# Patient Record
Sex: FEMALE | Race: WHITE | Employment: OTHER | URBAN - METROPOLITAN AREA
[De-identification: names, ages, dates, MRNs, and addresses within clinical notes are randomized per-mention and may not be internally consistent; named-entity substitution may affect disease eponyms.]

---

## 2017-03-09 ENCOUNTER — HOSPITAL ENCOUNTER (OUTPATIENT)
Dept: LAB | Age: 67
Discharge: HOME | End: 2017-03-09
Payer: MEDICARE

## 2017-03-09 DIAGNOSIS — I25.10 ATHEROSCLEROTIC HEART DISEASE OF NATIVE CORONARY ARTERY WITHOUT ANGINA PECTORIS: ICD-10-CM

## 2017-03-09 DIAGNOSIS — E78.5 HYPERLIPEMIA: ICD-10-CM

## 2017-03-09 LAB
ALBUMIN SERPL BCP-MCNC: 3.7 G/DL (ref 3.4–5)
ALBUMIN/GLOB SERPL: 1.1 {RATIO} (ref 0.8–1.7)
ALP SERPL-CCNC: 170 U/L (ref 45–117)
ALT SERPL-CCNC: 20 U/L (ref 13–56)
AST SERPL W P-5'-P-CCNC: 21 U/L (ref 15–37)
BILIRUB DIRECT SERPL-MCNC: 0.2 MG/DL (ref 0–0.2)
BILIRUB SERPL-MCNC: 0.5 MG/DL (ref 0.2–1)
CHOLEST SERPL-MCNC: 158 MG/DL
CK SERPL-CCNC: 117 U/L (ref 26–192)
GLOBULIN SER CALC-MCNC: 3.4 G/DL (ref 2–4)
HDLC SERPL-MCNC: 58 MG/DL (ref 40–60)
HDLC SERPL: 2.7 {RATIO} (ref 0–5)
LDLC SERPL CALC-MCNC: 84.4 MG/DL (ref 0–100)
LIPID PROFILE,FLP: NORMAL
PROT SERPL-MCNC: 7.1 G/DL (ref 6.4–8.2)
TRIGL SERPL-MCNC: 78 MG/DL (ref ?–150)
VLDLC SERPL CALC-MCNC: 15.6 MG/DL

## 2017-03-09 PROCEDURE — 83516 IMMUNOASSAY NONANTIBODY: CPT | Performed by: INTERNAL MEDICINE

## 2017-03-09 PROCEDURE — 80076 HEPATIC FUNCTION PANEL: CPT | Performed by: INTERNAL MEDICINE

## 2017-03-09 PROCEDURE — 36415 COLL VENOUS BLD VENIPUNCTURE: CPT | Performed by: INTERNAL MEDICINE

## 2017-03-09 PROCEDURE — 82103 ALPHA-1-ANTITRYPSIN TOTAL: CPT | Performed by: INTERNAL MEDICINE

## 2017-03-09 PROCEDURE — 84080 ASSAY ALKALINE PHOSPHATASES: CPT | Performed by: INTERNAL MEDICINE

## 2017-03-09 PROCEDURE — 82550 ASSAY OF CK (CPK): CPT | Performed by: INTERNAL MEDICINE

## 2017-03-09 PROCEDURE — 80061 LIPID PANEL: CPT | Performed by: INTERNAL MEDICINE

## 2017-03-09 PROCEDURE — 86038 ANTINUCLEAR ANTIBODIES: CPT | Performed by: INTERNAL MEDICINE

## 2017-03-10 LAB
A1AT SERPL-MCNC: 173 MG/DL (ref 90–200)
ACTIN IGG SERPL-ACNC: 8 UNITS (ref 0–19)
ALP BONE CFR SERPL: 29 % (ref 14–68)
ALP INTEST CFR SERPL: 0 % (ref 0–18)
ALP LIVER CFR SERPL: 71 % (ref 18–85)
ALP SERPL-CCNC: 156 IU/L (ref 39–117)
ANA SER QL: POSITIVE
MITOCHONDRIA M2 IGG SER-ACNC: 6.3 UNITS (ref 0–20)

## 2017-05-22 ENCOUNTER — OFFICE VISIT (OUTPATIENT)
Dept: CARDIOLOGY CLINIC | Age: 67
End: 2017-05-22

## 2017-05-22 VITALS
OXYGEN SATURATION: 97 % | WEIGHT: 199 LBS | HEART RATE: 77 BPM | DIASTOLIC BLOOD PRESSURE: 76 MMHG | SYSTOLIC BLOOD PRESSURE: 112 MMHG | HEIGHT: 62 IN | BODY MASS INDEX: 36.62 KG/M2

## 2017-05-22 DIAGNOSIS — I34.0 NON-RHEUMATIC MITRAL REGURGITATION: ICD-10-CM

## 2017-05-22 DIAGNOSIS — E78.5 DYSLIPIDEMIA: ICD-10-CM

## 2017-05-22 DIAGNOSIS — I25.10 CORONARY ARTERY DISEASE INVOLVING NATIVE CORONARY ARTERY OF NATIVE HEART WITHOUT ANGINA PECTORIS: Primary | ICD-10-CM

## 2017-05-22 DIAGNOSIS — Z98.890 S/P CARDIAC CATHETERIZATION: ICD-10-CM

## 2017-05-22 RX ORDER — DICYCLOMINE HYDROCHLORIDE 10 MG/1
10 CAPSULE ORAL
COMMUNITY
End: 2018-11-01

## 2017-05-22 NOTE — PROGRESS NOTES
1. Have you been to the ER, urgent care clinic since your last visit? Hospitalized since your last visit? No     2. Have you seen or consulted any other health care providers outside of the [address] [place] [address] since your last visit? Include any pap smears or colon screening.   No

## 2017-05-22 NOTE — PROGRESS NOTES
HISTORY OF PRESENT ILLNESS  Yanci Castillo is a 74 y.o. female. Shortness of Breath   Pertinent negatives include no fever, no headaches, no cough, no wheezing, no PND, no orthopnea, no chest pain, no vomiting, no abdominal pain, no rash, no leg swelling and no claudication. Patient presents for a follow-up office visit. She has a history of dyslipidemia and a family history for CAD. The patient had an episode of fairly significant back pain in January 2014 that awoke her from sleep, it radiated around to the front of her chest up into her neck and down both arms associated with tingling and numbness. he patient subsequently underwent a cardiac catheterization in February 2014, which showed a high-grade stenosis of her mid left circumflex extending into obtuse marginal branch 1. She was continued to complain of exertional dyspnea and some chest pain between her shoulder blades, which is felt to be her anginal equivalent. She was also afraid to do any exertional activity because of her high-grade left circumflex stenosis, so she underwent scheduled percutaneous coronary intervention over her left circumflex lesion with a drug-eluting stent In July 2014. She underwent a followup echocardiogram in April 2016 which showed overall improvement of her well ejection fraction of 55-60% without mention of any regional wall motion abnormalities. Her mitral regurgitation was in the moderate range, which may have been minimally increased compared to the prior study. The patient was last seen in the office approximately 6 months ago. Since last visit, she continues have exertional dyspnea, but states her activity tolerance is unchanged. Overall she feels her dyspnea has not significantly changed over the past 6-12 months. No new chest pain or pressure. No leg swelling, orthopnea, or PND.     Past Medical History:   Diagnosis Date    Arthritis     CAD (coronary artery disease), native coronary artery 7/2014    ULISES to LCx    Cardiac cath 07/07/2014    RCA 15%. LM patent. mLAD 30-50%. CX/OM1 85% (2.75 x 26-mm Resolute ULISES, resid 0%).  Cardiac echocardiogram 04/05/2016    EF 55-60%. No WMA. Gr 1 DDfx. Mod MR.  Cardiac nuclear imaging test, abnormal 02/05/2014    Mod-sized, mild-mod mid lateral infarction w/mild-mod rosa isela-infarct ischemia. Mod lateral hypk. EF 56%. Normal EKG on pharm stress test.  Intermediate risk.  Dyslipidemia     Gallstone     Hernia     Obesity     Sinus infection       Current Outpatient Prescriptions   Medication Sig Dispense Refill    dicyclomine (BENTYL) 10 mg capsule Take 10 mg by mouth daily as needed.  rosuvastatin (CRESTOR) 20 mg tablet Take 20 mg by mouth nightly.  metoprolol succinate (TOPROL-XL) 50 mg XL tablet TAKE 1 TABLET BY MOUTH DAILY 90 Tab 3    mometasone 100 mcg/actuation HFAA Take 100 mcg by inhalation daily.  nitroglycerin (NITROSTAT) 0.4 mg SL tablet 1 Tab by SubLINGual route every five (5) minutes as needed for Chest Pain. 1 Bottle 3    aspirin delayed-release 81 mg tablet Take 162 mg by mouth daily.  desloratadine-pseudoephedrine (CLARINEX-D 12 HOUR) 2.5-120 mg per tablet Take 1 Tab by mouth two (2) times a day.  montelukast (SINGULAIR) 10 mg tablet Take 10 mg by mouth daily.  pyridoxine (VITAMIN B-6) 100 mg tablet Take 100 mg by mouth daily.  calcium 500 mg tab Take  by mouth.  DICLOFENAC SODIUM/MISOPROSTOL (ARTHROTEC 50 PO) Take  by mouth. Allergies   Allergen Reactions    Celebrex [Celecoxib] Swelling      Social History   Substance Use Topics    Smoking status: Never Smoker    Smokeless tobacco: Never Used    Alcohol use No            Review of Systems   Constitutional: Negative for chills, fever and weight loss. HENT: Negative for nosebleeds. Eyes: Negative for blurred vision and double vision. Respiratory: Positive for shortness of breath.  Negative for cough and wheezing. Cardiovascular: Negative for chest pain, palpitations, orthopnea, claudication, leg swelling and PND. Gastrointestinal: Negative for abdominal pain, heartburn, nausea and vomiting. Genitourinary: Negative for dysuria and hematuria. Musculoskeletal: Negative for falls and myalgias. Skin: Negative for rash. Neurological: Negative for dizziness, focal weakness and headaches. Endo/Heme/Allergies: Does not bruise/bleed easily. Psychiatric/Behavioral: Negative for substance abuse. Visit Vitals    /76    Pulse 77    Ht 5' 2\" (1.575 m)    Wt 90.3 kg (199 lb)    SpO2 97%    BMI 36.4 kg/m2      Physical Exam   Constitutional: She is oriented to person, place, and time. She appears well-developed and well-nourished. HENT:   Head: Normocephalic and atraumatic. Eyes: Conjunctivae are normal.   Neck: Neck supple. No JVD present. Carotid bruit is not present. Cardiovascular: Normal rate, regular rhythm, S1 normal, S2 normal and normal pulses. Exam reveals no gallop. Murmur heard. High-pitched blowing holosystolic murmur is present with a grade of 2/6  at the apex  Pulmonary/Chest: Effort normal and breath sounds normal. She has no wheezes. She has no rales. Abdominal: Soft. Bowel sounds are normal. There is no tenderness. Musculoskeletal: She exhibits no edema. Neurological: She is alert and oriented to person, place, and time. Skin: Skin is warm and dry. EKG: Normal sinus rhythm, Normal axis, Poor R wave progression, Normal QTc interval, No ST or T wave abnormalities concerning for ischemia. Compared to previous EKG, No significant interval change. ASSESSMENT and PLAN    Coronary artery disease. The patient has a 85-90% stenosis of her mid left circumflex extending into OM1 Which was diagnosed by cardiac catheterization in February 2014. She underwent scheduled PCI In July 2014, with a Resolute drug-eluting stent.  Her symptoms have improved since the intervention. I suspect she did have a small infarction of the inferolateral region in January 2014. Ejection fraction is now back to normal by echocardiogram in 2016, EF 55-60%. She is now taking an aspirin, beta blocker and high-dose potent statin. All of which I would continue. Mitral regurgitation. Non-rheumatic, more likely ischemic in nature. This was moderate on her recent echocardiogram in April 2016. I would like to reevaluate this later this summer with a follow-up echocardiogram.    Dyslipidemia. Patient was recently switched over to Crestor 20 mg daily by her PCP. Her lipids have been reasonably well-controlled on this regimen. I prefer her LDL to be less than 70 if possible. Followup in 6 months, sooner if needed.

## 2017-05-22 NOTE — MR AVS SNAPSHOT
Visit Information Date & Time Provider Department Dept. Phone Encounter #  
 5/22/2017  1:40 PM Jr Cartwright MD [facility] [address] [id number] Your Appointments 11/20/2017  1:20 PM  
Follow Up with Jr Cartwright MD  
[facility] ([facility]) Appt Note: 6 month f/u  
 [address] [place] [address] [address] [address] [address] [phone] [address] Upcoming Health Maintenance Date Due Hepatitis C Screening 1950 DTaP/Tdap/Td series (1 - Tdap) 6/25/1969 BREAST CANCER SCRN MAMMOGRAM 6/25/1998 FOBT Q 1 YEAR AGE 50-75 6/25/1998 ZOSTER VACCINE AGE 60> 6/25/2008 GLAUCOMA SCREENING Q2Y 6/25/2013 OSTEOPOROSIS SCREENING (DEXA) 6/25/2013 Pneumococcal 65+ Low/Medium Risk (1 of 2 - PCV13) 6/25/2013 MEDICARE YEARLY EXAM 6/25/2013 INFLUENZA AGE 9 TO ADULT 8/1/2017 Allergies as of 5/22/2017  Review Complete On: 12/22/2016 By: Demi Dietrich RN Severity Noted Reaction Type Reactions Celebrex [Celecoxib]  10/17/2012    Swelling Current Immunizations  Never Reviewed No immunizations on file. Not reviewed this visit You Were Diagnosed With   
  
 Codes Comments Coronary artery disease involving native coronary artery of native heart without angina pectoris    -  Primary ICD-10-CM: I25.10 ICD-9-CM: 414.01 Non-rheumatic mitral regurgitation     ICD-10-CM: I34.0 ICD-9-CM: 424.0 Dyslipidemia     ICD-10-CM: E78.5 ICD-9-CM: 272.4 S/P cardiac catheterization     ICD-10-CM: Z98.890 ICD-9-CM: V45.89 Vitals BP Pulse Height(growth percentile) Weight(growth percentile) SpO2 BMI  
 112/76 77 5' 2\" (1.575 m) 199 lb (90.3 kg) 97% 36.4 kg/m2 OB Status Smoking Status Postmenopausal Never Smoker Vitals History BMI and BSA Data Body Mass Index Body Surface Area  
 36.4 kg/m 2 1.99 m 2 Preferred Pharmacy Pharmacy Name Phone [facility] [place] [address] [place] [address] [phone] Your Updated Medication List  
  
   
This list is accurate as of: 5/22/17  2:54 PM.  Always use your most recent med list.  
  
  
  
  
 ARTHROTEC 50 PO Take  by mouth. aspirin delayed-release 81 mg tablet Take 162 mg by mouth daily. BENTYL 10 mg capsule Generic drug:  dicyclomine Take 10 mg by mouth daily as needed. calcium 500 mg Tab Take  by mouth. CLARINEX-D 12 HOUR 2.5-120 mg per tablet Generic drug:  desloratadine-pseudoephedrine Take 1 Tab by mouth two (2) times a day. CRESTOR 20 mg tablet Generic drug:  rosuvastatin Take 20 mg by mouth nightly. metoprolol succinate 50 mg XL tablet Commonly known as:  TOPROL-XL  
TAKE 1 TABLET BY MOUTH DAILY  
  
 mometasone 100 mcg/actuation Hfaa Take 100 mcg by inhalation daily. nitroglycerin 0.4 mg SL tablet Commonly known as:  NITROSTAT  
1 Tab by SubLINGual route every five (5) minutes as needed for Chest Pain. SINGULAIR 10 mg tablet Generic drug:  montelukast  
Take 10 mg by mouth daily. VITAMIN B-6 100 mg tablet Generic drug:  pyridoxine (vitamin B6) Take 100 mg by mouth daily. We Performed the Following AMB POC EKG ROUTINE W/ 12 LEADS, INTER & REP [72560 CPT(R)] To-Do List   
 05/22/2017 ECHO:  2D ECHO COMPLETE ADULT (TTE) W OR WO CONTR   
  
 06/12/2017 12:30 PM  
  Appointment with [id number] MACHINE (WT ) at [facility] NON-INVASIVE CARD ([phone]) Age Limit for ALL Heart procedures @ all [facility] facilities: 18 yrs and older only. Under the age of 25, refer to [facility] ([phone]). Wt Limit: 350lbs.   This study requires patient to bring a written physician's order or MD office may fax the order to Central Scheduling at [phone]. Patient needs to bring a current list of all medications. No preparation is required for this study. Patients should report 15 minutes prior to their appointment time to the [facility], [address]. Introducing [facility] SERVICES! Dear Carmen Stephens: Thank you for requesting a [employer] account. Our records indicate that you already have an active [employer] account. You can access your account anytime at [email or web address] Did you know that you can access your hospital and ER discharge instructions at any time in [employer] You can also review all of your test results from your hospital stay or ER visit. Additional Information If you have questions, please visit the Frequently Asked Questions section of the [employer] website at [email or web address] Remember, [employer] is NOT to be used for urgent needs. For medical emergencies, dial 911. Now available from your iPhone and Android! Please provide this summary of care documentation to your next provider. Your primary care clinician is listed as Sada Lew. If you have any questions after today's visit, please call [phone].

## 2017-06-12 ENCOUNTER — HOSPITAL ENCOUNTER (OUTPATIENT)
Dept: NON INVASIVE DIAGNOSTICS | Age: 67
Discharge: HOME | End: 2017-06-12
Attending: INTERNAL MEDICINE
Payer: MEDICARE

## 2017-06-12 DIAGNOSIS — I34.0 NON-RHEUMATIC MITRAL REGURGITATION: ICD-10-CM

## 2017-06-12 PROCEDURE — 93306 TTE W/DOPPLER COMPLETE: CPT

## 2017-06-23 ENCOUNTER — TELEPHONE (OUTPATIENT)
Dept: CARDIOLOGY CLINIC | Age: 67
End: 2017-06-23

## 2017-06-23 NOTE — TELEPHONE ENCOUNTER
Patient was informed that her mitral regurgitation remains in the moderate range and was unchanged from last year.    Patient verbalized understanding to results

## 2017-06-23 NOTE — TELEPHONE ENCOUNTER
----- Message from Jairon Alston MD sent at 6/15/2017  1:41 PM EDT -----  Please let the patient know that her mitral regurgitation remains in the moderate range and was unchanged from last year.  ----- Message -----     From: Christie Romo LPN     Sent: 8/66/2782   4:09 PM       To: Jairon Alston MD    Per last note: Mitral regurgitation. Non-rheumatic, more likely ischemic in nature. This was moderate on her recent echocardiogram in April 2016.   I would like to reevaluate this later this summer with a follow-up echocardiogram.

## 2017-10-11 ENCOUNTER — HOSPITAL ENCOUNTER (OUTPATIENT)
Dept: BONE DENSITY | Age: 67
Discharge: HOME | End: 2017-10-11
Attending: ADVANCED PRACTICE MIDWIFE
Payer: MEDICARE

## 2017-10-11 DIAGNOSIS — M81.0 OSTEOPOROSIS: ICD-10-CM

## 2017-10-11 PROCEDURE — 77080 DXA BONE DENSITY AXIAL: CPT

## 2017-11-20 ENCOUNTER — OFFICE VISIT (OUTPATIENT)
Dept: CARDIOLOGY CLINIC | Age: 67
End: 2017-11-20

## 2017-11-20 VITALS
HEART RATE: 68 BPM | DIASTOLIC BLOOD PRESSURE: 70 MMHG | BODY MASS INDEX: 35.88 KG/M2 | OXYGEN SATURATION: 98 % | HEIGHT: 62 IN | WEIGHT: 195 LBS | SYSTOLIC BLOOD PRESSURE: 110 MMHG

## 2017-11-20 DIAGNOSIS — E78.5 DYSLIPIDEMIA: ICD-10-CM

## 2017-11-20 DIAGNOSIS — I34.0 NON-RHEUMATIC MITRAL REGURGITATION: ICD-10-CM

## 2017-11-20 DIAGNOSIS — I25.10 CORONARY ARTERY DISEASE INVOLVING NATIVE CORONARY ARTERY OF NATIVE HEART WITHOUT ANGINA PECTORIS: Primary | ICD-10-CM

## 2017-11-20 NOTE — MR AVS SNAPSHOT
Visit Information Date & Time Provider Department Dept. Phone Encounter #  
 11/20/2017  1:20 PM Amadou Nassar MD [facility] [address] [id number] Your Appointments 11/19/2018  1:00 PM  
Follow Up with Amadou Nassar MD  
[facility] ([address]) Appt Note: 1 year follow up with an EKG  
 [facility] [address] [address] [address] [phone] [address] [place] [address] [address] Upcoming Health Maintenance Date Due Hepatitis C Screening 1950 DTaP/Tdap/Td series (1 - Tdap) 6/25/1969 BREAST CANCER SCRN MAMMOGRAM 6/25/1998 FOBT Q 1 YEAR AGE 50-75 6/25/1998 ZOSTER VACCINE AGE 60> 4/25/2008 GLAUCOMA SCREENING Q2Y 6/25/2013 Pneumococcal 65+ Low/Medium Risk (1 of 2 - PCV13) 6/25/2013 MEDICARE YEARLY EXAM 6/25/2013 Influenza Age 5 to Adult 8/1/2017 Allergies as of 11/20/2017  Review Complete On: 5/22/2017 By: Amadou Nassar MD  
  
 Severity Noted Reaction Type Reactions Celebrex [Celecoxib]  10/17/2012    Swelling Current Immunizations  Never Reviewed No immunizations on file. Not reviewed this visit You Were Diagnosed With   
  
 Codes Comments Coronary artery disease involving native coronary artery of native heart without angina pectoris    -  Primary ICD-10-CM: I25.10 ICD-9-CM: 414.01 Dyslipidemia     ICD-10-CM: E78.5 ICD-9-CM: 272.4 Non-rheumatic mitral regurgitation     ICD-10-CM: I34.0 ICD-9-CM: 424.0 Vitals BP Pulse Height(growth percentile) Weight(growth percentile) SpO2 BMI  
 110/70 68 5' 2\" (1.575 m) 195 lb (88.5 kg) 98% 35.67 kg/m2 OB Status Smoking Status Postmenopausal Never Smoker Vitals History BMI and BSA Data Body Mass Index Body Surface Area  
 35.67 kg/m 2 1.97 m 2 Preferred Pharmacy Pharmacy Name Phone [facility] DR[facility]ORE [facility] [place] [address] [facility] [phone] Your Updated Medication List  
  
   
This list is accurate as of: 11/20/17  2:13 PM.  Always use your most recent med list.  
  
  
  
  
 ARTHROTEC 50 PO Take  by mouth. aspirin delayed-release 81 mg tablet Take 162 mg by mouth daily. BENTYL 10 mg capsule Generic drug:  dicyclomine Take 10 mg by mouth daily as needed. calcium 500 mg Tab Take  by mouth. CLARINEX-D 12 HOUR 2.5-120 mg per tablet Generic drug:  desloratadine-pseudoephedrine Take 1 Tab by mouth two (2) times a day. CRESTOR 20 mg tablet Generic drug:  rosuvastatin Take 20 mg by mouth nightly. metoprolol succinate 50 mg XL tablet Commonly known as:  TOPROL-XL  
TAKE 1 TABLET BY MOUTH DAILY  
  
 mometasone 100 mcg/actuation Hfaa Take 100 mcg by inhalation daily. nitroglycerin 0.4 mg SL tablet Commonly known as:  NITROSTAT  
1 Tab by SubLINGual route every five (5) minutes as needed for Chest Pain. SINGULAIR 10 mg tablet Generic drug:  montelukast  
Take 10 mg by mouth daily. VITAMIN B-6 100 mg tablet Generic drug:  pyridoxine (vitamin B6) Take 100 mg by mouth daily. We Performed the Following AMB POC EKG ROUTINE W/ 12 LEADS, INTER & REP [72298 CPT(R)] To-Do List   
 10/01/2018 ECHO:  2D ECHO COMPLETE ADULT (TTE) W OR WO CONTR Introducing [facility]! Dear Petra Melchor: Thank you for requesting a [employer] account. Our records indicate that you already have an active [employer] account. You can access your account anytime at [email or web address] Did you know that you can access your hospital and ER discharge instructions at any time in [employer]? You can also review all of your test results from your hospital stay or ER visit. Additional Information If you have questions, please visit the Frequently Asked Questions section of the [employer] website at [email or web address] Remember, [employer] is NOT to be used for urgent needs. For medical emergencies, dial 911. Now available from your iPhone and Android! Please provide this summary of care documentation to your next provider. Your primary care clinician is listed as Glynn Hall. If you have any questions after today's visit, please call [phone].

## 2017-11-20 NOTE — PROGRESS NOTES
1. Have you been to the ER, urgent care clinic since your last visit? Hospitalized since your last visit? No     2. Have you seen or consulted any other health care providers outside of the [address] [place] [address] since your last visit? Include any pap smears or colon screening.  No

## 2017-11-20 NOTE — PROGRESS NOTES
HISTORY OF PRESENT ILLNESS  Timothy Najera is a 69 y.o. female. Shortness of Breath   Pertinent negatives include no fever, no headaches, no cough, no wheezing, no PND, no orthopnea, no chest pain, no vomiting, no abdominal pain, no rash, no leg swelling and no claudication. Patient presents for a follow-up office visit. The patient had an episode of fairly significant back pain in January 2014 that awoke her from sleep, it radiated around to the front of her chest up into her neck and down both arms associated with tingling and numbness. She subsequently underwent a cardiac catheterization in February 2014, which showed a high-grade stenosis of her mid left circumflex extending into obtuse marginal branch 1. She was continued to complain of exertional dyspnea and some chest pain between her shoulder blades, which is felt to be her anginal equivalent. She was also afraid to do any exertional activity because of her high-grade left circumflex stenosis, so she underwent scheduled percutaneous coronary intervention over her left circumflex lesion with a drug-eluting stent In July 2014. She underwent a followup echocardiogram in April 2016 which showed overall improvement of her well ejection fraction of 55-60% without mention of any regional wall motion abnormalities. Her mitral regurgitation was in the moderate range, which may have been minimally increased compared to the prior study. The patient then underwent a follow-up echocardiogram in June 2017 which showed preserved LV systolic function, EF 43%, evidence of diastolic dysfunction, mitral valve prolapse with moderate mitral regurgitation, essentially unchanged compared to the study from 2016. She was last seen in the office 6 months ago. Since that time, she has been feeling well. She continues to have exertional dyspnea which has been present for several years and has not significantly changed.   She denies any new chest pain or pressure. No leg swelling, orthopnea, or PND. No palpitations, dizziness, nor syncope. Past Medical History:   Diagnosis Date    Arthritis     CAD (coronary artery disease), native coronary artery 7/2014    ULISES to LCx    Cardiac cath 07/07/2014    RCA 15%. LM patent. mLAD 30-50%. CX/OM1 85% (2.75 x 26-mm Resolute ULISES, resid 0%).  Cardiac echocardiogram 04/05/2016    EF 55-60%. No WMA. Gr 1 DDfx. Mod MR.  Cardiac nuclear imaging test, abnormal 02/05/2014    Mod-sized, mild-mod mid lateral infarction w/mild-mod rosa isela-infarct ischemia. Mod lateral hypk. EF 56%. Normal EKG on pharm stress test.  Intermediate risk.  Dyslipidemia     Gallstone     Hernia     Obesity     Sinus infection       Current Outpatient Prescriptions   Medication Sig Dispense Refill    dicyclomine (BENTYL) 10 mg capsule Take 10 mg by mouth daily as needed.  rosuvastatin (CRESTOR) 20 mg tablet Take 20 mg by mouth nightly.  metoprolol succinate (TOPROL-XL) 50 mg XL tablet TAKE 1 TABLET BY MOUTH DAILY 90 Tab 3    mometasone 100 mcg/actuation HFAA Take 100 mcg by inhalation daily.  nitroglycerin (NITROSTAT) 0.4 mg SL tablet 1 Tab by SubLINGual route every five (5) minutes as needed for Chest Pain. 1 Bottle 3    aspirin delayed-release 81 mg tablet Take 162 mg by mouth daily.  desloratadine-pseudoephedrine (CLARINEX-D 12 HOUR) 2.5-120 mg per tablet Take 1 Tab by mouth two (2) times a day.  montelukast (SINGULAIR) 10 mg tablet Take 10 mg by mouth daily.  pyridoxine (VITAMIN B-6) 100 mg tablet Take 100 mg by mouth daily.  calcium 500 mg tab Take  by mouth.  DICLOFENAC SODIUM/MISOPROSTOL (ARTHROTEC 50 PO) Take  by mouth.          Allergies   Allergen Reactions    Celebrex [Celecoxib] Swelling      Social History   Substance Use Topics    Smoking status: Never Smoker    Smokeless tobacco: Never Used    Alcohol use No            Review of Systems   Constitutional: Negative for chills, fever and weight loss. HENT: Negative for nosebleeds. Eyes: Negative for blurred vision and double vision. Respiratory: Positive for shortness of breath. Negative for cough and wheezing. Cardiovascular: Negative for chest pain, palpitations, orthopnea, claudication, leg swelling and PND. Gastrointestinal: Negative for abdominal pain, heartburn, nausea and vomiting. Genitourinary: Negative for dysuria and hematuria. Musculoskeletal: Negative for falls and myalgias. Skin: Negative for rash. Neurological: Negative for dizziness, focal weakness and headaches. Endo/Heme/Allergies: Does not bruise/bleed easily. Psychiatric/Behavioral: Negative for substance abuse. Visit Vitals    /70    Pulse 68    Ht 5' 2\" (1.575 m)    Wt 88.5 kg (195 lb)    SpO2 98%    BMI 35.67 kg/m2      Physical Exam   Constitutional: She is oriented to person, place, and time. She appears well-developed and well-nourished. HENT:   Head: Normocephalic and atraumatic. Eyes: Conjunctivae are normal.   Neck: Neck supple. No JVD present. Carotid bruit is not present. Cardiovascular: Normal rate, regular rhythm, S1 normal, S2 normal and normal pulses. Exam reveals no gallop. Murmur heard. High-pitched blowing holosystolic murmur is present with a grade of 2/6  at the apex  Pulmonary/Chest: Effort normal and breath sounds normal. She has no wheezes. She has no rales. Abdominal: Soft. Bowel sounds are normal. There is no tenderness. Musculoskeletal: She exhibits no edema. Neurological: She is alert and oriented to person, place, and time. Skin: Skin is warm and dry. EKG: Normal sinus rhythm, Normal axis, Poor R wave progression, Normal QTc interval, No ST or T wave abnormalities concerning for ischemia. Compared to previous EKG, No significant interval change. ASSESSMENT and PLAN    Coronary artery disease.   The patient had a 85-90% stenosis of her mid left circumflex extending into OM1 diagnosed by cardiac catheterization in February 2014. She underwent scheduled PCI In July 2014, with a Resolute drug-eluting stent. Her symptoms have improved since the intervention. I suspect she did have a small infarction of the inferolateral region in January 2014. Ejection fraction is now back to normal by echocardiogram in 2017, EF 60%. She is now taking an aspirin, beta blocker and high-dose potent statin. All of which I would continue. Mitral regurgitation. Non-rheumatic, more likely ischemic versus myxomatous in nature. This was moderate on her recent echocardiogram in June 2017. I would like to reevaluate this annually with follow-up echocardiograms. Dyslipidemia. Patient was recently switched over to Crestor 20 mg daily by her PCP. Her lipids have been reasonably well-controlled on this regimen. I prefer her LDL to be less than 70 if possible. Followup in 12 months, sooner if needed.

## 2017-12-11 RX ORDER — METOPROLOL SUCCINATE 50 MG/1
TABLET, EXTENDED RELEASE ORAL
Qty: 90 TAB | Refills: 3 | Status: SHIPPED | OUTPATIENT
Start: 2017-12-11 | End: 2021-04-05 | Stop reason: SDUPTHER

## 2018-07-26 ENCOUNTER — HOSPITAL ENCOUNTER (OUTPATIENT)
Dept: LAB | Age: 68
Discharge: HOME | End: 2018-07-26
Payer: MEDICARE

## 2018-07-26 DIAGNOSIS — Z72.89 OTHER PROBLEMS RELATED TO LIFESTYLE: ICD-10-CM

## 2018-07-26 DIAGNOSIS — I25.119 ATHEROSCLEROTIC HEART DISEASE OF NATIVE CORONARY ARTERY WITH ANGINA PECTORIS (HCC): ICD-10-CM

## 2018-07-26 DIAGNOSIS — E55.9 VITAMIN D DEFICIENCY: ICD-10-CM

## 2018-07-26 DIAGNOSIS — E78.5 HYPERLIPEMIA: ICD-10-CM

## 2018-07-26 DIAGNOSIS — R73.01 IMPAIRED FASTING GLUCOSE: ICD-10-CM

## 2018-07-26 LAB
25(OH)D3 SERPL-MCNC: 23.9 NG/ML (ref 30–100)
ALBUMIN SERPL-MCNC: 3.5 G/DL (ref 3.4–5)
ALBUMIN/GLOB SERPL: 0.9 {RATIO} (ref 0.8–1.7)
ALP SERPL-CCNC: 124 U/L (ref 45–117)
ALT SERPL-CCNC: 20 U/L (ref 13–56)
ANION GAP SERPL CALC-SCNC: 6 MMOL/L (ref 3–18)
AST SERPL-CCNC: 16 U/L (ref 15–37)
BASOPHILS # BLD: 0.1 K/UL (ref 0–0.1)
BASOPHILS NFR BLD: 1 % (ref 0–2)
BILIRUB SERPL-MCNC: 0.5 MG/DL (ref 0.2–1)
BUN SERPL-MCNC: 15 MG/DL (ref 7–18)
BUN/CREAT SERPL: 18 (ref 12–20)
CALCIUM SERPL-MCNC: 8.5 MG/DL (ref 8.5–10.1)
CHLORIDE SERPL-SCNC: 109 MMOL/L (ref 100–108)
CHOLEST SERPL-MCNC: 151 MG/DL
CO2 SERPL-SCNC: 27 MMOL/L (ref 21–32)
CREAT SERPL-MCNC: 0.82 MG/DL (ref 0.6–1.3)
DIFFERENTIAL METHOD BLD: NORMAL
EOSINOPHIL # BLD: 0.4 K/UL (ref 0–0.4)
EOSINOPHIL NFR BLD: 5 % (ref 0–5)
ERYTHROCYTE [DISTWIDTH] IN BLOOD BY AUTOMATED COUNT: 13.7 % (ref 11.6–14.5)
EST. AVERAGE GLUCOSE BLD GHB EST-MCNC: 134 MG/DL
GLOBULIN SER CALC-MCNC: 3.7 G/DL (ref 2–4)
GLUCOSE SERPL-MCNC: 106 MG/DL (ref 74–99)
HBA1C MFR BLD: 6.3 % (ref 4.2–5.6)
HCT VFR BLD AUTO: 41.8 % (ref 35–45)
HCV AB SER IA-ACNC: 0.12 INDEX
HCV AB SERPL QL IA: NEGATIVE
HCV COMMENT,HCGAC: NORMAL
HDLC SERPL-MCNC: 57 MG/DL (ref 40–60)
HDLC SERPL: 2.6 {RATIO} (ref 0–5)
HGB BLD-MCNC: 13.8 G/DL (ref 12–16)
LDLC SERPL CALC-MCNC: 71.2 MG/DL (ref 0–100)
LIPID PROFILE,FLP: NORMAL
LYMPHOCYTES # BLD: 3.2 K/UL (ref 0.9–3.6)
LYMPHOCYTES NFR BLD: 38 % (ref 21–52)
MCH RBC QN AUTO: 28.6 PG (ref 24–34)
MCHC RBC AUTO-ENTMCNC: 33 G/DL (ref 31–37)
MCV RBC AUTO: 86.5 FL (ref 74–97)
MONOCYTES # BLD: 0.4 K/UL (ref 0.05–1.2)
MONOCYTES NFR BLD: 5 % (ref 3–10)
NEUTS SEG # BLD: 4.2 K/UL (ref 1.8–8)
NEUTS SEG NFR BLD: 51 % (ref 40–73)
PLATELET # BLD AUTO: 267 K/UL (ref 135–420)
PMV BLD AUTO: 11.2 FL (ref 9.2–11.8)
POTASSIUM SERPL-SCNC: 4.6 MMOL/L (ref 3.5–5.5)
PROT SERPL-MCNC: 7.2 G/DL (ref 6.4–8.2)
RBC # BLD AUTO: 4.83 M/UL (ref 4.2–5.3)
SODIUM SERPL-SCNC: 142 MMOL/L (ref 136–145)
TRIGL SERPL-MCNC: 114 MG/DL (ref ?–150)
VLDLC SERPL CALC-MCNC: 22.8 MG/DL
WBC # BLD AUTO: 8.3 K/UL (ref 4.6–13.2)

## 2018-07-26 PROCEDURE — 83036 HEMOGLOBIN GLYCOSYLATED A1C: CPT | Performed by: INTERNAL MEDICINE

## 2018-07-26 PROCEDURE — 36415 COLL VENOUS BLD VENIPUNCTURE: CPT | Performed by: INTERNAL MEDICINE

## 2018-07-26 PROCEDURE — 82306 VITAMIN D 25 HYDROXY: CPT | Performed by: INTERNAL MEDICINE

## 2018-07-26 PROCEDURE — 86803 HEPATITIS C AB TEST: CPT | Performed by: INTERNAL MEDICINE

## 2018-07-26 PROCEDURE — 85025 COMPLETE CBC W/AUTO DIFF WBC: CPT | Performed by: INTERNAL MEDICINE

## 2018-07-26 PROCEDURE — 80053 COMPREHEN METABOLIC PANEL: CPT | Performed by: INTERNAL MEDICINE

## 2018-07-26 PROCEDURE — 80061 LIPID PANEL: CPT | Performed by: INTERNAL MEDICINE

## 2018-08-09 ENCOUNTER — HOSPITAL ENCOUNTER (OUTPATIENT)
Dept: CT IMAGING | Age: 68
Discharge: HOME | End: 2018-08-09
Attending: INTERNAL MEDICINE
Payer: MEDICARE

## 2018-08-09 DIAGNOSIS — R31.29 MICROSCOPIC HEMATURIA: ICD-10-CM

## 2018-08-09 DIAGNOSIS — R35.0 URINARY FREQUENCY: ICD-10-CM

## 2018-08-09 PROCEDURE — 74176 CT ABD & PELVIS W/O CONTRAST: CPT

## 2018-08-21 ENCOUNTER — OFFICE VISIT (OUTPATIENT)
Dept: UROLOGY | Age: 68
End: 2018-08-21

## 2018-08-21 ENCOUNTER — HOSPITAL ENCOUNTER (OUTPATIENT)
Dept: LAB | Age: 68
Discharge: HOME | End: 2018-08-21
Payer: MEDICARE

## 2018-08-21 VITALS
BODY MASS INDEX: 37.17 KG/M2 | HEIGHT: 62 IN | HEART RATE: 76 BPM | WEIGHT: 202 LBS | TEMPERATURE: 97.3 F | OXYGEN SATURATION: 94 % | SYSTOLIC BLOOD PRESSURE: 121 MMHG | DIASTOLIC BLOOD PRESSURE: 68 MMHG

## 2018-08-21 DIAGNOSIS — R31.29 MICROSCOPIC HEMATURIA: Primary | ICD-10-CM

## 2018-08-21 DIAGNOSIS — Z87.442 HISTORY OF KIDNEY STONES: ICD-10-CM

## 2018-08-21 PROBLEM — E66.01 SEVERE OBESITY (BMI 35.0-39.9): Status: ACTIVE | Noted: 2018-08-21

## 2018-08-21 LAB
BILIRUB UR QL STRIP: NEGATIVE
GLUCOSE UR-MCNC: NEGATIVE MG/DL
KETONES P FAST UR STRIP-MCNC: NEGATIVE MG/DL
PH UR STRIP: 5.5 [PH] (ref 4.6–8)
PROT UR QL STRIP: NORMAL
SP GR UR STRIP: 1.03 (ref 1–1.03)
UA UROBILINOGEN AMB POC: NORMAL (ref 0.2–1)
URINALYSIS CLARITY POC: CLEAR
URINALYSIS COLOR POC: YELLOW
URINE BLOOD POC: NORMAL
URINE LEUKOCYTES POC: NEGATIVE
URINE NITRITES POC: NEGATIVE

## 2018-08-21 PROCEDURE — 88112 CYTOPATH CELL ENHANCE TECH: CPT | Performed by: UROLOGY

## 2018-08-21 RX ORDER — DEXTROMETHORPHAN HYDROBROMIDE, GUAIFENESIN 5; 100 MG/5ML; MG/5ML
650 LIQUID ORAL EVERY 8 HOURS
COMMUNITY
End: 2020-11-13

## 2018-08-21 NOTE — PROGRESS NOTES
Ms. Stacia Nichols has a reminder for a \"due or due soon\" health maintenance. I have asked that she contact her primary care provider for follow-up on this health maintenance.

## 2018-08-21 NOTE — PATIENT INSTRUCTIONS

## 2018-08-21 NOTE — PROGRESS NOTES
Chief Complaint   Patient presents with    New Patient    Microscopic Hematuria       HISTORY OF PRESENT ILLNESS:  Freedom Teran is a 77 y.o. female who comes in today with a history of microhematuria. She has never noticed any bleeding herself either on the tissue or on her underclothing. She does have a remote history of gross hematuria about 2 years ago when she passed a ureteral stone. The CT scan brought with her today and which I have reviewed with her shows no evidence of any upper tract stones that I can see. She is currently completely asymptomatic. Her urinalysis though in our office does show some mild proteinuria although her metabolic blood work all looks fine and her renal function studies are completely normal.  She has never been a smoker. She does take aspirin for mild anticoagulants resulting from a cardiac stent being placed about 3 years ago. Past Medical History:   Diagnosis Date    Arthritis     CAD (coronary artery disease), native coronary artery 7/2014    ULISES to LCx    Cardiac cath 07/07/2014    RCA 15%. LM patent. mLAD 30-50%. CX/OM1 85% (2.75 x 26-mm Resolute ULISES, resid 0%).  Cardiac echocardiogram 04/05/2016    EF 55-60%. No WMA. Gr 1 DDfx. Mod MR.  Cardiac nuclear imaging test, abnormal 02/05/2014    Mod-sized, mild-mod mid lateral infarction w/mild-mod rosa isela-infarct ischemia. Mod lateral hypk. EF 56%. Normal EKG on pharm stress test.  Intermediate risk.     Dyslipidemia     Gallstone     Hernia     Obesity     Sinus infection        Past Surgical History:   Procedure Laterality Date    HX CHOLECYSTECTOMY      HX HEART CATHETERIZATION      HX HERNIA REPAIR      HX LYSIS OF ADHESIONS      HX OOPHORECTOMY         Social History   Substance Use Topics    Smoking status: Never Smoker    Smokeless tobacco: Never Used    Alcohol use No       Allergies   Allergen Reactions    Celebrex [Celecoxib] Swelling       Family History   Problem Relation Age of Onset    Hypertension Mother     Cancer Father     Heart Disease Maternal Grandmother        Current Outpatient Prescriptions   Medication Sig Dispense Refill    folic acid/multivit-min/lutein (CENTRUM SILVER PO) Take  by mouth.  multivit-mins 25-folic acid-D3 9-3,602 mg-unit tab Take  by mouth.  acetaminophen (TYLENOL ARTHRITIS PAIN) 650 mg TbER Take 650 mg by mouth every eight (8) hours.  metoprolol succinate (TOPROL-XL) 50 mg XL tablet TAKE 1 TABLET BY MOUTH DAILY 90 Tab 3    dicyclomine (BENTYL) 10 mg capsule Take 10 mg by mouth daily as needed.  rosuvastatin (CRESTOR) 20 mg tablet Take 20 mg by mouth nightly.  mometasone 100 mcg/actuation HFAA Take 100 mcg by inhalation daily.  aspirin delayed-release 81 mg tablet Take 162 mg by mouth daily.  desloratadine-pseudoephedrine (CLARINEX-D 12 HOUR) 2.5-120 mg per tablet Take 1 Tab by mouth two (2) times a day.  DICLOFENAC SODIUM/MISOPROSTOL (ARTHROTEC 50 PO) Take  by mouth.  nitroglycerin (NITROSTAT) 0.4 mg SL tablet 1 Tab by SubLINGual route every five (5) minutes as needed for Chest Pain. 1 Bottle 3    montelukast (SINGULAIR) 10 mg tablet Take 10 mg by mouth daily.  pyridoxine (VITAMIN B-6) 100 mg tablet Take 100 mg by mouth daily.  calcium 500 mg tab Take  by mouth. REVIEW OF SYSTEMS:   Documented on the chart      PHYSICAL EXAMINATION:     Visit Vitals    /68 (BP 1 Location: Left arm, BP Patient Position: Sitting)    Pulse 76    Temp 97.3 °F (36.3 °C) (Oral)    Ht 5' 2\" (1.575 m)    Wt 202 lb (91.6 kg)    SpO2 94%    BMI 36.95 kg/m2     Constitutional: Well developed, well-nourished female in no acute distress. CV:  No peripheral swelling noted  Respiratory: No respiratory distress or difficulties  Abdomen:  Soft and nontender. No masses. No hepatosplenomegaly.  Female: A pelvic exam is not performed today. Skin:  Normal color. No evidence of jaundice. Neuro/Psych:  Patient with appropriate affect. Alert and oriented. Lymphatic:   No enlargement of supraclavicular lymph nodes. Results for orders placed or performed in visit on 08/21/18   AMB POC URINALYSIS DIP STICK AUTO W/O MICRO   Result Value Ref Range    Color (UA POC) Yellow     Clarity (UA POC) Clear     Glucose (UA POC) Negative Negative    Bilirubin (UA POC) Negative Negative    Ketones (UA POC) Negative Negative    Specific gravity (UA POC) 1.030 1.001 - 1.035    Blood (UA POC) Trace Negative    pH (UA POC) 5.5 4.6 - 8.0    Protein (UA POC) 2+ Negative    Urobilinogen (UA POC) 1 mg/dL 0.2 - 1    Nitrites (UA POC) Negative Negative    Leukocyte esterase (UA POC) Negative Negative         REVIEW OF LABS AND IMAGING:      Imaging Report Reviewed? YES      Images Reviewed? YES           Other Lab Data Reviewed? YES    ASSESSMENT:     ICD-10-CM ICD-9-CM    1. Microscopic hematuria R31.29 599.72 AMB POC URINALYSIS DIP STICK AUTO W/O MICRO      CYTOLOGY NON-GYN   2. History of kidney stones Z87.442 V13.01                 PLAN/DISCUSSION: I have reviewed the CT scan with her and gone over those results. I am going to send urine for cytology despite the fact that she has never been a smoker nevertheless on the basis of all AUA guidelines for microhematuria, I think she is going to need a flexible cystoscopy here in the office. 1 of the things that is of note of her urine is that she does have some proteinuria. This may indicate some glomerular dysfunction despite normal renal function. Patient voices understanding and agreement to the plan. Pietro Barton MD on 8/21/2018           Please note: This document has been produced using voice recognition software. Unrecognized errors in transcription may be present.

## 2018-09-06 ENCOUNTER — OFFICE VISIT (OUTPATIENT)
Dept: UROLOGY | Age: 68
End: 2018-09-06

## 2018-09-06 VITALS
SYSTOLIC BLOOD PRESSURE: 125 MMHG | OXYGEN SATURATION: 93 % | BODY MASS INDEX: 37.17 KG/M2 | HEART RATE: 85 BPM | DIASTOLIC BLOOD PRESSURE: 74 MMHG | WEIGHT: 202 LBS | HEIGHT: 62 IN

## 2018-09-06 DIAGNOSIS — R31.29 MICROSCOPIC HEMATURIA: Primary | ICD-10-CM

## 2018-09-06 LAB
BILIRUB UR QL STRIP: NORMAL
GLUCOSE UR-MCNC: NEGATIVE MG/DL
KETONES P FAST UR STRIP-MCNC: NEGATIVE MG/DL
PH UR STRIP: 5.5 [PH] (ref 4.6–8)
PROT UR QL STRIP: NORMAL
SP GR UR STRIP: 1.03 (ref 1–1.03)
UA UROBILINOGEN AMB POC: NORMAL (ref 0.2–1)
URINALYSIS CLARITY POC: CLEAR
URINALYSIS COLOR POC: YELLOW
URINE BLOOD POC: NORMAL
URINE LEUKOCYTES POC: NEGATIVE
URINE NITRITES POC: NEGATIVE

## 2018-09-06 RX ORDER — CHOLECALCIFEROL (VITAMIN D3) 125 MCG
CAPSULE ORAL DAILY
COMMUNITY

## 2018-09-06 RX ORDER — RABEPRAZOLE SODIUM 20 MG/1
TABLET, DELAYED RELEASE ORAL
COMMUNITY
Start: 2018-06-07 | End: 2018-11-01

## 2018-09-06 RX ORDER — CONJUGATED ESTROGENS 0.62 MG/G
CREAM VAGINAL
COMMUNITY
Start: 2018-06-04 | End: 2020-11-13

## 2018-09-06 NOTE — MR AVS SNAPSHOT
[facility] [address] [address] 
[phone] Patient: Manjinder Gonzales MRN: [id number] IEE:[id number] Visit Information Date & Time Provider Department Dept. Phone Encounter #  
 9/6/2018  2:00 PM Ezio Marie Spangler [facility] [phone] Your Appointments 11/19/2018  1:00 PM  
Follow Up with Miladys Bolivar MD  
[facility] [place] [address] ([address] [place] [address]) Appt Note: 1 year follow up with an EKG  
 [place] [address] [address] [address] [phone] [address] [place] [address] [address] Upcoming Health Maintenance Date Due DTaP/Tdap/Td series (1 - Tdap) 6/25/1969 BREAST CANCER SCRN MAMMOGRAM 6/25/1998 FOBT Q 1 YEAR AGE 50-75 6/25/1998 ZOSTER VACCINE AGE 60> 4/25/2008 GLAUCOMA SCREENING Q2Y 6/25/2013 Pneumococcal 65+ Low/Medium Risk (1 of 2 - PCV13) 6/25/2013 MEDICARE YEARLY EXAM 3/14/2018 Influenza Age 5 to Adult 8/1/2018 Allergies as of 9/6/2018  Review Complete On: 9/6/2018 By: Nathaly Perry LPN Severity Noted Reaction Type Reactions Celebrex [Celecoxib]  10/17/2012    Swelling Current Immunizations  Never Reviewed No immunizations on file. Not reviewed this visit You Were Diagnosed With   
  
 Codes Comments Microscopic hematuria    -  Primary ICD-10-CM: R31.29 ICD-9-CM: 599.72 Vitals BP Pulse Height(growth percentile) Weight(growth percentile) SpO2 BMI  
 125/74 (BP 1 Location: Left arm, BP Patient Position: Sitting) 85 5' 2\" (1.575 m) 202 lb (91.6 kg) 93% 36.95 kg/m2 OB Status Smoking Status Postmenopausal Never Smoker Vitals History BMI and BSA Data Body Mass Index Body Surface Area  
 36.95 kg/m 2 2 m 2 Preferred Pharmacy Pharmacy Name Phone [facility] [place] [address] [place] [address] [phone] Your Updated Medication List  
  
   
This list is accurate as of 9/6/18  2:44 PM.  Always use your most recent med list.  
  
  
  
  
 ARTHROTEC 50 PO Take  by mouth. aspirin delayed-release 81 mg tablet Take 162 mg by mouth daily. BENTYL 10 mg capsule Generic drug:  dicyclomine Take 10 mg by mouth daily as needed. calcium 500 mg Tab Take  by mouth. CENTRUM SILVER PO Take  by mouth. CLARINEX-D 12 HOUR 2.5-120 mg per tablet Generic drug:  desloratadine-pseudoephedrine Take 1 Tab by mouth two (2) times a day. CRESTOR 20 mg tablet Generic drug:  rosuvastatin Take 20 mg by mouth nightly. metoprolol succinate 50 mg XL tablet Commonly known as:  TOPROL-XL  
TAKE 1 TABLET BY MOUTH DAILY  
  
 mometasone 100 mcg/actuation Hfaa Take 100 mcg by inhalation daily. multivit-mins 25-folic acid-D3 6-4,218 mg-unit Tab Take  by mouth. nitroglycerin 0.4 mg SL tablet Commonly known as:  NITROSTAT  
1 Tab by SubLINGual route every five (5) minutes as needed for Chest Pain. PREMARIN 0.625 mg/gram vaginal cream  
Generic drug:  conjugated estrogens RABEprazole 20 mg tablet Commonly known as:  ACIPHEX  
  
 SINGULAIR 10 mg tablet Generic drug:  montelukast  
Take 10 mg by mouth daily. TYLENOL ARTHRITIS PAIN 650 mg [place] Generic drug:  acetaminophen Take 650 mg by mouth every eight (8) hours. VITAMIN B-6 100 mg tablet Generic drug:  pyridoxine (vitamin B6) Take 100 mg by mouth daily. VITAMIN D3 2,000 unit Tab Generic drug:  cholecalciferol (vitamin D3) Take  by mouth. We Performed the Following AMB POC URINALYSIS DIP STICK AUTO W/O MICRO [73200 CPT(R)] CYSTOSCOPY [06478 CPT(R)] To-Do List   
 11/07/2018 12:30 PM  
 Appointment with [facility] S[facility] at [facility]-INVAS[facility] ([phone]) Age Limit for ALL Heart procedures @ all [employer] facilities: 18 yrs and older only. Under the age of 25, refer to [facility] ([phone]). Wt Limit: 350lbs. This study requires patient to bring a written physician's order or MD office may fax the order to Central Scheduling at [phone]. Patient needs to bring a current list of all medications. No preparation is required for this study. Patients should report 15 minutes prior to their appointment time to the [facility], [address] [facility]/[address]. Patient Instructions Blood in the Urine: Care Instructions Your Care Instructions Blood in the urine, or hematuria, may make the urine look red, brown, or pink. There may be blood every time you urinate or just from time to time. You cannot always see blood in the urine, but it will show up in a urine test. 
Blood in the urine may be serious. It should always be checked by a doctor. Your doctor may recommend more tests, including an X-ray, a CT scan, or a cystoscopy (which lets a doctor look inside the urethra and bladder). Blood in the urine can be a sign of another problem. Common causes are bladder infections and kidney stones. An injury to your groin or your genital area can also cause bleeding in the urinary tract. Very hard exercise-such as running a marathon-can cause blood in the urine. Blood in the urine can also be a sign of kidney disease or cancer in the bladder or kidney. Many cases of blood in the urine are caused by a harmless condition that runs in families. This is called benign familial hematuria. It does not need any treatment. Sometimes your urine may look red or brown even though it does not contain blood.  For example, not getting enough fluids (dehydration), taking certain medicines, or having a liver problem can change the color of your urine. Eating foods such as beets, rhubarb, or blackberries or foods with red food coloring can make your urine look red or pink. Follow-up care is a key part of your treatment and safety. Be sure to make and go to all appointments, and call your doctor if you are having problems. It's also a good idea to know your test results and keep a list of the medicines you take. When should you call for help? Call your doctor now or seek immediate medical care if: 
  · You have symptoms of a urinary infection. For example: ¨ You have pus in your urine. ¨ You have pain in your back just below your rib cage. This is called flank pain. ¨ You have a fever, chills, or body aches. ¨ It hurts to urinate. ¨ You have groin or belly pain.  
  · You have more blood in your urine.  
 Watch closely for changes in your health, and be sure to contact your doctor if: 
  · You have new urination problems.  
  · You do not get better as expected. Where can you learn more? Go to http://ofelia-yamilex.info/. Enter J391 in the search box to learn more about \"Blood in the Urine: Care Instructions. \" Current as of: May 12, 2017 Content Version: 11.7 © 8085-8733 [employer]. Care instructions adapted under license by AngelPrime (which disclaims liability or warranty for this information). If you have questions about a medical condition or this instruction, always ask your healthcare professional. [place] [address] any warranty or liability for your use of this information. Cystoscopy: Care Instructions Your Care Instructions Cystoscopy is a test. It uses a thin, lighted tube called a cystoscope to see the inside of the bladder and the urethra. The urethra is the tube that carries urine out of the body. This test is helpful because it lets your doctor see areas of your bladder and urethra that are hard to see on X-rays.  It can help your doctor find bladder stones, tumors, bleeding, and infection. During this test, your doctor also can take tissue and urine samples. And if your doctor finds small stones or growths, he or she can remove them. In most cases the scope is in the bladder for less than 10 minutes. But the entire test may take 45 minutes or longer. You will probably get local anesthesia. This numbs a small part of your body. Or you may get spinal anesthesia, which numbs more of your body. Once in a while, doctors use general anesthesia. It makes you sleep during surgery. If you get a local anesthetic, you may be able to get up right after the test. But if you had spinal or general anesthesia, you will stay in the recovery room until you are able to walk or you have feeling again in your lower body. This usually takes about an hour. Your doctor may be able to tell you some of the results right after the test. But the complete results may take several days. Follow-up care is a key part of your treatment and safety. Be sure to make and go to all appointments, and call your doctor if you are having problems. It's also a good idea to know your test results and keep a list of the medicines you take. How can you care for yourself at home? Before the test 
· If you are having a local anesthetic, you can eat and drink before the test. 
· If you are having a spinal or general anesthetic, do not eat or drink anything for at least 8 hours before the test. Tell your doctor what medicines you take. · If you are not staying overnight in the hospital, make sure you have someone who can drive you home after the test. 
After the test 
· If your doctor prescribed antibiotics, take them as directed. Do not stop taking them just because you feel better. You need to take the full course of antibiotics. · You may have some burning when you urinate for a day or two after the test. You may feel better if you drink more fluids. This may also help prevent an infection. · Your urine may have a pinkish color for a few days after the test. 
When should you call for help? Call your doctor now or seek immediate medical care if: 
  · Your urine is still red or you see blood clots after you have urinated several times.  
  · You cannot pass urine 8 hours after the test.  
  · You get a fever or chills.  
  · You have pain in your belly or your back just below your rib cage. This is also called flank pain.  
 Watch closely for changes in your health, and be sure to contact your doctor if: 
  · You have pain or burning when you urinate. A burning sensation is normal for a day or two after the test. But call if it does not get better.  
  · You have a frequent urge to urinate but can pass only small amounts of urine.  
  · Your urine is pink, red, or cloudy or smells bad. It is normal for the urine to have a pinkish color for a few days after the test. But call if it does not get better.  
  · You do not get better as expected. Where can you learn more? Go to http://ofelia-yamilex.info/. Enter B063 in the search box to learn more about \"Cystoscopy: Care Instructions. \" Current as of: May 12, 2017 Content Version: 11.7 © 8749-8613 Solo[employer]. Care instructions adapted under license by bitmovin (which disclaims liability or warranty for this information). If you have questions about a medical condition or this instruction, always ask your healthcare professional. [place] [address] any warranty or liability for your use of this information. Introducing [facility]! Dear Kwaku Dacosta: Thank you for requesting a MyLikes account. Our records indicate that you already have an active MyLikes account. You can access your account anytime at [email or web address] Did you know that you can access your hospital and ER discharge instructions at any time in MyLikes?   You can also review all of your test results from your hospital stay or ER visit. Additional Information If you have questions, please visit the Frequently Asked Questions section of the [email or web address] website at [email or web address] Remember, [email or web address] is NOT to be used for urgent needs. For medical emergencies, dial 911. Now available from your iPhone and Android! Please provide this summary of care documentation to your next provider. Your primary care clinician is listed as Marivel Aragon. If you have any questions after today's visit, please call [phone].

## 2018-09-06 NOTE — PROGRESS NOTES
Chief Complaint Patient presents with  Microscopic Hematuria  Cystoscopy HISTORY OF PRESENT ILLNESS:  Yarelis Lambert is a 77 y.o. female comes into the office today for a cystoscopy for microscopic hematuria this is carried out with the findings of no significant pathology. Refer to the procedure note for those details. Past Medical History:  
Diagnosis Date  Arthritis  CAD (coronary artery disease), native coronary artery 7/2014 ULISES to LCx  Cardiac cath 07/07/2014 RCA 15%. LM patent. mLAD 30-50%. CX/OM1 85% (2.75 x 26-mm Resolute ULISES, resid 0%).  Cardiac echocardiogram 04/05/2016 EF 55-60%. No WMA. Gr 1 DDfx. Mod MR.  Cardiac nuclear imaging test, abnormal 02/05/2014 Mod-sized, mild-mod mid lateral infarction w/mild-mod rosa isela-infarct ischemia. Mod lateral hypk. EF 56%. Normal EKG on pharm stress test.  Intermediate risk.  Dyslipidemia  Gallstone  Hernia  Obesity  Sinus infection Past Surgical History:  
Procedure Laterality Date  HX CHOLECYSTECTOMY  HX HEART CATHETERIZATION    
 HX HERNIA REPAIR    
 HX LYSIS OF ADHESIONS    
 HX OOPHORECTOMY Social History Substance Use Topics  Smoking status: Never Smoker  Smokeless tobacco: Never Used  Alcohol use No  
 
 
Allergies Allergen Reactions  Celebrex [Celecoxib] Swelling Family History Problem Relation Age of Onset  Hypertension Mother  Cancer Father  Heart Disease Maternal Grandmother Current Outpatient Prescriptions Medication Sig Dispense Refill  RABEprazole (ACIPHEX) 20 mg tablet  PREMARIN 0.625 mg/gram vaginal cream     
 cholecalciferol, vitamin D3, (VITAMIN D3) 2,000 unit tab Take  by mouth.  folic acid/multivit-min/lutein (CENTRUM SILVER PO) Take  by mouth.  acetaminophen (TYLENOL ARTHRITIS PAIN) 650 mg TbER Take 650 mg by mouth every eight (8) hours.  metoprolol succinate (TOPROL-XL) 50 mg XL tablet TAKE 1 TABLET BY MOUTH DAILY 90 Tab 3  
 dicyclomine (BENTYL) 10 mg capsule Take 10 mg by mouth daily as needed.  rosuvastatin (CRESTOR) 20 mg tablet Take 20 mg by mouth nightly.  aspirin delayed-release 81 mg tablet Take 162 mg by mouth daily.  desloratadine-pseudoephedrine (CLARINEX-D 12 HOUR) 2.5-120 mg per tablet Take 1 Tab by mouth two (2) times a day.  montelukast (SINGULAIR) 10 mg tablet Take 10 mg by mouth daily.  calcium 500 mg tab Take  by mouth.  multivit-mins 25-folic acid-D3 1-1,232 mg-unit tab Take  by mouth.  mometasone 100 mcg/actuation HFAA Take 100 mcg by inhalation daily.  nitroglycerin (NITROSTAT) 0.4 mg SL tablet 1 Tab by SubLINGual route every five (5) minutes as needed for Chest Pain. 1 Bottle 3  pyridoxine (VITAMIN B-6) 100 mg tablet Take 100 mg by mouth daily.  DICLOFENAC SODIUM/MISOPROSTOL (ARTHROTEC 50 PO) Take  by mouth. REVIEW OF SYSTEMS:  
Documented on the chart PHYSICAL EXAMINATION:  
 
Visit Vitals  /74 (BP 1 Location: Left arm, BP Patient Position: Sitting)  Pulse 85  
 Ht 5' 2\" (1.575 m)  Wt 202 lb (91.6 kg)  SpO2 93%  BMI 36.95 kg/m2 Constitutional: Well developed, well-nourished female in no acute distress. CV:  No peripheral swelling noted Respiratory: No respiratory distress or difficulties Abdomen:  Soft and nontender. No masses. No hepatosplenomegaly.  Female: Atrophic urethritis and vaginitis. Skin:  Normal color. No evidence of jaundice. Neuro/Psych:  Patient with appropriate affect. Alert and oriented. Lymphatic:   No enlargement of supraclavicular lymph nodes. Results for orders placed or performed in visit on 09/06/18 AMB POC URINALYSIS DIP STICK AUTO W/O MICRO Result Value Ref Range Color (UA POC) Yellow Clarity (UA POC) Clear Glucose (UA POC) Negative Negative Bilirubin (UA POC) 1+ Negative Ketones (UA POC) Negative Negative Specific gravity (UA POC) 1.030 1.001 - 1.035 Blood (UA POC) 1+ Negative pH (UA POC) 5.5 4.6 - 8.0 Protein (UA POC) 2+ Negative Urobilinogen (UA POC) 1 mg/dL 0.2 - 1 Nitrites (UA POC) Negative Negative Leukocyte esterase (UA POC) Negative Negative REVIEW OF LABS AND IMAGING:   
 
Imaging Report Reviewed? NO Images Reviewed? NO Other Lab Data Reviewed? YES 
 
ASSESSMENT:  
  ICD-10-CM ICD-9-CM 1. Microscopic hematuria R31.29 599.72 AMB POC URINALYSIS DIP STICK AUTO W/O MICRO  
   CYSTOSCOPY PLAN/DISCUSSION: Cystoscopy revealed atrophic urethritis and vaginitis but no pathology in the bladder. Return as necessary. 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 She does have some moderate proteinuria which may need to be evaluated by a nephrologist. 
 
Patient voices understanding and agreement to the plan. Tammy Maurice MD on 9/6/2018 Please note: This document has been produced using voice recognition software. Unrecognized errors in transcription may be present.

## 2018-09-06 NOTE — PROCEDURES
Under satisfactory skin prep the flexible cystoscope was inserted there and there is moderate atrophic vaginitis as well as urethritis. A careful survey of the bladder however reveals no intravesical pathology that I can detect. The bladder distends easily empties well. She does have a moderate cystocele but no other significant findings. I think her microhematuria is probably on the basis of atrophic urethritis.

## 2018-09-06 NOTE — PROGRESS NOTES
Ms. Princess Bower has a reminder for a \"due or due soon\" health maintenance. I have asked that she contact her primary care provider for follow-up on this health maintenance. [facility] 
OFFICE PROCEDURE PROGRESS NOTE Chart reviewed for the following: 
 Amber Soriano LPN, have reviewed the History, Physical and updated the Allergic reactions for Lajean Downer TIME OUT performed immediately prior to start of procedure: 
 I, Cyndy Mesa LPN, have performed the following reviews on Lajean Downer prior to the start of the procedure: 
         
* Patient was identified by name and date of birth * Agreement on procedure being performed was verified * Risks and Benefits explained to the patient * Procedure site verified and marked as necessary * Patient was positioned for comfort * Consent was signed and verified Time: 14:17 Date of procedure: 9/6/2018 Procedure performed by:  Sena Kirkpatrick MD 
 
Provider assisted by: Moisés Edge LPN Patient assisted by: self How tolerated by patient: tolerated the procedure well with no complications Post Procedural Pain Scale: 0 - No Hurt Comments: Patient verbalized understanding of procedure and post procedure instructions.

## 2018-09-06 NOTE — PATIENT INSTRUCTIONS
Blood in the Urine: Care Instructions Your Care Instructions Blood in the urine, or hematuria, may make the urine look red, brown, or pink. There may be blood every time you urinate or just from time to time. You cannot always see blood in the urine, but it will show up in a urine test. 
Blood in the urine may be serious. It should always be checked by a doctor. Your doctor may recommend more tests, including an X-ray, a CT scan, or a cystoscopy (which lets a doctor look inside the urethra and bladder). Blood in the urine can be a sign of another problem. Common causes are bladder infections and kidney stones. An injury to your groin or your genital area can also cause bleeding in the urinary tract. Very hard exercise-such as running a marathon-can cause blood in the urine. Blood in the urine can also be a sign of kidney disease or cancer in the bladder or kidney. Many cases of blood in the urine are caused by a harmless condition that runs in families. This is called benign familial hematuria. It does not need any treatment. Sometimes your urine may look red or brown even though it does not contain blood. For example, not getting enough fluids (dehydration), taking certain medicines, or having a liver problem can change the color of your urine. Eating foods such as beets, rhubarb, or blackberries or foods with red food coloring can make your urine look red or pink. Follow-up care is a key part of your treatment and safety. Be sure to make and go to all appointments, and call your doctor if you are having problems. It's also a good idea to know your test results and keep a list of the medicines you take. When should you call for help? Call your doctor now or seek immediate medical care if: 
  · You have symptoms of a urinary infection. For example: ¨ You have pus in your urine. ¨ You have pain in your back just below your rib cage. This is called flank pain. ¨ You have a fever, chills, or body aches. ¨ It hurts to urinate. ¨ You have groin or belly pain.  
  · You have more blood in your urine.  
 Watch closely for changes in your health, and be sure to contact your doctor if: 
  · You have new urination problems.  
  · You do not get better as expected. Where can you learn more? Go to http://ofelia-yamilex.info/. Enter M098 in the search box to learn more about \"Blood in the Urine: Care Instructions. \" Current as of: May 12, 2017 Content Version: 11.7 © 1562-6663 [employer]. Care instructions adapted under license by [employer] (which disclaims liability or warranty for this information). If you have questions about a medical condition or this instruction, always ask your healthcare professional. [address] any warranty or liability for your use of this information. Cystoscopy: Care Instructions Your Care Instructions Cystoscopy is a test. It uses a thin, lighted tube called a cystoscope to see the inside of the bladder and the urethra. The urethra is the tube that carries urine out of the body. This test is helpful because it lets your doctor see areas of your bladder and urethra that are hard to see on X-rays. It can help your doctor find bladder stones, tumors, bleeding, and infection. During this test, your doctor also can take tissue and urine samples. And if your doctor finds small stones or growths, he or she can remove them. In most cases the scope is in the bladder for less than 10 minutes. But the entire test may take 45 minutes or longer. You will probably get local anesthesia. This numbs a small part of your body. Or you may get spinal anesthesia, which numbs more of your body. Once in a while, doctors use general anesthesia. It makes you sleep during surgery.  
If you get a local anesthetic, you may be able to get up right after the test. But if you had spinal or general anesthesia, you will stay in the recovery room until you are able to walk or you have feeling again in your lower body. This usually takes about an hour. Your doctor may be able to tell you some of the results right after the test. But the complete results may take several days. Follow-up care is a key part of your treatment and safety. Be sure to make and go to all appointments, and call your doctor if you are having problems. It's also a good idea to know your test results and keep a list of the medicines you take. How can you care for yourself at home? Before the test 
· If you are having a local anesthetic, you can eat and drink before the test. 
· If you are having a spinal or general anesthetic, do not eat or drink anything for at least 8 hours before the test. Tell your doctor what medicines you take. · If you are not staying overnight in the hospital, make sure you have someone who can drive you home after the test. 
After the test 
· If your doctor prescribed antibiotics, take them as directed. Do not stop taking them just because you feel better. You need to take the full course of antibiotics. · You may have some burning when you urinate for a day or two after the test. You may feel better if you drink more fluids. This may also help prevent an infection. · Your urine may have a pinkish color for a few days after the test. 
When should you call for help? Call your doctor now or seek immediate medical care if: 
  · Your urine is still red or you see blood clots after you have urinated several times.  
  · You cannot pass urine 8 hours after the test.  
  · You get a fever or chills.  
  · You have pain in your belly or your back just below your rib cage. This is also called flank pain.  
 Watch closely for changes in your health, and be sure to contact your doctor if: 
  · You have pain or burning when you urinate.  A burning sensation is normal for a day or two after the test. But call if it does not get better.  
  · You have a frequent urge to urinate but can pass only small amounts of urine.  
  · Your urine is pink, red, or cloudy or smells bad. It is normal for the urine to have a pinkish color for a few days after the test. But call if it does not get better.  
  · You do not get better as expected. Where can you learn more? Go to http://ofelia-yamilex.info/. Enter A883 in the search box to learn more about \"Cystoscopy: Care Instructions. \" Current as of: May 12, 2017 Content Version: 11.7 © 9979-0047 [employer]. Care instructions adapted under license by [employer] (which disclaims liability or warranty for this information). If you have questions about a medical condition or this instruction, always ask your healthcare professional. [address] any warranty or liability for your use of this information.

## 2018-10-29 ENCOUNTER — TELEPHONE (OUTPATIENT)
Dept: CARDIOLOGY CLINIC | Age: 68
End: 2018-10-29

## 2018-11-07 ENCOUNTER — HOSPITAL ENCOUNTER (OUTPATIENT)
Dept: NON INVASIVE DIAGNOSTICS | Age: 68
Discharge: HOME | End: 2018-11-07
Attending: INTERNAL MEDICINE
Payer: MEDICARE

## 2018-11-07 VITALS
SYSTOLIC BLOOD PRESSURE: 124 MMHG | HEIGHT: 62 IN | DIASTOLIC BLOOD PRESSURE: 74 MMHG | WEIGHT: 202 LBS | BODY MASS INDEX: 37.17 KG/M2

## 2018-11-07 DIAGNOSIS — I34.0 NON-RHEUMATIC MITRAL REGURGITATION: ICD-10-CM

## 2018-11-07 PROCEDURE — 93306 TTE W/DOPPLER COMPLETE: CPT

## 2018-11-08 LAB
ECHO AO ROOT DIAM: 3.12 CM
ECHO LA AREA 4C: 20.7 CM2
ECHO LA VOL 2C: 86.8 ML (ref 22–52)
ECHO LA VOL 4C: 58.97 ML (ref 22–52)
ECHO LA VOL BP: 79.46 ML (ref 22–52)
ECHO LA VOL/BSA BIPLANE: 41.39 ML/M2 (ref 16–28)
ECHO LA VOLUME INDEX A2C: 45.22 ML/M2 (ref 16–28)
ECHO LA VOLUME INDEX A4C: 30.72 ML/M2 (ref 16–28)
ECHO LV E' LATERAL VELOCITY: 8.75 CM/S
ECHO LV E' SEPTAL VELOCITY: 5.75 CM/S
ECHO LV INTERNAL DIMENSION DIASTOLIC: 4.78 CM (ref 3.9–5.3)
ECHO LV INTERNAL DIMENSION SYSTOLIC: 3.46 CM
ECHO LV IVSD: 1.19 CM (ref 0.6–0.9)
ECHO LV MASS 2D: 273.9 G (ref 67–162)
ECHO LV MASS INDEX 2D: 142.7 G/M2 (ref 43–95)
ECHO LV POSTERIOR WALL DIASTOLIC: 1.3 CM (ref 0.6–0.9)
ECHO LVOT DIAM: 1.91 CM
ECHO LVOT PEAK GRADIENT: 3.6 MMHG
ECHO LVOT PEAK VELOCITY: 95.47 CM/S
ECHO LVOT VTI: 21.72 CM
ECHO MV A VELOCITY: 87.79 CM/S
ECHO MV AREA PISA: 0.1 CM2
ECHO MV E DECELERATION TIME (DT): 248.6 MS
ECHO MV E VELOCITY: 0.87 CM/S
ECHO MV E/A RATIO: 0.01
ECHO MV E/E' LATERAL: 0.1
ECHO MV E/E' RATIO (AVERAGED): 0.13
ECHO MV E/E' SEPTAL: 0.15
ECHO MV EROA PISA: 0.1 CM2
ECHO MV REGURGITANT PEAK GRADIENT: 133 MMHG
ECHO MV REGURGITANT PEAK VELOCITY: 576.55 CM/S
ECHO MV REGURGITANT RADIUS PISA: 0.58 CM
ECHO MV REGURGITANT VOLUME: 15.81 CC
ECHO MV REGURGITANT VTIA: 175.05 CM
ECHO PVEIN A DURATION: 116 MS
ECHO PVEIN A VELOCITY: 30.11 CM/S

## 2018-11-09 NOTE — PROGRESS NOTES
Per your last note\" Coronary artery disease. The patient had a 85-90% stenosis of her mid left circumflex extending into OM1 diagnosed by cardiac catheterization in February 2014. She underwent scheduled PCI In July 2014, with a Resolute drug-eluting stent. Her symptoms have improved since the intervention. I suspect she did have a small infarction of the inferolateral region in January 2014. Ejection fraction is now back to normal by echocardiogram in 2017, EF 60%. She is now taking an aspirin, beta blocker and high-dose potent statin. All of which I would continue. 
  
Mitral regurgitation. Non-rheumatic, more likely ischemic versus myxomatous in nature. This was moderate on her recent echocardiogram in June 2017. I would like to reevaluate this annually with follow-up echocardiograms.

## 2018-11-14 ENCOUNTER — HOSPITAL ENCOUNTER (OUTPATIENT)
Dept: ULTRASOUND IMAGING | Age: 68
Discharge: HOME | End: 2018-11-14
Attending: FAMILY MEDICINE
Payer: MEDICARE

## 2018-11-14 ENCOUNTER — TELEPHONE (OUTPATIENT)
Dept: CARDIOLOGY CLINIC | Age: 68
End: 2018-11-14

## 2018-11-14 DIAGNOSIS — R10.2 PELVIC PAIN IN FEMALE: ICD-10-CM

## 2018-11-14 DIAGNOSIS — R10.9 ACUTE ABDOMINAL PAIN: ICD-10-CM

## 2018-11-14 PROCEDURE — 76830 TRANSVAGINAL US NON-OB: CPT

## 2018-11-14 PROCEDURE — 76700 US EXAM ABDOM COMPLETE: CPT

## 2018-11-14 NOTE — TELEPHONE ENCOUNTER
----- Message from Frieda Mac MD sent at 11/9/2018  9:41 AM EST ----- Please let the patient know that her mitral regurgitation has not significantly changed on her repeat echocardiogram.  He remains in the moderate range. Her heart function remains normal as well. 
----- Message ----- From: Deepika Brooks LPN Sent: 11/9/2018   8:26 AM 
To: Frieda Mac MD 
 
Per your last note\" Coronary artery disease. The patient had a 85-90% stenosis of her mid left circumflex extending into OM1 diagnosed by cardiac catheterization in February 2014. She underwent scheduled PCI In July 2014, with a Resolute drug-eluting stent. Her symptoms have improved since the intervention. I suspect she did have a small infarction of the inferolateral region in January 2014. Ejection fraction is now back to normal by echocardiogram in 2017, EF 60%. She is now taking an aspirin, beta blocker and high-dose potent statin. All of which I would continue. 
  
Mitral regurgitation. Non-rheumatic, more likely ischemic versus myxomatous in nature. This was moderate on her recent echocardiogram in June 2017. I would like to reevaluate this annually with follow-up echocardiograms.

## 2018-11-14 NOTE — LETTER
11/14/2018 10:01 AM 
 
Ms. Niall Palm [address] [id number] Dear Ms. Ayonfabjaleesa, We have been unable to reach you by phone to notify you of your test results. Please call our office at [phone] and ask to speak with my nurse in order to explain these results to you and advise you of any recommendations. Sincerely, Delia Lane MD

## 2018-11-14 NOTE — TELEPHONE ENCOUNTER
Patient called office and may aware of results. Verified name and , all questions answered. This has been fully explained to the patient, who indicates understanding.

## 2018-11-20 ENCOUNTER — HOSPITAL ENCOUNTER (EMERGENCY)
Age: 68
Discharge: HOME | End: 2018-11-21
Attending: EMERGENCY MEDICINE
Payer: MEDICARE

## 2018-11-20 ENCOUNTER — APPOINTMENT (OUTPATIENT)
Dept: CT IMAGING | Age: 68
End: 2018-11-20
Attending: PHYSICIAN ASSISTANT
Payer: MEDICARE

## 2018-11-20 DIAGNOSIS — N20.0 RENAL CALCULI: Primary | ICD-10-CM

## 2018-11-20 DIAGNOSIS — K83.8 DILATION OF COMMON BILE DUCT: ICD-10-CM

## 2018-11-20 LAB
ALBUMIN SERPL-MCNC: 3.5 G/DL (ref 3.4–5)
ALBUMIN/GLOB SERPL: 0.8 {RATIO} (ref 0.8–1.7)
ALP SERPL-CCNC: 142 U/L (ref 45–117)
ALT SERPL-CCNC: 25 U/L (ref 13–56)
ANION GAP SERPL CALC-SCNC: 6 MMOL/L (ref 3–18)
AST SERPL-CCNC: 42 U/L (ref 15–37)
BASOPHILS # BLD: 0.1 K/UL (ref 0–0.1)
BASOPHILS NFR BLD: 0 % (ref 0–2)
BILIRUB SERPL-MCNC: 0.8 MG/DL (ref 0.2–1)
BUN SERPL-MCNC: 18 MG/DL (ref 7–18)
BUN/CREAT SERPL: 15 (ref 12–20)
CALCIUM SERPL-MCNC: 9.3 MG/DL (ref 8.5–10.1)
CHLORIDE SERPL-SCNC: 108 MMOL/L (ref 100–108)
CO2 SERPL-SCNC: 24 MMOL/L (ref 21–32)
CREAT SERPL-MCNC: 1.18 MG/DL (ref 0.6–1.3)
DIFFERENTIAL METHOD BLD: ABNORMAL
EOSINOPHIL # BLD: 0.3 K/UL (ref 0–0.4)
EOSINOPHIL NFR BLD: 2 % (ref 0–5)
ERYTHROCYTE [DISTWIDTH] IN BLOOD BY AUTOMATED COUNT: 13.2 % (ref 11.6–14.5)
GLOBULIN SER CALC-MCNC: 4.4 G/DL (ref 2–4)
GLUCOSE SERPL-MCNC: 123 MG/DL (ref 74–99)
HCT VFR BLD AUTO: 42.5 % (ref 35–45)
HGB BLD-MCNC: 14.2 G/DL (ref 12–16)
LYMPHOCYTES # BLD: 2.4 K/UL (ref 0.9–3.6)
LYMPHOCYTES NFR BLD: 18 % (ref 21–52)
MCH RBC QN AUTO: 28.6 PG (ref 24–34)
MCHC RBC AUTO-ENTMCNC: 33.4 G/DL (ref 31–37)
MCV RBC AUTO: 85.5 FL (ref 74–97)
MONOCYTES # BLD: 1 K/UL (ref 0.05–1.2)
MONOCYTES NFR BLD: 7 % (ref 3–10)
NEUTS SEG # BLD: 9.5 K/UL (ref 1.8–8)
NEUTS SEG NFR BLD: 73 % (ref 40–73)
PLATELET # BLD AUTO: 266 K/UL (ref 135–420)
PMV BLD AUTO: 10.6 FL (ref 9.2–11.8)
POTASSIUM SERPL-SCNC: 4.8 MMOL/L (ref 3.5–5.5)
PROT SERPL-MCNC: 7.9 G/DL (ref 6.4–8.2)
RBC # BLD AUTO: 4.97 M/UL (ref 4.2–5.3)
SODIUM SERPL-SCNC: 138 MMOL/L (ref 136–145)
WBC # BLD AUTO: 13.1 K/UL (ref 4.6–13.2)

## 2018-11-20 PROCEDURE — 96374 THER/PROPH/DIAG INJ IV PUSH: CPT

## 2018-11-20 PROCEDURE — 85025 COMPLETE CBC W/AUTO DIFF WBC: CPT

## 2018-11-20 PROCEDURE — 80053 COMPREHEN METABOLIC PANEL: CPT

## 2018-11-20 PROCEDURE — 87086 URINE CULTURE/COLONY COUNT: CPT

## 2018-11-20 PROCEDURE — 99284 EMERGENCY DEPT VISIT MOD MDM: CPT

## 2018-11-20 PROCEDURE — 74176 CT ABD & PELVIS W/O CONTRAST: CPT

## 2018-11-20 PROCEDURE — 81001 URINALYSIS AUTO W/SCOPE: CPT

## 2018-11-20 PROCEDURE — 81003 URINALYSIS AUTO W/O SCOPE: CPT

## 2018-11-20 PROCEDURE — 99285 EMERGENCY DEPT VISIT HI MDM: CPT

## 2018-11-20 PROCEDURE — 74011250636 HC RX REV CODE- 250/636: Performed by: PHYSICIAN ASSISTANT

## 2018-11-20 PROCEDURE — 96361 HYDRATE IV INFUSION ADD-ON: CPT

## 2018-11-20 PROCEDURE — 96376 TX/PRO/DX INJ SAME DRUG ADON: CPT

## 2018-11-20 PROCEDURE — 96375 TX/PRO/DX INJ NEW DRUG ADDON: CPT

## 2018-11-20 RX ORDER — MORPHINE SULFATE 2 MG/ML
2 INJECTION, SOLUTION INTRAMUSCULAR; INTRAVENOUS
Status: COMPLETED | OUTPATIENT
Start: 2018-11-20 | End: 2018-11-20

## 2018-11-20 RX ORDER — MORPHINE SULFATE 2 MG/ML
2 INJECTION, SOLUTION INTRAMUSCULAR; INTRAVENOUS
Status: DISCONTINUED | OUTPATIENT
Start: 2018-11-20 | End: 2018-11-20

## 2018-11-20 RX ORDER — ONDANSETRON 2 MG/ML
4 INJECTION INTRAMUSCULAR; INTRAVENOUS
Status: DISCONTINUED | OUTPATIENT
Start: 2018-11-20 | End: 2018-11-20

## 2018-11-20 RX ORDER — ONDANSETRON 2 MG/ML
4 INJECTION INTRAMUSCULAR; INTRAVENOUS
Status: COMPLETED | OUTPATIENT
Start: 2018-11-20 | End: 2018-11-20

## 2018-11-20 RX ADMIN — MORPHINE SULFATE 2 MG: 2 INJECTION, SOLUTION INTRAMUSCULAR; INTRAVENOUS at 22:57

## 2018-11-20 RX ADMIN — ONDANSETRON HYDROCHLORIDE 4 MG: 2 SOLUTION INTRAMUSCULAR; INTRAVENOUS at 22:57

## 2018-11-20 RX ADMIN — SODIUM CHLORIDE 1000 ML: 900 INJECTION, SOLUTION INTRAVENOUS at 22:57

## 2018-11-20 RX ADMIN — ONDANSETRON 4 MG: 2 INJECTION INTRAMUSCULAR; INTRAVENOUS at 23:47

## 2018-11-21 ENCOUNTER — APPOINTMENT (OUTPATIENT)
Dept: ULTRASOUND IMAGING | Age: 68
End: 2018-11-21
Attending: PHYSICIAN ASSISTANT
Payer: MEDICARE

## 2018-11-21 VITALS
DIASTOLIC BLOOD PRESSURE: 103 MMHG | RESPIRATION RATE: 15 BRPM | TEMPERATURE: 97.3 F | OXYGEN SATURATION: 95 % | HEART RATE: 91 BPM | WEIGHT: 200 LBS | HEIGHT: 62 IN | SYSTOLIC BLOOD PRESSURE: 123 MMHG | BODY MASS INDEX: 36.8 KG/M2

## 2018-11-21 LAB
APPEARANCE UR: ABNORMAL
BACTERIA URNS QL MICRO: ABNORMAL /HPF
BILIRUB UR QL: NEGATIVE
CAOX CRY URNS QL MICRO: ABNORMAL
COLOR UR: ABNORMAL
EPITH CASTS URNS QL MICRO: ABNORMAL /LPF (ref 0–5)
GLUCOSE UR STRIP.AUTO-MCNC: NEGATIVE MG/DL
HGB UR QL STRIP: ABNORMAL
KETONES UR QL STRIP.AUTO: ABNORMAL MG/DL
LEUKOCYTE ESTERASE UR QL STRIP.AUTO: ABNORMAL
NITRITE UR QL STRIP.AUTO: NEGATIVE
PH UR STRIP: 5.5 [PH] (ref 5–8)
PROT UR STRIP-MCNC: 30 MG/DL
RBC #/AREA URNS HPF: ABNORMAL /HPF (ref 0–5)
SP GR UR REFRACTOMETRY: 1.02 (ref 1–1.03)
UROBILINOGEN UR QL STRIP.AUTO: 1 EU/DL (ref 0.2–1)
WBC URNS QL MICRO: POSITIVE /HPF (ref 0–5)

## 2018-11-21 PROCEDURE — 74011250636 HC RX REV CODE- 250/636: Performed by: PHYSICIAN ASSISTANT

## 2018-11-21 PROCEDURE — 96376 TX/PRO/DX INJ SAME DRUG ADON: CPT

## 2018-11-21 PROCEDURE — 76705 ECHO EXAM OF ABDOMEN: CPT

## 2018-11-21 RX ORDER — ONDANSETRON HYDROCHLORIDE 4 MG/2ML
4 INJECTION, SOLUTION INTRAMUSCULAR; INTRAVENOUS
Status: COMPLETED | OUTPATIENT
Start: 2018-11-21 | End: 2018-11-21

## 2018-11-21 RX ORDER — TAMSULOSIN HYDROCHLORIDE 0.4 MG/1
CAPSULE ORAL
Qty: 10 CAP | Refills: 0 | Status: SHIPPED | OUTPATIENT
Start: 2018-11-21 | End: 2018-11-29

## 2018-11-21 RX ORDER — MORPHINE SULFATE 2 MG/ML
2 INJECTION, SOLUTION INTRAMUSCULAR; INTRAVENOUS
Status: COMPLETED | OUTPATIENT
Start: 2018-11-21 | End: 2018-11-21

## 2018-11-21 RX ORDER — OXYCODONE AND ACETAMINOPHEN 5; 325 MG/1; MG/1
TABLET ORAL
Qty: 12 TAB | Refills: 0 | Status: SHIPPED | OUTPATIENT
Start: 2018-11-21 | End: 2018-12-26

## 2018-11-21 RX ORDER — CEPHALEXIN 500 MG/1
500 CAPSULE ORAL 2 TIMES DAILY
Qty: 14 CAP | Refills: 0 | Status: SHIPPED | OUTPATIENT
Start: 2018-11-21 | End: 2018-11-29

## 2018-11-21 RX ADMIN — ONDANSETRON 4 MG: 2 INJECTION INTRAMUSCULAR; INTRAVENOUS at 01:12

## 2018-11-21 RX ADMIN — MORPHINE SULFATE 2 MG: 2 INJECTION, SOLUTION INTRAMUSCULAR; INTRAVENOUS at 01:12

## 2018-11-21 NOTE — ED NOTES
Pt ambulated with RN standby to restroom to attempt to produce a urine sample. Pt reported feeling slightly nauseous and a little off from the pain med but stated she was fine to ambulate to bathroom. Pt had steady but slow gait to restroom.

## 2018-11-21 NOTE — ED NOTES
I have reviewed discharge instructions with patient. The patient verbalized understanding. Patient armband removed and shredded. Pt is ambulatory with no acute distress noted at this time, pt alert and oriented. Stable at time of discharge. Vital signs stable. Pt is being discharged with 2 prescription at this time.

## 2018-11-21 NOTE — ED PROVIDER NOTES
EMERGENCY DEPARTMENT HISTORY AND PHYSICAL EXAM 
 
Date: 11/20/2018 Patient Name: Claudia Stokes History of Presenting Illness Chief Complaint Patient presents with  Abdominal Pain  Flank Pain  
  left History Provided By: Patient Chief Complaint: left flank pain Duration: hours Timing: Location: left flank Quality:  
Severity:  
Modifying Factors: n/a Associated Symptoms: nausea Additional History (Context): Claudia Stokes is a 77 y.o. female with h/o renal calculi, gall stones, obesity, athritis, hernia, CAD who presents with left flank pain for past 10 hours. States pain started around 1pm and has been constant. Feels like kidney stone. Also reports some blood in urine, left groin pain. Denies dysuria, fever, chills, abdominal pain, vomiting, diarrhea, CP, sob or any other sx. PCP: Lisa Grande MD 
 
Current Facility-Administered Medications Medication Dose Route Frequency Provider Last Rate Last Dose  morphine injection 2 mg  2 mg IntraVENous NOW Patito Merino PA-C Current Outpatient Medications Medication Sig Dispense Refill  tamsulosin (FLOMAX) 0.4 mg capsule Take 1 tab by mouth daily until kidney stone passes. 10 Cap 0  
 oxyCODONE-acetaminophen (PERCOCET) 5-325 mg per tablet Take 1 tablet every 4-6 hours as needed for pain control. If you were instructed to try over the counter ibuprofen or tylenol, only take the percocet for pain not controlled with the over the counter medication. 12 Tab 0  cephALEXin (KEFLEX) 500 mg capsule Take 1 Cap by mouth two (2) times a day for 7 days. 14 Cap 0  
 traMADol (ULTRAM) 50 mg tablet Take 1-2 Tabs by mouth every six (6) hours as needed for Pain. Max Daily Amount: 400 mg. 30 Tab 0  
 dicyclomine (BENTYL) 10 mg capsule Take 10 mg by mouth 4 times daily (before meals and nightly).     
 PREMARIN 0.625 mg/gram vaginal cream     
  cholecalciferol, vitamin D3, (VITAMIN D3) 2,000 unit tab Take  by mouth.  folic acid/multivit-min/lutein (CENTRUM SILVER PO) Take  by mouth.  acetaminophen (TYLENOL ARTHRITIS PAIN) 650 mg TbER Take 650 mg by mouth every eight (8) hours.  metoprolol succinate (TOPROL-XL) 50 mg XL tablet TAKE 1 TABLET BY MOUTH DAILY 90 Tab 3  
 rosuvastatin (CRESTOR) 20 mg tablet Take 20 mg by mouth nightly.  mometasone 100 mcg/actuation HFAA Take 100 mcg by inhalation daily.  nitroglycerin (NITROSTAT) 0.4 mg SL tablet 1 Tab by SubLINGual route every five (5) minutes as needed for Chest Pain. 1 Bottle 3  
 aspirin delayed-release 81 mg tablet Take 162 mg by mouth daily.  desloratadine-pseudoephedrine (CLARINEX-D 12 HOUR) 2.5-120 mg per tablet Take 1 Tab by mouth two (2) times a day.  montelukast (SINGULAIR) 10 mg tablet Take 10 mg by mouth daily. Past History Past Medical History: 
Past Medical History:  
Diagnosis Date  Arthritis  CAD (coronary artery disease), native coronary artery 7/2014 ULISES to LCx  Cardiac cath 07/07/2014 RCA 15%. LM patent. mLAD 30-50%. CX/OM1 85% (2.75 x 26-mm Resolute ULISES, resid 0%).  Cardiac echocardiogram 04/05/2016 EF 55-60%. No WMA. Gr 1 DDfx. Mod MR.  Cardiac nuclear imaging test, abnormal 02/05/2014 Mod-sized, mild-mod mid lateral infarction w/mild-mod [place]-infarct ischemia. Mod lateral hypk. EF 56%. Normal EKG on pharm stress test.  Intermediate risk.  Dyslipidemia  Gallstone  Hernia  Obesity  Sinus infection Past Surgical History: 
Past Surgical History:  
Procedure Laterality Date  HX CHOLECYSTECTOMY  HX HEART CATHETERIZATION    
 HX HERNIA REPAIR    
 HX LYSIS OF ADHESIONS    
 HX OOPHORECTOMY Family History: 
Family History Problem Relation Age of Onset  Hypertension Mother  Cancer Father  Heart Disease Maternal Grandmother Social History: Social History Tobacco Use  Smoking status: Never Smoker  Smokeless tobacco: Never Used Substance Use Topics  Alcohol use: No  
 Drug use: No  
 
 
Allergies: Allergies Allergen Reactions  Celebrex [Celecoxib] Swelling Review of Systems Review of Systems Constitutional: Negative for chills and fever. HENT: Negative. Eyes: Negative. Respiratory: Negative for cough and shortness of breath. Cardiovascular: Negative for chest pain and palpitations. Gastrointestinal: Negative for abdominal pain, constipation, diarrhea, nausea and vomiting. Genitourinary: Positive for flank pain and hematuria. Negative for decreased urine volume, dysuria, vaginal bleeding, vaginal discharge and vaginal pain. Musculoskeletal: Negative for back pain and neck pain. Skin: Negative. Allergic/Immunologic: Negative. Neurological: Negative for dizziness, weakness, numbness and headaches. Psychiatric/Behavioral: Negative. All other systems reviewed and are negative. All Other Systems Negative Physical Exam  
 
Vitals:  
 11/21/18 2990 11/21/18 0053 11/21/18 0054 11/21/18 3450 BP:      
Pulse: 92 91 90 91 Resp: 19 18 16 15 Temp:      
SpO2: 95% 94% 95% 95% Weight:      
Height:      
 
Physical Exam  
Constitutional: She is oriented to person, place, and time. She appears well-developed and well-nourished. No distress. NAD, well hydrated, non toxic HENT:  
Head: Normocephalic and atraumatic. Nose: Nose normal.  
Mouth/Throat: Oropharynx is clear and moist. No oropharyngeal exudate. Eyes: Conjunctivae and EOM are normal. Pupils are equal, round, and reactive to light. Neck: Normal range of motion. Neck supple. Cardiovascular: Normal rate, regular rhythm and normal heart sounds. No murmur heard. Pulmonary/Chest: Effort normal and breath sounds normal. No respiratory distress. She has no wheezes. She has no rales. Abdominal: Soft. She exhibits no distension. There is no tenderness. There is CVA tenderness. There is no guarding. Left CVAT tenderness, mild left lower abdominal pain. No RUQ or epigastric pain. Musculoskeletal: Normal range of motion. Lymphadenopathy:  
  She has no cervical adenopathy. Neurological: She is alert and oriented to person, place, and time. No cranial nerve deficit. Coordination normal.  
Skin: Skin is warm. No rash noted. She is not diaphoretic. Psychiatric: She has a normal mood and affect. Her behavior is normal.  
Nursing note and vitals reviewed. Diagnostic Study Results Labs - Recent Results (from the past 12 hour(s)) CBC WITH AUTOMATED DIFF Collection Time: 11/20/18 10:35 PM  
Result Value Ref Range WBC 13.1 4.6 - 13.2 K/uL  
 RBC 4.97 4.20 - 5.30 M/uL  
 HGB 14.2 12.0 - 16.0 g/dL HCT 42.5 35.0 - 45.0 % MCV 85.5 74.0 - 97.0 FL  
 MCH 28.6 24.0 - 34.0 PG  
 MCHC 33.4 31.0 - 37.0 g/dL  
 RDW 13.2 11.6 - 14.5 % PLATELET 362 624 - 550 K/uL MPV 10.6 9.2 - 11.8 FL  
 NEUTROPHILS 73 40 - 73 % LYMPHOCYTES 18 (L) 21 - 52 % MONOCYTES 7 3 - 10 % EOSINOPHILS 2 0 - 5 % BASOPHILS 0 0 - 2 %  
 ABS. NEUTROPHILS 9.5 (H) 1.8 - 8.0 K/UL  
 ABS. LYMPHOCYTES 2.4 0.9 - 3.6 K/UL  
 ABS. MONOCYTES 1.0 0.05 - 1.2 K/UL  
 ABS. EOSINOPHILS 0.3 0.0 - 0.4 K/UL  
 ABS. BASOPHILS 0.1 0.0 - 0.1 K/UL  
 DF AUTOMATED METABOLIC PANEL, COMPREHENSIVE Collection Time: 11/20/18 10:35 PM  
Result Value Ref Range Sodium 138 136 - 145 mmol/L Potassium 4.8 3.5 - 5.5 mmol/L Chloride 108 100 - 108 mmol/L  
 CO2 24 21 - 32 mmol/L Anion gap 6 3.0 - 18 mmol/L Glucose 123 (H) 74 - 99 mg/dL BUN 18 7.0 - 18 MG/DL Creatinine 1.18 0.6 - 1.3 MG/DL  
 BUN/Creatinine ratio 15 12 - 20 GFR est AA 55 (L) >60 ml/min/1.73m2 GFR est non-AA 45 (L) >60 ml/min/1.73m2 Calcium 9.3 8.5 - 10.1 MG/DL  Bilirubin, total 0.8 0.2 - 1.0 MG/DL  
 ALT (SGPT) 25 13 - 56 U/L  
 AST (SGOT) 42 (H) 15 - 37 U/L Alk. phosphatase 142 (H) 45 - 117 U/L Protein, total 7.9 6.4 - 8.2 g/dL Albumin 3.5 3.4 - 5.0 g/dL Globulin 4.4 (H) 2.0 - 4.0 g/dL A-G Ratio 0.8 0.8 - 1.7 URINALYSIS W/ RFLX MICROSCOPIC Collection Time: 11/20/18 11:57 PM  
Result Value Ref Range Color KEV Appearance CLOUDY Specific gravity 1.025 1.005 - 1.030    
 pH (UA) 5.5 5.0 - 8.0 Protein 30 (A) NEG mg/dL Glucose NEGATIVE  NEG mg/dL Ketone TRACE (A) NEG mg/dL Bilirubin NEGATIVE  NEG Blood LARGE (A) NEG Urobilinogen 1.0 0.2 - 1.0 EU/dL Nitrites NEGATIVE  NEG Leukocyte Esterase SMALL (A) NEG URINE MICROSCOPIC ONLY Collection Time: 11/20/18 11:57 PM  
Result Value Ref Range WBC POSITIVE 0 - 5 /hpf  
 RBC TOO NUMEROUS TO COUNT 0 - 5 /hpf Epithelial cells FEW 0 - 5 /lpf Bacteria (A) NEG /hpf UNABLE TO QUANTITATE MICROSCOPIC PARAMETERS DUE TO EXCESSIVE RBCS  
 CA Oxalate crystals 1+ (A) NEG Radiologic Studies -  
US ABD LTD Final Result CT ABD PELV WO CONT Final Result CT Results  (Last 48 hours)  
          
 11/20/18 2207  CT ABD PELV WO CONT Final result Impression:  IMPRESSION:  
   
1. Left mid ureteral stone, with associated postobstructive changes. 2.  No acute findings along the gastrointestinal tract. 3.  Unexplained increasing trend for the focal fluid collection or cystic  
structure located between the uterus and the rectum. Recommend further  
evaluation with ultrasound on nonemergent basis. If still equivocal with  
ultrasound results, MR may be considered for further delineation. 4.  New questionable developing hypodense structure in the pancreatic head  
adjacent to the dilated common bile duct. The common bile duct appears more  
dilated than on recent prior study. Recommend correlation with either  
contrast-enhanced CT or MR on nonemergent basis. Narrative:  EXAM:  CT Abdomen-Pelvis without Contrast.  
   
CLINICAL INDICATION:  Left flank pain with nausea and vomiting. History of  
kidney stones. COMPARISON:  8/9/18. TECHNIQUE:  Helical volumetric CT imaging of the abdomen and pelvis is performed  
without IV contrast administration. Coronal and sagittal multiplanar  
reconstruction images are generated for improved anatomic delineation.  
   
- Radiation dose:  DLP 1033 mGy-cm.  
- All CT scans at this facility are performed using dose optimization technique  
as appropriate to the performed exam, to include automated exposure control,  
adjustment of the mA and/or kV according to patient's size (Including  
appropriate matching for site-specific examinations), or use of iterative  
reconstruction technique. FINDINGS:  
   
ABDOMEN Lung Bases:  Multiple foci of scarring or subsegmental atelectatic changes in  
both lungs. No convincing evidence of dominant lung mass. No significant  
pleural effusion. Liver, Gallbladder, Bile Ducts:  
   
- Status post cholecystectomy. - Dilation of the common bile duct, up to about 0.9 cm. Increased in diameter  
compared to 8/9/18. Complex morphology of the dilated common bile duct in the  
pancreatic head region. This may be related to the presence of small adjacent  
cystic structure (axial #31, coronal #32), measuring about 0.8 cm. Pancreas:  Predominantly fatty replaced. The pancreatic tail region appears  
relatively spared from fatty infiltration. The pancreatic head region reveals a  
questionable new developing density adjacent to the distal common bile duct as  
noted above. Spleen, Adrenal Glands:  Unremarkable. Kidneys:  Unremarkable right kidney and ureter. The left kidney demonstrates  
new mild hydronephrosis with perinephric fat stranding. The left ureter is  
asymmetrically dilated, with a 0.4 x 0.3 cm stone (axial #55) in the mid left ureter at about L4 transverse processes inferior margin level. GI Tract:  Small hiatal hernia. Small adjacent lymph node measuring only about  
0.6 x 0.4 cm. Unremarkable stomach. No acute findings along the small bowel. The appendix is not confidently visualized. No acute appendicitis. No evidence  
of acute diverticulitis or colitis is detected. Mild diverticulosis coli. Nodes:  No convincing evidence for mesenteric or retroperitoneal adenopathy. Vascular: Moderate to pronounced atheromatous plaque calcifications in the  
aorta. PELVIS Urinary Bladder:  Contracted. Rectum:  Unremarkable. Uterus, Adnexa:  Grossly unremarkable uterus. Adjacent to the uterus, a bilobed  
cystic structure is identified, located between the uterus and the rectum,  
measuring about 6.0 x 3.7 x 4.6 cm. This structure was much smaller on 8/9/18  
and 12/22/16. The source for this focal fluid collection or cystic structure is  
unclear. Pelvic Sidewall:  No adenopathy. Bones:  No acute osseous findings. Spontaneous fusion of L5 and S1. CXR Results  (Last 48 hours) None Medical Decision Making I am the first provider for this patient. I reviewed the vital signs, available nursing notes, past medical history, past surgical history, family history and social history. Vital Signs-Reviewed the patient's vital signs. Pulse Oximetry Analysis - 95% on RA Records Reviewed: Nursing Notes, Old Medical Records, Previous Radiology Studies and Previous Laboratory Studies Procedures: 
Procedures Provider Notes (Medical Decision Making):  
Pt here with left flank pain c/w pain from previous kidney stones. Radiates to left groin. +N, no vomiting. No fever. VSS. Exam c/w left CVAT, mild diffuse abdominal pain. Labs, urine, CT of abdomen pelvis reviewed. US to eval new dilated CB reviewed, no dilation noted on US.  CT of abdomen/pelvis c/w kidney stone to left. All other results d/w patient thoroughly- she is aware of all findings and will f/u with pcp and urology. Pt not septic, no concern for admission or emergent consultation. Pt has urology. MED RECONCILIATION: 
Current Facility-Administered Medications Medication Dose Route Frequency  morphine injection 2 mg  2 mg IntraVENous NOW Current Outpatient Medications Medication Sig  tamsulosin (FLOMAX) 0.4 mg capsule Take 1 tab by mouth daily until kidney stone passes.  oxyCODONE-acetaminophen (PERCOCET) 5-325 mg per tablet Take 1 tablet every 4-6 hours as needed for pain control. If you were instructed to try over the counter ibuprofen or tylenol, only take the percocet for pain not controlled with the over the counter medication.  cephALEXin (KEFLEX) 500 mg capsule Take 1 Cap by mouth two (2) times a day for 7 days.  traMADol (ULTRAM) 50 mg tablet Take 1-2 Tabs by mouth every six (6) hours as needed for Pain. Max Daily Amount: 400 mg.  
 dicyclomine (BENTYL) 10 mg capsule Take 10 mg by mouth 4 times daily (before meals and nightly).  PREMARIN 0.625 mg/gram vaginal cream   
 cholecalciferol, vitamin D3, (VITAMIN D3) 2,000 unit tab Take  by mouth.  folic acid/multivit-min/lutein (CENTRUM SILVER PO) Take  by mouth.  acetaminophen (TYLENOL ARTHRITIS PAIN) 650 mg TbER Take 650 mg by mouth every eight (8) hours.  metoprolol succinate (TOPROL-XL) 50 mg XL tablet TAKE 1 TABLET BY MOUTH DAILY  rosuvastatin (CRESTOR) 20 mg tablet Take 20 mg by mouth nightly.  mometasone 100 mcg/actuation HFAA Take 100 mcg by inhalation daily.  nitroglycerin (NITROSTAT) 0.4 mg SL tablet 1 Tab by SubLINGual route every five (5) minutes as needed for Chest Pain.  aspirin delayed-release 81 mg tablet Take 162 mg by mouth daily.  desloratadine-pseudoephedrine (CLARINEX-D 12 HOUR) 2.5-120 mg per tablet Take 1 Tab by mouth two (2) times a day.  montelukast (SINGULAIR) 10 mg tablet Take 10 mg by mouth daily. Disposition: d/c home DISCHARGE NOTE:  
 
Pt has been reexamined. Patient has no new complaints, changes, or physical findings. Care plan outlined and precautions discussed. Results of work up were reviewed with the patient. All medications were reviewed with the patient; will d/c home with urology f/u. All of pt's questions and concerns were addressed. Patient was instructed and agrees to follow up with pcp, as well as to return to the ED upon further deterioration. Patient is ready to go home. Follow-up Information Follow up With Specialties Details Why Contact Info Shae Moreno MD Internal Medicine Schedule an appointment as soon as possible for a visit in 2 days  [address] [place] [address] [address] [facility] [id number] 
[phone] [facility] EMERGENCY DEPT Emergency Medicine  As needed, If symptoms worsen [address] [facility] [address] 
[phone] Urology of [facility]    [address] [facility] [address] 
[phone] Current Discharge Medication List  
  
START taking these medications Details  
tamsulosin (FLOMAX) 0.4 mg capsule Take 1 tab by mouth daily until kidney stone passes. Qty: 10 Cap, Refills: 0  
  
oxyCODONE-acetaminophen (PERCOCET) 5-325 mg per tablet Take 1 tablet every 4-6 hours as needed for pain control. If you were instructed to try over the counter ibuprofen or tylenol, only take the percocet for pain not controlled with the over the counter medication. Qty: 12 Tab, Refills: 0 Associated Diagnoses: Renal calculi  
  
cephALEXin (KEFLEX) 500 mg capsule Take 1 Cap by mouth two (2) times a day for 7 days. Qty: 14 Cap, Refills: 0 Diagnosis Clinical Impression:  
Renal calculi

## 2018-11-21 NOTE — DISCHARGE INSTRUCTIONS
Kidney Stone: Care Instructions  Your Care Instructions    Kidney stones are formed when salts, minerals, and other substances normally found in the urine clump together. They can be as small as grains of sand or, rarely, as large as golf balls. While the stone is traveling through the ureter, which is the tube that carries urine from the kidney to the bladder, you will probably feel pain. The pain may be mild or very severe. You may also have some blood in your urine. As soon as the stone reaches the bladder, any intense pain should go away. If a stone is too large to pass on its own, you may need a medical procedure to help you pass the stone. The doctor has checked you carefully, but problems can develop later. If you notice any problems or new symptoms, get medical treatment right away. Follow-up care is a key part of your treatment and safety. Be sure to make and go to all appointments, and call your doctor if you are having problems. It's also a good idea to know your test results and keep a list of the medicines you take. How can you care for yourself at home? · Drink plenty of fluids, enough so that your urine is light yellow or clear like water. If you have kidney, heart, or liver disease and have to limit fluids, talk with your doctor before you increase the amount of fluids you drink. · Take pain medicines exactly as directed. Call your doctor if you think you are having a problem with your medicine. ? If the doctor gave you a prescription medicine for pain, take it as prescribed. ? If you are not taking a prescription pain medicine, ask your doctor if you can take an over-the-counter medicine. Read and follow all instructions on the label. · Your doctor may ask you to strain your urine so that you can collect your kidney stone when it passes. You can use a kitchen strainer or a tea strainer to catch the stone. Store it in a plastic bag until you see your doctor again.   Preventing future kidney stones  Some changes in your diet may help prevent kidney stones. Depending on the cause of your stones, your doctor may recommend that you:  · Drink plenty of fluids, enough so that your urine is light yellow or clear like water. If you have kidney, heart, or liver disease and have to limit fluids, talk with your doctor before you increase the amount of fluids you drink. · Limit coffee, tea, and alcohol. Also avoid grapefruit juice. · Do not take more than the recommended daily dose of vitamins C and D.  · Avoid antacids such as Gaviscon, Maalox, Mylanta, or Tums. · Limit the amount of salt (sodium) in your diet. · Eat a balanced diet that is not too high in protein. · Limit foods that are high in a substance called oxalate, which can cause kidney stones. These foods include dark green vegetables, rhubarb, chocolate, wheat bran, nuts, cranberries, and beans. When should you call for help? Call your doctor now or seek immediate medical care if:    · You cannot keep down fluids.     · Your pain gets worse.     · You have a fever or chills.     · You have new or worse pain in your back just below your rib cage (the flank area).     · You have new or more blood in your urine.    Watch closely for changes in your health, and be sure to contact your doctor if:    · You do not get better as expected. Where can you learn more? Go to http://ofelia-yamilex.info/. Enter B233 in the search box to learn more about \"Kidney Stone: Care Instructions. \"  Current as of: March 15, 2018  Content Version: 11.8  © 3747-1861 [employer]. Care instructions adapted under license by [employer] (which disclaims liability or warranty for this information). If you have questions about a medical condition or this instruction, always ask your healthcare professional. [address] any warranty or liability for your use of this information.            Learning About Diet for Kidney Stone Prevention  What are kidney stones? Kidney stones are made of salts and minerals in the urine that form small \"shabana. \" Stones can form in the kidneys and the ureters (the tubes that lead from the kidneys to the bladder). They can also form in the bladder. Stones may not cause a problem as long as they stay in the kidneys. But they can cause sudden, severe pain. Pain is most likely when the stones travel from the kidneys to the bladder. Kidney stones can cause bloody urine. Kidney stones often run in families. You are more likely to get them if you don't drink enough fluids, mainly water. Certain foods and drinks and some dietary supplements may also increase your risk for kidney stones if you consume too much of them. What can you do to prevent kidney stones? Changing what you eat may not prevent all types of kidney stones. But for people who have a history of certain kinds of kidney stones, some changes in diet may help. A dietitian can help you set up a meal plan that includes healthy, low-oxalate choices. Here are some general guidelines to get you started. Plan your meals and snacks around foods that are low in oxalate. These foods include:  · Corn, kale, parsnips, and squash,. · Beef, chicken, pork, turkey, and fish. · Milk, butter, cheese, and yogurt. You can eat certain foods that are medium-high in oxalate, but eat them only once in a while. These foods include:  · Bread. · Brown rice. · English muffins. · Figs. · Popcorn. · String beans. · Tomatoes. Limit very high-oxalate foods, including:  · Black tea. · Coffee. · Chocolate. · Dark green vegetables. · Nuts. Here are some other things you can do to help prevent kidney stones. · Drink plenty of fluids. If you have kidney, heart, or liver disease and have to limit fluids, talk with your doctor before you increase the amount of fluids you drink.   · Do not take more than the recommended daily dose of vitamins C and D.  · Limit the salt in your diet. · Eat a balanced diet that is not too high in protein. Follow-up care is a key part of your treatment and safety. Be sure to make and go to all appointments, and call your doctor if you are having problems. It's also a good idea to know your test results and keep a list of the medicines you take. Where can you learn more? Go to http://ofelia-yamilex.info/. Enter C138 in the search box to learn more about \"Learning About Diet for Kidney Stone Prevention. \"  Current as of: March 29, 2018  Content Version: 11.8  © 7749-1889 [employer], [employer]. Care instructions adapted under license by [employer] (which disclaims liability or warranty for this information). If you have questions about a medical condition or this instruction, always ask your healthcare professional. [address] any warranty or liability for your use of this information.

## 2018-11-21 NOTE — ED TRIAGE NOTES
Pt. States \"I got a kidney stone\" reports symptoms started today around [address] [place] [address]. Pt. Also c/o N/V.

## 2018-11-21 NOTE — ED NOTES
Pt resting on stretcher in a position of comfort with  at bedside. No acute distress observed and stable vital signs on monitor. Call bell within reach. No needs expressed at this time. Will continue to monitor pt and carry out orders.

## 2018-11-21 NOTE — ED NOTES
Reports pain resolved. Continues to experience nausea. Medicated per MAR. IVF complete. INT patent and free from s/sx of inflammation or infiltration. Call bell in reach. WCTM.

## 2018-11-22 LAB
BACTERIA SPEC CULT: NORMAL
SERVICE CMNT-IMP: NORMAL

## 2018-11-26 ENCOUNTER — TELEPHONE (OUTPATIENT)
Dept: ONCOLOGY | Age: 68
End: 2018-11-26

## 2018-11-26 NOTE — H&P (VIEW-ONLY)
[facility], [address], [address] [facility] 
[address] [facility], [address] [place], [address] 
 [phone] Frandy Grady PEREZ Patient ID: 
Name:  Sarah Betts MRN:  [id number] :  1950/68 y.o. Date:  2018 HISTORY OF PRESENT ILLNESS: 
Sarah Betts is a 77 y.o.   postmenopausal female referred by Dr. Ledy Baez for thickened endometrial lining and complex bilobed cyst 
with internal septations and internal echoes. She was orginally presented to PCP 2018 with reports of RLQ pelvic pain and diagnosed with U/A with hematuria. CT from 2018 negative for mass with free fluid was visualized. Due to continued pain she underwent US on 2018 with results of 8mm endometrial lining and a 5.7 cm x 5.1 cm complex right adnexal mass with no free fluid. She then underwent CT again on 2018 revealed a bilobed 
cystic structure located between the uterus and the rectum measuring about 6.0 x 3.7 x 4.6 cm.  drawn on 2018 within normal limits. Her gynecologic history is positive for left oophorectomy. Currently, Denies Vaginal bleeding, change in vaginal discharge, new abdominopelvic pain, change in bladder/bowel habits. Patient admits to some pain with bowel movements. Labs: 
2018 : 27.8 
2018 Pap IG, rfx HPV ASCUS 
NEGATIVE FOR INTRAEPITHELIAL LESION AND MALIGNANCY. Imaging 2018 US [facility] FINDINGS: 
  
Pancreas: Unremarkable. Tail not well visualized. 
  
Abdominal aorta: No aneurysm. 
  
IVC: Unremarkable. 
  
Liver: 14.3 cm length. Slightly coarsened increased echotexture. No focal 
abnormality. Portal vein normal caliber with antegrade flow. 
  
Right kidney: 10.8 cm length. Normal echotexture.  No focal abnormality or 
hydronephrosis. 
  
Gallbladder: Absent. 
  
 Biliary tree: Common bile duct 0.7 cm caliber. No intrahepatic ductal 
dilatation. 
  
IMPRESSION: 
  
1. No clearly acute findings status post cholecystectomy. Slightly coarsened 
increased hepatic echotexture suggests nonspecific hepatocellular pathology such 
as steatosis. 11/20/2018 CT Abdomen-Pelvis without Contrast 
FINDINGS: 
ABDOMEN 
  
Lung Bases:  Multiple foci of scarring or subsegmental atelectatic changes in 
both lungs. No convincing evidence of dominant lung mass. No significant 
pleural effusion. 
  
Liver, Gallbladder, Bile Ducts: 
  
- Status post cholecystectomy. - Dilation of the common bile duct, up to about 0.9 cm. Increased in diameter 
compared to 8/9/18. Complex morphology of the dilated common bile duct in the 
pancreatic head region. This may be related to the presence of small adjacent 
cystic structure (axial #31, coronal #32), measuring about 0.8 cm. 
  
Pancreas:  Predominantly fatty replaced. The pancreatic tail region appears 
relatively spared from fatty infiltration. The pancreatic head region reveals a 
questionable new developing density adjacent to the distal common bile duct as 
noted above. 
  
Spleen, Adrenal Glands:  Unremarkable. 
  
Kidneys:  Unremarkable right kidney and ureter. The left kidney demonstrates 
new mild hydronephrosis with perinephric fat stranding. The left ureter is 
asymmetrically dilated, with a 0.4 x 0.3 cm stone (axial #55) in the mid left 
ureter at about L4 transverse processes inferior margin level. 
  
GI Tract:  Small hiatal hernia. Small adjacent lymph node measuring only about 
0.6 x 0.4 cm. Unremarkable stomach. No acute findings along the small bowel. The appendix is not confidently visualized. No acute appendicitis. No evidence 
of acute diverticulitis or colitis is detected.   Mild diverticulosis coli. 
  
Nodes:  No convincing evidence for mesenteric or retroperitoneal adenopathy. 
  
 Vascular: Moderate to pronounced atheromatous plaque calcifications in the 
aorta. 
  
PELVIS 
  
Urinary Bladder:  Contracted. 
  
Rectum:  Unremarkable. 
  
Uterus, Adnexa:  Grossly unremarkable uterus. Adjacent to the uterus, a bilobed 
cystic structure is identified, located between the uterus and the rectum, 
measuring about 6.0 x 3.7 x 4.6 cm. This structure was much smaller on 8/9/18 
and 12/22/16. The source for this focal fluid collection or cystic structure is 
unclear. 
  
Pelvic Sidewall:  No adenopathy. 
  
Bones:  No acute osseous findings. Spontaneous fusion of L5 and S1. 
  
IMPRESSION: 
  
1.  Left mid ureteral stone, with associated postobstructive changes. 
  
2. No acute findings along the gastrointestinal tract.    
  
3. Unexplained increasing trend for the focal fluid collection or cystic 
structure located between the uterus and the rectum. Recommend further 
evaluation with ultrasound on nonemergent basis. If still equivocal with 
ultrasound results, MR may be considered for further delineation. 
  
4.  New questionable developing hypodense structure in the pancreatic head 
adjacent to the dilated common bile duct. The common bile duct appears more 
dilated than on recent prior study. Recommend correlation with either 
contrast-enhanced CT or MR on nonemergent basis. 11/14/2018 US PELVIS NON OB W TV 
FINDINGS: 
  
Transabdominal: Pelvic structures not well visualized. 
  
Transvaginal: Uterus is retroverted and measures 5.5 x 3.2 x 3.4 cm with 
endometrial canal roughly 0.8 cm thick and contains heterogeneous material. 
Cervix closed in 2.1 cm length. No suspicious uterine or cervical parenchymal 
lesions. In the right adnexum there is a 5.7 x 2.7 x 5.1 cm complex bilobed cyst 
with internal septations and internal echoes, possibly ovarian. Normal ovarian 
parenchyma not visualized. Left ovary not visualized.  No free fluid. 
  
IMPRESSION: 
  
 Large bilobed right adnexal complex cyst or complex loculated fluid as above. A 
complex ovarian cystic mass not excluded. From an imaging standpoint, contrast 
enhanced MRI may provide further characterization. 
  
Thickened endometrial canal containing heterogeneous material, abnormal for 
postmenopausal. Consider tissue sampling. 
  
Left ovary not visualized. 08/09/2018 CT ABD PELV WO CONT FINDINGS:  view shows lung bases clear and normal bowel gas pattern. 
  
CT Of The Abdomen: Lung bases: Linear densities inferiorly right middle lobe and 
both lower lobes-scar versus atelectasis. No effusions. 
  
Heart and pericardium: Normal. 
  
Liver: Normal. 
  
Gall bladder: Absent. 
  
Spleen: Normal. 
  
Pancreas: Largely fatty replaced. No duct dilatation. 
  
Adrenal glands: Normal. 
  
Kidneys: No hydronephrosis or nephrolithiasis. No ureterectasis or ureteral 
stones. No focal renal masses seen on the noncontrast scan. 
  
Lymph nodes: Stable 8 mm lymph node just above the third part of the duodenum. Subcentimeter retroperitoneal lymph nodes also similar. No mesenteric 
adenopathy. 
  
Bowel: Distal esophagus, stomach, duodenum, small bowel normal. Appendix not 
identified. Cecum is in the right side of pelvis. Multiple diverticula are 
present at the left colon and throughout the sigmoid. 
  
CT Of The Pelvis: Peritoneal space: Small volume free fluid. 
  
GYN: Uterus normal. No adnexal masses. 
  
Urinary bladder: Is empty and as such is not well evaluated. No definite stones 
in the lumen. 
  
Bony skeleton: Moderate disc space narrowing L4/L5 with minimal 
anterolisthesis. . 
  
IMPRESSION: 
  
No kidney stones or secondary signs of recently passed stone. If hematuria 
persists, consider CT urogram for correlation. 
  
Small amount of free fluid in the pelvis, slightly increased since prior. 
  
Diverticulosis coli. No findings suggesting acute diverticulitis. 
  
 
 
 
  
ROS:  
As above Patient Active Problem List  
 Diagnosis Date Noted  Severe obesity (BMI 35.0-39.9) 08/21/2018  Non-rheumatic mitral regurgitation 05/02/2016  Nephrolithiasis 08/06/2015  Ureteral stone 08/06/2015  S/P cardiac catheterization 07/08/2014  CAD (coronary artery disease), native coronary artery 06/20/2014  Abnormal nuclear stress test 02/07/2014  Chest pain 02/07/2014  Dyslipidemia  Obesity Past Medical History:  
Diagnosis Date  Arthritis  CAD (coronary artery disease), native coronary artery 7/2014 ULISES to LCx  Cardiac cath 07/07/2014 RCA 15%. LM patent. mLAD 30-50%. CX/OM1 85% (2.75 x 26-mm Resolute ULISES, resid 0%).  Cardiac echocardiogram 04/05/2016 EF 55-60%. No WMA. Gr 1 DDfx. Mod MR.  Cardiac nuclear imaging test, abnormal 02/05/2014 Mod-sized, mild-mod mid lateral infarction w/mild-mod [place]-infarct ischemia. Mod lateral hypk. EF 56%. Normal EKG on pharm stress test.  Intermediate risk.  Dyslipidemia  Gallstone  Hernia  Obesity  Sinus infection Past Surgical History:  
Procedure Laterality Date  HX CHOLECYSTECTOMY  HX HEART CATHETERIZATION    
 HX HERNIA REPAIR    
 HX LYSIS OF ADHESIONS    
 HX OOPHORECTOMY  IN REMOVAL OF ANAL FISSURE  11/02/2018 OB History No data available Social History Tobacco Use  Smoking status: Never Smoker  Smokeless tobacco: Never Used Substance Use Topics  Alcohol use: No  
  
Family History Problem Relation Age of Onset  Hypertension Mother  Cancer Father  Heart Disease Maternal Grandmother  Diabetes Paternal Grandmother Current Outpatient Medications Medication Sig  
 diclofenac (VOLTAREN) 1 % gel Apply  to affected area four (4) times daily.  tamsulosin (FLOMAX) 0.4 mg capsule Take 1 tab by mouth daily until kidney stone passes.   
 traMADol (ULTRAM) 50 mg tablet Take 1-2 Tabs by mouth every six (6) hours as needed for Pain. Max Daily Amount: 400 mg.  
 dicyclomine (BENTYL) 10 mg capsule Take 10 mg by mouth 4 times daily (before meals and nightly).  PREMARIN 0.625 mg/gram vaginal cream   
 cholecalciferol, vitamin D3, (VITAMIN D3) 2,000 unit tab Take  by mouth.  folic acid/multivit-min/lutein (CENTRUM SILVER PO) Take  by mouth.  acetaminophen (TYLENOL ARTHRITIS PAIN) 650 mg TbER Take 650 mg by mouth every eight (8) hours.  metoprolol succinate (TOPROL-XL) 50 mg XL tablet TAKE 1 TABLET BY MOUTH DAILY  rosuvastatin (CRESTOR) 20 mg tablet Take 20 mg by mouth nightly.  mometasone 100 mcg/actuation HFAA Take 100 mcg by inhalation daily.  nitroglycerin (NITROSTAT) 0.4 mg SL tablet 1 Tab by SubLINGual route every five (5) minutes as needed for Chest Pain.  aspirin delayed-release 81 mg tablet Take 162 mg by mouth daily.  desloratadine-pseudoephedrine (CLARINEX-D 12 HOUR) 2.5-120 mg per tablet Take 1 Tab by mouth two (2) times a day.  montelukast (SINGULAIR) 10 mg tablet Take 10 mg by mouth daily.  oxyCODONE-acetaminophen (PERCOCET) 5-325 mg per tablet Take 1 tablet every 4-6 hours as needed for pain control. If you were instructed to try over the counter ibuprofen or tylenol, only take the percocet for pain not controlled with the over the counter medication.  cephALEXin (KEFLEX) 500 mg capsule Take 1 Cap by mouth two (2) times a day for 7 days. No current facility-administered medications for this visit. Allergies Allergen Reactions  Celebrex [Celecoxib] Swelling OBJECTIVE: 
 
Physical Exam 
VITAL SIGNS: Visit Vitals /73 (BP 1 Location: Right arm, BP Patient Position: Sitting) Pulse 79 Temp 97 °F (36.1 °C) (Oral) Resp 18 Ht 5' 3\" (1.6 m) Wt 92.3 kg (203 lb 6.4 oz) SpO2 97% BMI 36.03 kg/m² GENERAL VIDHI: in no apparent distress and well developed and well nourished MUSCULOSKEL: no joint tenderness, deformity or swelling INTEGUMENT:  warm and dry, no rashes or lesions ABDOMEN . soft, NT, ND, No masses appreciated EXTREMITIES: extremities normal, atraumatic, no cyanosis or edema PELVIC: External genitalia: normal general appearance Vaginal: atrophic mucosa Cervix: normal appearance Adnexa: enlarged adnexa, midline Uterus: normal single, nontender RECTAL: deferred FABIO SURVEY: Cervical, supraclavicular, axillary and inguinal nodes normal.  
NEURO: Grossly normal  
 
[facility] GYNECOLOGIC ONCOLOGY SPECIALISTSOFFICE PROCEDURE PROGRESS NOTE Chart reviewed for the following: 
 Ino Medina MD, have reviewed the History, Physical and updated the Allergic reactions for Magnolia [place]is TIME OUT performed immediately prior to start of procedure: 
 Ino Medina MD, have performed the following reviews on Magnolia [place]is prior to the start of the procedure: 
         
* Patient was identified by name and date of birth * Agreement on procedure being performed was verified * Risks and Benefits explained to the patient * Procedure site verified and marked as necessary * Patient was positioned for comfort * Consent was signed and verified Time: 1015am 
 
 
Date of procedure: 11/28/2018 Procedure performed by:  Jazzy Friedman MD 
 
Provider assisted by: Doreen Murrell LPN Patient assisted by: self How tolerated by patient: tolerated the procedure well with no complications Post Procedural Pain Scale: 0 - No Hurt Comments: unable to pass pipelle due to stenosis IMPRESSION/PLAN:  
1. Complex right ovarian cyst, thickened endometrium 
 -Reviewed her imaging and blood work. Explained likely benign but given her age and complexity would recommend surgical evaluation. 
 -Endometrial biopsy unable to be performed toay 
 -Discussed recommendation for laparoscopic resection of pelvic mass including right salpingo-oophorectomy with D&C .  We also discussed possibility of hysterectomy and staging of malignant.   
 -Risks, benefits and alternatives of surgery discussed in detail and written consent obtained. Risks including bleeding, infection, blood clot, injury to nearby organs including bladder, bowel, ureters and blood vessels. The total time spent was 60 minutes regarding this patients diagnosis of complex right ovarian cyst , thickened endometrium and >50% of this time was spent counseling and coordinating care Malena Watkins DO Gynecologic Oncology 11/28/201810:53 AM

## 2018-11-26 NOTE — PROGRESS NOTES
[address], [address], [address] [facility] 
[address] [facility], [address] [place], [address] 
 [phone] Audelia Gaxiola DO Patient ID: 
Name:  Bryan Montgomery MRN:  [id number] :  1950/68 y.o. Date:  2018 HISTORY OF PRESENT ILLNESS: 
Bryan Montgomery is a 77 y.o.   postmenopausal female referred by Dr. Tena Horne for thickened endometrial lining and complex bilobed cyst 
with internal septations and internal echoes. She was orginally presented to PCP 2018 with reports of RLQ pelvic pain and diagnosed with U/A with hematuria. CT from 2018 negative for mass with free fluid was visualized. Due to continued pain she underwent US on 2018 with results of 8mm endometrial lining and a 5.7 cm x 5.1 cm complex right adnexal mass with no free fluid. She then underwent CT again on 2018 revealed a bilobed 
cystic structure located between the uterus and the rectum measuring about 6.0 x 3.7 x 4.6 cm.  drawn on 2018 within normal limits. Her gynecologic history is positive for left oophorectomy. Currently, Denies Vaginal bleeding, change in vaginal discharge, new abdominopelvic pain, change in bladder/bowel habits. Patient admits to some pain with bowel movements. Labs: 
2018 : 27.8 
2018 Pap IG, rfx HPV ASCUS 
NEGATIVE FOR INTRAEPITHELIAL LESION AND MALIGNANCY. Imaging 2018 US ABD LTD FINDINGS: 
  
Pancreas: Unremarkable. Tail not well visualized. 
  
Abdominal aorta: No aneurysm. 
  
IVC: Unremarkable. 
  
Liver: 14.3 cm length. Slightly coarsened increased echotexture. No focal 
abnormality. Portal vein normal caliber with antegrade flow. 
  
Right kidney: 10.8 cm length. Normal echotexture.  No focal abnormality or 
hydronephrosis. 
  
Gallbladder: Absent. 
  
 Biliary tree: Common bile duct 0.7 cm caliber. No intrahepatic ductal 
dilatation. 
  
IMPRESSION: 
  
1. No clearly acute findings status post cholecystectomy. Slightly coarsened 
increased hepatic echotexture suggests nonspecific hepatocellular pathology such 
as steatosis. 11/20/2018 CT Abdomen-Pelvis without Contrast 
FINDINGS: 
ABDOMEN 
  
Lung Bases:  Multiple foci of scarring or subsegmental atelectatic changes in 
both lungs. No convincing evidence of dominant lung mass. No significant 
pleural effusion. 
  
Liver, Gallbladder, Bile Ducts: 
  
- Status post cholecystectomy. - Dilation of the common bile duct, up to about 0.9 cm. Increased in diameter 
compared to 8/9/18. Complex morphology of the dilated common bile duct in the 
pancreatic head region. This may be related to the presence of small adjacent 
cystic structure (axial #31, coronal #32), measuring about 0.8 cm. 
  
Pancreas:  Predominantly fatty replaced. The pancreatic tail region appears 
relatively spared from fatty infiltration. The pancreatic head region reveals a 
questionable new developing density adjacent to the distal common bile duct as 
noted above. 
  
Spleen, Adrenal Glands:  Unremarkable. 
  
Kidneys:  Unremarkable right kidney and ureter. The left kidney demonstrates 
new mild hydronephrosis with perinephric fat stranding. The left ureter is 
asymmetrically dilated, with a 0.4 x 0.3 cm stone (axial #55) in the mid left 
ureter at about L4 transverse processes inferior margin level. 
  
GI Tract:  Small hiatal hernia. Small adjacent lymph node measuring only about 
0.6 x 0.4 cm. Unremarkable stomach. No acute findings along the small bowel. The appendix is not confidently visualized. No acute appendicitis. No evidence 
of acute diverticulitis or colitis is detected.   Mild diverticulosis coli. 
  
Nodes:  No convincing evidence for mesenteric or retroperitoneal adenopathy. 
  
 Vascular: Moderate to pronounced atheromatous plaque calcifications in the 
aorta. 
  
PELVIS 
  
Urinary Bladder:  Contracted. 
  
Rectum:  Unremarkable. 
  
Uterus, Adnexa:  Grossly unremarkable uterus. Adjacent to the uterus, a bilobed 
cystic structure is identified, located between the uterus and the rectum, 
measuring about 6.0 x 3.7 x 4.6 cm. This structure was much smaller on 8/9/18 
and 12/22/16. The source for this focal fluid collection or cystic structure is 
unclear. 
  
Pelvic Sidewall:  No adenopathy. 
  
Bones:  No acute osseous findings. Spontaneous fusion of L5 and S1. 
  
IMPRESSION: 
  
1.  Left mid ureteral stone, with associated postobstructive changes. 
  
2. No acute findings along the gastrointestinal tract.    
  
3. Unexplained increasing trend for the focal fluid collection or cystic 
structure located between the uterus and the rectum. Recommend further 
evaluation with ultrasound on nonemergent basis. If still equivocal with 
ultrasound results, MR may be considered for further delineation. 
  
4.  New questionable developing hypodense structure in the pancreatic head 
adjacent to the dilated common bile duct. The common bile duct appears more 
dilated than on recent prior study. Recommend correlation with either 
contrast-enhanced CT or MR on nonemergent basis. 11/14/2018 US PELVIS NON OB W TV 
FINDINGS: 
  
Transabdominal: Pelvic structures not well visualized. 
  
Transvaginal: Uterus is retroverted and measures 5.5 x 3.2 x 3.4 cm with 
endometrial canal roughly 0.8 cm thick and contains heterogeneous material. 
Cervix closed in 2.1 cm length. No suspicious uterine or cervical parenchymal 
lesions. In the right adnexum there is a 5.7 x 2.7 x 5.1 cm complex bilobed cyst 
with internal septations and internal echoes, possibly ovarian. Normal ovarian 
parenchyma not visualized. Left ovary not visualized.  No free fluid. 
  
IMPRESSION: 
  
 Large bilobed right adnexal complex cyst or complex loculated fluid as above. A 
complex ovarian cystic mass not excluded. From an imaging standpoint, contrast 
enhanced MRI may provide further characterization. 
  
Thickened endometrial canal containing heterogeneous material, abnormal for 
postmenopausal. Consider tissue sampling. 
  
Left ovary not visualized. 08/09/2018 CT ABD PELV WO CONT FINDINGS:  view shows lung bases clear and normal bowel gas pattern. 
  
CT Of The Abdomen: Lung bases: Linear densities inferiorly right middle lobe and 
both lower lobes-scar versus atelectasis. No effusions. 
  
Heart and pericardium: Normal. 
  
Liver: Normal. 
  
Gall bladder: Absent. 
  
Spleen: Normal. 
  
Pancreas: Largely fatty replaced. No duct dilatation. 
  
Adrenal glands: Normal. 
  
Kidneys: No hydronephrosis or nephrolithiasis. No ureterectasis or ureteral 
stones. No focal renal masses seen on the noncontrast scan. 
  
Lymph nodes: Stable 8 mm lymph node just above the third part of the duodenum. Subcentimeter retroperitoneal lymph nodes also similar. No mesenteric 
adenopathy. 
  
Bowel: Distal esophagus, stomach, duodenum, small bowel normal. Appendix not 
identified. Cecum is in the right side of pelvis. Multiple diverticula are 
present at the left colon and throughout the sigmoid. 
  
CT Of The Pelvis: Peritoneal space: Small volume free fluid. 
  
GYN: Uterus normal. No adnexal masses. 
  
Urinary bladder: Is empty and as such is not well evaluated. No definite stones 
in the lumen. 
  
Bony skeleton: Moderate disc space narrowing L4/L5 with minimal 
anterolisthesis. . 
  
IMPRESSION: 
  
No kidney stones or secondary signs of recently passed stone. If hematuria 
persists, consider CT urogram for correlation. 
  
Small amount of free fluid in the pelvis, slightly increased since prior. 
  
Diverticulosis coli. No findings suggesting acute diverticulitis. 
  
 
 
 
  
ROS:  
As above Patient Active Problem List  
 Diagnosis Date Noted  Severe obesity (BMI 35.0-39.9) 08/21/2018  Non-rheumatic mitral regurgitation 05/02/2016  Nephrolithiasis 08/06/2015  Ureteral stone 08/06/2015  S/P cardiac catheterization 07/08/2014  CAD (coronary artery disease), native coronary artery 06/20/2014  Abnormal nuclear stress test 02/07/2014  Chest pain 02/07/2014  Dyslipidemia  Obesity Past Medical History:  
Diagnosis Date  Arthritis  CAD (coronary artery disease), native coronary artery 7/2014 ULISES to LCx  Cardiac cath 07/07/2014 RCA 15%. LM patent. mLAD 30-50%. CX/OM1 85% (2.75 x 26-mm Resolute ULISES, resid 0%).  Cardiac echocardiogram 04/05/2016 EF 55-60%. No WMA. Gr 1 DDfx. Mod MR.  Cardiac nuclear imaging test, abnormal 02/05/2014 Mod-sized, mild-mod mid lateral infarction w/mild-mod [place]-infarct ischemia. Mod lateral hypk. EF 56%. Normal EKG on pharm stress test.  Intermediate risk.  Dyslipidemia  Gallstone  Hernia  Obesity  Sinus infection Past Surgical History:  
Procedure Laterality Date  HX CHOLECYSTECTOMY  HX HEART CATHETERIZATION    
 HX HERNIA REPAIR    
 HX LYSIS OF ADHESIONS    
 HX OOPHORECTOMY  AL REMOVAL OF ANAL FISSURE  11/02/2018 OB History No data available Social History Tobacco Use  Smoking status: Never Smoker  Smokeless tobacco: Never Used Substance Use Topics  Alcohol use: No  
  
Family History Problem Relation Age of Onset  Hypertension Mother  Cancer Father  Heart Disease Maternal Grandmother  Diabetes Paternal Grandmother Current Outpatient Medications Medication Sig  
 diclofenac (VOLTAREN) 1 % gel Apply  to affected area four (4) times daily.  tamsulosin (FLOMAX) 0.4 mg capsule Take 1 tab by mouth daily until kidney stone passes.   
 traMADol (ULTRAM) 50 mg tablet Take 1-2 Tabs by mouth every six (6) hours as needed for Pain. Max Daily Amount: 400 mg.  
 dicyclomine (BENTYL) 10 mg capsule Take 10 mg by mouth 4 times daily (before meals and nightly).  PREMARIN 0.625 mg/gram vaginal cream   
 cholecalciferol, vitamin D3, (VITAMIN D3) 2,000 unit tab Take  by mouth.  folic acid/multivit-min/lutein (CENTRUM SILVER PO) Take  by mouth.  acetaminophen (TYLENOL ARTHRITIS PAIN) 650 mg TbER Take 650 mg by mouth every eight (8) hours.  metoprolol succinate (TOPROL-XL) 50 mg XL tablet TAKE 1 TABLET BY MOUTH DAILY  rosuvastatin (CRESTOR) 20 mg tablet Take 20 mg by mouth nightly.  mometasone 100 mcg/actuation HFAA Take 100 mcg by inhalation daily.  nitroglycerin (NITROSTAT) 0.4 mg SL tablet 1 Tab by SubLINGual route every five (5) minutes as needed for Chest Pain.  aspirin delayed-release 81 mg tablet Take 162 mg by mouth daily.  desloratadine-pseudoephedrine (CLARINEX-D 12 HOUR) 2.5-120 mg per tablet Take 1 Tab by mouth two (2) times a day.  montelukast (SINGULAIR) 10 mg tablet Take 10 mg by mouth daily.  oxyCODONE-acetaminophen (PERCOCET) 5-325 mg per tablet Take 1 tablet every 4-6 hours as needed for pain control. If you were instructed to try over the counter ibuprofen or tylenol, only take the percocet for pain not controlled with the over the counter medication.  cephALEXin (KEFLEX) 500 mg capsule Take 1 Cap by mouth two (2) times a day for 7 days. No current facility-administered medications for this visit. Allergies Allergen Reactions  Celebrex [Celecoxib] Swelling OBJECTIVE: 
 
Physical Exam 
VITAL SIGNS: Visit Vitals /73 (BP 1 Location: Right arm, BP Patient Position: Sitting) Pulse 79 Temp 97 °F (36.1 °C) (Oral) Resp 18 Ht 5' 3\" (1.6 m) Wt 92.3 kg (203 lb 6.4 oz) SpO2 97% BMI 36.03 kg/m² GENERAL VIDHI: in no apparent distress and well developed and well nourished MUSCULOSKEL: no joint tenderness, deformity or swelling INTEGUMENT:  warm and dry, no rashes or lesions ABDOMEN . soft, NT, ND, No masses appreciated EXTREMITIES: extremities normal, atraumatic, no cyanosis or edema PELVIC: External genitalia: normal general appearance Vaginal: atrophic mucosa Cervix: normal appearance Adnexa: enlarged adnexa, midline Uterus: normal single, nontender RECTAL: deferred FABIO SURVEY: Cervical, supraclavicular, axillary and inguinal nodes normal.  
NEURO: Grossly normal  
 
[facility] GYNECOLOGIC ONCOLOGY SPECIALISTSOFFICE PROCEDURE PROGRESS NOTE Chart reviewed for the following: 
 Stanford Reddy MD, have reviewed the History, Physical and updated the Allergic reactions for Orma Heckler TIME OUT performed immediately prior to start of procedure: 
 Stanford Reddy MD, have performed the following reviews on Orma Heckler prior to the start of the procedure: 
         
* Patient was identified by name and date of birth * Agreement on procedure being performed was verified * Risks and Benefits explained to the patient * Procedure site verified and marked as necessary * Patient was positioned for comfort * Consent was signed and verified Time: 1015am 
 
 
Date of procedure: 11/28/2018 Procedure performed by:  Elmo Cruz MD 
 
Provider assisted by: Ajith Franklin LPN Patient assisted by: self How tolerated by patient: tolerated the procedure well with no complications Post Procedural Pain Scale: 0 - No Hurt Comments: unable to pass pipelle due to stenosis IMPRESSION/PLAN:  
1. Complex right ovarian cyst, thickened endometrium 
 -Reviewed her imaging and blood work. Explained likely benign but given her age and complexity would recommend surgical evaluation. 
 -Endometrial biopsy unable to be performed toay 
 -Discussed recommendation for laparoscopic resection of pelvic mass including right salpingo-oophorectomy with D&C .  We also discussed possibility of hysterectomy and staging of malignant.   
 -Risks, benefits and alternatives of surgery discussed in detail and written consent obtained. Risks including bleeding, infection, blood clot, injury to nearby organs including bladder, bowel, ureters and blood vessels. The total time spent was 60 minutes regarding this patients diagnosis of complex right ovarian cyst , thickened endometrium and >50% of this time was spent counseling and coordinating care Radha Knapp DO Gynecologic Oncology 11/28/201810:53 AM

## 2018-11-26 NOTE — TELEPHONE ENCOUNTER
Contacted patient an got patients voicemail, message was left advising patient to contact the office at her earliest convienence so we can get a records release for signed so [facility] can obtain patients office notes and such. Office number was left on patients voicemail.

## 2018-11-28 ENCOUNTER — OFFICE VISIT (OUTPATIENT)
Dept: ONCOLOGY | Age: 68
End: 2018-11-28

## 2018-11-28 ENCOUNTER — TELEPHONE (OUTPATIENT)
Dept: ONCOLOGY | Age: 68
End: 2018-11-28

## 2018-11-28 ENCOUNTER — HOSPITAL ENCOUNTER (OUTPATIENT)
Dept: LAB | Age: 68
Discharge: HOME | End: 2018-11-28
Payer: MEDICARE

## 2018-11-28 VITALS
RESPIRATION RATE: 18 BRPM | HEART RATE: 79 BPM | SYSTOLIC BLOOD PRESSURE: 139 MMHG | HEIGHT: 63 IN | WEIGHT: 203.4 LBS | BODY MASS INDEX: 36.04 KG/M2 | DIASTOLIC BLOOD PRESSURE: 73 MMHG | OXYGEN SATURATION: 97 % | TEMPERATURE: 97 F

## 2018-11-28 DIAGNOSIS — Z01.818 PREOPERATIVE TESTING: Primary | ICD-10-CM

## 2018-11-28 DIAGNOSIS — N83.202 OVARIAN CYST, LEFT: ICD-10-CM

## 2018-11-28 DIAGNOSIS — Z01.818 PREOPERATIVE TESTING: ICD-10-CM

## 2018-11-28 LAB
ANION GAP SERPL CALC-SCNC: 9 MMOL/L (ref 3–18)
ATRIAL RATE: 66 BPM
BASOPHILS # BLD: 0.1 K/UL (ref 0–0.1)
BASOPHILS NFR BLD: 1 % (ref 0–2)
BUN SERPL-MCNC: 10 MG/DL (ref 7–18)
BUN/CREAT SERPL: 13 (ref 12–20)
CALCIUM SERPL-MCNC: 9.1 MG/DL (ref 8.5–10.1)
CALCULATED P AXIS, ECG09: 22 DEGREES
CALCULATED R AXIS, ECG10: 4 DEGREES
CALCULATED T AXIS, ECG11: 36 DEGREES
CHLORIDE SERPL-SCNC: 109 MMOL/L (ref 100–108)
CO2 SERPL-SCNC: 23 MMOL/L (ref 21–32)
CREAT SERPL-MCNC: 0.77 MG/DL (ref 0.6–1.3)
DIAGNOSIS, 93000: NORMAL
DIFFERENTIAL METHOD BLD: ABNORMAL
EOSINOPHIL # BLD: 0.5 K/UL (ref 0–0.4)
EOSINOPHIL NFR BLD: 6 % (ref 0–5)
ERYTHROCYTE [DISTWIDTH] IN BLOOD BY AUTOMATED COUNT: 13.6 % (ref 11.6–14.5)
GLUCOSE SERPL-MCNC: 87 MG/DL (ref 74–99)
HCT VFR BLD AUTO: 40 % (ref 35–45)
HGB BLD-MCNC: 13 G/DL (ref 12–16)
LYMPHOCYTES # BLD: 2.6 K/UL (ref 0.9–3.6)
LYMPHOCYTES NFR BLD: 31 % (ref 21–52)
MCH RBC QN AUTO: 28.1 PG (ref 24–34)
MCHC RBC AUTO-ENTMCNC: 32.5 G/DL (ref 31–37)
MCV RBC AUTO: 86.4 FL (ref 74–97)
MONOCYTES # BLD: 0.5 K/UL (ref 0.05–1.2)
MONOCYTES NFR BLD: 6 % (ref 3–10)
NEUTS SEG # BLD: 4.6 K/UL (ref 1.8–8)
NEUTS SEG NFR BLD: 56 % (ref 40–73)
P-R INTERVAL, ECG05: 182 MS
PLATELET # BLD AUTO: 296 K/UL (ref 135–420)
PMV BLD AUTO: 11.1 FL (ref 9.2–11.8)
POTASSIUM SERPL-SCNC: 4.3 MMOL/L (ref 3.5–5.5)
Q-T INTERVAL, ECG07: 416 MS
QRS DURATION, ECG06: 88 MS
QTC CALCULATION (BEZET), ECG08: 436 MS
RBC # BLD AUTO: 4.63 M/UL (ref 4.2–5.3)
SODIUM SERPL-SCNC: 141 MMOL/L (ref 136–145)
VENTRICULAR RATE, ECG03: 66 BPM
WBC # BLD AUTO: 8.2 K/UL (ref 4.6–13.2)

## 2018-11-28 PROCEDURE — 86901 BLOOD TYPING SEROLOGIC RH(D): CPT

## 2018-11-28 PROCEDURE — 80048 BASIC METABOLIC PNL TOTAL CA: CPT

## 2018-11-28 PROCEDURE — 85025 COMPLETE CBC W/AUTO DIFF WBC: CPT

## 2018-11-28 PROCEDURE — 36415 COLL VENOUS BLD VENIPUNCTURE: CPT

## 2018-11-28 PROCEDURE — 93005 ELECTROCARDIOGRAM TRACING: CPT

## 2018-11-28 RX ORDER — DICLOFENAC SODIUM 10 MG/G
GEL TOPICAL AS NEEDED
COMMUNITY
End: 2021-08-31 | Stop reason: SDUPTHER

## 2018-11-28 NOTE — PROGRESS NOTES
Louis Gibbs, a 77 y.o. female,  is here for Chief Complaint Patient presents with  New Patient Dr. Jordin Ji, 5 mm cyst, thickened endometrium Visit Vitals /73 (BP 1 Location: Right arm, BP Patient Position: Sitting) Pulse 79 Temp 97 °F (36.1 °C) (Oral) Resp 18 Ht 5' 3\" (1.6 m) Wt 92.3 kg (203 lb 6.4 oz) SpO2 97% BMI 36.03 kg/m² Patient denies any persistent or worsening abdominal or pelvic pain. Denies any unusual vaginal bleeding, discharge, irritation, or odor. Denies experiencing any constipation or diarrheaanal fissure repaired and having bowel movements has become a more delicate process, No burning, discomfort, or irritation with urination, but has had blood in her urine , because of having kidney stones. [facility]

## 2018-11-28 NOTE — TELEPHONE ENCOUNTER
Left voicemail advising patient to contact the office so patient can get scheduled for surgery counseling appointment with [facility]. Office number was left for patient to contact the office back.

## 2018-11-28 NOTE — PATIENT INSTRUCTIONS

## 2018-11-29 LAB
ABO + RH BLD: NORMAL
BLOOD GROUP ANTIBODIES SERPL: NORMAL
SPECIMEN EXP DATE BLD: NORMAL

## 2018-11-29 RX ORDER — CALCIUM POLYCARBOPHIL 625 MG
625 TABLET ORAL DAILY
COMMUNITY
End: 2020-11-13

## 2018-12-03 ENCOUNTER — OFFICE VISIT (OUTPATIENT)
Dept: ONCOLOGY | Age: 68
End: 2018-12-03

## 2018-12-03 VITALS
RESPIRATION RATE: 16 BRPM | SYSTOLIC BLOOD PRESSURE: 124 MMHG | HEART RATE: 69 BPM | OXYGEN SATURATION: 94 % | DIASTOLIC BLOOD PRESSURE: 72 MMHG | TEMPERATURE: 97.7 F | WEIGHT: 204 LBS | BODY MASS INDEX: 36.14 KG/M2 | HEIGHT: 63 IN

## 2018-12-03 DIAGNOSIS — Z71.89 ENCOUNTER FOR SURGICAL COUNSELING: Primary | ICD-10-CM

## 2018-12-03 NOTE — PROGRESS NOTES
Judy Jordan, a 77 y.o. female,  is here for Chief Complaint Patient presents with  Patient Education  
  surgical counseling Visit Vitals /72 (BP 1 Location: Left arm, BP Patient Position: Sitting) Pulse 69 Temp 97.7 °F (36.5 °C) (Oral) Resp 16 Ht 5' 2.5\" (1.588 m) Wt 92.5 kg (204 lb) SpO2 94% BMI 36.72 kg/m² Reviewed consent form for DR. [facility] and information packet for a Resection of pelvic mass, right salpingo-oophorectomy, dilation and curettage, possible hysterctomy, and staging scheduled on 12/05/2018 at 1200 with an arrival time of 1000. Patient was given information packet that included pre-op and post-op instructions as well as the scheduled date, start time, arrival time, and length of the surgery and signed the consent form. Patient expressed understanding and had no further questions. Patient was encouraged to call if they have questions later. 1. Have you been to the ER, urgent care clinic since your last visit? Hospitalized since your last visit? No 
 
2. Have you seen or consulted any other health care providers outside of the [address] [place] [address] since your last visit? Include any pap smears or colon screening.  No

## 2018-12-03 NOTE — PATIENT INSTRUCTIONS
Laparoscopically Assisted Vaginal Hysterectomy: Before Your Surgery What is a laparoscopically assisted vaginal hysterectomy? A hysterectomy is surgery to take out the uterus. In some cases, the ovaries and fallopian tubes are taken out at the same time. The doctor makes one or more small cuts in the belly. These cuts are called incisions. They let the doctor insert tools to do the surgery. One of these tools is a tube with a light on it. It's called a laparoscope, or scope. The scope and the other tools allow the doctor to free the uterus. Then the doctor makes a small cut in the vagina. The uterus is taken out through this cut. After the surgery, you will not have periods. You will not be able to get pregnant. If there is a chance that you want to have a baby, talk to your doctor about treatment options. Some women go home the day of surgery and some will stay in the hospital 1 to 2 days after surgery. You will need about 4 to 6 weeks to fully recover. The recovery time may be less for some patients. Follow-up care is a key part of your treatment and safety. Be sure to make and go to all appointments, and call your doctor if you are having problems. It's also a good idea to know your test results and keep a list of the medicines you take. What happens before surgery? 
 Surgery can be stressful. This information will help you understand what you can expect. And it will help you safely prepare for surgery. 
 Preparing for surgery 
  · Understand exactly what surgery is planned, along with the risks, benefits, and other options. · Tell your doctors ALL the medicines, vitamins, supplements, and herbal remedies you take. Some of these can increase the risk of bleeding or interact with anesthesia.  
  · If you take blood thinners, such as warfarin (Coumadin), clopidogrel (Plavix), or aspirin, be sure to talk to your doctor.  He or she will tell you if you should stop taking these medicines before your surgery. Make sure that you understand exactly what your doctor wants you to do.  
  · Your doctor will tell you which medicines to take or stop before your surgery. You may need to stop taking certain medicines a week or more before surgery. So talk to your doctor as soon as you can.  
  · If you have an advance directive, let your doctor know. It may include a living will and a durable power of  for health care. Bring a copy to the hospital. If you don't have one, you may want to prepare one. It lets your doctor and loved ones know your health care wishes. Doctors advise that everyone prepare these papers before any type of surgery or procedure. What happens on the day of surgery? · Follow the instructions exactly about when to stop eating and drinking. If you don't, your surgery may be canceled. If your doctor told you to take your medicines on the day of surgery, please take them with only a sip of water.  
  · Take a bath or shower before you come in for your surgery. Do not apply lotions, perfumes, deodorants, or nail polish.  
  · Do not shave the surgical site yourself.  
  · Take off all jewelry and piercings. And take out contact lenses, if you wear them.  
 At the hospital or surgery center · Bring a picture ID.  
  · You will be kept comfortable and safe by your anesthesia provider. You will be asleep during the surgery.  
  · The surgery will take about 2 to 4 hours. Going home · Be sure you have someone to drive you home. Anesthesia and pain medicine make it unsafe for you to drive.  
  · You will be given more specific instructions about recovering from your surgery. They will cover things like diet, wound care, follow-up care, driving, and getting back to your normal routine. When should you call your doctor? · You have questions or concerns.  
  · You do not understand how to prepare for your surgery.   · You become ill before surgery (such as fever, flu, or a cold).  
  · You need to reschedule or have changed your mind about having the surgery. Where can you learn more? Go to http://ofelia-yamilex.info/. Enter D669 in the search box to learn more about \"Laparoscopically Assisted Vaginal Hysterectomy: Before Your Surgery. \" Current as of: May 15, 2018 Content Version: 11.8 © 8187-8678 [employer]. Care instructions adapted under license by [employer] (which disclaims liability or warranty for this information). If you have questions about a medical condition or this instruction, always ask your healthcare professional. [address] any warranty or liability for your use of this information. Laparoscopically Assisted Vaginal Hysterectomy: What to Expect at [facility] Your Recovery You can expect to feel better and stronger each day, although you may need pain medicine for a week or two. You may get tired easily or have less energy than usual. This may last for several weeks after surgery. You will probably notice that your belly is swollen and puffy. This is common. The swelling will take several weeks to go down. It may take about 4 to 6 weeks to fully recover. The recovery time may be less for some patients. You may have some light vaginal bleeding. Don't have sex until the doctor says it is okay. Don't douche or put anything into your vagina, such as a tampon, until your doctor says it is okay. It is important to avoid lifting while you are recovering so that you can heal. 
This care sheet gives you a general idea about how long it will take for you to recover. But each person recovers at a different pace. Follow the steps below to get better as quickly as possible. How can you care for yourself at home? Activity 
  · Rest when you feel tired. Getting enough sleep will help you recover.   · Try to walk each day. Start by walking a little more than you did the day before. Bit by bit, increase the amount you walk. Walking boosts blood flow and helps prevent pneumonia and constipation.  
  · Avoid lifting anything that would make you strain. This may include heavy grocery bags and milk containers, a heavy briefcase or backpack, cat litter or dog food bags, a vacuum , or a child.  
  · Avoid strenuous activities, such as biking, jogging, weight lifting, or aerobic exercise, until your doctor says it is okay.  
  · You may shower. Pat the cut (incision) dry. Do not take a bath for the first 2 weeks, or until your doctor tells you it is okay.  
  · Ask your doctor when you can drive again.  
  · You will probably need to take about 2 weeks off from work. It depends on the type of work you do and how you feel.  
  · Your doctor will tell you when you can have sex again. Diet 
  · You can eat your normal diet. If your stomach is upset, try bland, low-fat foods like plain rice, broiled chicken, toast, and yogurt.  
  · Drink plenty of fluids (unless your doctor tells you not to).  
  · You may notice that your bowel movements are not regular right after your surgery. This is common. Try to avoid constipation and straining with bowel movements. You may want to take a fiber supplement every day. If you have not had a bowel movement after a couple of days, ask your doctor about taking a mild laxative. Medicines 
  · Your doctor will tell you if and when you can restart your medicines. He or she will also give you instructions about taking any new medicines.  
  · If you take blood thinners, such as warfarin (Coumadin), clopidogrel (Plavix), or aspirin, be sure to talk to your doctor. He or she will tell you if and when to start taking those medicines again. Make sure that you understand exactly what your doctor wants you to do.  
  · Be safe with medicines. Take pain medicines exactly as directed. ? If the doctor gave you a prescription medicine for pain, take it as prescribed. ? If you are not taking a prescription pain medicine, ask your doctor if you can take an over-the-counter medicine.  
  · If you think your pain medicine is making you sick to your stomach: 
? Take your medicine after meals (unless your doctor has told you not to). ? Ask your doctor for a different pain medicine.  
  · If your doctor prescribed antibiotics, take them as directed. Do not stop taking them just because you feel better. You need to take the full course of antibiotics. Incision care 
  · You may have stitches over the cuts (incisions) the doctor made in your belly. If you have strips of tape on the incisions the doctor made, leave the tape on for a week or until it falls off. Or follow your doctor's instructions for removing the tape.  
  · Wash the area daily with warm, soapy water, and pat it dry. Don't use hydrogen peroxide or alcohol, which can slow healing. You may cover the area with a gauze bandage if it weeps or rubs against clothing. Change the bandage every day.  
  · Keep the area clean and dry. Other instructions 
  · You may have some light vaginal bleeding. Wear sanitary pads if needed. Do not douche or use tampons. Follow-up care is a key part of your treatment and safety. Be sure to make and go to all appointments, and call your doctor if you are having problems. It's also a good idea to know your test results and keep a list of the medicines you take. When should you call for help? Call 911 anytime you think you may need emergency care. For example, call if: 
  · You passed out (lost consciousness).  
  · You have chest pain, are short of breath, or cough up blood.  
 Call your doctor now or seek immediate medical care if: 
  · You have pain that does not get better after you take pain medicine.  
  · You cannot pass stools or gas.   · You have vaginal discharge that has increased in amount or smells bad.  
  · You are sick to your stomach or cannot drink fluids.  
  · You have loose stitches, or your incision comes open.  
  · Bright red blood has soaked through the bandage over your incision.  
  · You have signs of infection, such as: 
? Increased pain, swelling, warmth, or redness. ? Red streaks leading from the incision. ? Pus draining from the incision. ? A fever.  
  · You have bright red vaginal bleeding that soaks one or more pads in an hour, or you have large clots.  
  · You have signs of a blood clot in your leg (called a deep vein thrombosis), such as: 
? Pain in your calf, back of the knee, thigh, or groin. ? Redness and swelling in your leg.  
 Watch closely for changes in your health, and be sure to contact your doctor if you have any problems. Where can you learn more? Go to http://ofelia-yamilex.info/. Enter O100 in the search box to learn more about \"Laparoscopically Assisted Vaginal Hysterectomy: What to Expect at Home. \" Current as of: May 15, 2018 Content Version: 11.8 © 0220-8203 [employer]. Care instructions adapted under license by [employer] (which disclaims liability or warranty for this information). If you have questions about a medical condition or this instruction, always ask your healthcare professional. [address] any warranty or liability for your use of this information. Hysteroscopy With Dilation and Curettage: What to Expect at [facility] Your Recovery For a hysteroscopy, your doctor guides a lighted tube through your cervix and into your uterus. This helps the doctor see inside your uterus. For a dilation and curettage (D&C), your doctor uses a curved tool, called a curette, to gently scrape tissue from your uterus.  
After these procedures, you are likely to have a backache or cramps similar to menstrual cramps. Expect to pass small clots of blood from your vagina for the first few days. You may have light vaginal bleeding for several weeks after the D&C. If the doctor filled your uterus with air, you may have gas pains or your belly may feel full. You may also have shoulder pain. These symptoms should go away in 1 to 2 days. You will probably be able to go back to most of your normal activities in 1 or 2 days. This care sheet gives you a general idea about how long it will take for you to recover. But each person recovers at a different pace. Follow the steps below to get better as quickly as possible. How can you care for yourself at home? Activity 
  · Rest when you feel tired.  
  · Most women are able to return to work the day after the procedure.  
  · Wear sanitary pads if needed. Do not douche or use tampons for 2 weeks or until your doctor says it is okay.  
  · Ask your doctor when it is okay for you to have sex.  
  · If you could become pregnant, talk about birth control with your doctor. Do not try to become pregnant until your doctor says it is okay. Diet 
  · You can eat your normal diet. If your stomach is upset, try bland, low-fat foods like plain rice, broiled chicken, toast, and yogurt.  
  · Drink plenty of fluids (unless your doctor tells you not to). Medicines 
  · Your doctor will tell you if and when you can restart your medicines. He or she will also give you instructions about taking any new medicines.  
  · If you take blood thinners, such as warfarin (Coumadin), clopidogrel (Plavix), or aspirin, be sure to talk to your doctor. He or she will tell you if and when to start taking those medicines again. Make sure that you understand exactly what your doctor wants you to do.  
  · Be safe with medicines. Read and follow all instructions on the label. ? If the doctor gave you a prescription medicine for pain, take it as prescribed. ? If you are not taking a prescription pain medicine, ask your doctor if you can take an over-the-counter medicine.  
  · If your doctor prescribed antibiotics, take them as directed. Do not stop taking them just because you feel better. You need to take the full course of antibiotics. Follow-up care is a key part of your treatment and safety. Be sure to make and go to all appointments, and call your doctor if you are having problems. It's also a good idea to know your test results and keep a list of the medicines you take. When should you call for help? Call 911 anytime you think you may need emergency care. For example, call if: 
  · You passed out (lost consciousness).  
  · You have chest pain, are short of breath, or cough up blood.  
 Call your doctor now or seek immediate medical care if: 
  · You have pain that does not get better after you take pain medicine.  
  · You cannot pass stools or gas.  
  · You have bright red vaginal bleeding that soaks one or more pads in an hour, or you have large clots.  
  · You have a vaginal discharge that has increased or that smells bad.  
  · You are sick to your stomach or cannot drink fluids.  
  · You have symptoms of a blood clot in your leg (called a deep vein thrombosis), such as: 
? Pain in your calf, back of the knee, thigh, or groin. ? Redness and swelling in your leg.  
  · You have signs of infection, such as: 
? Increased pain, swelling, warmth, or redness. ? Red streaks leading from the incision. ? Pus draining from the incision. ? A fever.  
 Watch closely for changes in your health, and be sure to contact your doctor if you have any problems. Where can you learn more? Go to http://ofelia-yamilex.info/. Enter T188 in the search box to learn more about \"Hysteroscopy With Dilation and Curettage: What to Expect at Home. \" Current as of: November 21, 2017 Content Version: 11.8 © 7109-8992 [employer]. Care instructions adapted under license by [employer] (which disclaims liability or warranty for this information). If you have questions about a medical condition or this instruction, always ask your healthcare professional. [address] any warranty or liability for your use of this information.

## 2018-12-04 ENCOUNTER — ANESTHESIA EVENT (OUTPATIENT)
Dept: SURGERY | Age: 68
End: 2018-12-04
Payer: MEDICARE

## 2018-12-05 ENCOUNTER — HOSPITAL ENCOUNTER (OUTPATIENT)
Age: 68
Setting detail: OUTPATIENT SURGERY
Discharge: HOME | End: 2018-12-05
Attending: OBSTETRICS & GYNECOLOGY | Admitting: OBSTETRICS & GYNECOLOGY
Payer: MEDICARE

## 2018-12-05 ENCOUNTER — ANESTHESIA (OUTPATIENT)
Dept: SURGERY | Age: 68
End: 2018-12-05
Payer: MEDICARE

## 2018-12-05 VITALS
HEIGHT: 63 IN | DIASTOLIC BLOOD PRESSURE: 72 MMHG | SYSTOLIC BLOOD PRESSURE: 147 MMHG | WEIGHT: 203 LBS | TEMPERATURE: 97 F | RESPIRATION RATE: 14 BRPM | OXYGEN SATURATION: 94 % | BODY MASS INDEX: 35.97 KG/M2 | HEART RATE: 73 BPM

## 2018-12-05 DIAGNOSIS — G89.18 POST-OPERATIVE PAIN: Primary | ICD-10-CM

## 2018-12-05 PROCEDURE — 74011250636 HC RX REV CODE- 250/636: Performed by: OBSTETRICS & GYNECOLOGY

## 2018-12-05 PROCEDURE — 77030034154 HC SHR COAG HARM ACE J&J -F: Performed by: OBSTETRICS & GYNECOLOGY

## 2018-12-05 PROCEDURE — 77030035220 HC TRCR ENDOSC BLNTPRT ANCHR COVD -B: Performed by: OBSTETRICS & GYNECOLOGY

## 2018-12-05 PROCEDURE — 74011250636 HC RX REV CODE- 250/636: Performed by: NURSE ANESTHETIST, CERTIFIED REGISTERED

## 2018-12-05 PROCEDURE — 77030019605: Performed by: OBSTETRICS & GYNECOLOGY

## 2018-12-05 PROCEDURE — 76210000021 HC REC RM PH II 0.5 TO 1 HR: Performed by: OBSTETRICS & GYNECOLOGY

## 2018-12-05 PROCEDURE — 88305 TISSUE EXAM BY PATHOLOGIST: CPT

## 2018-12-05 PROCEDURE — 74011250637 HC RX REV CODE- 250/637: Performed by: NURSE ANESTHETIST, CERTIFIED REGISTERED

## 2018-12-05 PROCEDURE — 76060000033 HC ANESTHESIA 1 TO 1.5 HR: Performed by: OBSTETRICS & GYNECOLOGY

## 2018-12-05 PROCEDURE — 74011250637 HC RX REV CODE- 250/637

## 2018-12-05 PROCEDURE — 77030016151 HC PROTCTR LNS DFOG COVD -B: Performed by: OBSTETRICS & GYNECOLOGY

## 2018-12-05 PROCEDURE — 77030035048 HC TRCR ENDOSC OPTCL COVD -B: Performed by: OBSTETRICS & GYNECOLOGY

## 2018-12-05 PROCEDURE — 77030020268 HC MISC GENERAL SUPPLY: Performed by: OBSTETRICS & GYNECOLOGY

## 2018-12-05 PROCEDURE — 76210000000 HC OR PH I REC 2 TO 2.5 HR: Performed by: OBSTETRICS & GYNECOLOGY

## 2018-12-05 PROCEDURE — 77030039266 HC ADH SKN EXOFIN S2SG -A: Performed by: OBSTETRICS & GYNECOLOGY

## 2018-12-05 PROCEDURE — 77030005521 HC CATH URETH FOL38 BARD -B: Performed by: OBSTETRICS & GYNECOLOGY

## 2018-12-05 PROCEDURE — 88304 TISSUE EXAM BY PATHOLOGIST: CPT

## 2018-12-05 PROCEDURE — 77030032490 HC SLV COMPR SCD KNE COVD -B: Performed by: OBSTETRICS & GYNECOLOGY

## 2018-12-05 PROCEDURE — 88112 CYTOPATH CELL ENHANCE TECH: CPT

## 2018-12-05 PROCEDURE — 74011000250 HC RX REV CODE- 250: Performed by: OBSTETRICS & GYNECOLOGY

## 2018-12-05 PROCEDURE — 74011250636 HC RX REV CODE- 250/636

## 2018-12-05 PROCEDURE — 76010000149 HC OR TIME 1 TO 1.5 HR: Performed by: OBSTETRICS & GYNECOLOGY

## 2018-12-05 PROCEDURE — 77030014063 HC TIP MANIP UTER COOP -B: Performed by: OBSTETRICS & GYNECOLOGY

## 2018-12-05 PROCEDURE — 74011000250 HC RX REV CODE- 250

## 2018-12-05 PROCEDURE — 77030018836 HC SOL IRR NACL ICUM -A: Performed by: OBSTETRICS & GYNECOLOGY

## 2018-12-05 PROCEDURE — 77030020782 HC GWN BAIR PAWS FLX 3M -B: Performed by: OBSTETRICS & GYNECOLOGY

## 2018-12-05 RX ORDER — GLYCOPYRROLATE 0.2 MG/ML
INJECTION INTRAMUSCULAR; INTRAVENOUS AS NEEDED
Status: DISCONTINUED | OUTPATIENT
Start: 2018-12-05 | End: 2018-12-05 | Stop reason: HOSPADM

## 2018-12-05 RX ORDER — OXYCODONE AND ACETAMINOPHEN 5; 325 MG/1; MG/1
1-2 TABLET ORAL
Status: DISCONTINUED | OUTPATIENT
Start: 2018-12-05 | End: 2018-12-05 | Stop reason: HOSPADM

## 2018-12-05 RX ORDER — ONDANSETRON 2 MG/ML
INJECTION INTRAMUSCULAR; INTRAVENOUS AS NEEDED
Status: DISCONTINUED | OUTPATIENT
Start: 2018-12-05 | End: 2018-12-05 | Stop reason: HOSPADM

## 2018-12-05 RX ORDER — PROPOFOL 10 MG/ML
INJECTION, EMULSION INTRAVENOUS AS NEEDED
Status: DISCONTINUED | OUTPATIENT
Start: 2018-12-05 | End: 2018-12-05 | Stop reason: HOSPADM

## 2018-12-05 RX ORDER — ROCURONIUM BROMIDE 10 MG/ML
INJECTION, SOLUTION INTRAVENOUS AS NEEDED
Status: DISCONTINUED | OUTPATIENT
Start: 2018-12-05 | End: 2018-12-05 | Stop reason: HOSPADM

## 2018-12-05 RX ORDER — SODIUM CHLORIDE 0.9 % (FLUSH) 0.9 %
5-10 SYRINGE (ML) INJECTION EVERY 8 HOURS
Status: DISCONTINUED | OUTPATIENT
Start: 2018-12-05 | End: 2018-12-05 | Stop reason: HOSPADM

## 2018-12-05 RX ORDER — HEPARIN SODIUM 5000 [USP'U]/ML
5000 INJECTION, SOLUTION INTRAVENOUS; SUBCUTANEOUS ONCE
Status: COMPLETED | OUTPATIENT
Start: 2018-12-05 | End: 2018-12-05

## 2018-12-05 RX ORDER — MIDAZOLAM HYDROCHLORIDE 1 MG/ML
INJECTION, SOLUTION INTRAMUSCULAR; INTRAVENOUS AS NEEDED
Status: DISCONTINUED | OUTPATIENT
Start: 2018-12-05 | End: 2018-12-05 | Stop reason: HOSPADM

## 2018-12-05 RX ORDER — SODIUM CHLORIDE 0.9 % (FLUSH) 0.9 %
5-10 SYRINGE (ML) INJECTION AS NEEDED
Status: DISCONTINUED | OUTPATIENT
Start: 2018-12-05 | End: 2018-12-05 | Stop reason: HOSPADM

## 2018-12-05 RX ORDER — BUPIVACAINE HYDROCHLORIDE 5 MG/ML
INJECTION, SOLUTION EPIDURAL; INTRACAUDAL AS NEEDED
Status: DISCONTINUED | OUTPATIENT
Start: 2018-12-05 | End: 2018-12-05 | Stop reason: HOSPADM

## 2018-12-05 RX ORDER — SODIUM CHLORIDE, SODIUM LACTATE, POTASSIUM CHLORIDE, CALCIUM CHLORIDE 600; 310; 30; 20 MG/100ML; MG/100ML; MG/100ML; MG/100ML
75 INJECTION, SOLUTION INTRAVENOUS CONTINUOUS
Status: DISCONTINUED | OUTPATIENT
Start: 2018-12-05 | End: 2018-12-05 | Stop reason: HOSPADM

## 2018-12-05 RX ORDER — FAMOTIDINE 20 MG/1
20 TABLET, FILM COATED ORAL ONCE
Status: COMPLETED | OUTPATIENT
Start: 2018-12-05 | End: 2018-12-05

## 2018-12-05 RX ORDER — OXYCODONE AND ACETAMINOPHEN 5; 325 MG/1; MG/1
1-2 TABLET ORAL
Qty: 60 TAB | Refills: 0 | Status: SHIPPED | OUTPATIENT
Start: 2018-12-05 | End: 2018-12-26

## 2018-12-05 RX ORDER — FENTANYL CITRATE 50 UG/ML
INJECTION, SOLUTION INTRAMUSCULAR; INTRAVENOUS AS NEEDED
Status: DISCONTINUED | OUTPATIENT
Start: 2018-12-05 | End: 2018-12-05 | Stop reason: HOSPADM

## 2018-12-05 RX ORDER — ONDANSETRON 2 MG/ML
4 INJECTION INTRAMUSCULAR; INTRAVENOUS ONCE
Status: COMPLETED | OUTPATIENT
Start: 2018-12-05 | End: 2018-12-05

## 2018-12-05 RX ORDER — SUCCINYLCHOLINE CHLORIDE 20 MG/ML
INJECTION INTRAMUSCULAR; INTRAVENOUS AS NEEDED
Status: DISCONTINUED | OUTPATIENT
Start: 2018-12-05 | End: 2018-12-05 | Stop reason: HOSPADM

## 2018-12-05 RX ORDER — SCOLOPAMINE TRANSDERMAL SYSTEM 1 MG/1
PATCH, EXTENDED RELEASE TRANSDERMAL AS NEEDED
Status: DISCONTINUED | OUTPATIENT
Start: 2018-12-05 | End: 2018-12-05 | Stop reason: HOSPADM

## 2018-12-05 RX ORDER — SCOLOPAMINE TRANSDERMAL SYSTEM 1 MG/1
1 PATCH, EXTENDED RELEASE TRANSDERMAL ONCE
Status: DISCONTINUED | OUTPATIENT
Start: 2018-12-05 | End: 2018-12-05 | Stop reason: HOSPADM

## 2018-12-05 RX ORDER — DEXAMETHASONE SODIUM PHOSPHATE 4 MG/ML
INJECTION, SOLUTION INTRA-ARTICULAR; INTRALESIONAL; INTRAMUSCULAR; INTRAVENOUS; SOFT TISSUE AS NEEDED
Status: DISCONTINUED | OUTPATIENT
Start: 2018-12-05 | End: 2018-12-05 | Stop reason: HOSPADM

## 2018-12-05 RX ORDER — PROMETHAZINE HYDROCHLORIDE 25 MG/ML
12.5 INJECTION, SOLUTION INTRAMUSCULAR; INTRAVENOUS
Status: COMPLETED | OUTPATIENT
Start: 2018-12-05 | End: 2018-12-05

## 2018-12-05 RX ORDER — CEFAZOLIN SODIUM 2 G/50ML
2 SOLUTION INTRAVENOUS EVERY 8 HOURS
Status: DISCONTINUED | OUTPATIENT
Start: 2018-12-05 | End: 2018-12-05 | Stop reason: HOSPADM

## 2018-12-05 RX ORDER — NEOSTIGMINE METHYLSULFATE 5 MG/5 ML
SYRINGE (ML) INTRAVENOUS AS NEEDED
Status: DISCONTINUED | OUTPATIENT
Start: 2018-12-05 | End: 2018-12-05 | Stop reason: HOSPADM

## 2018-12-05 RX ORDER — HYDROMORPHONE HYDROCHLORIDE 2 MG/ML
0.5 INJECTION, SOLUTION INTRAMUSCULAR; INTRAVENOUS; SUBCUTANEOUS
Status: DISCONTINUED | OUTPATIENT
Start: 2018-12-05 | End: 2018-12-05 | Stop reason: HOSPADM

## 2018-12-05 RX ORDER — LIDOCAINE HYDROCHLORIDE 20 MG/ML
INJECTION, SOLUTION EPIDURAL; INFILTRATION; INTRACAUDAL; PERINEURAL AS NEEDED
Status: DISCONTINUED | OUTPATIENT
Start: 2018-12-05 | End: 2018-12-05 | Stop reason: HOSPADM

## 2018-12-05 RX ADMIN — SUCCINYLCHOLINE CHLORIDE 100 MG: 20 INJECTION INTRAMUSCULAR; INTRAVENOUS at 11:49

## 2018-12-05 RX ADMIN — ONDANSETRON 4 MG: 2 INJECTION INTRAMUSCULAR; INTRAVENOUS at 13:28

## 2018-12-05 RX ADMIN — FAMOTIDINE 20 MG: 20 TABLET, FILM COATED ORAL at 10:52

## 2018-12-05 RX ADMIN — Medication 4 MG: at 12:36

## 2018-12-05 RX ADMIN — HYDROMORPHONE HYDROCHLORIDE 0.5 MG: 2 INJECTION INTRAMUSCULAR; INTRAVENOUS; SUBCUTANEOUS at 13:26

## 2018-12-05 RX ADMIN — LIDOCAINE HYDROCHLORIDE 100 MG: 20 INJECTION, SOLUTION EPIDURAL; INFILTRATION; INTRACAUDAL; PERINEURAL at 11:48

## 2018-12-05 RX ADMIN — ONDANSETRON 4 MG: 2 INJECTION INTRAMUSCULAR; INTRAVENOUS at 11:54

## 2018-12-05 RX ADMIN — SCOLOPAMINE TRANSDERMAL SYSTEM 1 PATCH: 1 PATCH, EXTENDED RELEASE TRANSDERMAL at 11:41

## 2018-12-05 RX ADMIN — MIDAZOLAM HYDROCHLORIDE 2 MG: 1 INJECTION, SOLUTION INTRAMUSCULAR; INTRAVENOUS at 11:42

## 2018-12-05 RX ADMIN — PROMETHAZINE HYDROCHLORIDE 12.5 MG: 25 INJECTION, SOLUTION INTRAMUSCULAR; INTRAVENOUS at 13:49

## 2018-12-05 RX ADMIN — HEPARIN SODIUM 5000 UNITS: 5000 INJECTION INTRAVENOUS; SUBCUTANEOUS at 10:52

## 2018-12-05 RX ADMIN — FENTANYL CITRATE 100 MCG: 50 INJECTION, SOLUTION INTRAMUSCULAR; INTRAVENOUS at 12:10

## 2018-12-05 RX ADMIN — CEFAZOLIN SODIUM 2 G: 2 SOLUTION INTRAVENOUS at 11:45

## 2018-12-05 RX ADMIN — FENTANYL CITRATE 100 MCG: 50 INJECTION, SOLUTION INTRAMUSCULAR; INTRAVENOUS at 11:48

## 2018-12-05 RX ADMIN — ROCURONIUM BROMIDE 20 MG: 10 INJECTION, SOLUTION INTRAVENOUS at 12:00

## 2018-12-05 RX ADMIN — SODIUM CHLORIDE, SODIUM LACTATE, POTASSIUM CHLORIDE, AND CALCIUM CHLORIDE 75 ML/HR: 600; 310; 30; 20 INJECTION, SOLUTION INTRAVENOUS at 10:52

## 2018-12-05 RX ADMIN — ROCURONIUM BROMIDE 10 MG: 10 INJECTION, SOLUTION INTRAVENOUS at 11:48

## 2018-12-05 RX ADMIN — DEXAMETHASONE SODIUM PHOSPHATE 4 MG: 4 INJECTION, SOLUTION INTRA-ARTICULAR; INTRALESIONAL; INTRAMUSCULAR; INTRAVENOUS; SOFT TISSUE at 11:54

## 2018-12-05 RX ADMIN — GLYCOPYRROLATE 0.8 MG: 0.2 INJECTION INTRAMUSCULAR; INTRAVENOUS at 12:36

## 2018-12-05 RX ADMIN — PROPOFOL 150 MG: 10 INJECTION, EMULSION INTRAVENOUS at 11:48

## 2018-12-05 NOTE — ANESTHESIA POSTPROCEDURE EVALUATION
Procedure(s): LAPAROSCOPY RESECTION OF PELVIC MASS/RIGHT SALPINGO OOPHERECTOMY 
DILATATION AND CURETTAGE. Anesthesia Post Evaluation Multimodal analgesia: multimodal analgesia used between 6 hours prior to anesthesia start to PACU discharge Patient location during evaluation: bedside Patient participation: complete - patient participated Level of consciousness: awake Pain management: adequate Airway patency: patent Anesthetic complications: no 
Cardiovascular status: stable Respiratory status: acceptable Hydration status: acceptable Post anesthesia nausea and vomiting:  controlled Visit Vitals /73 (BP 1 Location: Right arm, BP Patient Position: At rest) Pulse 93 Temp 36.7 °C (98.1 °F) Resp 20 Ht 5' 2.5\" (1.588 m) Wt 92.1 kg (203 lb) SpO2 94% BMI 36.54 kg/m²

## 2018-12-05 NOTE — BRIEF OP NOTE
BRIEF OPERATIVE NOTE Date of Procedure: 12/5/2018 Preoperative Diagnosis: Z01.818, N83.262 COMPLEX RIGHT OVARIAN CYST Postoperative Diagnosis: Z01.818, N83.262 COMPLEX RIGHT OVARIAN CYST Procedure(s): LAPAROSCOPY RESECTION OF PELVIC MASS/RIGHT SALPINGO OOPHERECTOMY 
DILATATION AND CURETTAGE Surgeon(s) and Role: Rosalinda Lambert MD - Primary Surgical Assistant: Todd Wang Surgical Staff: 
Circ-1: Nohemy Bolivar RN; Ethel Umana RN 
Circ-Relief: Jad Byrdial Scrub Tech-1: Lynsey Marroquin Scrub Tech-Relief: Miranda Left Surg Asst-1: Toniann Olszewski No case tracking events are documented in the log. Anesthesia: General  
Estimated Blood Loss: 10 Specimens:  
ID Type Source Tests Collected by Time Destination 1 : Cyst Biopsy Cul Located within [facility] Fresh Ovary  Rosalinda Lambert MD 12/5/2018 1226 Pathology 2 : Endometrial Currettings Fresh Endocervical Curetting  Rosalinda Lambert MD 12/5/2018 1215 Pathology 3 : Right Tube and Right Ovary Fresh Ovary  Rosalinda Lambert MD 12/5/2018 1233 Pathology 1 : Pelvic Washings Fresh Pelvis  Rosalinda Lambert MD 12/5/2018 1212 Cytology Findings: normal right tube/ovary. Bilobed cystic lesion in posterior cul de sac Complications: none Implants: * No implants in log *

## 2018-12-05 NOTE — INTERVAL H&P NOTE
H&P Update: 
Familia Ware was seen and examined. History and physical has been reviewed. The patient has been examined.  There have been no significant clinical changes since the completion of the originally dated History and Physical. 
 
Signed By: Scar Padilla MD   
 December 5, 2018 11:07 AM

## 2018-12-05 NOTE — PERIOP NOTES
Patient armband removed and shredded Patient confirmed by two identifiers with discharge instructions prior too being provided to patient and daughter. E-signature not obtained due to technical error. Ani Vargas notified to initiate investigation of on-going occurrence.

## 2018-12-05 NOTE — DISCHARGE INSTRUCTIONS
Laparoscopic Oophorectomy: What to Expect at [address]    After surgery to remove one or both ovaries, you may feel some pain in your belly for a few days. Your belly may also be swollen. You may have a change in your bowel movements for a few days. It's normal to also have some shoulder or back pain. This is caused by the gas your doctor put in your belly to help see your organs better. To help with pain, your doctor will prescribe medicines. You may need about 1 week to fully recover. It's important not to lift anything heavy for about 1 week. You can ask your doctor when it's okay to have sex. This care sheet gives you a general idea about how long it will take for you to recover. But each person recovers at a different pace. Follow the steps below to get better as quickly as possible. How can you care for yourself at home? Activity    · Rest when you feel tired.     · Be active. Walking is a good choice.     · Allow your body to heal. Don't move quickly or lift anything heavy until you are feeling better.     · Hold a pillow over your incisions when you cough or take deep breaths. This will support your belly and may help to decrease your pain.     · Do breathing exercises at home as instructed by your doctor. This will help prevent pneumonia. Diet    · You can eat your normal diet. If your stomach is upset, try bland, low-fat foods like plain rice, broiled chicken, toast, and yogurt.     · Drink plenty of fluids (unless your doctor tells you not to).   · If your bowel movements are not regular right after surgery, try to avoid constipation and straining. Drink plenty of water. Your doctor may suggest fiber, a stool softener, or a mild laxative. Medicines    · Your doctor will tell you if and when you can restart your medicines.  He or she will also give you instructions about taking any new medicines.     · If you take blood thinners, such as warfarin (Coumadin), clopidogrel (Plavix), or aspirin, be sure to talk to your doctor. He or she will tell you if and when to start taking those medicines again. Make sure that you understand exactly what your doctor wants you to do.     · Be safe with medicines. Read and follow all instructions on the label. ? If the doctor gave you a prescription medicine for pain, take it as prescribed. ? If you are not taking a prescription pain medicine, ask your doctor if you can take an over-the-counter medicine. Incision care    · If you have strips of tape on the cut (incision) the doctor made, leave the tape on for a week or until it falls off.     · Wash the area daily with warm, soapy water, and pat it dry. Don't use hydrogen peroxide or alcohol. They can slow healing.     · You may cover the area with a gauze bandage if it oozes fluid or rubs against clothing.     · Change the bandage every day.     · Keep the area clean and dry. Other instructions    · Wear loose, comfortable clothing. For a few weeks, avoid anything that puts pressure on your belly.     · You may want to use a heating pad on your belly to help with pain. Follow-up care is a key part of your treatment and safety. Be sure to make and go to all appointments, and call your doctor if you are having problems. It's also a good idea to know your test results and keep a list of the medicines you take. When should you call for help? Call 911 anytime you think you may need emergency care.  For example, call if:    · You passed out (lost consciousness).     · You have chest pain, are short of breath, or cough up blood.    Call your doctor now or seek immediate medical care if:    · You have pain that does not get better after you take pain medicine.     · You cannot pass stools or gas.     · You have vaginal discharge that has increased in amount or smells bad.     · You are sick to your stomach or cannot drink fluids.     · You have loose stitches, or your incision comes open.     · Bright red blood has soaked through the bandage over your incision.     · You have signs of infection, such as:  ? Increased pain, swelling, warmth, or redness. ? Red streaks leading from the incision. ? Pus draining from the incision. ? A fever.     · You have bright red vaginal bleeding that soaks one or more pads in an hour, or you have large clots.     · You have signs of a blood clot in your leg (called a deep vein thrombosis), such as:  ? Pain in your calf, back of the knee, thigh, or groin. ? Redness and swelling in your leg.    Watch closely for changes in your health, and be sure to contact your doctor if you have any problems. Where can you learn more? Go to http://ofelia-yamilex.info/. Enter G212 in the search box to learn more about \"Laparoscopic Oophorectomy: What to Expect at Home. \"  Current as of: May 15, 2018  Content Version: 11.8  © 9494-8749 [employer]. Care instructions adapted under license by [employer] (which disclaims liability or warranty for this information). If you have questions about a medical condition or this instruction, always ask your healthcare professional. [place] [address] any warranty or liability for your use of this information. Dilation and Curettage: What to Expect at Home  Your Recovery  Dilation and curettage (D&C) is a procedure to remove tissue from the inside of the uterus. The doctor used a curved tool, called a curette, to gently scrape tissue from your uterus. You are likely to have a backache, or cramps similar to menstrual cramps, and pass small clots of blood from your vagina for the first few days. You may continue to have light vaginal bleeding for several weeks after the procedure. You will probably be able to go back to most of your normal activities in 1 or 2 days. This care sheet gives you a general idea about how long it will take for you to recover. But each person recovers at a different pace. Follow the steps below to get better as quickly as possible. How can you care for yourself at home? Activity    · Rest when you feel tired. Getting enough sleep will help you recover.     · Avoid strenuous activities, such as bicycle riding, jogging, weight lifting, or aerobic exercise, until your doctor says it is okay.     · Most women are able to return to work the day after the procedure.     · You may have some light vaginal bleeding. Wear sanitary pads if needed. Do not douche or use tampons for 2 weeks or until your doctor says it is okay.     · Ask your doctor when it is okay for you to have sex.     · If you could become pregnant, talk about birth control with your doctor. Do not try to become pregnant until your doctor says it is okay. Diet    · You can eat your normal diet. If your stomach is upset, try bland, low-fat foods like plain rice, broiled chicken, toast, and yogurt.     · Drink plenty of fluids (unless your doctor tells you not to). Medicines    · Your doctor will tell you if and when you can restart your medicines. He or she will also give you instructions about taking any new medicines.     · If you take blood thinners, such as warfarin (Coumadin), clopidogrel (Plavix), or aspirin, be sure to talk to your doctor. He or she will tell you if and when to start taking those medicines again. Make sure that you understand exactly what your doctor wants you to do.     · Be safe with medicines. Take pain medicines exactly as directed. ? If the doctor gave you a prescription medicine for pain, take it as prescribed. ? If you are not taking a prescription pain medicine, ask your doctor if you can take an over-the-counter medicine.     · If you think your pain medicine is making you sick to your stomach:  ? Take your medicine after meals (unless your doctor has told you not to). ? Ask your doctor for a different pain medicine.     · If your doctor prescribed antibiotics, take them as directed. Do not stop taking them just because you feel better. You need to take the full course of antibiotics. Follow-up care is a key part of your treatment and safety. Be sure to make and go to all appointments, and call your doctor if you are having problems. It's also a good idea to know your test results and keep a list of the medicines you take. When should you call for help? Call 911 anytime you think you may need emergency care. For example, call if:    · You passed out (lost consciousness).     · You have chest pain, are short of breath, or cough up blood.    Call your doctor now or seek immediate medical care if:    · You have bright red vaginal bleeding that soaks one or more pads in an hour, or you have large clots.     · You have vaginal discharge that increases in amount or smells bad.     · You are sick to your stomach or cannot drink fluids.     · You have pain that does not get better after you take pain medicine.     · You cannot pass stools or gas.     · You have symptoms of a blood clot in your leg (called a deep vein thrombosis), such as:  ? Pain in your calf, back of the knee, thigh, or groin. ? Redness and swelling in your leg.     · You have signs of infection, such as:  ? Increased pain, swelling, warmth, or redness. ? Red streaks leading from the area. ? Pus draining from the area. ? A fever.    Watch closely for changes in your health, and be sure to contact your doctor if you have any problems. Where can you learn more? Go to http://ofelia-yamilex.info/. Enter [phone] in the search box to learn more about \"Dilation and Curettage: What to Expect at Home. \"  Current as of: November 21, 2017  Content Version: 11.8  © 5399-9215 [employer]. Care instructions adapted under license by [employer] (which disclaims liability or warranty for this information).  If you have questions about a medical condition or this instruction, always ask your healthcare professional. [address] any warranty or liability for your use of this information. Acute Pain After Surgery: Care Instructions  Your Care Instructions    It's common to have some pain after surgery. Pain doesn't mean that something is wrong or that the surgery didn't go well. But when the pain is severe, it's important to work with your doctor to manage it. It's also important to be aware of a few facts about pain and pain medicine. · You are the only person who knows what your pain feels like. So be sure to tell your doctor when you are in pain or when the pain changes. Then he or she will know how to adjust your medicines. · Pain is often easier to control right after it starts. So it may be better to take regular doses of pain medicine and not wait until the pain gets bad. · Medicine can help control pain. But this doesn't mean you'll have no pain. Medicine works to keep the pain at a level you can live with. With time, you will feel better. Follow-up care is a key part of your treatment and safety. Be sure to make and go to all appointments, and call your doctor if you are having problems. It's also a good idea to know your test results and keep a list of the medicines you take. How can you care for yourself at home? · Be safe with medicines. Read and follow all instructions on the label. ? If the doctor gave you a prescription medicine for pain, take it as prescribed. ? If you are not taking a prescription pain medicine, ask your doctor if you can take an over-the-counter medicine. · If you take an over-the-counter pain medicine, such as acetaminophen (Tylenol), ibuprofen (Advil, Motrin), or naproxen (Aleve), read and follow all instructions on the label. · Do not take two or more pain medicines at the same time unless the doctor told you to. · Do not drink alcohol while you are taking pain medicines. · Try to walk each day if your doctor recommends it.  Start by walking a little more than you did the day before. Bit by bit, increase the amount you walk. Walking increases blood flow. It also helps prevent pneumonia and constipation. · To prevent constipation from opioid pain medicines:  ? Talk to your doctor about a laxative. ? Include fruits, vegetables, beans, and whole grains in your diet each day. These foods are high in fiber. ? Drink plenty of fluids, enough so that your urine is light yellow or clear like water. Drink water, fruit juice, or other drinks that do not contain caffeine or alcohol. If you have kidney, heart, or liver disease and have to limit fluids, talk with your doctor before you increase the amount of fluids you drink. ? Take a fiber supplement, such as Citrucel or Metamucil, every day if needed. Read and follow all instructions on the label. If you take pain medicine for more than a few days, talk to your doctor before you take fiber. When should you call for help? Call your doctor now or seek immediate medical care if:    · Your pain gets worse.     · Your pain is not controlled by medicine.    Watch closely for changes in your health, and be sure to contact your doctor if you have any problems. Where can you learn more? Go to http://ofelia-yamilex.info/. Enter [phone] in the search box to learn more about \"Acute Pain After Surgery: Care Instructions. \"  Current as of: November 20, 2017  Content Version: 11.8  © 5815-1334 [employer]. Care instructions adapted under license by [employer] (which disclaims liability or warranty for this information). If you have questions about a medical condition or this instruction, always ask your healthcare professional. [place] [address] any warranty or liability for your use of this information. Narcotic-Analgesic/Acetaminophen (Percocet, Norco, Lorcet HD, Lortab 10/325) - (By mouth)   Why this medicine is used:   Relieves pain.   Contact a nurse or doctor right away if you have:  · Extreme weakness, shallow breathing, slow heartbeat  · Severe confusion, lightheadedness, dizziness, fainting  · Yellow skin or eyes, dark urine or pale stools  · Severe constipation, severe stomach pain, nausea, vomiting, loss of appetite  · Sweating or cold, clammy skin     Common side effects:  · Mild constipation, nausea, vomiting  · Sleepiness, tiredness  · Itching, rash  © 2017 [facility] Information is for End User's use only and may not be sold, redistributed or otherwise used for commercial purposes. DISCHARGE SUMMARY from Nurse    PATIENT INSTRUCTIONS:    After general anesthesia or intravenous sedation, for 24 hours or while taking prescription Narcotics:  · Limit your activities  · Do not drive and operate hazardous machinery  · Do not make important personal or business decisions  · Do  not drink alcoholic beverages  · If you have not urinated within 8 hours after discharge, please contact your surgeon on call. Report the following to your surgeon:  · Excessive pain, swelling, redness or odor of or around the surgical area  · Temperature over 100.5  · Nausea and vomiting lasting longer than 4 hours or if unable to take medications  · Any signs of decreased circulation or nerve impairment to extremity: change in color, persistent  numbness, tingling, coldness or increase pain  · Any questions    *  Please give a list of your current medications to your Primary Care Provider. *  Please update this list whenever your medications are discontinued, doses are      changed, or new medications (including over-the-counter products) are added. *  Please carry medication information at all times in case of emergency situations. These are general instructions for a healthy lifestyle:    No smoking/ No tobacco products/ Avoid exposure to second hand smoke  Surgeon General's Warning:  Quitting smoking now greatly reduces serious risk to your health.     Obesity, smoking, and sedentary lifestyle greatly increases your risk for illness    A healthy diet, regular physical exercise & weight monitoring are important for maintaining a healthy lifestyle    You may be retaining fluid if you have a history of heart failure or if you experience any of the following symptoms:  Weight gain of 3 pounds or more overnight or 5 pounds in a week, increased swelling in our hands or feet or shortness of breath while lying flat in bed. Please call your doctor as soon as you notice any of these symptoms; do not wait until your next office visit. Recognize signs and symptoms of STROKE:    F-face looks uneven    A-arms unable to move or move unevenly    S-speech slurred or non-existent    T-time-call 911 as soon as signs and symptoms begin-DO NOT go       Back to bed or wait to see if you get better-TIME IS BRAIN. Warning Signs of HEART ATTACK     Call 911 if you have these symptoms:   Chest discomfort. Most heart attacks involve discomfort in the center of the chest that lasts more than a few minutes, or that goes away and comes back. It can feel like uncomfortable pressure, squeezing, fullness, or pain.  Discomfort in other areas of the upper body. Symptoms can include pain or discomfort in one or both arms, the back, neck, jaw, or stomach.  Shortness of breath with or without chest discomfort.  Other signs may include breaking out in a cold sweat, nausea, or lightheadedness. Don't wait more than five minutes to call 911 - MINUTES MATTER! Fast action can save your life. Calling 911 is almost always the fastest way to get lifesaving treatment. Emergency Medical Services staff can begin treatment when they arrive -- up to an hour sooner than if someone gets to the hospital by car. The discharge information has been reviewed with the patient/family. The patient/family verbalized understanding.   Discharge medications reviewed with the patient/family and appropriate educational materials and side effects teaching were provided.   ___________________________________________________________________________________________________________________________________

## 2018-12-06 NOTE — OP NOTES
[address] [place] [address] Dr  OPERATIVE REPORT    Elza Wheeler  MR#: [id number]  : 1950  ACCOUNT #: [de-identified]   DATE OF SERVICE: 2018    PREOPERATIVE DIAGNOSES:     1. Complex right ovarian cyst.  2.  Thickened endometrium. POSTOPERATIVE DIAGNOSES:       1. Complex right ovarian cyst.  2.  Thickened endometrium. PROCEDURES PERFORMED:    1. Laparoscopic resection of pelvic mass. 2.  Left salpingo-oophorectomy. 3.  Dilation and curettage washings. SURGEON:  Pita Palomo DO.    ASSISTANTS:  Sylvia Ortega and Rita Valentin. ANESTHESIA:  General.    ESTIMATED BLOOD LOSS:  10 mL. URINE OUTPUT:  100 mL. COMPLICATIONS:  None. SPECIMENS REMOVED:  Right tube and ovary, cul-de-sac cyst and washings. IMPLANTS:  none. FINDINGS:  Laparoscopic findings revealed some omental adhesions to the anterior abdominal wall at her umbilicus. Within the pelvis she had a normal right tube and ovary. Separate from that there was a 3-4 cm bilobed cyst that was in the posterior cul-de-sac adhered to the uterus, posterior vagina and rectum. INDICATIONS:  The patient is a 68-year-old who initially presented to her PCP with right lower quadrant pain. A CT was done which was negative for mass but some free fluid was identified. She had continued pain, underwent an ultrasound revealing an 8 mm endometrial lining and a 5.7 x 5.1 cm complex, what was thought to be, right adnexal mass. She subsequently underwent a CT scan revealing this bilobed structure between the uterus and the rectum. CA-125 was drawn which was normal.  She presents today for definitive surgical management. DESCRIPTION OF PROCEDURE:  The patient was taken to the operating room where general anesthesia was administered without difficulty. She was placed in dorsal lithotomy position, abdomen prepped and draped in sterile fashion.   Surgical timeout by taken by the surgical team.  A Sewell catheter was placed. Sterile speculum then placed in patient's vagina. Anterior lip of the cervix grasped with a single toothed tenaculum. The cervix was then dilated and endometrial curettage was performed. A 6 cm CHIN was then placed. Attention was then turned to the abdomen where a Veress needle was introduced in the left upper quadrant in the midclavicular line. Pneumoperitoneum was obtained at 50 mmHg. A 5 mm optical trocar was advanced with findings above. At this poin another 5 mm trocar was placed in the left side under direct visualization. Using Harmonic scalpel the omental adhesions to anterior abdominal wall were taken down. Once this was accomplished a 5 mm trocar was placed about 2-3 cm above the umbilicus in the midline. Attention was turned to the pelvis. Washings were obtained. Initial attempt to try to dissect this cyst from its attachments to the uterus, vagina and rectum caused the cyst to rupture with clear fluid. Some of the cyst wall was bluntly dissected free from its attachments but it was very closely adhered to the rectum and decision was made to defer complete resection, given very benign appearance. The specimens were handed off for permanent specimen. Attention was then turned to the right adnexa where the ureter was identified peristalsing below. The IP ligament was elevated and transected using the Harmonic scalpel. The remaining attachments of the broad ligament were then divided using the Harmonic scalpel. The right utero-ovarian ligament and fallopian tube were then divided using the Harmonic scalpel. This was placed in an EndoCatch bag, removed and sent for permanent specimen. The pelvis was irrigated copiously. There was good hemostasis. At this point all trocars were removed. Pneumoperitoneum was relieved. All skin sites were closed with 4-0 Monocryl and Dermabond. Marcaine 0.5% was injected at the conclusion. The patient tolerated the procedure well. Sponge and needle correct x2 and the patient taken to recovery in stable condition.       DO CHI Martin  D: 12/05/2018 12:42     T: 12/05/2018 19:31  JOB #: [id number]

## 2018-12-13 ENCOUNTER — TELEPHONE (OUTPATIENT)
Dept: ONCOLOGY | Age: 68
End: 2018-12-13

## 2018-12-13 DIAGNOSIS — R31.9 HEMATURIA, UNSPECIFIED TYPE: Primary | ICD-10-CM

## 2018-12-13 NOTE — TELEPHONE ENCOUNTER
Pt called reporting large amounts of blood in her urine. She reports she only noticies blood after urination and denies blood on her pad currently. Pt reports passing a kidney stone late 11/2018 with similar symptoms. Denies flank pain or dysuria. Instructed pt to contact her urology ASAP for evaluation. Reviewed benign pathology results with patient over the phone. Patient verbalized understanding. Patient agreed to plan. Patient instructed to contact office for any question or concerns.      Adan Tena NP

## 2018-12-13 NOTE — TELEPHONE ENCOUNTER
Patient contacted the office to request her pathology results from her procedure. Please call her back at [phone]. Patient then relayed that she had spotting after her procedure, which she expected per her surgery counseling, but that stopped yesterday and she has developed blood in her urine today. She also stated that she has had blood in her urine before when she was getting kidney stones. She would like advice on if this is surgery related or possibly kidney stone related.

## 2018-12-14 ENCOUNTER — OFFICE VISIT (OUTPATIENT)
Dept: UROLOGY | Age: 68
End: 2018-12-14

## 2018-12-14 VITALS
SYSTOLIC BLOOD PRESSURE: 128 MMHG | WEIGHT: 200 LBS | DIASTOLIC BLOOD PRESSURE: 71 MMHG | HEIGHT: 63 IN | HEART RATE: 74 BPM | OXYGEN SATURATION: 96 % | BODY MASS INDEX: 35.44 KG/M2

## 2018-12-14 DIAGNOSIS — R31.29 MICROSCOPIC HEMATURIA: Primary | ICD-10-CM

## 2018-12-14 DIAGNOSIS — N20.1 LEFT URETERAL STONE: ICD-10-CM

## 2018-12-14 LAB
BILIRUB UR QL STRIP: NEGATIVE
GLUCOSE UR-MCNC: NEGATIVE MG/DL
KETONES P FAST UR STRIP-MCNC: NEGATIVE MG/DL
PH UR STRIP: 5.5 [PH] (ref 4.6–8)
PROT UR QL STRIP: NORMAL
SP GR UR STRIP: 1.02 (ref 1–1.03)
UA UROBILINOGEN AMB POC: NORMAL (ref 0.2–1)
URINALYSIS CLARITY POC: CLEAR
URINALYSIS COLOR POC: YELLOW
URINE BLOOD POC: NORMAL
URINE LEUKOCYTES POC: NORMAL
URINE NITRITES POC: NEGATIVE

## 2018-12-14 NOTE — PROGRESS NOTES
Ms. Ahmet Gallegos has a reminder for a \"due or due soon\" health maintenance. I have asked that she contact her primary care provider for follow-up on this health maintenance.

## 2018-12-14 NOTE — PATIENT INSTRUCTIONS
Blood in the Urine: Care Instructions  Your Care Instructions    Blood in the urine, or hematuria, may make the urine look red, brown, or pink. There may be blood every time you urinate or just from time to time. You cannot always see blood in the urine, but it will show up in a urine test.  Blood in the urine may be serious. It should always be checked by a doctor. Your doctor may recommend more tests, including an X-ray, a CT scan, or a cystoscopy (which lets a doctor look inside the urethra and bladder). Blood in the urine can be a sign of another problem. Common causes are bladder infections and kidney stones. An injury to your groin or your genital area can also cause bleeding in the urinary tract. Very hard exercisesuch as running a marathoncan cause blood in the urine. Blood in the urine can also be a sign of kidney disease or cancer in the bladder or kidney. Many cases of blood in the urine are caused by a harmless condition that runs in families. This is called benign familial hematuria. It does not need any treatment. Sometimes your urine may look red or brown even though it does not contain blood. For example, not getting enough fluids (dehydration), taking certain medicines, or having a liver problem can change the color of your urine. Eating foods such as beets, rhubarb, or blackberries or foods with red food coloring can make your urine look red or pink. Follow-up care is a key part of your treatment and safety. Be sure to make and go to all appointments, and call your doctor if you are having problems. It's also a good idea to know your test results and keep a list of the medicines you take. When should you call for help? Call your doctor now or seek immediate medical care if:    · You have symptoms of a urinary infection. For example:  ? You have pus in your urine. ? You have pain in your back just below your rib cage. This is called flank pain. ?  You have a fever, chills, or body aches.  ? It hurts to urinate. ? You have groin or belly pain.     · You have more blood in your urine.    Watch closely for changes in your health, and be sure to contact your doctor if:    · You have new urination problems.     · You do not get better as expected. Where can you learn more? Go to http://ofelia-yamilex.info/. Enter R491 in the search box to learn more about \"Blood in the Urine: Care Instructions. \"  Current as of: March 21, 2018  Content Version: 11.8  © 2524-5741 [employer]. Care instructions adapted under license by [employer] (which disclaims liability or warranty for this information). If you have questions about a medical condition or this instruction, always ask your healthcare professional. [address] any warranty or liability for your use of this information.

## 2018-12-14 NOTE — PROGRESS NOTES
Ms. Larry Jennings has a reminder for a \"due or due soon\" health maintenance. I have asked that she contact her primary care provider for follow-up on this health maintenance.

## 2018-12-14 NOTE — PROGRESS NOTES
Chief Complaint   Patient presents with   Orelia Goodness Hematuria    Abdominal Pain     left       HISTORY OF PRESENT ILLNESS:  Ravinder St is a 77 y.o. female who comes in today with another major illness. 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 She had that treated laparoscopically and had no malignancy. She is recuperating from those when she developed severe left renal colic about 2 days ago and went to the emergency room. I had seen her earlier in the year with microhematuria and and going over the CT scan prior to that there was a mention made of a small calcific area near the left mid ureter at that time. The CT scan on this occasion confirms a 4 mm very definite stone with hydroureter above it where the ureter would go over the psoas muscle on the left is above the iliac vessel. Past Medical History:   Diagnosis Date    Arthritis     CAD (coronary artery disease), native coronary artery 7/2014    ULISES to LCx    Cardiac cath 07/07/2014    RCA 15%. LM patent. mLAD 30-50%. CX/OM1 85% (2.75 x 26-mm Resolute ULISES, resid 0%).  Cardiac echocardiogram 04/05/2016    EF 55-60%. No WMA. Gr 1 DDfx. Mod MR.  Cardiac nuclear imaging test, abnormal 02/05/2014    Mod-sized, mild-mod mid lateral infarction w/mild-mod rosa isela-infarct ischemia. Mod lateral hypk. EF 56%. Normal EKG on pharm stress test.  Intermediate risk.     Chronic kidney disease     Dyslipidemia     Gallstone     Hernia     Kidney stone     Nausea & vomiting     Obesity     PONV (postoperative nausea and vomiting)     Sinus infection        Past Surgical History:   Procedure Laterality Date    HX CHOLECYSTECTOMY      HX HEART CATHETERIZATION      HX HERNIA REPAIR      HX LYSIS OF ADHESIONS      HX OOPHORECTOMY      HX OTHER SURGICAL  11/2018    repair of anal fissure    ID REMOVAL OF ANAL FISSURE  11/02/2018       Social History     Tobacco Use    Smoking status: Never Smoker    Smokeless tobacco: Never Used   Substance Use Topics    Alcohol use: No    Drug use: No       Allergies   Allergen Reactions    Celebrex [Celecoxib] Swelling       Family History   Problem Relation Age of Onset    Hypertension Mother     Cancer Father     Heart Disease Maternal Grandmother     Diabetes Paternal Grandmother        Current Outpatient Medications   Medication Sig Dispense Refill    oxyCODONE-acetaminophen (PERCOCET) 5-325 mg per tablet Take 1-2 Tabs by mouth every four (4) hours as needed for Pain. Max Daily Amount: 12 Tabs. 60 Tab 0    calcium polycarbophil (FIBER LAXATIVE, CA POLYCARBO,) 625 mg tablet Take 625 mg by mouth daily.  traMADol (ULTRAM) 50 mg tablet Take 1-2 Tabs by mouth every six (6) hours as needed for Pain. Max Daily Amount: 400 mg. 30 Tab 0    PREMARIN 0.625 mg/gram vaginal cream       cholecalciferol, vitamin D3, (VITAMIN D3) 2,000 unit tab Take  by mouth daily.  folic acid/multivit-min/lutein (CENTRUM SILVER PO) Take  by mouth.  acetaminophen (TYLENOL ARTHRITIS PAIN) 650 mg TbER Take 650 mg by mouth every eight (8) hours.  metoprolol succinate (TOPROL-XL) 50 mg XL tablet TAKE 1 TABLET BY MOUTH DAILY 90 Tab 3    rosuvastatin (CRESTOR) 20 mg tablet Take 20 mg by mouth daily.  mometasone 100 mcg/actuation HFAA Take 100 mcg by inhalation daily.  aspirin delayed-release 81 mg tablet Take 162 mg by mouth daily.  desloratadine-pseudoephedrine (CLARINEX-D 12 HOUR) 2.5-120 mg per tablet Take 1 Tab by mouth two (2) times a day.  montelukast (SINGULAIR) 10 mg tablet Take 10 mg by mouth daily.  diclofenac (VOLTAREN) 1 % gel Apply  to affected area as needed.  oxyCODONE-acetaminophen (PERCOCET) 5-325 mg per tablet Take 1 tablet every 4-6 hours as needed for pain control.   If you were instructed to try over the counter ibuprofen or tylenol, only take the percocet for pain not controlled with the over the counter medication. 12 Tab 0           REVIEW OF SYSTEMS:   Documented on the chart      PHYSICAL EXAMINATION:     Visit Vitals  /71 (BP 1 Location: Left arm, BP Patient Position: Sitting)   Pulse 74   Ht 5' 2.5\" (1.588 m)   Wt 200 lb (90.7 kg)   SpO2 96%   BMI 36.00 kg/m²     Constitutional: Well developed, well-nourished female in no acute distress. CV:  No peripheral swelling noted  Respiratory: No respiratory distress or difficulties  Abdomen:  Soft and nontender. No masses. No hepatosplenomegaly. Moderate left flank pain.  Female:  No CVA tenderness. Skin:  Normal color. No evidence of jaundice. Neuro/Psych:  Patient with appropriate affect. Alert and oriented. Lymphatic:   No enlargement of supraclavicular lymph nodes. Results for orders placed or performed in visit on 12/14/18   AMB POC URINALYSIS DIP STICK AUTO W/O MICRO   Result Value Ref Range    Color (UA POC) Yellow     Clarity (UA POC) Clear     Glucose (UA POC) Negative Negative    Bilirubin (UA POC) Negative Negative    Ketones (UA POC) Negative Negative    Specific gravity (UA POC) 1.025 1.001 - 1.035    Blood (UA POC) 3+ Negative    pH (UA POC) 5.5 4.6 - 8.0    Protein (UA POC) 2+ Negative    Urobilinogen (UA POC) normal 0.2 - 1    Nitrites (UA POC) Negative Negative    Leukocyte esterase (UA POC) Trace Negative         REVIEW OF LABS AND IMAGING:      Imaging Report Reviewed? YES      Images Reviewed? YES           Other Lab Data Reviewed? YES    ASSESSMENT:     ICD-10-CM ICD-9-CM    1. Microscopic hematuria R31.29 599.72 AMB POC URINALYSIS DIP STICK AUTO W/O MICRO   2. Left ureteral stone N20.1 592.1                 PLAN/DISCUSSION: She is having a moderate amount of discomfort from this but with all the surgical procedures she had in the past couple of months, she wants to wait on this.   At this point in time since she is not on narcotics frequently because of the stone and it is relatively not painful at this moment I told her I would be ca comfortable recommending just following it until it becomes unbearable or at least until after the first of the year when she will be over her surgeries now and can go ahead and tolerate a cystoscopy and ureteroscopy to get the stone at that time. She is willing to do this but will call us back if she has acute problems. Patient voices understanding and agreement to the plan. Teresa Lambert MD on 12/14/2018           Please note: This document has been produced using voice recognition software. Unrecognized errors in transcription may be present.

## 2018-12-26 ENCOUNTER — TELEPHONE (OUTPATIENT)
Dept: UROLOGY | Age: 68
End: 2018-12-26

## 2018-12-26 NOTE — TELEPHONE ENCOUNTER
The patient had called us with a clinically reasonable complaint of small-volume urinary frequency and urgency. I have reviewed her CT scan and there is a possibility that there are actually 2 stones in her left ureter. I have again gone over the nature of the problem and the nature of its correction with the patient. The patient is advised that she can continue to try to pass the stone but we can give her no medication that will actually alleviate her current symptoms. Alternatively we can proceed with ureteroscopy. The patient will consider.   Preparation technique convalescence and risks have been fully discussed

## 2018-12-27 ENCOUNTER — ANESTHESIA EVENT (OUTPATIENT)
Dept: SURGERY | Age: 68
End: 2018-12-27
Payer: MEDICARE

## 2018-12-27 ENCOUNTER — OFFICE VISIT (OUTPATIENT)
Dept: UROLOGY | Age: 68
End: 2018-12-27

## 2018-12-27 VITALS
SYSTOLIC BLOOD PRESSURE: 113 MMHG | BODY MASS INDEX: 35.26 KG/M2 | OXYGEN SATURATION: 95 % | DIASTOLIC BLOOD PRESSURE: 68 MMHG | HEIGHT: 63 IN | WEIGHT: 199 LBS | HEART RATE: 88 BPM

## 2018-12-27 DIAGNOSIS — N20.0 KIDNEY STONE: Primary | ICD-10-CM

## 2018-12-27 LAB
BILIRUB UR QL STRIP: NORMAL
GLUCOSE UR-MCNC: NEGATIVE MG/DL
KETONES P FAST UR STRIP-MCNC: NORMAL MG/DL
PH UR STRIP: 5.5 [PH] (ref 4.6–8)
PROT UR QL STRIP: NORMAL
SP GR UR STRIP: 1.02 (ref 1–1.03)
UA UROBILINOGEN AMB POC: NORMAL (ref 0.2–1)
URINALYSIS CLARITY POC: NORMAL
URINALYSIS COLOR POC: NORMAL
URINE BLOOD POC: NORMAL
URINE LEUKOCYTES POC: NEGATIVE
URINE NITRITES POC: NEGATIVE

## 2018-12-27 RX ORDER — SODIUM CHLORIDE, SODIUM LACTATE, POTASSIUM CHLORIDE, CALCIUM CHLORIDE 600; 310; 30; 20 MG/100ML; MG/100ML; MG/100ML; MG/100ML
25 INJECTION, SOLUTION INTRAVENOUS CONTINUOUS
Status: CANCELLED | OUTPATIENT
Start: 2018-12-27 | End: 2018-12-28

## 2018-12-27 RX ORDER — CIPROFLOXACIN 2 MG/ML
400 INJECTION, SOLUTION INTRAVENOUS ONCE
Status: CANCELLED | OUTPATIENT
Start: 2018-12-28 | End: 2018-12-28

## 2018-12-27 RX ORDER — LIDOCAINE HYDROCHLORIDE 10 MG/ML
0.1 INJECTION, SOLUTION EPIDURAL; INFILTRATION; INTRACAUDAL; PERINEURAL AS NEEDED
Status: CANCELLED | OUTPATIENT
Start: 2018-12-27

## 2018-12-27 RX ORDER — SODIUM CHLORIDE 9 MG/ML
100 INJECTION, SOLUTION INTRAVENOUS CONTINUOUS
Status: CANCELLED | OUTPATIENT
Start: 2018-12-28

## 2018-12-27 RX ORDER — SODIUM CHLORIDE 0.9 % (FLUSH) 0.9 %
5-10 SYRINGE (ML) INJECTION AS NEEDED
Status: CANCELLED | OUTPATIENT
Start: 2018-12-27

## 2018-12-27 RX ORDER — SODIUM CHLORIDE 0.9 % (FLUSH) 0.9 %
5-10 SYRINGE (ML) INJECTION EVERY 8 HOURS
Status: CANCELLED | OUTPATIENT
Start: 2018-12-27

## 2018-12-27 NOTE — PROGRESS NOTES
Sarahvincent Lamaskrystyna    Chief Complaint   Patient presents with   [address] [place] [address] Kidney Stone       History and Physical    The patient is a very pleasant 78-year-old female  who I spoke with by telephone and invited her to come back in. The patient has had intermittently symptomatic left ureteral calculus or calculi going back to last month and has had primary difficulty with refractory frequency urgency and small volumes. This occurred on a transient basis and she felt she may have passed a stone but the CT scan is showing 2 stones in her left ureter with none in the kidney and the patient comes now for ureteroscopy laser  and stent placement    Past Medical History:   Diagnosis Date    Arthritis     CAD (coronary artery disease), native coronary artery 7/2014    ULISES to LCx    Cardiac cath 07/07/2014    RCA 15%. LM patent. mLAD 30-50%. CX/OM1 85% (2.75 x 26-mm Resolute ULISES, resid 0%).  Cardiac echocardiogram 04/05/2016    EF 55-60%. No WMA. Gr 1 DDfx. Mod MR.  Cardiac nuclear imaging test, abnormal 02/05/2014    Mod-sized, mild-mod mid lateral infarction w/mild-mod rosa isela-infarct ischemia. Mod lateral hypk. EF 56%. Normal EKG on pharm stress test.  Intermediate risk.  Chronic kidney disease     Dyslipidemia     Gallstone     Hernia     Kidney stone     Nausea & vomiting     Obesity     PONV (postoperative nausea and vomiting)     Sinus infection      Patient Active Problem List   Diagnosis Code    Dyslipidemia E78.5    Obesity E66.9    Abnormal nuclear stress test R94.39    Chest pain R07.9    CAD (coronary artery disease), native coronary artery I25.10    S/P cardiac catheterization Z98.890    Nephrolithiasis N20.0    Ureteral stone N20.1    Non-rheumatic mitral regurgitation I34.0    Severe obesity (BMI 35.0-39. 9) E66.01     Past Surgical History:   Procedure Laterality Date    HX CHOLECYSTECTOMY      HX HEART CATHETERIZATION      HX HERNIA REPAIR      HX LYSIS OF ADHESIONS      HX OOPHORECTOMY Right 12/05/2018    HX OTHER SURGICAL  11/2018    repair of anal fissure    MA REMOVAL OF ANAL FISSURE  11/02/2018     Current Outpatient Medications   Medication Sig Dispense Refill    calcium polycarbophil (FIBER LAXATIVE, CA POLYCARBO,) 625 mg tablet Take 625 mg by mouth daily.  traMADol (ULTRAM) 50 mg tablet Take 1-2 Tabs by mouth every six (6) hours as needed for Pain. Max Daily Amount: 400 mg. 30 Tab 0    cholecalciferol, vitamin D3, (VITAMIN D3) 2,000 unit tab Take  by mouth daily.  folic acid/multivit-min/lutein (CENTRUM SILVER PO) Take  by mouth.  acetaminophen (TYLENOL ARTHRITIS PAIN) 650 mg TbER Take 650 mg by mouth every eight (8) hours.  metoprolol succinate (TOPROL-XL) 50 mg XL tablet TAKE 1 TABLET BY MOUTH DAILY 90 Tab 3    rosuvastatin (CRESTOR) 20 mg tablet Take 20 mg by mouth daily.  aspirin delayed-release 81 mg tablet Take 162 mg by mouth daily.  desloratadine-pseudoephedrine (CLARINEX-D 12 HOUR) 2.5-120 mg per tablet Take 1 Tab by mouth two (2) times a day.  montelukast (SINGULAIR) 10 mg tablet Take 10 mg by mouth daily.  diclofenac (VOLTAREN) 1 % gel Apply  to affected area as needed.  PREMARIN 0.625 mg/gram vaginal cream       mometasone 100 mcg/actuation HFAA Take 100 mcg by inhalation daily.        Allergies   Allergen Reactions    Celebrex [Celecoxib] Swelling     Social History     Socioeconomic History    Marital status:      Spouse name: Not on file    Number of children: Not on file    Years of education: Not on file    Highest education level: Not on file   Social Needs    Financial resource strain: Not on file    Food insecurity - worry: Not on file    Food insecurity - inability: Not on file    Transportation needs - medical: Not on file   [employer] needs - non-medical: Not on file   Occupational History    Not on file   Tobacco Use    Smoking status: Never Smoker    Smokeless tobacco: Never Used   Substance and Sexual Activity    Alcohol use: No    Drug use: No    Sexual activity: Yes     Partners: Male     Birth control/protection: None   Other Topics Concern    Not on file   Social History Narrative    Not on file      Family History   Problem Relation Age of Onset    Hypertension Mother     Cancer Father     Heart Disease Maternal Grandmother     Diabetes Paternal Grandmother                  Visit Vitals  /68 (BP 1 Location: Right arm, BP Patient Position: Sitting)   Pulse 88   Ht 5' 2.5\" (1.588 m)   Wt 199 lb (90.3 kg)   SpO2 95%   BMI 35.82 kg/m²     Physical        Gen: WDWN adult NAD  Head  : normocephalic,  Normal ROM; eyes with normal pupils, EOMs, no masses;  conjunctiva normal  Neck: normal movement,  no evident mass,  No evident adenopathy, trachea midline,  Lungs: no respiratory distress or difficulties  CV:  No evident peripheral swelling  Abd :bowel sounds normal, no masses, tenderness, organomegaly  Flanks     -    Extremities- no edema, arthritis, deformity, swelling  Psych- oriented, no evident anxiety, no cognitive impairment evident    Is 3+ positive for blood and negative for leukocytes    I have reviewed the CT scan with the patient and shown her my uncertainty about whether or not there are 2 stones present                Impression/ PLAN  Left ureteral stone versus multiple stones    Plan:  Anesthesia cystoscopy with left ureteroscopy laser stent the patient is aware of the preparation technique and convalescence. She is aware of the risks that include, but are not limited to, infection, bleeding, failure, injury, possible need for prolonged stenting or for additional procedures            This visit exceeded 25 minutes and >50% was counselling  The patient understands the discussion and plan    PLEASE NOTE:      This document has been produced using voice recognition software.   Unrecognized errors in transcription may be present    Stephan Hou Shamar Parrish MD

## 2018-12-27 NOTE — PATIENT INSTRUCTIONS
Kidney Stone: Care Instructions  Your Care Instructions    Kidney stones are formed when salts, minerals, and other substances normally found in the urine clump together. They can be as small as grains of sand or, rarely, as large as golf balls. While the stone is traveling through the ureter, which is the tube that carries urine from the kidney to the bladder, you will probably feel pain. The pain may be mild or very severe. You may also have some blood in your urine. As soon as the stone reaches the bladder, any intense pain should go away. If a stone is too large to pass on its own, you may need a medical procedure to help you pass the stone. The doctor has checked you carefully, but problems can develop later. If you notice any problems or new symptoms, get medical treatment right away. Follow-up care is a key part of your treatment and safety. Be sure to make and go to all appointments, and call your doctor if you are having problems. It's also a good idea to know your test results and keep a list of the medicines you take. How can you care for yourself at home? · Drink plenty of fluids, enough so that your urine is light yellow or clear like water. If you have kidney, heart, or liver disease and have to limit fluids, talk with your doctor before you increase the amount of fluids you drink. · Take pain medicines exactly as directed. Call your doctor if you think you are having a problem with your medicine. ? If the doctor gave you a prescription medicine for pain, take it as prescribed. ? If you are not taking a prescription pain medicine, ask your doctor if you can take an over-the-counter medicine. Read and follow all instructions on the label. · Your doctor may ask you to strain your urine so that you can collect your kidney stone when it passes. You can use a kitchen strainer or a tea strainer to catch the stone. Store it in a plastic bag until you see your doctor again.   Preventing future kidney stones  Some changes in your diet may help prevent kidney stones. Depending on the cause of your stones, your doctor may recommend that you:  · Drink plenty of fluids, enough so that your urine is light yellow or clear like water. If you have kidney, heart, or liver disease and have to limit fluids, talk with your doctor before you increase the amount of fluids you drink. · Limit coffee, tea, and alcohol. Also avoid grapefruit juice. · Do not take more than the recommended daily dose of vitamins C and D.  · Avoid antacids such as Gaviscon, Maalox, Mylanta, or Tums. · Limit the amount of salt (sodium) in your diet. · Eat a balanced diet that is not too high in protein. · Limit foods that are high in a substance called oxalate, which can cause kidney stones. These foods include dark green vegetables, rhubarb, chocolate, wheat bran, nuts, cranberries, and beans. When should you call for help? Call your doctor now or seek immediate medical care if:    · You cannot keep down fluids.     · Your pain gets worse.     · You have a fever or chills.     · You have new or worse pain in your back just below your rib cage (the flank area).     · You have new or more blood in your urine.    Watch closely for changes in your health, and be sure to contact your doctor if:    · You do not get better as expected. Where can you learn more? Go to http://ofelia-yamilex.info/. Enter F749 in the search box to learn more about \"Kidney Stone: Care Instructions. \"  Current as of: March 15, 2018  Content Version: 11.8  © 7355-4540 [employer]. Care instructions adapted under license by [employer] (which disclaims liability or warranty for this information). If you have questions about a medical condition or this instruction, always ask your healthcare professional. [address] any warranty or liability for your use of this information.

## 2018-12-27 NOTE — PROGRESS NOTES
Ms. Knight [place] has a reminder for a \"due or due soon\" health maintenance. I have asked that she contact her primary care provider for follow-up on this health maintenance.

## 2018-12-28 ENCOUNTER — APPOINTMENT (OUTPATIENT)
Dept: GENERAL RADIOLOGY | Age: 68
End: 2018-12-28
Attending: UROLOGY
Payer: MEDICARE

## 2018-12-28 ENCOUNTER — ANESTHESIA (OUTPATIENT)
Dept: SURGERY | Age: 68
End: 2018-12-28
Payer: MEDICARE

## 2018-12-28 ENCOUNTER — HOSPITAL ENCOUNTER (OUTPATIENT)
Age: 68
Setting detail: OUTPATIENT SURGERY
Discharge: HOME | End: 2018-12-28
Attending: UROLOGY | Admitting: UROLOGY
Payer: MEDICARE

## 2018-12-28 VITALS
DIASTOLIC BLOOD PRESSURE: 58 MMHG | RESPIRATION RATE: 13 BRPM | HEIGHT: 63 IN | OXYGEN SATURATION: 94 % | BODY MASS INDEX: 34.91 KG/M2 | TEMPERATURE: 98 F | SYSTOLIC BLOOD PRESSURE: 122 MMHG | HEART RATE: 80 BPM | WEIGHT: 197 LBS

## 2018-12-28 DIAGNOSIS — N20.1 URETERAL STONE: Primary | ICD-10-CM

## 2018-12-28 PROCEDURE — 74011250636 HC RX REV CODE- 250/636: Performed by: UROLOGY

## 2018-12-28 PROCEDURE — 77030031373: Performed by: UROLOGY

## 2018-12-28 PROCEDURE — C1758 CATHETER, URETERAL: HCPCS | Performed by: UROLOGY

## 2018-12-28 PROCEDURE — C2617 STENT, NON-COR, TEM W/O DEL: HCPCS | Performed by: UROLOGY

## 2018-12-28 PROCEDURE — 74011250636 HC RX REV CODE- 250/636: Performed by: NURSE ANESTHETIST, CERTIFIED REGISTERED

## 2018-12-28 PROCEDURE — 74011000250 HC RX REV CODE- 250: Performed by: NURSE ANESTHETIST, CERTIFIED REGISTERED

## 2018-12-28 PROCEDURE — 74011250636 HC RX REV CODE- 250/636

## 2018-12-28 PROCEDURE — 77030009934 HC PRB IRR KT UTMD -B: Performed by: UROLOGY

## 2018-12-28 PROCEDURE — 76210000021 HC REC RM PH II 0.5 TO 1 HR: Performed by: UROLOGY

## 2018-12-28 PROCEDURE — C1769 GUIDE WIRE: HCPCS | Performed by: UROLOGY

## 2018-12-28 PROCEDURE — 77030006974 HC BSKT URET RTVR BSC -C: Performed by: UROLOGY

## 2018-12-28 PROCEDURE — 76060000032 HC ANESTHESIA 0.5 TO 1 HR: Performed by: UROLOGY

## 2018-12-28 PROCEDURE — 76210000006 HC OR PH I REC 0.5 TO 1 HR: Performed by: UROLOGY

## 2018-12-28 PROCEDURE — 76010000160 HC OR TIME 0.5 TO 1 HR INTENSV-TIER 1: Performed by: UROLOGY

## 2018-12-28 PROCEDURE — 77030020782 HC GWN BAIR PAWS FLX 3M -B: Performed by: UROLOGY

## 2018-12-28 PROCEDURE — 74011250637 HC RX REV CODE- 250/637: Performed by: ANESTHESIOLOGY

## 2018-12-28 PROCEDURE — 77030019927 HC TBNG IRR CYSTO BAXT -A: Performed by: UROLOGY

## 2018-12-28 PROCEDURE — 74018 RADEX ABDOMEN 1 VIEW: CPT

## 2018-12-28 PROCEDURE — 77030032490 HC SLV COMPR SCD KNE COVD -B: Performed by: UROLOGY

## 2018-12-28 PROCEDURE — 77030018836 HC SOL IRR NACL ICUM -A: Performed by: UROLOGY

## 2018-12-28 DEVICE — URETERAL STENT
Type: IMPLANTABLE DEVICE | Site: URETER | Status: FUNCTIONAL
Brand: POLARIS™ ULTRA

## 2018-12-28 RX ORDER — HYDROCODONE BITARTRATE AND ACETAMINOPHEN 5; 325 MG/1; MG/1
1 TABLET ORAL
Qty: 28 TAB | Refills: 0 | Status: SHIPPED | OUTPATIENT
Start: 2018-12-28 | End: 2020-11-13

## 2018-12-28 RX ORDER — CIPROFLOXACIN 250 MG/1
250 TABLET, FILM COATED ORAL EVERY 12 HOURS
Qty: 20 TAB | Refills: 0 | Status: SHIPPED | OUTPATIENT
Start: 2018-12-28 | End: 2019-01-07

## 2018-12-28 RX ORDER — FENTANYL CITRATE 50 UG/ML
INJECTION, SOLUTION INTRAMUSCULAR; INTRAVENOUS AS NEEDED
Status: DISCONTINUED | OUTPATIENT
Start: 2018-12-28 | End: 2018-12-28 | Stop reason: HOSPADM

## 2018-12-28 RX ORDER — FENTANYL CITRATE 50 UG/ML
50 INJECTION, SOLUTION INTRAMUSCULAR; INTRAVENOUS
Status: CANCELLED | OUTPATIENT
Start: 2018-12-28 | End: 2018-12-30

## 2018-12-28 RX ORDER — SODIUM CHLORIDE 0.9 % (FLUSH) 0.9 %
5-10 SYRINGE (ML) INJECTION AS NEEDED
Status: DISCONTINUED | OUTPATIENT
Start: 2018-12-28 | End: 2018-12-28 | Stop reason: HOSPADM

## 2018-12-28 RX ORDER — ONDANSETRON 2 MG/ML
INJECTION INTRAMUSCULAR; INTRAVENOUS AS NEEDED
Status: DISCONTINUED | OUTPATIENT
Start: 2018-12-28 | End: 2018-12-28 | Stop reason: HOSPADM

## 2018-12-28 RX ORDER — PROPOFOL 10 MG/ML
INJECTION, EMULSION INTRAVENOUS AS NEEDED
Status: DISCONTINUED | OUTPATIENT
Start: 2018-12-28 | End: 2018-12-28 | Stop reason: HOSPADM

## 2018-12-28 RX ORDER — SODIUM CHLORIDE 0.9 % (FLUSH) 0.9 %
5-10 SYRINGE (ML) INJECTION EVERY 8 HOURS
Status: CANCELLED | OUTPATIENT
Start: 2018-12-28

## 2018-12-28 RX ORDER — SODIUM CHLORIDE 0.9 % (FLUSH) 0.9 %
5-10 SYRINGE (ML) INJECTION AS NEEDED
Status: CANCELLED | OUTPATIENT
Start: 2018-12-28

## 2018-12-28 RX ORDER — SCOLOPAMINE TRANSDERMAL SYSTEM 1 MG/1
1 PATCH, EXTENDED RELEASE TRANSDERMAL
Status: DISCONTINUED | OUTPATIENT
Start: 2018-12-28 | End: 2018-12-28 | Stop reason: HOSPADM

## 2018-12-28 RX ORDER — SODIUM CHLORIDE 0.9 % (FLUSH) 0.9 %
5-10 SYRINGE (ML) INJECTION EVERY 8 HOURS
Status: DISCONTINUED | OUTPATIENT
Start: 2018-12-28 | End: 2018-12-28 | Stop reason: HOSPADM

## 2018-12-28 RX ORDER — MIDAZOLAM HYDROCHLORIDE 1 MG/ML
INJECTION, SOLUTION INTRAMUSCULAR; INTRAVENOUS AS NEEDED
Status: DISCONTINUED | OUTPATIENT
Start: 2018-12-28 | End: 2018-12-28 | Stop reason: HOSPADM

## 2018-12-28 RX ORDER — SODIUM CHLORIDE 9 MG/ML
100 INJECTION, SOLUTION INTRAVENOUS CONTINUOUS
Status: DISCONTINUED | OUTPATIENT
Start: 2018-12-28 | End: 2018-12-28 | Stop reason: HOSPADM

## 2018-12-28 RX ORDER — DEXAMETHASONE SODIUM PHOSPHATE 4 MG/ML
INJECTION, SOLUTION INTRA-ARTICULAR; INTRALESIONAL; INTRAMUSCULAR; INTRAVENOUS; SOFT TISSUE AS NEEDED
Status: DISCONTINUED | OUTPATIENT
Start: 2018-12-28 | End: 2018-12-28 | Stop reason: HOSPADM

## 2018-12-28 RX ORDER — SODIUM CHLORIDE, SODIUM LACTATE, POTASSIUM CHLORIDE, CALCIUM CHLORIDE 600; 310; 30; 20 MG/100ML; MG/100ML; MG/100ML; MG/100ML
75 INJECTION, SOLUTION INTRAVENOUS CONTINUOUS
Status: DISCONTINUED | OUTPATIENT
Start: 2018-12-28 | End: 2018-12-28 | Stop reason: HOSPADM

## 2018-12-28 RX ORDER — LIDOCAINE HYDROCHLORIDE 20 MG/ML
INJECTION, SOLUTION EPIDURAL; INFILTRATION; INTRACAUDAL; PERINEURAL AS NEEDED
Status: DISCONTINUED | OUTPATIENT
Start: 2018-12-28 | End: 2018-12-28 | Stop reason: HOSPADM

## 2018-12-28 RX ORDER — CIPROFLOXACIN 2 MG/ML
400 INJECTION, SOLUTION INTRAVENOUS ONCE
Status: COMPLETED | OUTPATIENT
Start: 2018-12-28 | End: 2018-12-28

## 2018-12-28 RX ORDER — FENTANYL CITRATE 50 UG/ML
25 INJECTION, SOLUTION INTRAMUSCULAR; INTRAVENOUS AS NEEDED
Status: CANCELLED | OUTPATIENT
Start: 2018-12-28

## 2018-12-28 RX ADMIN — FAMOTIDINE 20 MG: 10 INJECTION INTRAVENOUS at 15:44

## 2018-12-28 RX ADMIN — DEXAMETHASONE SODIUM PHOSPHATE 4 MG: 4 INJECTION, SOLUTION INTRA-ARTICULAR; INTRALESIONAL; INTRAMUSCULAR; INTRAVENOUS; SOFT TISSUE at 17:44

## 2018-12-28 RX ADMIN — SODIUM CHLORIDE, SODIUM LACTATE, POTASSIUM CHLORIDE, AND CALCIUM CHLORIDE 75 ML/HR: 600; 310; 30; 20 INJECTION, SOLUTION INTRAVENOUS at 15:44

## 2018-12-28 RX ADMIN — CIPROFLOXACIN 400 MG: 2 INJECTION, SOLUTION INTRAVENOUS at 17:37

## 2018-12-28 RX ADMIN — LIDOCAINE HYDROCHLORIDE 60 MG: 20 INJECTION, SOLUTION EPIDURAL; INFILTRATION; INTRACAUDAL; PERINEURAL at 17:36

## 2018-12-28 RX ADMIN — FENTANYL CITRATE 50 MCG: 50 INJECTION, SOLUTION INTRAMUSCULAR; INTRAVENOUS at 18:10

## 2018-12-28 RX ADMIN — SCOLOPAMINE TRANSDERMAL SYSTEM 1 PATCH: 1 PATCH, EXTENDED RELEASE TRANSDERMAL at 17:30

## 2018-12-28 RX ADMIN — MIDAZOLAM HYDROCHLORIDE 2 MG: 1 INJECTION, SOLUTION INTRAMUSCULAR; INTRAVENOUS at 17:33

## 2018-12-28 RX ADMIN — FENTANYL CITRATE 50 MCG: 50 INJECTION, SOLUTION INTRAMUSCULAR; INTRAVENOUS at 17:41

## 2018-12-28 RX ADMIN — SODIUM CHLORIDE, SODIUM LACTATE, POTASSIUM CHLORIDE, AND CALCIUM CHLORIDE: 600; 310; 30; 20 INJECTION, SOLUTION INTRAVENOUS at 17:52

## 2018-12-28 RX ADMIN — PROPOFOL 50 MG: 10 INJECTION, EMULSION INTRAVENOUS at 17:42

## 2018-12-28 RX ADMIN — ONDANSETRON 4 MG: 2 INJECTION INTRAMUSCULAR; INTRAVENOUS at 17:46

## 2018-12-28 RX ADMIN — PROPOFOL 150 MG: 10 INJECTION, EMULSION INTRAVENOUS at 17:36

## 2018-12-28 NOTE — BRIEF OP NOTE
BRIEF OPERATIVE NOTE Date of Procedure: 12/28/2018 Preoperative Diagnosis: N20.1 LEFT URETERAL STONE Postoperative Diagnosis: N20.1 LEFT URETERAL STONE Procedure(s): LEFT URETEROSCOPIC HOLMIUM LASER LITHOTRIPSY WITH STENT PLACEMENT/C-ARM Surgeon(s) and Role: Ashley Aguillon MD - Primary Surgical Assistant: none Surgical Staff: 
Circ-1: Shawanda Sierra RN : Raegan Paniagua Radiology Technician: Nubia Seals Scrub Tech-1: Klarissa Curtis Event Time In Time Out Incision Start  Incision Close 1827 Anesthesia: General  
Estimated Blood Loss: 5cc Specimens: * No specimens in log * Findings: stone Complications: none Implants:  
Implant Name Type Inv. Item Serial No.  Lot No. LRB No. Used Action Adan Pineda [id number] Teja Ortiz - [id number]  DHJKR VUYJ [id number]-PGTL [id number] -- PERCUFLEX  [facility] UROLOGY-WOMENS [facility] [id number] Left 1 Implanted

## 2018-12-28 NOTE — H&P
History and Physical   
The patient is a very pleasant 77-year-old female  who I spoke with by telephone and invited her to come back in. The patient has had intermittently symptomatic left ureteral calculus or calculi going back to last month and has had primary difficulty with refractory frequency urgency and small volumes. This occurred on a transient basis and she felt she may have passed a stone but the CT scan is showing 2 stones in her left ureter with none in the kidney and the patient comes now for ureteroscopy laser  and stent placement 
  
    
Past Medical History:  
Diagnosis Date  Arthritis    
 CAD (coronary artery disease), native coronary artery 7/2014  
  ULISES to LCx  Cardiac cath 07/07/2014  
  RCA 15%. LM patent. mLAD 30-50%. CX/OM1 85% (2.75 x 26-mm Resolute ULISES, resid 0%).  Cardiac echocardiogram 04/05/2016  
  EF 55-60%. No WMA. Gr 1 DDfx. Mod MR.  Cardiac nuclear imaging test, abnormal 02/05/2014  
  Mod-sized, mild-mod mid lateral infarction w/mild-mod [place]-infarct ischemia. Mod lateral hypk. EF 56%. Normal EKG on pharm stress test.  Intermediate risk.  Chronic kidney disease    
 Dyslipidemia    
 Gallstone    
 Hernia    
 Kidney stone    
 Nausea & vomiting    
 Obesity    
 PONV (postoperative nausea and vomiting)    
 Sinus infection    
  
    
Patient Active Problem List  
Diagnosis Code  Dyslipidemia E78.5  Obesity E66.9  Abnormal nuclear stress test R94.39  
 Chest pain R07.9  CAD (coronary artery disease), native coronary artery I25.10  
 S/P cardiac catheterization Z98.890  
 Nephrolithiasis N20.0  Ureteral stone N20.1  Non-rheumatic mitral regurgitation I34.0  Severe obesity (BMI 35.0-39. 9) E66.01  
  
     
Past Surgical History:  
Procedure Laterality Date  HX CHOLECYSTECTOMY      
 HX HEART CATHETERIZATION      
 HX HERNIA REPAIR      
 HX LYSIS OF ADHESIONS      
  HX OOPHORECTOMY Right 12/05/2018  HX OTHER SURGICAL   11/2018  
  repair of anal fissure  KS REMOVAL OF ANAL FISSURE   11/02/2018  
  
      
Current Outpatient Medications Medication Sig Dispense Refill  calcium polycarbophil (FIBER LAXATIVE, CA POLYCARBO,) 625 mg tablet Take 625 mg by mouth daily.      
 traMADol (ULTRAM) 50 mg tablet Take 1-2 Tabs by mouth every six (6) hours as needed for Pain. Max Daily Amount: 400 mg. 30 Tab 0  cholecalciferol, vitamin D3, (VITAMIN D3) 2,000 unit tab Take  by mouth daily.      
 folic acid/multivit-min/lutein (CENTRUM SILVER PO) Take  by mouth.      
 acetaminophen (TYLENOL ARTHRITIS PAIN) 650 mg TbER Take 650 mg by mouth every eight (8) hours.      
 metoprolol succinate (TOPROL-XL) 50 mg XL tablet TAKE 1 TABLET BY MOUTH DAILY 90 Tab 3  
 rosuvastatin (CRESTOR) 20 mg tablet Take 20 mg by mouth daily.      
 aspirin delayed-release 81 mg tablet Take 162 mg by mouth daily.      
 desloratadine-pseudoephedrine (CLARINEX-D 12 HOUR) 2.5-120 mg per tablet Take 1 Tab by mouth two (2) times a day.      
 montelukast (SINGULAIR) 10 mg tablet Take 10 mg by mouth daily.      
 diclofenac (VOLTAREN) 1 % gel Apply  to affected area as needed.      
 PREMARIN 0.625 mg/gram vaginal cream        
 mometasone 100 mcg/actuation HFAA Take 100 mcg by inhalation daily.      
  
    
Allergies Allergen Reactions  Celebrex [Celecoxib] Swelling  
  
Social History  
  
     
Socioeconomic History  Marital status:   
    Spouse name: Not on file  Number of children: Not on file  Years of education: Not on file  Highest education level: Not on file Social Needs  Financial resource strain: Not on file  Food insecurity - worry: Not on file  Food insecurity - inability: Not on file  Transportation needs - medical: Not on file  Transportation needs - non-medical: Not on file Occupational History  Not on file Tobacco Use  
  Smoking status: Never Smoker  Smokeless tobacco: Never Used Substance and Sexual Activity  Alcohol use: No  
 Drug use: No  
 Sexual activity: Yes  
    Partners: Male  
    Birth control/protection: None Other Topics Concern  Not on file Social History Narrative  Not on file Family History Problem Relation Age of Onset  Hypertension Mother    
 Cancer Father    
 Heart Disease Maternal Grandmother    
 Diabetes Paternal Grandmother    
  
  
  
  
  
  
  
Visit Vitals /68 (BP 1 Location: Right arm, BP Patient Position: Sitting) Pulse 88 Ht 5' 2.5\" (1.588 m) Wt 199 lb (90.3 kg) SpO2 95% BMI 35.82 kg/m²  
  
Physical   
  
  
Gen: WDWN adult NAD Head  : normocephalic,  Normal ROM; eyes with normal pupils, EOMs, no masses;  conjunctiva normal 
Neck: normal movement,  no evident mass,  No evident adenopathy, trachea midline, 
Lungs: no respiratory distress or difficulties CV:  No evident peripheral swelling Abd :bowel sounds normal, no masses, tenderness, organomegaly Flanks    
- 
  
Extremities- no edema, arthritis, deformity, swelling Psych- oriented, no evident anxiety, no cognitive impairment evident 
  
Is 3+ positive for blood and negative for leukocytes 
  
I have reviewed the CT scan with the patient and shown her my uncertainty about whether or not there are 2 stones present 
  
  
  
  
  
  
  
Impression/ PLAN Left ureteral stone versus multiple stones 
  
Plan: Anesthesia cystoscopy with left ureteroscopy laser stent the patient is aware of the preparation technique and convalescence. She is aware of the risks that include, but are not limited to, infection, bleeding, failure, injury, possible need for prolonged stenting or for additional procedures 
  
  
  
  
  
This visit exceeded 25 minutes and >50% was counselling The patient understands the discussion and plan

## 2018-12-28 NOTE — ANESTHESIA POSTPROCEDURE EVALUATION
Procedure(s): LEFT URETEROSCOPIC HOLMIUM LASER LITHOTRIPSY WITH STENT PLACEMENT/C-ARM. Anesthesia Post Evaluation Multimodal analgesia: multimodal analgesia used between 6 hours prior to anesthesia start to PACU discharge Patient location during evaluation: bedside Patient participation: complete - patient participated Level of consciousness: awake Pain management: adequate Airway patency: patent Anesthetic complications: no 
Cardiovascular status: stable Respiratory status: acceptable Hydration status: acceptable Post anesthesia nausea and vomiting:  controlled Visit Vitals /49 Pulse 81 Temp 36.6 °C (97.9 °F) Resp 16 Ht 5' 2.5\" (1.588 m) Wt 89.4 kg (197 lb) SpO2 97% BMI 35.46 kg/m²

## 2018-12-28 NOTE — ANESTHESIA POSTPROCEDURE EVALUATION
Procedure(s): LEFT URETEROSCOPIC HOLMIUM LASER LITHOTRIPSY WITH STENT PLACEMENT/C-ARM. Anesthesia Post Evaluation Multimodal analgesia: multimodal analgesia used between 6 hours prior to anesthesia start to PACU discharge Patient location during evaluation: bedside Patient participation: complete - patient participated Level of consciousness: awake Pain management: adequate Airway patency: patent Anesthetic complications: no 
Cardiovascular status: stable Respiratory status: acceptable Hydration status: acceptable Post anesthesia nausea and vomiting:  controlled Visit Vitals /49 Pulse 81 Temp 36.7 °C (98 °F) Resp 16 Ht 5' 2.5\" (1.588 m) Wt 89.4 kg (197 lb) SpO2 97% BMI 35.46 kg/m²

## 2018-12-28 NOTE — ANESTHESIA PREPROCEDURE EVALUATION
Anesthetic History PONV Review of Systems / Medical History Patient summary reviewed and pertinent labs reviewed Pulmonary Within defined limits Neuro/Psych Within defined limits Cardiovascular CAD and cardiac stents Exercise tolerance: >4 METS 
  
GI/Hepatic/Renal 
Within defined limits Endo/Other Morbid obesity and arthritis Other Findings Comments:  
 
 
  
 
 
 
 
Physical Exam 
 
Airway Mallampati: III 
TM Distance: < 4 cm Neck ROM: normal range of motion Mouth opening: Normal 
 
 Cardiovascular Regular rate and rhythm,  S1 and S2 normal,  no murmur, click, rub, or gallop Rhythm: regular Rate: normal 
 
 
 
 Dental 
 
Dentition: [email or web address] Pulmonary Breath sounds clear to auscultation Abdominal 
GI exam deferred Other Findings Anesthetic Plan ASA: 3 Anesthesia type: general 
 
 
 
 
Induction: Intravenous Anesthetic plan and risks discussed with: Patient

## 2018-12-29 DIAGNOSIS — N20.1 URETERAL STONE: Primary | ICD-10-CM

## 2018-12-29 NOTE — PROGRESS NOTES
The patient is now less than 24 hours postop left ureteroscopy and ureteral pyeloscopy for stone removal 
 
The patient has predictable bladder pressure and hematuria but otherwise is doing well. I have advised her that I want to get a KUB performed just before she comes to the office for her appointment 3 January

## 2018-12-29 NOTE — ADDENDUM NOTE
Addendum  created 12/28/18 1923 by Wyatt Medley CRNA Intraprocedure LDAs edited, LDA properties accepted

## 2018-12-29 NOTE — DISCHARGE INSTRUCTIONS
DISCHARGE SUMMARY from Nurse    PATIENT INSTRUCTIONS:    After general anesthesia or intravenous sedation, for 24 hours or while taking prescription Narcotics:  · Limit your activities  · Do not drive and operate hazardous machinery  · Do not make important personal or business decisions  · Do  not drink alcoholic beverages  · If you have not urinated within 8 hours after discharge, please contact your surgeon on call. Report the following to your surgeon:  · Excessive pain, swelling, redness or odor of or around the surgical area  · Temperature over 100.5  · Nausea and vomiting lasting longer than 4 hours or if unable to take medications  · Any signs of decreased circulation or nerve impairment to extremity: change in color, persistent  numbness, tingling, coldness or increase pain  · Any questions    What to do at Home:  Recommended activity: Activity as tolerated and no driving for today. *  Please give a list of your current medications to your Primary Care Provider. *  Please update this list whenever your medications are discontinued, doses are      changed, or new medications (including over-the-counter products) are added. *  Please carry medication information at all times in case of emergency situations. These are general instructions for a healthy lifestyle:    No smoking/ No tobacco products/ Avoid exposure to second hand smoke  Surgeon General's Warning:  Quitting smoking now greatly reduces serious risk to your health. Obesity, smoking, and sedentary lifestyle greatly increases your risk for illness    A healthy diet, regular physical exercise & weight monitoring are important for maintaining a healthy lifestyle    You may be retaining fluid if you have a history of heart failure or if you experience any of the following symptoms:  Weight gain of 3 pounds or more overnight or 5 pounds in a week, increased swelling in our hands or feet or shortness of breath while lying flat in bed. Please call your doctor as soon as you notice any of these symptoms; do not wait until your next office visit. Recognize signs and symptoms of STROKE:    F-face looks uneven    A-arms unable to move or move unevenly    S-speech slurred or non-existent    T-time-call 911 as soon as signs and symptoms begin-DO NOT go       Back to bed or wait to see if you get better-TIME IS BRAIN. Warning Signs of HEART ATTACK     Call 911 if you have these symptoms:   Chest discomfort. Most heart attacks involve discomfort in the center of the chest that lasts more than a few minutes, or that goes away and comes back. It can feel like uncomfortable pressure, squeezing, fullness, or pain.  Discomfort in other areas of the upper body. Symptoms can include pain or discomfort in one or both arms, the back, neck, jaw, or stomach.  Shortness of breath with or without chest discomfort.  Other signs may include breaking out in a cold sweat, nausea, or lightheadedness. Don't wait more than five minutes to call 911 - MINUTES MATTER! Fast action can save your life. Calling 911 is almost always the fastest way to get lifesaving treatment. Emergency Medical Services staff can begin treatment when they arrive -- up to an hour sooner than if someone gets to the hospital by car. Laser Lithotripsy: What to Expect at [address] lithotripsy is a way to treat kidney stones. This treatment uses a laser to break kidney stones into tiny pieces. For several hours after the procedure you may have a burning feeling when you urinate. You may feel the urge to go even if you don't need to. This feeling should go away within a day. Drinking a lot of water can help. Your doctor also may advise you to take medicine that numbs the burning. This medicine is called phenazopyridine. It is available by prescription and over the counter. Brand names include Pyridium and Uristat. Your doctor may prescribe an antibiotic.  This will help prevent an infection. You may have some blood in your urine for 2 or 3 days. Your doctor may have placed a small tube inside one of your ureters. Ureters are the tubes that connect the kidneys to the bladder. The small tube the doctor may have placed is called a stent. It may help the stone fragments pass through your body. Your doctor may remove the stent in a few weeks. Most stone fragments that are not removed pass out of the body within 24 hours. But sometimes it can take many weeks. If you have a large stone, you may need to come back for more treatments. This care sheet gives you a general idea about how long it will take for you to recover. But each person recovers at a different pace. Follow the steps below to feel better as quickly as possible. How can you care for yourself at home? Activity    · Rest as much as you need to after you go home.     · You may do your regular activities. But avoid hard exercise or sports for about a week or until there is no blood in your urine. Diet    · You can eat your normal diet after lithotripsy.     · Continue to drink plenty of fluids, enough so that your urine is light yellow or clear like water. If you have kidney, heart, or liver disease and have to limit fluids, talk with your doctor before you increase the amount of fluids you drink. Medicines    · Your doctor will tell you if and when you can restart your medicines. He or she will also give you instructions about taking any new medicines.     · If you take blood thinners, such as warfarin (Coumadin), clopidogrel (Plavix), or aspirin, be sure to talk to your doctor. He or she will tell you if and when to start taking those medicines again. Make sure that you understand exactly what your doctor wants you to do.     · If you take medicine to stop the burning when you urinate, take it exactly as recommended. Call your doctor if you think you are having a problem with your medicine.  This medicine may color your urine orange or red. This is normal. You will get more details on the specific medicine your doctor recommends.     · If your doctor prescribed antibiotics, take them as directed. Do not stop taking them just because you feel better. You need to take the full course of antibiotics.     · Be safe with medicines. Read and follow all instructions on the label. ? If the doctor gave you a prescription medicine for pain, take it as prescribed. ? If you are not taking a prescription pain medicine, ask your doctor if you can take acetaminophen (Tylenol). Do not take ibuprofen (Advil, Motrin) or naproxen (Aleve) or similar medicines unless your doctor tells you to. ? Do not take two or more pain medicines at the same time unless the doctor told you to. Many pain medicines have acetaminophen, which is Tylenol. Too much acetaminophen (Tylenol) can be harmful.    Heat    · Take a warm bath. This may soothe the burning. Other instructions    · Urinate through the strainer the doctor gives you. Save any stone pieces, including those that look like sand or gravel. Take these to your doctor. This will help your doctor find the cause of your stones. Follow-up care is a key part of your treatment and safety. Be sure to make and go to all appointments, and call your doctor if you are having problems. It's also a good idea to know your test results and keep a list of the medicines you take. When should you call for help? Call 911 anytime you think you may need emergency care. For example, call if:    · You passed out (lost consciousness).     · You have chest pain, are short of breath, or cough up blood.    Call your doctor now or seek immediate medical care if:    · You have pain that does not get better after you take pain medicine.     · You have new or more blood clots in your urine.  (It is normal for the urine to be pink for a few days.)     · You cannot urinate.     · You have symptoms of a urinary tract infection. These may include:  ? Pain or burning when you urinate. ? A frequent need to urinate without being able to pass much urine. ? Pain in the flank, which is just below the rib cage and above the waist on either side of the back. ? Blood in the urine. ? A fever.     · You are sick to your stomach or cannot drink fluids.     · You have signs of a blood clot in your leg (called a deep vein thrombosis), such as:  ? Pain in the calf, back of the knee, thigh, or groin. ? Redness and swelling in your leg.    Watch closely for any changes in your health, and be sure to contact your doctor if you have any problems. Where can you learn more? Go to http://ofelia-yamilex.info/. Enter Q239 in the search box to learn more about \"Laser Lithotripsy: What to Expect at Home. \"  Current as of: March 15, 2018  Content Version: 11.8  © 8090-0009 [employer]. Care instructions adapted under license by [employer] (which disclaims liability or warranty for this information). If you have questions about a medical condition or this instruction, always ask your healthcare professional. [place] [address] any warranty or liability for your use of this information. Ureteral Stent Placement: What to Expect at [address]    A ureteral (say \"you-REE-ter-\") stent is a thin, hollow tube that is placed in the ureter to help urine pass from the kidney into the bladder. Ureters are the tubes that connect the kidneys to the bladder. You may have a small amount of blood in your urine for 1 to 3 days after the procedure. While the stent is in place, you may have to urinate more often, feel a sudden need to urinate, or feel like you cannot completely empty your bladder. You may feel some pain when you urinate or do strenuous activity. You also may notice a small amount of blood in your urine after strenuous activities.  These side effects usually do not prevent people from doing their normal daily activities. You may have a thin string coming out of your urethra. Your urethra is the tube that carries urine from your bladder to outside your body. This string is attached to the stent. Try not to pull on the string. The doctor will use the string to pull out the stent when you no longer need it. After the procedure, urine may flow better from your kidneys to your bladder. A ureteral stent may be left in place for several days or for as long as several months. Your doctor will take it out when you no longer need it. This care sheet gives you a general idea about how long it will take for you to recover. But each person recovers at a different pace. Follow the steps below to get better as quickly as possible. How can you care for yourself at home? Activity    · Rest when you feel tired. Getting enough sleep will help you recover.     · Avoid strenuous activities, such as bicycle riding, jogging, weight lifting, or aerobic exercise, until your doctor says it is okay.     · Ask your doctor when you can drive again.     · Most people are able to return to work the day after the procedure. If your work requires intense activity, you may feel pain in your kidney area or get tired easily. If this happens, you may need to do less strenuous activities while the stent is in.     · Ask your doctor when it is okay for you to have sex. Diet    · You can eat your normal diet. If your stomach is upset, try bland, low-fat foods like plain rice, broiled chicken, toast, and yogurt.     · Drink plenty of fluids (unless your doctor tells you not to). Medicines    · Your doctor will tell you if and when you can restart your medicines. He or she will also give you instructions about taking any new medicines.     · If you take blood thinners, such as warfarin (Coumadin), clopidogrel (Plavix), or aspirin, be sure to talk to your doctor.  He or she will tell you if and when to start taking those medicines again. Make sure that you understand exactly what your doctor wants you to do.     · Be safe with medicines. Take pain medicines exactly as directed. ? If the doctor gave you a prescription medicine for pain, take it as prescribed. ? If you are not taking a prescription pain medicine, ask your doctor if you can take an over-the-counter medicine.     · If you think your pain medicine is making you sick to your stomach:  ? Take your medicine after meals (unless your doctor has told you not to). ? Ask your doctor for a different pain medicine.     · If your doctor prescribed antibiotics, take them as directed. Do not stop taking them just because you feel better. You need to take the full course of antibiotics. Follow-up care is a key part of your treatment and safety. Be sure to make and go to all appointments, and call your doctor if you are having problems. It's also a good idea to know your test results and keep a list of the medicines you take. When should you call for help? Call 911 anytime you think you may need emergency care. For example, call if:    · You passed out (lost consciousness).     · You have severe trouble breathing.     · You have sudden chest pain and shortness of breath, or you cough up blood.     · You have severe belly pain.    Call your doctor now or seek immediate medical care if:    · Part or all of the stent comes out of your urethra.     · You have pain that does not get better after you take pain medicine.     · You have symptoms of a urinary infection. For example:  ? You have blood or pus in your urine. ? You have pain in your back just below your rib cage. This is called flank pain. ? You have a fever, chills, or body aches. ? It hurts to urinate. ? You have groin or belly pain.     · You cannot control when you urinate, or you leak urine.    Watch closely for changes in your health, and be sure to contact your doctor if you have any problems.   Where can you learn more? Go to http://ofelia-yamilex.info/. Enter C200 in the search box to learn more about \"Ureteral Stent Placement: What to Expect at Home. \"  Current as of: March 21, 2018  Content Version: 11.8  © 8597-4950 [employer]. Care instructions adapted under license by [employer] (which disclaims liability or warranty for this information). If you have questions about a medical condition or this instruction, always ask your healthcare professional. [address] any warranty or liability for your use of this information. Ciprofloxacin (By injection)   Ciprofloxacin (uzj-qkt-CRXW-a-sin)  Treats infections and is given to people who have been exposed to anthrax. Also used to treat and prevent plague (including pneumonic and septicemic plague). This medicine is a quinolone antibiotic. Brand Name(s): [employer] Choice Ciprofloxacin, Cipro IV, Ciprofloxacin Novaplus   There may be other brand names for this medicine. When This Medicine Should Not Be Used: This medicine is not right for everyone. You should not receive it if you had an allergic reaction to ciprofloxacin or to similar medicines. How to Use This Medicine:   Injectable  · Your doctor will prescribe your dose and schedule. This medicine is given through a needle placed in a vein. · A nurse or other health provider will give you this medicine. · Drink extra fluids so you will urinate more often and help prevent kidney problems. · This medicine should come with a Medication Guide. Ask your pharmacist for a copy if you do not have one. · Your doctor may give you a few doses of this medicine until your condition improves. Then you may be switched to an oral medicine that works the same way. Talk to your doctor if you have concerns about this.   Drugs and Foods to Avoid:   Ask your doctor or pharmacist before using any other medicine, including over-the-counter medicines, vitamins, and herbal products. · Do not use this medicine together with tizanidine. · Some foods and medicines can affect how ciprofloxacin works. Tell your doctor if you are using any of the following:   ¨ Clozapine, cyclosporine, duloxetine, lidocaine, methotrexate, olanzapine, phenytoin, probenecid, ropinirole, sildenafil, or theophylline  ¨ Antibiotics (including azithromycin, clarithromycin, erythromycin)  ¨ Blood thinner (including warfarin)  ¨ Diabetes medicine  ¨ Heart rhythm medicine (including amiodarone, procainamide, quinidine, sotalol)  ¨ Medicine to treat depression or mental illness  ¨ NSAID pain or arthritis medicine (including aspirin, celecoxib, diclofenac, ibuprofen, naproxen)  ¨ Steroid medicine (including hydrocortisone, methylprednisolone, prednisone)  · This medicine slows the digestion of caffeine, so it might affect you for longer than normal.  Warnings While Using This Medicine:   · Tell your doctor if you are pregnant or breastfeeding, or if you have kidney disease, liver disease, heart disease, diabetes, myasthenia gravis, or a history of heart rhythm problems (including QT prolongation) or seizures. Tell your doctor if you have ever had tendon or joint problems, including rheumatoid arthritis, or if you have received a transplant. · This medicine may cause the following problems:  ¨ Tendinitis and tendon rupture (may happen after treatment ends)  ¨ Nerve damage in the arms or legs  ¨ Liver damage  ¨ Heart rhythm changes  ¨ Changes in blood sugar levels  · This medicine may make you dizzy, drowsy, or lightheaded. Do not drive or do anything that could be dangerous until you know how this medicine affects you. · This medicine can cause diarrhea. Call your doctor if the diarrhea becomes severe, does not stop, or is bloody. Do not take any medicine to stop diarrhea until you have talked to your doctor. Diarrhea can occur 2 months or more after you stop taking this medicine.   · This medicine may make your skin more sensitive to sunlight. Wear sunscreen. Do not use sunlamps or tanning beds. · Your doctor will check your progress and the effects of this medicine at regular visits. Keep all appointments. Possible Side Effects While Using This Medicine:   Call your doctor right away if you notice any of these side effects:  · Allergic reaction: Itching or hives, swelling in your face or hands, swelling or tingling in your mouth or throat, chest tightness, trouble breathing  · Blistering, peeling, red skin rash  · Change in how much or how often you urinate  · Dark urine or pale stools, nausea, vomiting, loss of appetite, stomach pain, yellow skin or eyes  · Diarrhea that may contain blood  · Fainting, dizziness, or lightheadedness  · Fast, slow, or uneven heartbeat  · Numbness, tingling, weakness, or burning pain in your hands, arms, legs, or feet  · Pain, stiffness, swelling, or bruises around your ankle, leg, shoulder, or other joint  · Seizures, severe headache, unusual thoughts or behavior, trouble sleeping, feeling anxious, confused, or depressed, seeing, hearing, or feeling things that are not there  · Unusual bleeding, bruising, or weakness  If you notice these less serious side effects, talk with your doctor:   · Pain, swelling, or redness where the needle is placed  If you notice other side effects that you think are caused by this medicine, tell your doctor. Call your doctor for medical advice about side effects. You may report side effects to FDA at 0-863-FDA-8837  © 2017 [facility] Information is for End User's use only and may not be sold, redistributed or otherwise used for commercial purposes. The above information is an  only. It is not intended as medical advice for individual conditions or treatments. Talk to your doctor, nurse or pharmacist before following any medical regimen to see if it is safe and effective for you.   Hydrocodone/Acetaminophen (By mouth) Acetaminophen (m-bmca-j-MIN-oh-fen), Hydrocodone Bitartrate (ycb-sdex-CXV-done bye-TAR-trate)  Treats pain. This medicine contains a narcotic pain reliever. Brand Name(s): Hycet, Lorcet, Lorcet HD, Lorcet Plus, Lortab 10/325, Lortab 5/325, Lortab 7.5/325, Lortab Elixir, Norco, Verdrocet, Vicodin, Vicodin ES, Vicodin HP, Xodol, Xodol 5/300   There may be other brand names for this medicine. When This Medicine Should Not Be Used: This medicine is not right for everyone. Do not use it if you had an allergic reaction to acetaminophen, hydrocodone, or other narcotic medicines, or stomach or bowel blockage (including paralytic ileus). How to Use This Medicine:   Capsule, Liquid, Tablet  · Your doctor will tell you how much medicine to use. Do not use more than directed. · An overdose can be dangerous. Follow directions carefully so you do not get too much medicine at one time. · Oral liquid: Measure the oral liquid medicine with a marked measuring spoon, oral syringe, or medicine cup. · Drink plenty of liquids to help avoid constipation. · This medicine should come with a Medication Guide. Ask your pharmacist for a copy if you do not have one. · Missed dose: Take a dose as soon as you remember. If it is almost time for your next dose, wait until then and take a regular dose. Do not take extra medicine to make up for a missed dose. · Store the medicine in a closed container at room temperature, away from heat, moisture, and direct light. Flush any unused Norco® tablets down the toilet. Drugs and Foods to Avoid:   Ask your doctor or pharmacist before using any other medicine, including over-the-counter medicines, vitamins, and herbal products. · Do not use this medicine if you are using or have used an MAO inhibitor within the past 14 days. · Some medicines can affect how hydrocodone/acetaminophen works.  Tell your doctor if you are using any of the following:   ¨ Carbamazepine, erythromycin, ketoconazole, mirtazapine, phenytoin, rifampin, ritonavir, tramadol, trazodone  ¨ Diuretic (water pill)  ¨ Medicine to treat depression or mental health problems  ¨ Medicine to treat migraine headaches  ¨ Phenothiazine medicine  · Tell your doctor if you use anything else that makes you sleepy. Some examples are allergy medicine, narcotic pain medicine, and alcohol. Tell your doctor if you are using buprenorphine, butorphanol, nalbuphine, pentazocine, or a muscle relaxer. · Do not drink alcohol while you are using this medicine. Acetaminophen can damage your liver, and your risk is higher if you also drink alcohol. Warnings While Using This Medicine:   · Tell your doctor if you are pregnant or breastfeeding, or if you have kidney disease, liver disease, lung or breathing problems, gallbladder or pancreas problems, an underactive thyroid, Jamel disease, prostate problems, trouble urinating, stomach problems, or a history of head injury or brain tumor, seizures, alcohol or drug addiction. · This medicine may cause the following problems:   ¨ High risk of overdose, which can lead to death  ¨ Respiratory depression (serious breathing problem that can be life-threatening)  ¨ Liver problems  ¨ Serious skin reactions  ¨ Serotonin syndrome (when used with certain medicines)  · This medicine can be habit-forming. Do not use more than your prescribed dose. Call your doctor if you think your medicine is not working. · This medicine may make you dizzy or drowsy. Do not drive or doing anything else that could be dangerous until you know how this medicine affects you. · This medicine contains acetaminophen. Read the labels of all other medicines you are using to see if they also contain acetaminophen, or ask your doctor or pharmacist. [place]jass not use more than 4 grams (4,000 milligrams) total of acetaminophen in one day. · Tell any doctor or dentist who treats you that you are using this medicine.  This medicine may affect certain medical test results. · This medicine may cause constipation, especially with long-term use. Ask your doctor if you should use a laxative to prevent and treat constipation. · This medicine could cause infertility. Talk with your doctor before using this medicine if you plan to have children. · Keep all medicine out of the reach of children. Never share your medicine with anyone. Possible Side Effects While Using This Medicine:   Call your doctor right away if you notice any of these side effects:  · Allergic reaction: Itching or hives, swelling in your face or hands, swelling or tingling in your mouth or throat, chest tightness, trouble breathing  · Anxiety, restlessness, fast heartbeat, fever, sweating, muscle spasms, twitching, diarrhea, seeing or hearing things that are not there  · Blistering, peeling, red skin rash  · Blue lips, fingernails, or skin  · Dark urine or pale stools, loss of appetite, nausea or vomiting, stomach pain, yellow skin or eyes  · Extreme weakness, shallow breathing, slow heartbeat, sweating, seizures, cold or clammy skin  · Lightheadedness, dizziness, fainting  If you notice these less serious side effects, talk with your doctor:   · Constipation, nausea, vomiting  · Tiredness or sleepiness  If you notice other side effects that you think are caused by this medicine, tell your doctor. Call your doctor for medical advice about side effects. You may report side effects to FDA at 7-279-FDA-1098  © 2017 [address] Information is for End User's use only and may not be sold, redistributed or otherwise used for commercial purposes. The above information is an  only. It is not intended as medical advice for individual conditions or treatments. Talk to your doctor, nurse or pharmacist before following any medical regimen to see if it is safe and effective for you. The discharge information has been reviewed with the patient and daughter.   The patient and daughter verbalized understanding. Discharge medications reviewed with the patient and daughter and appropriate educational materials and side effects teaching were provided.   ___________________________________________________________________________________________________________________________________

## 2018-12-29 NOTE — OP NOTES
[address] [place] [address]   OPERATIVE REPORT    Jody Domínguez.  MR#: [id number]  : 1950  ACCOUNT #: [de-identified]   DATE OF SERVICE: 2018    PREOPERATIVE DIAGNOSIS:  Left ureteral calculus versus calculi. POSTOPERATIVE DIAGNOSIS:  Left ureteral calculus versus calculi, solitary calculus. PROCEDURE PERFORMED:  Left ureteroscopy, ureteral pyeloscopy, lasertripsy, stone basket extraction, stent placement. INDICATIONS:  A 47-year-old female  who has been having intermittent left ureteral colic and bladder urgency symptoms for over a month with a CT scan suggesting a stone in the distal third of the ureter down near the bladder and there is an outside possibility that there is an additional calculus present. The patient has a few small calcifications elsewhere and the patient has been unable to pass the stone and wishes to proceed with a ureteroscopy, lasertripsy, stone basket extraction, stent placement. She is aware of the preparation, technique and convalescence. She is aware of the risks that include, but are not limited to, infection, failure, bleeding, injury, failure to accomplish our goal and possible need for additional procedures. She wishes to proceed giving her informed consent. ANESTHESIA:  General.    SURGEON:  Chaparrita Tse MD    ASSISTANT:  None. ESTIMATED BLOOD LOSS:  5 mL    FINDINGS:  Stone. COMPLICATIONS:  None. IMPLANTS:  Double-J stent. SPECIMENS REMOVED:  None. PROCEDURE:  The patient is anesthetized by the general LMA route. Timeout is accomplished and verified. The patient was placed in the dorsal lithotomy position where she is prepped and draped in sterile fashion. A rigid ureteroscope is inserted and retrograde evaluation carried out that shows a normal bladder with the exception of a mild amount of erythema and edema of the left ureteral orifice and there is a stone just inside the orifice.   A guidewire is gently passed above the stone and the scope is then advanced against the distal aspect of the stone and the stone is generally displaced into the more dilated proximal ureter. At this point, the scope is passed above the stone effectively dilating the ureteral orifice and I do not see any other additional stone above this, although there is a little bit of debris that appears to be some old clotted material and possibly some stone fragments. The guidewire is removed and a stone basket is placed through this instrumenting port. The basket is deployed and the stone is engaged and pulled out and retrieved. The scope is then reinserted and I do not see any other stones or fragments within the ureter. The ureteroscope is negotiated into the ureter and I go up into the renal pelvis where there is a small stone fragment present. This is grasped with the stone basket and extracted intact; but I am still not completely comfortable with leaving the kidney not thoroughly evaluated. Accordingly, the guidewire is left in place through the rigid ureteroscope as the rigid scope is removed and then a flexible ureteroscope was negotiated onto this and a pyeloscopy is carried out and I am able to get into all of the infundibula and calices and there is a collection of material in the posteroinferior yasmin that actually appears to be more like a rubbery type consistency that is probably not stone, but could be to some degree matrix. Accordingly, a 200 micron holmium laser fiber is deployed and the laser set at 5 Hz, 5 mendenhall, 1 joule, and this tissue is treated and does break apart in a way that is consistent with old organized clot or some other type of grumous material, but there is no obvious calcification within.   The renal pelvis was irrigated several times and aspirated and there appears to be no significant active bleeding and no residual stone fragments and fluoroscopy with the scope in place confirms that there are no significant stone shadows. The guidewire is left in place as the ureteroscope is removed. A cystoscope is fed onto the guidewire and over this a 6 x 22 double-J stent is placed with good proximal and distal coiling. The bladder is drained, the scope is removed, the string is left on, the stent secured to the left upper inner thigh with tape. The procedure was terminated. The patient tolerated the procedure well.       Mar Scheuermann, MD DG / Lexy.Monserrat  D: 12/28/2018 18:39     T: 12/29/2018 08:01  JOB #: [id number]

## 2019-01-01 ENCOUNTER — DOCUMENTATION ONLY (OUTPATIENT)
Dept: UROLOGY | Age: 69
End: 2019-01-01

## 2019-01-01 NOTE — PROGRESS NOTES
I called and spoke with the patient and she is continuing to have the anticipated stent discomfort. I have advised her please to get a KUB done and call the office in the morning that I can see her tomorrow afternoon

## 2019-01-02 ENCOUNTER — HOSPITAL ENCOUNTER (OUTPATIENT)
Dept: GENERAL RADIOLOGY | Age: 69
Discharge: HOME | End: 2019-01-02
Payer: MEDICARE

## 2019-01-02 ENCOUNTER — OFFICE VISIT (OUTPATIENT)
Dept: UROLOGY | Age: 69
End: 2019-01-02

## 2019-01-02 VITALS
OXYGEN SATURATION: 94 % | HEART RATE: 77 BPM | WEIGHT: 197 LBS | DIASTOLIC BLOOD PRESSURE: 60 MMHG | HEIGHT: 63 IN | SYSTOLIC BLOOD PRESSURE: 113 MMHG | BODY MASS INDEX: 34.91 KG/M2

## 2019-01-02 DIAGNOSIS — N20.1 URETERAL STONE: ICD-10-CM

## 2019-01-02 DIAGNOSIS — N20.1 LEFT URETERAL STONE: Primary | ICD-10-CM

## 2019-01-02 PROCEDURE — 74018 RADEX ABDOMEN 1 VIEW: CPT

## 2019-01-02 NOTE — PATIENT INSTRUCTIONS
Learning About Diet for Kidney Stone Prevention What are kidney stones? Kidney stones are made of salts and minerals in the urine that form small \"shabana. \" Stones can form in the kidneys and the ureters (the tubes that lead from the kidneys to the bladder). They can also form in the bladder. Stones may not cause a problem as long as they stay in the kidneys. But they can cause sudden, severe pain. Pain is most likely when the stones travel from the kidneys to the bladder. Kidney stones can cause bloody urine. Kidney stones often run in families. You are more likely to get them if you don't drink enough fluids, mainly water. Certain foods and drinks and some dietary supplements may also increase your risk for kidney stones if you consume too much of them. What can you do to prevent kidney stones? Changing what you eat may not prevent all types of kidney stones. But for people who have a history of certain kinds of kidney stones, some changes in diet may help. A dietitian can help you set up a meal plan that includes healthy, low-oxalate choices. Here are some general guidelines to get you started. Plan your meals and snacks around foods that are low in oxalate. These foods include: · Corn, kale, parsnips, and squash,. · Beef, chicken, pork, turkey, and fish. · Milk, butter, cheese, and yogurt. You can eat certain foods that are medium-high in oxalate, but eat them only once in a while. These foods include: · Bread. · Brown rice. · English muffins. · Figs. · Popcorn. · String beans. · Tomatoes. Limit very high-oxalate foods, including: · Black tea. · Coffee. · Chocolate. · Dark green vegetables. · Nuts. Here are some other things you can do to help prevent kidney stones. · Drink plenty of fluids. If you have kidney, heart, or liver disease and have to limit fluids, talk with your doctor before you increase the amount of fluids you drink. · Do not take more than the recommended daily dose of vitamins C and D. 
· Limit the salt in your diet. · Eat a balanced diet that is not too high in protein. Follow-up care is a key part of your treatment and safety. Be sure to make and go to all appointments, and call your doctor if you are having problems. It's also a good idea to know your test results and keep a list of the medicines you take. Where can you learn more? Go to http://ofelia-yamilex.info/. Enter C138 in the search box to learn more about \"Learning About Diet for Kidney Stone Prevention. \" Current as of: March 29, 2018 Content Version: 11.8 © 9861-3787 Healthwise, Incorporated. Care instructions adapted under license by [employer] (which disclaims liability or warranty for this information). If you have questions about a medical condition or this instruction, always ask your healthcare professional. [address] any warranty or liability for your use of this information.

## 2019-01-02 NOTE — PROGRESS NOTES
Ms. Veronica Chirinos has a reminder for a \"due or due soon\" health maintenance. I have asked that she contact her primary care provider for follow-up on this health maintenance.

## 2019-01-02 NOTE — PROGRESS NOTES
NARRATIVE: The patient is a pleasant 70-year-old female  who is known to this office and underwent left ureteroscopy and ureteral pyeloscopy with stone removal and stent placement about 5 days ago. She has had significant continued bladder irritative symptoms as a consequence of the stent and consequently comes now for its removal.  She states that she did observe my request that she restrict her fluids prior to coming in today. The stent is removed and is intact. The patient had a KUB done before this visit and there are no other visible opacifications within the kidney and more specifically there is what appears to be calcification in the pelvis that is away from the stent and would appear to be within a vascular structure. That is the opacification that I believe was in question and may be wonder whether or not there were 2 stones present her graph the patient will return in about 3 and half weeks and I will do a renal ultrasound on that kidney and we will talk about whether or not to do a 58-EHIK urine metabolic study IMPRESSION: Patient is postop left ureteroscopy with laser and stent placement now with the stent removed and a KUB suggesting no remaining opacifications in the kidney PLAN: Food restriction for today and then return in about 3-1/2 weeks for a renal ultrasound and consideration of metabolic workup This visit exceeded 15 minutes and greater than 50% was counseling. The patient expresses understanding of the treatment plan and wishes to proceed This dictation used voice recognition software and there may be mistakes.  
 
Shawn Mcgovern MD

## 2019-01-09 ENCOUNTER — OFFICE VISIT (OUTPATIENT)
Dept: ONCOLOGY | Age: 69
End: 2019-01-09

## 2019-01-09 VITALS
HEART RATE: 79 BPM | SYSTOLIC BLOOD PRESSURE: 105 MMHG | RESPIRATION RATE: 18 BRPM | OXYGEN SATURATION: 96 % | HEIGHT: 62 IN | DIASTOLIC BLOOD PRESSURE: 71 MMHG | BODY MASS INDEX: 36.4 KG/M2 | WEIGHT: 197.8 LBS | TEMPERATURE: 98.8 F

## 2019-01-09 DIAGNOSIS — K66.8 PERITONEAL CYST: Primary | ICD-10-CM

## 2019-01-09 RX ORDER — RABEPRAZOLE SODIUM 20 MG/1
TABLET, DELAYED RELEASE ORAL
COMMUNITY
Start: 2018-12-15 | End: 2021-04-05

## 2019-01-09 NOTE — PATIENT INSTRUCTIONS
Laparoscopic Oophorectomy: What to Expect at [facility] Your Recovery After surgery to remove one or both ovaries, you may feel some pain in your belly for a few days. Your belly may also be swollen. You may have a change in your bowel movements for a few days. It's normal to also have some shoulder or back pain. This is caused by the gas your doctor put in your belly to help see your organs better. To help with pain, your doctor will prescribe medicines. You may need about 1 week to fully recover. It's important not to lift anything heavy for about 1 week. You can ask your doctor when it's okay to have sex. This care sheet gives you a general idea about how long it will take for you to recover. But each person recovers at a different pace. Follow the steps below to get better as quickly as possible. How can you care for yourself at home? Activity 
  · Rest when you feel tired.  
  · Be active. Walking is a good choice.  
  · Allow your body to heal. Don't move quickly or lift anything heavy until you are feeling better.  
  · Hold a pillow over your incisions when you cough or take deep breaths. This will support your belly and may help to decrease your pain.  
  · Do breathing exercises at home as instructed by your doctor. This will help prevent pneumonia. Diet 
  · You can eat your normal diet. If your stomach is upset, try bland, low-fat foods like plain rice, broiled chicken, toast, and yogurt.  
  · Drink plenty of fluids (unless your doctor tells you not to).   · If your bowel movements are not regular right after surgery, try to avoid constipation and straining. Drink plenty of water. Your doctor may suggest fiber, a stool softener, or a mild laxative. Medicines 
  · Your doctor will tell you if and when you can restart your medicines.  He or she will also give you instructions about taking any new medicines.  
  · If you take blood thinners, such as warfarin (Coumadin), clopidogrel (Plavix), or aspirin, be sure to talk to your doctor. He or she will tell you if and when to start taking those medicines again. Make sure that you understand exactly what your doctor wants you to do.  
  · Be safe with medicines. Read and follow all instructions on the label. ? If the doctor gave you a prescription medicine for pain, take it as prescribed. ? If you are not taking a prescription pain medicine, ask your doctor if you can take an over-the-counter medicine. Incision care 
  · If you have strips of tape on the cut (incision) the doctor made, leave the tape on for a week or until it falls off.  
  · Wash the area daily with warm, soapy water, and pat it dry. Don't use hydrogen peroxide or alcohol. They can slow healing.  
  · You may cover the area with a gauze bandage if it oozes fluid or rubs against clothing.  
  · Change the bandage every day.  
  · Keep the area clean and dry. Other instructions 
  · Wear loose, comfortable clothing. For a few weeks, avoid anything that puts pressure on your belly.  
  · You may want to use a heating pad on your belly to help with pain. Follow-up care is a key part of your treatment and safety. Be sure to make and go to all appointments, and call your doctor if you are having problems. It's also a good idea to know your test results and keep a list of the medicines you take. When should you call for help? Call 911 anytime you think you may need emergency care. For example, call if: 
  · You passed out (lost consciousness).  
  · You have chest pain, are short of breath, or cough up blood.  
 Call your doctor now or seek immediate medical care if: 
  · You have pain that does not get better after you take pain medicine.  
  · You cannot pass stools or gas.  
  · You have vaginal discharge that has increased in amount or smells bad.  
  · You are sick to your stomach or cannot drink fluids.  
  · You have loose stitches, or your incision comes open.   · Bright red blood has soaked through the bandage over your incision.  
  · You have signs of infection, such as: 
? Increased pain, swelling, warmth, or redness. ? Red streaks leading from the incision. ? Pus draining from the incision. ? A fever.  
  · You have bright red vaginal bleeding that soaks one or more pads in an hour, or you have large clots.  
  · You have signs of a blood clot in your leg (called a deep vein thrombosis), such as: 
? Pain in your calf, back of the knee, thigh, or groin. ? Redness and swelling in your leg.  
 Watch closely for changes in your health, and be sure to contact your doctor if you have any problems. Where can you learn more? Go to http://ofelia-yamilex.info/. Enter P260 in the search box to learn more about \"Laparoscopic Oophorectomy: What to Expect at Home. \" Current as of: May 15, 2018 Content Version: 11.8 © 0375-4952 Healthwise, Incorporated. Care instructions adapted under license by [employer] (which disclaims liability or warranty for this information). If you have questions about a medical condition or this instruction, always ask your healthcare professional. [address] any warranty or liability for your use of this information.

## 2019-01-09 NOTE — PROGRESS NOTES
[address], [address], [address] [facility] [address], [address] [place] [phone] Shreya Barrios DO Postoperative Office Note Patient ID: 
Name: Gisella Triplett MRM: [id number] : 1950/68 y.o. Date: 2019 SUBJECTIVE: This is a 77 y.o.  female who presents s/p  Laparoscopic resection of pelvic mass, Left salpingo-oophorectomy, Dilation and curettage washings 
 on 2018 Currently she has no problems with eating, bowel movements, voiding, or their wound Appetite is good. Eating a regular diet without difficulty. Urinating without difficulty. Bowel movements are regular. The patient is not having any pain. Jamal Asencio Recently had to have a kidney stone removed by dr. Ankur De León Her pathology revealed A: CUL-DE-SAC CYST, BIOPSY BENIGN SIMPLE CYST.  
NO MALIGNANCY IDENTIFIED. B: Denia Hurst SCANT BENIGN ENDOCERVICAL AND ECTOCERVICAL MUCOSA. NO INTACT ENDOMETRIUM FOR EVALUATION. NO DYSPLASIA OR MALIGNANCY IDENTIFIED. C: RIGHT OVARY, TOTAL REMOVAL BENIGN SEROUS INCLUSION CYSTS, NO TUMOR SEEN. RIGHT FALLOPIAN TUBE, TOTAL Kathlen Bang PARATUBAL CYSTS. Medications: 
  
Current Outpatient Medications on File Prior to Visit Medication Sig Dispense Refill  
 HYDROcodone-acetaminophen (NORCO) 5-325 mg per tablet Take 1 Tab by mouth every four (4) hours as needed for Pain. Max Daily Amount: 6 Tabs. 28 Tab 0  
 calcium polycarbophil (FIBER LAXATIVE, CA POLYCARBO,) 625 mg tablet Take 625 mg by mouth daily.  diclofenac (VOLTAREN) 1 % gel Apply  to affected area as needed.  traMADol (ULTRAM) 50 mg tablet Take 1-2 Tabs by mouth every six (6) hours as needed for Pain. Max Daily Amount: 400 mg. 30 Tab 0  
 PREMARIN 0.625 mg/gram vaginal cream     
 cholecalciferol, vitamin D3, (VITAMIN D3) 2,000 unit tab Take  by mouth daily.  folic acid/multivit-min/lutein (CENTRUM SILVER PO) Take  by mouth.  acetaminophen (TYLENOL ARTHRITIS PAIN) 650 mg TbER Take 650 mg by mouth every eight (8) hours.  metoprolol succinate (TOPROL-XL) 50 mg XL tablet TAKE 1 TABLET BY MOUTH DAILY 90 Tab 3  
 rosuvastatin (CRESTOR) 20 mg tablet Take 20 mg by mouth daily.  mometasone 100 mcg/actuation HFAA Take 100 mcg by inhalation daily.  aspirin delayed-release 81 mg tablet Take 162 mg by mouth daily.  desloratadine-pseudoephedrine (CLARINEX-D 12 HOUR) 2.5-120 mg per tablet Take 1 Tab by mouth two (2) times a day.  montelukast (SINGULAIR) 10 mg tablet Take 10 mg by mouth daily.  RABEprazole (ACIPHEX) 20 mg tablet No current facility-administered medications on file prior to visit. Allergies: Allergies Allergen Reactions  Celebrex [Celecoxib] Swelling OBJECTIVE: 
 
Vitals:  
Visit Vitals /71 (BP 1 Location: Left arm, BP Patient Position: Sitting) Pulse 79 Temp 98.8 °F (37.1 °C) (Oral) Resp 18 Ht 5' 2\" (1.575 m) Wt 89.7 kg (197 lb 12.8 oz) SpO2 96% BMI 36.18 kg/m² Physical Examination: 
 
General:  alert, cooperative, no distress Abdomen: soft, bowel sounds active, non-tender Incision: healing well Pelvic: deferred Rectal: not done Extremity:   extremities normal, atraumatic, no cyanosis or edema IMPRESSION/PLAN: 
 
Otoniel Menon is Doing well postoperatively. .  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 
The operative procedures and clinical results have been reviewed with the patient. Implications of diagnosis discussed at length. All questions answered. Pt will f/u with primary gyn for routine gyn health maintenance I will see the patient back prn. The patient is advised to call our office with any problems or concerns. Jazzy Krause DO Gynecologic Oncology 1/9/2019/9:46 AM

## 2019-01-09 NOTE — PROGRESS NOTES
Ashu Granados, a 77 y.o. female,  is here for Chief Complaint Patient presents with  Surgical Follow-up  
  laproscopic resection of pelvic mass, right salpingo oopheectomy, dilation and curettage, 4 week follow up Visit Vitals /71 (BP 1 Location: Left arm, BP Patient Position: Sitting) Pulse 79 Temp 98.8 °F (37.1 °C) (Oral) Resp 18 Ht 5' 2\" (1.575 m) Wt 89.7 kg (197 lb 12.8 oz) SpO2 96% BMI 36.18 kg/m² Patient denies any persistent or worsening abdominal or pelvic pain. Denies any unusual vaginal bleeding, discharge, irritation, or odor. Denies experiencing any constipation or diarrhea. No burning, discomfort, or irritation with urination, but did have a kidney stone removed 12/28/2018. 1. Have you been to the ER, urgent care clinic since your last visit? Hospitalized since your last visit? No 
 
2. Have you seen or consulted any other health care providers outside of the [address] [place] [address] since your last visit? Include any pap smears or colon screening.  No

## 2019-01-11 ENCOUNTER — OFFICE VISIT (OUTPATIENT)
Dept: CARDIOLOGY CLINIC | Age: 69
End: 2019-01-11

## 2019-01-11 VITALS
BODY MASS INDEX: 36.62 KG/M2 | DIASTOLIC BLOOD PRESSURE: 70 MMHG | HEIGHT: 62 IN | WEIGHT: 199 LBS | OXYGEN SATURATION: 98 % | HEART RATE: 81 BPM | SYSTOLIC BLOOD PRESSURE: 130 MMHG

## 2019-01-11 DIAGNOSIS — Z98.890 S/P CARDIAC CATHETERIZATION: ICD-10-CM

## 2019-01-11 DIAGNOSIS — I34.0 NON-RHEUMATIC MITRAL REGURGITATION: ICD-10-CM

## 2019-01-11 DIAGNOSIS — E78.5 DYSLIPIDEMIA: ICD-10-CM

## 2019-01-11 DIAGNOSIS — I25.10 CORONARY ARTERY DISEASE INVOLVING NATIVE CORONARY ARTERY OF NATIVE HEART WITHOUT ANGINA PECTORIS: Primary | ICD-10-CM

## 2019-01-11 NOTE — PROGRESS NOTES
HISTORY OF PRESENT ILLNESS Michelle Razo is a 77 y.o. female. Hypertension Associated symptoms include shortness of breath. Pertinent negatives include no chest pain, no abdominal pain and no headaches. Patient presents for an overdue office visit. The patient had an episode of fairly significant back pain in January 2014 that awoke her from sleep, it radiated around to the front of her chest up into her neck and down both arms associated with tingling and numbness. She subsequently underwent a cardiac catheterization in February 2014, which showed a high-grade stenosis of her mid left circumflex extending into obtuse marginal branch 1. She was continued to complain of exertional dyspnea and some chest pain between her shoulder blades, which is felt to be her anginal equivalent. She was also afraid to do any exertional activity because of her high-grade left circumflex stenosis, so she underwent scheduled percutaneous coronary intervention over her left circumflex lesion with a drug-eluting stent In July 2014. She underwent a followup echocardiogram in April 2016 which showed overall improvement of her well ejection fraction of 55-60% without mention of any regional wall motion abnormalities. Her mitral regurgitation was in the moderate range, which may have been minimally increased compared to the prior study. The patient then underwent a follow-up echocardiogram November 2018 which showed preserved LV systolic function, EF 94-28%, evidence of diastolic dysfunction, mitral valve prolapse with moderate mitral regurgitation, essentially unchanged compared to the study from 2017. Patient was last seen in our office a little over a year ago. Since last visit she has had multiple urological procedures for kidney stones. Her last procedure was approximately 2-3 weeks ago. She did not have any cardiac issues.   She denies any change in her chronic dyspnea, no chest pain or pressure, no leg swelling, orthopnea or PND. Past Medical History:  
Diagnosis Date  Arthritis  CAD (coronary artery disease), native coronary artery 7/2014 ULISES to LCx  Cardiac cath 07/07/2014 RCA 15%. LM patent. mLAD 30-50%. CX/OM1 85% (2.75 x 26-mm Resolute ULISES, resid 0%).  Cardiac echocardiogram 04/05/2016 EF 55-60%. No WMA. Gr 1 DDfx. Mod MR.  Cardiac nuclear imaging test, abnormal 02/05/2014 Mod-sized, mild-mod mid lateral infarction w/mild-mod [place]-infarct ischemia. Mod lateral hypk. EF 56%. Normal EKG on pharm stress test.  Intermediate risk.  Chronic kidney disease   
 denies  Dyslipidemia  Gallstone  Hernia  Kidney stone  Nausea & vomiting  Obesity  PONV (postoperative nausea and vomiting)  Sinus infection Current Outpatient Medications Medication Sig Dispense Refill  RABEprazole (ACIPHEX) 20 mg tablet  HYDROcodone-acetaminophen (NORCO) 5-325 mg per tablet Take 1 Tab by mouth every four (4) hours as needed for Pain. Max Daily Amount: 6 Tabs. 28 Tab 0  
 calcium polycarbophil (FIBER LAXATIVE, CA POLYCARBO,) 625 mg tablet Take 625 mg by mouth daily.  diclofenac (VOLTAREN) 1 % gel Apply  to affected area as needed.  traMADol (ULTRAM) 50 mg tablet Take 1-2 Tabs by mouth every six (6) hours as needed for Pain. Max Daily Amount: 400 mg. 30 Tab 0  
 PREMARIN 0.625 mg/gram vaginal cream     
 cholecalciferol, vitamin D3, (VITAMIN D3) 2,000 unit tab Take  by mouth daily.  folic acid/multivit-min/lutein (CENTRUM SILVER PO) Take  by mouth.  acetaminophen (TYLENOL ARTHRITIS PAIN) 650 mg TbER Take 650 mg by mouth every eight (8) hours.  metoprolol succinate (TOPROL-XL) 50 mg XL tablet TAKE 1 TABLET BY MOUTH DAILY 90 Tab 3  
 rosuvastatin (CRESTOR) 20 mg tablet Take 20 mg by mouth daily.  mometasone 100 mcg/actuation HFAA Take 100 mcg by inhalation daily.  aspirin delayed-release 81 mg tablet Take 162 mg by mouth daily.  desloratadine-pseudoephedrine (CLARINEX-D 12 HOUR) 2.5-120 mg per tablet Take 1 Tab by mouth two (2) times a day.  montelukast (SINGULAIR) 10 mg tablet Take 10 mg by mouth daily. Allergies Allergen Reactions  Celebrex [Celecoxib] Swelling Social History Tobacco Use  Smoking status: Never Smoker  Smokeless tobacco: Never Used Substance Use Topics  Alcohol use: No  
 Drug use: No  
   
 
  
Review of Systems Constitutional: Negative for chills, fever and weight loss. HENT: Negative for nosebleeds. Eyes: Negative for blurred vision and double vision. Respiratory: Positive for shortness of breath. Negative for cough and wheezing. Cardiovascular: Negative for chest pain, palpitations, orthopnea, claudication, leg swelling and PND. Gastrointestinal: Negative for abdominal pain, heartburn, nausea and vomiting. Genitourinary: Negative for dysuria and hematuria. Musculoskeletal: Negative for falls and myalgias. Skin: Negative for rash. Neurological: Negative for dizziness, focal weakness and headaches. Endo/Heme/Allergies: Does not bruise/bleed easily. Psychiatric/Behavioral: Negative for substance abuse. Visit Vitals /70 Pulse 81 Ht 5' 2\" (1.575 m) Wt 90.3 kg (199 lb) SpO2 98% BMI 36.40 kg/m² Physical Exam  
Constitutional: She is oriented to person, place, and time. She appears well-developed and well-nourished. HENT:  
Head: Normocephalic and atraumatic. Eyes: Conjunctivae are normal.  
Neck: Neck supple. No JVD present. Carotid bruit is not present. Cardiovascular: Normal rate, regular rhythm, S1 normal, S2 normal and normal pulses. Exam reveals no gallop. Murmur heard. High-pitched blowing holosystolic murmur is present with a grade of 2/6 at the apex. Pulmonary/Chest: Effort normal and breath sounds normal. She has no wheezes. She has no rales. Abdominal: Soft. Bowel sounds are normal. There is no tenderness. Musculoskeletal: She exhibits no edema. Neurological: She is alert and oriented to person, place, and time. Skin: Skin is warm and dry. November 28, 2018 EKG: Normal sinus rhythm, Normal axis, Poor R wave progression, Normal QTc interval, No ST or T wave abnormalities concerning for ischemia. Compared to previous EKG, No significant interval change. ASSESSMENT and PLAN Coronary artery disease. The patient had a 85-90% stenosis of her mid left circumflex extending into OM1 diagnosed by cardiac catheterization in February 2014. She underwent scheduled PCI In July 2014, with a Resolute drug-eluting stent. Her symptoms improved following the intervention. I suspect she did have a small infarction of the inferolateral region in January 2014. Ejection fraction is now back to normal by echocardiogram in 2018, EF 60%. She is now taking an aspirin, beta blocker and high-dose potent statin. All of which I would continue. Mitral regurgitation. Non-rheumatic, more likely ischemic versus myxomatous in nature. This was moderate on her recent follow-up echocardiogram from November 2018. This is not significant change over the past 2 years. So unless she has new symptoms, this can be reassessed every other year. Dyslipidemia. Patient was recently switched over to Crestor 20 mg daily by her PCP. Her lipids have been reasonably well-controlled on this regimen. I prefer her LDL to be less than 70 if possible. Followup in 12 months, sooner if needed.

## 2019-01-11 NOTE — PROGRESS NOTES
Mundo Doll presents today for No chief complaint on file. Mundo Doll preferred language for health care discussion is english/other. Is someone accompanying this pt? No  
 
Is the patient using any DME equipment during OV? No  
 
Depression Screening: PHQ over the last two weeks 8/21/2018 Little interest or pleasure in doing things Not at all Feeling down, depressed, irritable, or hopeless Not at all Total Score PHQ 2 0 Learning Assessment: 
Learning Assessment 11/20/2017 PRIMARY LEARNER Patient HIGHEST LEVEL OF EDUCATION - PRIMARY LEARNER  -  
BARRIERS PRIMARY LEARNER -  
CO-LEARNER CAREGIVER -  
PRIMARY LANGUAGE ENGLISH  NEED -  
LEARNER PREFERENCE PRIMARY DEMONSTRATION  
LEARNING SPECIAL TOPICS -  
ANSWERED BY Patient RELATIONSHIP SELF Abuse Screening: 
Abuse Screening Questionnaire 8/21/2018 Do you ever feel afraid of your partner? Toya Lower Are you in a relationship with someone who physically or mentally threatens you? Toya Lower Is it safe for you to go home? Reynaldo Hoskins Fall Risk Fall Risk Assessment, last 12 mths 8/21/2018 Able to walk? Yes Fall in past 12 months? No  
 
 
Pt currently taking Anticoagulant therapy? ASA 162mg daily Coordination of Care: 1. Have you been to the ER, urgent care clinic since your last visit? Hospitalized since your last visit? No  
 
2. Have you seen or consulted any other health care providers outside of the [address] [place] [address] since your last visit? Include any pap smears or colon screening.  No

## 2019-01-22 ENCOUNTER — OFFICE VISIT (OUTPATIENT)
Dept: UROLOGY | Age: 69
End: 2019-01-22

## 2019-01-22 VITALS
HEIGHT: 62 IN | WEIGHT: 199 LBS | DIASTOLIC BLOOD PRESSURE: 63 MMHG | HEART RATE: 67 BPM | SYSTOLIC BLOOD PRESSURE: 133 MMHG | BODY MASS INDEX: 36.62 KG/M2 | OXYGEN SATURATION: 94 %

## 2019-01-22 DIAGNOSIS — N20.0 KIDNEY STONE: Primary | ICD-10-CM

## 2019-01-22 LAB
BILIRUB UR QL STRIP: NEGATIVE
GLUCOSE UR-MCNC: NEGATIVE MG/DL
KETONES P FAST UR STRIP-MCNC: NORMAL MG/DL
PH UR STRIP: 5 [PH] (ref 4.6–8)
PROT UR QL STRIP: NORMAL
SP GR UR STRIP: 1.03 (ref 1–1.03)
UA UROBILINOGEN AMB POC: NORMAL (ref 0.2–1)
URINALYSIS CLARITY POC: CLEAR
URINALYSIS COLOR POC: YELLOW
URINE BLOOD POC: NORMAL
URINE LEUKOCYTES POC: NEGATIVE
URINE NITRITES POC: NEGATIVE

## 2019-01-22 NOTE — PATIENT INSTRUCTIONS
Kidney Stone: Care Instructions Your Care Instructions Kidney stones are formed when salts, minerals, and other substances normally found in the urine clump together. They can be as small as grains of sand or, rarely, as large as golf balls. While the stone is traveling through the ureter, which is the tube that carries urine from the kidney to the bladder, you will probably feel pain. The pain may be mild or very severe. You may also have some blood in your urine. As soon as the stone reaches the bladder, any intense pain should go away. If a stone is too large to pass on its own, you may need a medical procedure to help you pass the stone. The doctor has checked you carefully, but problems can develop later. If you notice any problems or new symptoms, get medical treatment right away. Follow-up care is a key part of your treatment and safety. Be sure to make and go to all appointments, and call your doctor if you are having problems. It's also a good idea to know your test results and keep a list of the medicines you take. How can you care for yourself at home? · Drink plenty of fluids, enough so that your urine is light yellow or clear like water. If you have kidney, heart, or liver disease and have to limit fluids, talk with your doctor before you increase the amount of fluids you drink. · Take pain medicines exactly as directed. Call your doctor if you think you are having a problem with your medicine. ? If the doctor gave you a prescription medicine for pain, take it as prescribed. ? If you are not taking a prescription pain medicine, ask your doctor if you can take an over-the-counter medicine. Read and follow all instructions on the label. · Your doctor may ask you to strain your urine so that you can collect your kidney stone when it passes. You can use a kitchen strainer or a tea strainer to catch the stone. Store it in a plastic bag until you see your doctor again. Preventing future kidney stones Some changes in your diet may help prevent kidney stones. Depending on the cause of your stones, your doctor may recommend that you: · Drink plenty of fluids, enough so that your urine is light yellow or clear like water. If you have kidney, heart, or liver disease and have to limit fluids, talk with your doctor before you increase the amount of fluids you drink. · Limit coffee, tea, and alcohol. Also avoid grapefruit juice. · Do not take more than the recommended daily dose of vitamins C and D. 
· Avoid antacids such as Gaviscon, Maalox, Mylanta, or Tums. · Limit the amount of salt (sodium) in your diet. · Eat a balanced diet that is not too high in protein. · Limit foods that are high in a substance called oxalate, which can cause kidney stones. These foods include dark green vegetables, rhubarb, chocolate, wheat bran, nuts, cranberries, and beans. When should you call for help? Call your doctor now or seek immediate medical care if: 
  · You cannot keep down fluids.  
  · Your pain gets worse.  
  · You have a fever or chills.  
  · You have new or worse pain in your back just below your rib cage (the flank area).  
  · You have new or more blood in your urine.  
 Watch closely for changes in your health, and be sure to contact your doctor if: 
  · You do not get better as expected. Where can you learn more? Go to http://ofelia-yamilex.info/. Enter K064 in the search box to learn more about \"Kidney Stone: Care Instructions. \" Current as of: March 14, 2018 Content Version: 11.9 © 5647-9981 [employer]. Care instructions adapted under license by [employer] (which disclaims liability or warranty for this information). If you have questions about a medical condition or this instruction, always ask your healthcare professional. [address] any warranty or liability for your use of this information.

## 2019-01-22 NOTE — PROGRESS NOTES
NARRATIVE: 
 
 
Vision is a pleasant 77-year-old female  who is known to this office last visit here on the second of this month for removal of a stent following this could be with stone removal and stent placement 5 days prior to that. Since the stent was removed the patient has had no discomfort no blood and no passage Urine predictably is trace positive for blood but there are no white blood cells Upright left renal ultrasound is accomplished and shows no evidence of hydronephrosis Previous CT scan is reviewed and shows that there are no other remaining stones in either kidney IMPRESSION: Status post ureteroscopy for removal of a solitary obstructing left ureteral calculus with negative ultrasound and clear urine PLAN:Fully discussed with the patient. We will do metabolic [id number] urine study This visit exceeded 15 minutes and greater than 50% was counseling. The patient expresses understanding of the treatment plan and wishes to proceed This dictation used voice recognition software and there may be mistakes.  
 
Claudia Murrieta MD

## 2019-01-22 NOTE — PROGRESS NOTES
Ms. Juan Sam has a reminder for a \"due or due soon\" health maintenance. I have asked that she contact her primary care provider for follow-up on this health maintenance.

## 2019-02-25 ENCOUNTER — OFFICE VISIT (OUTPATIENT)
Dept: UROLOGY | Age: 69
End: 2019-02-25

## 2019-02-25 VITALS
WEIGHT: 199 LBS | DIASTOLIC BLOOD PRESSURE: 58 MMHG | BODY MASS INDEX: 36.62 KG/M2 | OXYGEN SATURATION: 95 % | HEIGHT: 62 IN | SYSTOLIC BLOOD PRESSURE: 134 MMHG | HEART RATE: 83 BPM

## 2019-02-25 DIAGNOSIS — N20.0 KIDNEY STONE: Primary | ICD-10-CM

## 2019-02-25 LAB
BILIRUB UR QL STRIP: NORMAL
GLUCOSE UR-MCNC: NEGATIVE MG/DL
KETONES P FAST UR STRIP-MCNC: NEGATIVE MG/DL
PH UR STRIP: 5 [PH] (ref 4.6–8)
PROT UR QL STRIP: NORMAL
SP GR UR STRIP: 1.03 (ref 1–1.03)
UA UROBILINOGEN AMB POC: NORMAL (ref 0.2–1)
URINALYSIS CLARITY POC: CLEAR
URINALYSIS COLOR POC: YELLOW
URINE BLOOD POC: NORMAL
URINE LEUKOCYTES POC: NEGATIVE
URINE NITRITES POC: NEGATIVE

## 2019-02-25 NOTE — PROGRESS NOTES
NARRATIVE: 
Patient is a 70-year-old female  with a history of stone issues and has undergone recent successful ureteroscopy with stone removal.  CT scan showed no remaining stone burden but the patient wanted to come up with a metabolic evaluation and comes now to review her 24-hour urine. The patient has a citrate deficiency. She however is unable to tolerate significant amounts of lemonade because it causes mouth ulcers and her dentist has blamed the mouth ulcers on lemonade consumption. The patient also has a volume deficiency with a resulting predictable increase in the supersaturation of calcium oxalate and uric acid. We have talked about hydration therapy and I have spoken with her about prescription occasion for citrate but have also suggested [employer] Urine analysis today is trace positive for blood IMPRESSION: History of kidney stones with volume and citrate deficiencies PLAN: As above This visit [id number] minutes and greater than 50% was counseling. The patient expresses understanding of the treatment plan and wishes to proceed This dictation used voice recognition software and there may be mistakes.  
 
Sreekanth Hassan MD

## 2019-02-25 NOTE — PATIENT INSTRUCTIONS
Learning About Diet for Kidney Stone Prevention What are kidney stones? Kidney stones are made of salts and minerals in the urine that form small \"shabana. \" Stones can form in the kidneys and the ureters (the tubes that lead from the kidneys to the bladder). They can also form in the bladder. Stones may not cause a problem as long as they stay in the kidneys. But they can cause sudden, severe pain. Pain is most likely when the stones travel from the kidneys to the bladder. Kidney stones can cause bloody urine. Kidney stones often run in families. You are more likely to get them if you don't drink enough fluids, mainly water. Certain foods and drinks and some dietary supplements may also increase your risk for kidney stones if you consume too much of them. What can you do to prevent kidney stones? Changing what you eat may not prevent all types of kidney stones. But for people who have a history of certain kinds of kidney stones, some changes in diet may help. A dietitian can help you set up a meal plan that includes healthy, low-oxalate choices. Here are some general guidelines to get you started. Plan your meals and snacks around foods that are low in oxalate. These foods include: · Corn, kale, parsnips, and squash,. · Beef, chicken, pork, turkey, and fish. · Milk, butter, cheese, and yogurt. You can eat certain foods that are medium-high in oxalate, but eat them only once in a while. These foods include: · Bread. · Brown rice. · English muffins. · Figs. · Popcorn. · String beans. · Tomatoes. Limit very high-oxalate foods, including: · Black tea. · Coffee. · Chocolate. · Dark green vegetables. · Nuts. Here are some other things you can do to help prevent kidney stones. · Drink plenty of fluids. If you have kidney, heart, or liver disease and have to limit fluids, talk with your doctor before you increase the amount of fluids you drink. · Do not take more than the recommended daily dose of vitamins C and D. 
· Limit the salt in your diet. · Eat a balanced diet that is not too high in protein. Follow-up care is a key part of your treatment and safety. Be sure to make and go to all appointments, and call your doctor if you are having problems. It's also a good idea to know your test results and keep a list of the medicines you take. Where can you learn more? Go to http://ofelia-yamilex.info/. Enter C138 in the search box to learn more about \"Learning About Diet for Kidney Stone Prevention. \" Current as of: March 28, 2018 Content Version: 11.9 © 6012-2535 [employer]. Care instructions adapted under license by [employer] (which disclaims liability or warranty for this information). If you have questions about a medical condition or this instruction, always ask your healthcare professional. [address] any warranty or liability for your use of this information.

## 2019-02-25 NOTE — PROGRESS NOTES
Ms. Ko Wilkins has a reminder for a \"due or due soon\" health maintenance. I have asked that she contact her primary care provider for follow-up on this health maintenance.

## 2019-05-08 ENCOUNTER — HOSPITAL ENCOUNTER (OUTPATIENT)
Dept: PHYSICAL THERAPY | Age: 69
Discharge: HOME | End: 2019-05-08
Payer: MEDICARE

## 2019-05-08 PROCEDURE — 97763 ORTHC/PROSTC MGMT SBSQ ENC: CPT

## 2019-05-08 PROCEDURE — 97535 SELF CARE MNGMENT TRAINING: CPT

## 2019-05-08 PROCEDURE — L3921 HFO W/JOINT(S) CF: HCPCS

## 2019-05-08 PROCEDURE — 97165 OT EVAL LOW COMPLEX 30 MIN: CPT

## 2019-05-08 NOTE — PROGRESS NOTES
Hand Therapy Evaluation and Daily Note Patient Name: Ismael Del Castillo Date:2019 : 1950 Age: 77 y.o.y/o [x]  Patient  Verified Payor: Pedro Luis Signs / Plan: [place] MEDICARE PART A & B / Product Type: Medicare /   
Referring Provider: Tre Car MD Visit:  2019 Onset Date:  2018 Surgical Date: n/a Surgical Procedure: n/a In time:12:00 PM  Out time:1:15 PM 
Total Treatment Time (min): 75 Total Timed Codes (min): 45 
1:1 Treatment Time ( [address] only): 75 Visit #: 1 of 8 Treatment Area: Pain in right hand [M79.641] Pain in left hand [M79.642] Precautions: 
 
Hand Dominance: right handed Hand Involved:bilateral 
 
Total Evaluation Time:  30 History of Present Condition:  Patient is a right hand dominant 77 y.o. female with a chief complaint  of bilateral hand pain secondary to osteoarthritis diagnosis in 2018. She reports recent increased pain in the left thumb CMC and right hand middle digit at DIP joint. She presents to skilled OT today with band aid applied to right middle DIP jt stating she wears this to avoid using finger for tasks. Pt reports since begining Dicoflenac, her pain and swelling has decreased however she reports pain and swelling with use. Her pain level today is 8/10 R>L. Her left thumb has good AROM with mild pain with grind test and palpation to webspace. She is unable to make a full fist with right hand due to pain. During this treatment session, patient was custom fitted for left thumb CMC joint immobilization splint and was also fitted for oval-8 splint for right middle digit DIP joint. Patient was provided and instructed in wear and care and also demonstrated ability to thais/doff splints appropriately. Patient will benefit from skilled OT in order to address deficits to return to normal daily activities without increased pain Pain Rating:  
Current: (0-no pain 10-debilitating pain) severe At best: (0-no pain 10-debilitating pain) severe At worst: (0-no pain 10-debilitating pain) severe Location: right finger and left thumb Type:  Constant Better with: Tylenol Worse with: using hands Medications/Allergies/Past Medical History:  See chart; reviewed with patient. Heart disease w/ h/o stent (July 2014), osteoarthritis Diagnostic Tests: X-rays on April 25th revealed generalized osteoarthritis of multiple sites in bilateral hands Prior Level of Function: Independent with ADL/IADL tasks without functional limitations of bilateral UEs Current Level of Function:  Decreased AROM, decreased strength, pain, edema Social History: Pt lives alone with dog to care for in 2 story home. Occupation/Job Requirements: Retired Observation: band aid applied to right middle DIP jt, collapsed left thumb CMC jt Scar/incision:   n/a Location:  Bilateral hands Palpation:  Severe pain with palpation to right middle DIP joint, slight pain with palpation over left thumb CMC jt Range of Motion:   Patient unable to make a fist with right hand due to middle digit THUMB ROM CHART as measured in degrees Thumb, Side Active/Passive Date: 
5/8/2019 MP 60  
IP Radial Abd. 80  
Palmar Abd. 45 Opposition WNL Strength:  DNT Sensation:    intact Edema: GIRTH CHART measured in cm 
Date: 5/8/2019 Side Right/Left DPC circum. 18.9/18.7 Wrist Crease 14.9/14.9 Special Tests:  
Tenderness L R Test L R  
1st Dorsal Comp - - Finkelstein's - - Snuff Box - - Kevon People - -  
2nd Dorsal Comp - - S-L Shear - -  
S-L Joint - - L-T Shear - -  
L-T Joint - - DRUJ Sup - -  
6th Dorsal Comp - - DRUJ Pro - - Ulnar Snuff - - DRUJ Grind + - Fovea - - TFCC - -  
STT Joint - - Mid-Carp Inst - -  
FCR - - P-T Grind - - Intersection - - ECU Sublux. - -  
 
 
ADLs Feeding:        []MaxA   []ModA   []Davi   [] CGA   []SBA   []Dolores   [x]Independent UE Dressing:       []MaxA   []ModA   [x]Davi   [] CGA   []SBA   []Dolores   []Independent LE Dressing:       []MaxA   []ModA   [x]Davi   [] CGA   []SBA   []Dolores   []Independent Grooming:       []MaxA   []ModA   [x]Davi   [] CGA   []SBA   []Dolores   []Independent Toileting:       []MaxA   []ModA   []Davi   [] CGA   []SBA   [x]Dolores   []Independent Bathing:       []MaxA   []ModA   []Davi   [] CGA   []SBA   [x]Dolores   []Independent Light Meal Prep:    []MaxA   []ModA   [x]Davi   [] CGA   []SBA   []Dolores   []Independent Household/Other: []MaxA   []ModA   [x]Davi   [] CGA   []SBA   []Dolores   []Independent Adaptive Equip:     []MaxA   []ModA   []Davi   [] CGA   []SBA   []Dolores   []Independent Driving:       []MaxA   []ModA   []Davi   [] CGA   []SBA   []Dolores   [x]Independent Todays Treatment:  Patient received an initial evaluation today followed by education as to diagnosis, precautions and treatment plan. OBJECTIVE 
L code:  
 
30 min Orthotic/Splinting: thumb CMC immobilization splint Rationale: increase ROM  to improve the patients ability to immobilize thumb Aia 16 15 min Self Care/Home Management: prognosis, splint wear and care, activity modifications Rationale: education  to improve the patients ability to reduce symptoms in bilateral hands With 
 [] TE 
 [] TA 
 [] neuro 
 [] other: Patient Education: [x] Review HEP [] Progressed/Changed HEP based on:  
[] positioning   [] body mechanics   [] transfers   [] heat/ice application  
[] Splint wear/care   [] Sensory re-education   [] scar management     
[] other:   
 
Pain Level (0-10 scale) post treatment: 8/10 Patient will continue to benefit from skilled OT services to modify and progress therapeutic interventions and address ROM deficits to attain goals. Assessment: patient unable to make a fist with right hand. Left hand digit AROM WNL Short Term Goals: To be accomplished in 2  weeks: Goal:* Patient will be compliant with initial home exercise program to take an active role in their rehabilitation process. Status at Eval: pt instructed in AROM of bilateral hand digits Goal:* Patient will demonstrate a good understanding of their condition and strategies for self-management. Status at Eval: pt educated in prognosis, splint wear and care, activity modifications Goal: *Patient/Caregiver instructed in orthotic wear, care, and precautions Status at eval: GOAL MET ON 5/8/19 Goal: * Written orthotic instructions given. Status at eval: GOAL MET ON 5/8/19 Goal: * Patient demonstrated independent donning and doffing of orthotic(s) Status at [facility]: GOAL MET ON 5/8/19 Long Term Goals: To be accomplished in 4 weeks:   
Goal:* Patient will be able to state 4 joint protection techniques to reduce pain in bilateral hands. Goal:* Patient will report a decrease in pain in bilateral hands after 10 minutes of constant activities. Goal:*Patient will regain 220 degrees total arc of motion of the right middle digit to enable grasp of cylindrical objects such as a glass, handle or toothbrush. Goal:* Patient will be compliant with splint wear in order to take an active role in the rehabilitation process. Goal:* Patient will improve FOTO outcome measure score by 15 points in order to demonstrate improved functional performance. Frequency / Duration: Patient to be seen 2 times per week for 4 weeks: 
 
Patient/ Caregiver education and instruction: Diagnosis, prognosis, self care, activity modification, brace/ splint application and exercises Functional Status Measure: 
Patient's:41 FOTO Benchmark: 58 Expected Change: 17 FOTO score based on 0 - 100 point scale, with 100 being no impairment. These scores are determined by patient reported functional assessments compared against national benchmarked data. 
  
Certification Period: 5/8/2019 - 7/7/2019 Kimmie Lima, OT, 5/8/2019 12:08 PM

## 2019-05-08 NOTE — PROGRESS NOTES
[address] 
[phone] fax Plan of Care/Statement of Necessity for Occupational Therapy Services Patient name: Ondina Connors Start of Care: 2019 Referral source: Ilan Dela Cruz : 1950 Medical Diagnosis: Pain in right hand [M79.641] Pain in left hand [M79.642] Payor: Nicholas Short / Plan: [place] MEDICARE PART A & B / Product Type: Medicare /  Onset Date:2018 Treatment Diagnosis: bilateral hand pain Prior Hospitalization: see medical history Provider#: [id number] Medications: Verified on Patient summary List  
 Comorbidities: Heart disease w/ h/o stent (2014), osteoarthritis Prior Level of Function:  Independent with ADL/IADL tasks without functional limitations of bilateral UEs. The Plan of Care and following information is based on the information from the initial evaluation. Assessment/ key information: Patient is a right hand dominant 77 y.o. female with a chief complaint  of bilateral hand pain secondary to osteoarthritis diagnosis in 2018. She reports recent increased pain in the left thumb CMC and right hand middle digit at DIP joint. She presents to skilled OT today with band aid applied to right middle DIP jt stating she wears this to avoid using finger for tasks. Pt reports since begining Dicoflenac, her pain and swelling has decreased however she reports pain and swelling with use. Her pain level today is 8/10 R>L. Her left thumb has good AROM with mild pain with grind test and palpation to webspace. She is unable to make a full fist with right hand due to pain. During this treatment session, patient was custom fitted for left thumb CMC joint immobilization splint and was also fitted for oval-8 splint for right middle digit DIP joint.  Patient was provided and instructed in wear and care and also demonstrated ability to thais/doff splints appropriately. Patient will benefit from skilled OT in order to address deficits to return to normal daily activities without increased pain. Evaluation Complexity: History LOW Complexity : Brief history review  Examination LOW Complexity : 1-3 performance deficits relating to physical, cognitive , or psychosocial skils that result in activity limitations and / or participation restrictions  Clinical Decision Making LOW Complexity : No comorbidities that affect functional and no verbal or physical assistance needed to complete eval tasks Overall Complexity Rating: LOW Problem List: Pain effecting function, Decreased range of motion, Decreased strength, Edema effecting function, Decreased coordination/prehension, Decreased ADL/functional abilities  and Decreased flexibility/joint mobility Treatment Plan may include any combination of the following: Therapeutic exercise, Therapeutic activities, Physical agent/modality, Manual therapy, Splinting/orthoses, Patient education and ADLs/IADLs Patient / Family readiness to learn indicated by: asking questions Persons(s) to be included in education:   patient (P) Barriers to Learning/Limitations: None Patient Goal (s): [place] Boone Patient Self Reported Health Status: fair Rehabilitation Potential: good Short Term Goals: To be accomplished in 2  weeks: 
Goal:* Patient will be compliant with initial home exercise program to take an active role in their rehabilitation process. Status at Eval: pt instructed in AROM of bilateral hand digits 
  
Goal:* Patient will demonstrate a good understanding of their condition and strategies for self-management. Status at [facility]: pt educated in prognosis, splint wear and care, activity modifications 
  
Goal: *Patient/Caregiver instructed in orthotic wear, care, and precautions Status at [facility]: GOAL MET ON 5/8/19 
  
 Goal: * Written orthotic instructions given. Status at [facility]: GOAL MET ON 5/8/19 
  
Goal: * Patient demonstrated independent donning and doffing of orthotic(s) Status at [facility]: GOAL MET ON 5/8/19 
  
Long Term Goals: To be accomplished in 4 weeks:                         
Goal:* Patient will be able to state 4 joint protection techniques to reduce pain in bilateral hands. 
  
Goal:* Patient will report a decrease in pain in bilateral hands after 10 minutes of constant activities. 
  
Goal:*Patient will regain 220 degrees total arc of motion of the right middle digit to enable grasp of cylindrical objects such as a glass, handle or toothbrush. 
  
Goal:* Patient will be compliant with splint wear in order to take an active role in the rehabilitation process. 
  
Goal:* Patient will improve FOTO outcome measure score by 15 points in order to demonstrate improved functional performance. 
  
Frequency / Duration: Patient to be seen 2 times per week for 4 weeks: 
  
Patient/ Caregiver education and instruction: Diagnosis, prognosis, self care, activity modification, brace/ splint application and exercises 
[x]  Plan of care has been reviewed with [facility] Certification Period: 5/8/2019 - 7/7/2019 Nicolas Romberg, OT 5/8/2019 1:41 PM 
 
________________________________________________________________________ I certify that the above Therapy Services are being furnished while the patient is under my care. I agree with the treatment plan and certify that this therapy is necessary. [de-identified] Signature:____________Date:_________TIME:________ 
 
[employer], Date and Time must be completed for valid certification ** Please sign and return to [address] [place], [address] 
[phone] fax

## 2019-05-10 ENCOUNTER — HOSPITAL ENCOUNTER (OUTPATIENT)
Dept: PHYSICAL THERAPY | Age: 69
Discharge: HOME | End: 2019-05-10
Payer: MEDICARE

## 2019-05-10 PROCEDURE — 97110 THERAPEUTIC EXERCISES: CPT

## 2019-05-10 PROCEDURE — 97535 SELF CARE MNGMENT TRAINING: CPT

## 2019-05-10 PROCEDURE — 97035 APP MDLTY 1+ULTRASOUND EA 15: CPT

## 2019-05-10 PROCEDURE — 97018 PARAFFIN BATH THERAPY: CPT

## 2019-05-10 NOTE — PROGRESS NOTES
OT DAILY TREATMENT NOTE 10-18 Patient Name: Claudette Parkinson Date:5/10/2019 : 1950 [x]  Patient  Verified Payor: [place] MEDICARE / Plan: Tsering De La Vega y / Product Type: Medicare / In time:12:30 PM  Out time:1:32 PM 
Total Treatment Time (min): 62 Visit #: 2 of 8 Medicare/BCBS Only Total Timed Codes (min):  52 1:1 Treatment Time:  58 Treatment Area: Pain in right hand [M79.641] Pain in left hand [M79.642] SUBJECTIVE Pain Level (0-10 scale): 3/10 MF, 0/10 L thumb Any medication changes, allergies to medications, adverse drug reactions, diagnosis change, or new procedure performed?: [x] No    [] Yes (see summary sheet for update) Subjective functional status/changes:   [] No changes reported \"The splint was has been hurting so I took it off.\" OBJECTIVE Modality rationale: decrease inflammation, decrease pain and increase tissue extensibility to improve the patients ability to make a composite fist with bilateral hands. Min Type Additional Details  
 [] Estim:  []Unatt       []IFC  []Premod []Other:  []w/ice   []w/heat Position: Location:  
 [] Estim: []Att    []TENS instruct  []NMES []Other:  []w/US   []w/ice   []w/heat Position: Location:  
 []  Traction: [] Cervical       []Lumbar 
                     [] Prone          []Supine []Intermittent   []Continuous Lbs: 
[] before manual 
[] after manual  
10 [x]  Ultrasound: []Continuous   [x] Pulsed []1MHz   [x]3MHz W/cm2:1.0 Location: right hand MF DIP jt  
 []  Iontophoresis with dexamethasone Location: [] Take home patch  
[] In clinic  
10 []  Ice     [x]  Heat Paraffin 
[]  Ice massage 
[]  Laser  
[]  Anodyne Position: resting Location:bilateral hands  
 []  Laser with stim 
[]  Other:  Position: Location:  
 []  Vasopneumatic Device Pressure:       [] lo [] med [] hi  
Temperature: [] lo [] med [] hi  
 [x] Skin assessment post-treatment:  [x]intact [x]redness- no adverse reaction 
  []redness  adverse reaction:  
 
18 min Therapeutic Exercise:  [x] See flow sheet :  
Rationale: increase ROM and tendon glides to improve the patients ability to make a composite fist with bilateral hands 5 min Manual Therapy:  IASTM using tool #6 using sweeping technique Rationale: decrease pain, increase tissue extensibility and decrease edema  to left thumb. 9 min Self Care/Home Management: activity modification, splint wear, paraffin use, silipos wear Rationale: education  to improve the patients ability to reduce symptoms and protect from further joint damage. With 
 [x] TE 
 [] TA 
 [] neuro 
 [] other: Patient Education: [x] Review HEP [] Progressed/Changed HEP based on:  
[] positioning   [] body mechanics   [] transfers   [] heat/ice application  
[] Splint wear/care   [] Sensory re-education   [] scar management     
[] other:   
 
     
Other Objective/Functional Measures:  Measurements: Taken with [employer] Dynamometer, in Lbs Level 2 5/10/2019 Date Date Date Date Date Date Right Left 28 Deficit Change Pinch Measurements: Taken with Pinch Gauge, in Lbs (hand) 5/10/2019 Date Date Date Date Date Lateral         
Right Left  13 Deficit Change Pad Right Left Deficit Change [employer] Right Left 11 Deficit Change Pain Level (0-10 scale) post treatment: 0/10 ASSESSMENT/Changes in Function: Pt verbalized difficulty with left CMC splint use stating it was too rigid. Patient was functional with splint use however does not want to continue use at this time.   She presented to skilled OT today with prefabricated thumb brace which appears to immobilize left thumb [facility] appropriately however with decreased functional ability to use left thumb. I told patient her prefabricated brace is fine to wear if that is more comfortable for her. Initiated IASTM to left thumb during this treatment session and patient demonstrated good tolerance to this. Also tested left  and pinch strength and patient did not have increased pain with this. She conitnues to have increased pain with right MF DIP jt. She reports difficulty with oval 8 splint wear and reports she will also stop wearing this at this time. Patient was provided with silipos to wear on DIP jt for compression and comfort to decrease pain. We will see how she does with this until beginning oval 8 splint use again to decrease DIP radial deviation. Initiated US to [address] and patient reported 0/10 pain level at end of treatment session. Patient will continue to benefit from skilled OT services to address ROM deficits, address strength deficits and analyze and address soft tissue restrictions to attain remaining goals. [x]  See Plan of Care 
[]  See progress note/recertification 
[]  See Discharge Summary Progress towards goals / Updated goals: 
Short Term Goals: To be accomplished in 2  weeks: 
Goal:* Patient will be compliant with initial home exercise program to take an active role in their rehabilitation process. Status at Eval: pt instructed in AROM of bilateral hand digits 
  
Goal:* Patient will demonstrate a good understanding of their condition and strategies for self-management. Status at Eval: pt educated in prognosis, splint wear and care, activity modifications 
  
Goal: *Patient/Caregiver instructed in orthotic wear, care, and precautions Status at [place] [facility]: GOAL MET ON 5/8/19 
  
Goal: * Written orthotic instructions given.  
Status at [place] [facility]: GOAL MET ON 5/8/19 
  
Goal: * Patient demonstrated independent donning and doffing of orthotic(s)  
Status at [place] [facility]: GOAL MET ON 5/8/19 
  
Long Term Goals: To be accomplished in 4 weeks:                         
 Goal:* Patient will be able to state 4 joint protection techniques to reduce pain in bilateral hands. 
  
Goal:* Patient will report a decrease in pain in bilateral hands after 10 minutes of constant activities. 
  
Goal:*Patient will regain 220 degrees total arc of motion of the right middle digit to enable grasp of cylindrical objects such as a glass, handle or toothbrush. 
  
Goal:* Patient will be compliant with splint wear in order to take an active role in the rehabilitation process. 
  
Goal:* Patient will improve FOTO outcome measure score by 15 points in order to demonstrate improved functional performance. PLAN 
[]  Upgrade activities as tolerated     [x]  Continue plan of care 
[]  Update interventions per flow sheet      
[]  Discharge due to:_ 
[]  Other:_ Froilan Gonzalez OT 5/10/2019  12:46 PM 
 
Future Appointments Date Time Provider Boubacar Kumar 5/13/2019 12:30 [facility]  
5/15/2019  1:30 PM Ar Magallon OT [facility]  
5/20/2019 12:00 PM Ar Magallon OT [facility]  
5/22/2019 12:00 PM Ar Magallon OT [facility]  
5/28/2019  1:30 PM Ar Magallon OT [facility]  
5/31/2019  1:30 PM [facility]

## 2019-05-13 ENCOUNTER — HOSPITAL ENCOUNTER (OUTPATIENT)
Dept: PHYSICAL THERAPY | Age: 69
Discharge: HOME | End: 2019-05-13
Payer: MEDICARE

## 2019-05-13 PROCEDURE — 97110 THERAPEUTIC EXERCISES: CPT

## 2019-05-13 PROCEDURE — 97535 SELF CARE MNGMENT TRAINING: CPT

## 2019-05-13 PROCEDURE — 97035 APP MDLTY 1+ULTRASOUND EA 15: CPT

## 2019-05-13 NOTE — PROGRESS NOTES
OT DAILY TREATMENT NOTE 10-18 Patient Name: Miryam Ch Date:2019 : 1950 [x]  Patient  Verified Payor: [place] MEDICARE / Plan: Tsering Villasenor / Product Type: Medicare / In time:12:30 PM  Out time:1:20 PM 
Total Treatment Time (min): 50 Visit #: 3 of 8 Medicare/BCBS Only Total Timed Codes (min):  50 1:1 Treatment Time:  50 Treatment Area: Pain in right hand [M79.641] Pain in left hand [M79.642] SUBJECTIVE Pain Level (0-10 scale): 2/10 right MF, 0/10 L thumb Any medication changes, allergies to medications, adverse drug reactions, diagnosis change, or new procedure performed?: [x] No    [] Yes (see summary sheet for update) Subjective functional status/changes:   [] No changes reported \"When you have pain, you figure out how to do stuff without it hurting. \" OBJECTIVE Modality rationale: decrease inflammation, decrease pain and increase tissue extensibility to improve the patients ability to make a composite fist with right hand. Min Type Additional Details  
 [] Estim:  []Unatt       []IFC  []Premod []Other:  []w/ice   []w/heat Position: Location:  
 [] Estim: []Att    []TENS instruct  []NMES []Other:  []w/US   []w/ice   []w/heat Position: Location:  
 []  Traction: [] Cervical       []Lumbar 
                     [] Prone          []Supine []Intermittent   []Continuous Lbs: 
[] before manual 
[] after manual  
10 [x]  Ultrasound: []Continuous   [x] Pulsed []1MHz   [x]3MHz W/cm2:1.0 Location: right middle digit DIP jt  
 []  Iontophoresis with dexamethasone Location: [] Take home patch  
[] In clinic  
 []  Ice     []  heat 
[]  Ice massage 
[]  Laser  
[]  Anodyne Position: Location:  
 []  Laser with stim 
[]  Other:  Position: Location:  
 []  Vasopneumatic Device Pressure:       [] lo [] med [] hi  
Temperature: [] lo [] med [] hi  
 [x] Skin assessment post-treatment:  [x]intact []redness- no adverse reaction 
  []redness  adverse reaction:  
 
32 min Therapeutic Exercise:  [x] See flow sheet :  
Rationale: increase ROM, increase strength and improve coordination to improve the patients ability to increase use of bilateral UE for functional tasks. 8 min Self Care/Home Management: joint protection strategies Rationale: education  to improve the patients ability to reduce symptoms in right hand. With 
 [] TE 
 [] TA 
 [] neuro 
 [] other: Patient Education: [x] Review HEP [] Progressed/Changed HEP based on:  
[] positioning   [] body mechanics   [] transfers   [] heat/ice application  
[] Splint wear/care   [] Sensory re-education   [] scar management     
[] other:   
 
     
Other Objective/Functional Measures: Single Digit ROM CHART as measured in degrees Digit A/P 5/13/2019 Right MF DIP Date Side MP 90 PIP 95 DIP 25 MCDANIEL 210 Pain Level (0-10 scale) post treatment: 0/10 ASSESSMENT/Changes in Function: Initiated FM tasks today and patient tolerated this well with left hand. Some increased pain on right hand that goes away with rest. 
 
Patient will continue to benefit from skilled OT services to modify and progress therapeutic interventions, address ROM deficits and address strength deficits to attain remaining goals. [x]  See Plan of Care 
[]  See progress note/recertification 
[]  See Discharge Summary Progress towards goals / Updated goals: 
Short Term Goals: To be accomplished in 2  weeks: 
Goal:* Patient will be compliant with initial home exercise program to take an active role in their rehabilitation process. Status at Eval: pt instructed in AROM of bilateral hand digits 
  
Goal:* Patient will demonstrate a good understanding of their condition and strategies for self-management.  
Status at Eval: pt educated in prognosis, splint wear and care, activity modifications 
  
 Goal: *Patient/Caregiver instructed in orthotic wear, care, and precautions Status at eval: GOAL MET ON 5/8/19 
  
Goal: * Written orthotic instructions given. Status at eval: GOAL MET ON 5/8/19 
  
Goal: * Patient demonstrated independent donning and doffing of orthotic(s)  
Status at eval: GOAL MET ON 5/8/19 
  
Long Term Goals: To be accomplished in 4 weeks:                         
Goal:* Patient will be able to state 4 joint protection techniques to reduce pain in bilateral hands. 5/13/19 - pt provided and instructed in joint protection strategies  
  
Goal:* Patient will report a decrease in pain in bilateral hands after 10 minutes of constant activities. 
  
Goal:*Patient will regain 220 degrees total arc of motion of the right middle digit to enable grasp of cylindrical objects such as a glass, handle or toothbrush. 5/13/19 - 210 degrees MCDANIEL 
  
Goal:* Patient will be compliant with splint wear in order to take an active role in the rehabilitation process. 5/13/19 - pt compliant with L thumb PF splint wear. 
  
Goal:* Patient will improve FOTO outcome measure score by 15 points in order to demonstrate improved functional performance. PLAN 
[]  Upgrade activities as tolerated     [x]  Continue plan of care 
[]  Update interventions per flow sheet      
[]  Discharge due to:_ 
[]  Other:_ China Ocampo OT 5/13/2019  12:58 PM 
 
Future Appointments Date Time Provider Boubacar Kumar 5/20/2019 12:00 PM Hayley Leisure, OT [facility]  
5/21/2019  2:30 PM [facility]  
5/28/2019  1:30 PM Hayley Leisure, OT [facility]  
5/31/2019  1:30 PM Hayley Leisure, OT [facility]

## 2019-05-15 ENCOUNTER — APPOINTMENT (OUTPATIENT)
Dept: PHYSICAL THERAPY | Age: 69
End: 2019-05-15
Payer: MEDICARE

## 2019-05-17 ENCOUNTER — APPOINTMENT (OUTPATIENT)
Dept: PHYSICAL THERAPY | Age: 69
End: 2019-05-17
Payer: MEDICARE

## 2019-05-20 ENCOUNTER — HOSPITAL ENCOUNTER (OUTPATIENT)
Dept: PHYSICAL THERAPY | Age: 69
Discharge: HOME | End: 2019-05-20
Payer: MEDICARE

## 2019-05-20 PROCEDURE — 97035 APP MDLTY 1+ULTRASOUND EA 15: CPT

## 2019-05-20 PROCEDURE — 97535 SELF CARE MNGMENT TRAINING: CPT

## 2019-05-20 PROCEDURE — 97140 MANUAL THERAPY 1/> REGIONS: CPT

## 2019-05-20 NOTE — PROGRESS NOTES
OT DAILY TREATMENT NOTE 10-18 Patient Name: Yarelis Lambert Date:2019 : 1950 [x]  Patient  Verified Payor: [place] MEDICARE / Plan: Tsering Bhakta / Product Type: Medicare / In time:12:00 PM  Out time:12:58 PM 
Total Treatment Time (min): 58 Visit #: 4 of 8 Medicare/BCBS Only Total Timed Codes (min):  48 1:1 Treatment Time:  48 Treatment Area: Pain in right hand [M79.641] Pain in left hand [M79.642] SUBJECTIVE Pain Level (0-10 scale): 2/10 right MF, 0/10 left thumb Any medication changes, allergies to medications, adverse drug reactions, diagnosis change, or new procedure performed?: [x] No    [] Yes (see summary sheet for update) Subjective functional status/changes:   [] No changes reported \"It was a 20 pain level yesterday. I couldn't get relief with anything. \" OBJECTIVE Modality rationale: decrease inflammation, decrease pain and increase tissue extensibility to improve the patients ability to make a composite fist with right hand Min Type Additional Details  
 [] Estim:  []Unatt       []IFC  []Premod []Other:  []w/ice   []w/heat Position: Location:  
 [] Estim: []Att    []TENS instruct  []NMES []Other:  []w/US   []w/ice   []w/heat Position: Location:  
 []  Traction: [] Cervical       []Lumbar 
                     [] Prone          []Supine []Intermittent   []Continuous Lbs: 
[] before manual 
[] after manual  
10 [x]  Ultrasound: []Continuous   [x] Pulsed []1MHz   [x]3MHz W/cm2:1.0 Location: right MF at DIP jt  
 []  Iontophoresis with dexamethasone Location: [] Take home patch  
[] In clinic  
10 []  Ice     [x]  heat 
[]  Ice massage 
[]  Laser  
[]  Anodyne Position:resting Location:right hand  
 []  Laser with stim 
[]  Other:  Position: Location:  
 []  Vasopneumatic Device Pressure:       [] lo [] med [] hi  
 Temperature: [] lo [] med [] hi  
[x] Skin assessment post-treatment:  [x]intact [x]redness- no adverse reaction 
  []redness  adverse reaction:  
 
5 min Therapeutic Exercise:  [x] See flow sheet :  
Rationale: increase ROM to improve the patients ability to increase use of bilateral hands for functional tasks. 8 
 min Manual Therapy:  IASTM using tool #6 using sweeping technique Rationale: increase ROM, increase tissue extensibility and decrease edema  to right MF 
 
25 min Self Care/Home Management: silver ring splint, insurance coverage Rationale: education and increase ROM  to improve the patients ability to reduce right MF deformity. With 
 [] TE 
 [] TA 
 [] neuro 
 [] other: Patient Education: [x] Review HEP [] Progressed/Changed HEP based on:  
[] positioning   [] body mechanics   [] transfers   [] heat/ice application  
[] Splint wear/care   [] Sensory re-education   [] scar management     
[] other:   
 
     
Other Objective/Functional Measures: Single Digit ROM CHART as measured in degrees Digit A/P 5/13/2019 Right MF DIP 5/20/2019 Right MF     
MP 90  90    
PIP 95  100    
DIP 25  54    
MCDANIEL 210  244    
 
Pain Level (0-10 scale) post treatment: 0/10 ASSESSMENT/Changes in Function: Pt reported increased pain in right MF DIP jt over the weekend stating she attempted massage and use of heat to reduce pain however this did not help. Discussed anti-inflammatory medicine use in which she is prescribed. Patient presented to have increased ability to make a fist but reports increased pain with task. She expressed interest in the Silver Ring splint however, after reviewing insurance coverage and discussing use of splint to reduce deformity , and not necessarily swelling, she is not interested in being fitted at this time.  
 
Patient will continue to benefit from skilled OT services to address ROM deficits, address strength deficits and analyze and address soft tissue restrictions to attain remaining goals. [x]  See Plan of Care 
[]  See progress note/recertification 
[]  See Discharge Summary Progress towards goals / Updated goals: 
Short Term Goals: To be accomplished in 2  weeks: 
Goal:* Patient will be compliant with initial home exercise program to take an active role in their rehabilitation process. Status at Eval: pt instructed in AROM of bilateral hand digits 
  
Goal:* Patient will demonstrate a good understanding of their condition and strategies for self-management. Status at [facility]: pt educated in prognosis, splint wear and care, activity modifications 
  
Goal: *Patient/Caregiver instructed in orthotic wear, care, and precautions Status at [facility]: GOAL MET ON 5/8/19 
  
Goal: * Written orthotic instructions given. Status at [facility]: GOAL MET ON 5/8/19 
  
Goal: * Patient demonstrated independent donning and doffing of orthotic(s)  
Status at [facility]: GOAL MET ON 5/8/19 
  
Long Term Goals: To be accomplished in 4 weeks:                         
Goal:* Patient will be able to state 4 joint protection techniques to reduce pain in bilateral hands. 5/13/19 - pt provided and instructed in joint protection strategies  
  
Goal:* Patient will report a decrease in pain in bilateral hands after 10 minutes of constant activities. 
  
Goal:*Patient will regain 220 degrees total arc of motion of the right middle digit to enable grasp of cylindrical objects such as a glass, handle or toothbrush. 5/13/19 - 210 degrees MCDANIEL; 5/20/19 - 244 degrees MCDANIEL but with increased pain. 
  
Goal:* Patient will be compliant with splint wear in order to take an active role in the rehabilitation process. 5/13/19 - pt compliant with L thumb PF splint wear. 
  
Goal:* Patient will improve FOTO outcome measure score by 15 points in order to demonstrate improved functional performance. PLAN 
[]  Upgrade activities as tolerated     []  Continue plan of care []  Update interventions per flow sheet      
[]  Discharge due to:_ 
[]  Other:_ Esther Barrow OT 5/20/2019  12:29 PM 
 
Future Appointments Date Time Provider Boubacar Kumar 5/21/2019  2:30 PM Edyta Cooper, OT [facility]  
5/28/2019  1:30 PM Linward Pool, OT [facility]  
5/31/2019  1:30 PM Linward Pool, OT [facility]

## 2019-05-21 ENCOUNTER — HOSPITAL ENCOUNTER (OUTPATIENT)
Dept: PHYSICAL THERAPY | Age: 69
Discharge: HOME | End: 2019-05-21
Payer: MEDICARE

## 2019-05-21 PROCEDURE — 97140 MANUAL THERAPY 1/> REGIONS: CPT

## 2019-05-21 PROCEDURE — 97035 APP MDLTY 1+ULTRASOUND EA 15: CPT

## 2019-05-21 NOTE — PROGRESS NOTES
OT DAILY TREATMENT NOTE 10-18    Patient Name: Ondina Connors  Date:2019  : 1950  [x]  Patient  Verified  Payor: [place] MEDICARE / Plan: VA MEDICARE PART A & B / Product Type: Medicare /    In time:2:30 PM  Out time:3:05 PM  Total Treatment Time (min): 35  Visit #: 5 of 8    Medicare/BCBS Only   Total Timed Codes (min):  25 1:1 Treatment Time:  30     Treatment Area: Pain in right hand [M79.641]  Pain in left hand [M79.642]    SUBJECTIVE  Pain Level (0-10 scale): 0/10  Any medication changes, allergies to medications, adverse drug reactions, diagnosis change, or new procedure performed?: [x] No    [] Yes (see summary sheet for update)  Subjective functional status/changes:   [] No changes reported  \"It hasn't been hurting since you worked on it. \"    OBJECTIVE    Modality rationale: decrease inflammation, decrease pain and increase tissue extensibility to improve the patients ability to make a composite fist with right hand   Min Type Additional Details    [] Estim:  []Unatt       []IFC  []Premod                        []Other:  []w/ice   []w/heat  Position:  Location:    [] Estim: []Att    []TENS instruct  []NMES                    []Other:  []w/US   []w/ice   []w/heat  Position:  Location:    []  Traction: [] Cervical       []Lumbar                       [] Prone          []Supine                       []Intermittent   []Continuous Lbs:  [] before manual  [] after manual   10 [x]  Ultrasound: []Continuous   [x] Pulsed                           []1MHz   [x]3MHz W/cm2:1.0  Location:right hand middle digit DIP jt    []  Iontophoresis with dexamethasone         Location: [] Take home patch   [] In clinic   10 []  Ice     [x]  heat  []  Ice massage  []  Laser   []  Anodyne Position:resting  Location:right hand    []  Laser with stim  []  Other:  Position:  Location:    []  Vasopneumatic Device Pressure:       [] lo [] med [] hi   Temperature: [] lo [] med [] hi   [x] Skin assessment post-treatment: [x]intact [x]redness- no adverse reaction    []redness  adverse reaction:     5 min Therapeutic Exercise:  [x] See flow sheet :   Rationale: increase ROM to improve the patients ability to make a composite fist  10 min Manual Therapy:  IASTM using tool #6 using sweeping technique   Rationale: decrease pain, increase ROM, increase tissue extensibility and decrease edema  to right hand middle digit DIP    With   [] TE   [] TA   [] neuro   [] other: Patient Education: [x] Review HEP    [] Progressed/Changed HEP based on:   [] positioning   [] body mechanics   [] transfers   [] heat/ice application   [] Splint wear/care   [] Sensory re-education   [] scar management      [] other:            Other Objective/Functional Measures: slight pain with hook fist.     Pain Level (0-10 scale) post treatment: 0/10    ASSESSMENT/Changes in Function: Patient reported to skilled OT with decreased pain today. Increased tolerance to IASTM. Patient will continue to benefit from skilled OT services to address ROM deficits, address strength deficits and analyze and address soft tissue restrictions to attain remaining goals. [x]  See Plan of Care  []  See progress note/recertification  []  See Discharge Summary         Progress towards goals / Updated goals:  Short Term Goals: To be accomplished in 2  weeks:  Goal:* Patient will be compliant with initial home exercise program to take an active role in their rehabilitation process. Status at Eval: pt instructed in AROM of bilateral hand digits     Goal:* Patient will demonstrate a good understanding of their condition and strategies for self-management. Status at Eval: pt educated in prognosis, splint wear and care, activity modifications     Goal: *Patient/Caregiver instructed in orthotic wear, care, and precautions   Status at eval: GOAL MET ON 5/8/19     Goal: * Written orthotic instructions given.   Status at eval: GOAL MET ON 5/8/19     Goal: * Patient demonstrated independent donning and doffing of orthotic(s)   Status at eval: GOAL MET ON 5/8/19     Long Term Goals: To be accomplished in 4 weeks:                          Goal:* Patient will be able to state 4 joint protection techniques to reduce pain in bilateral hands. 5/13/19 - pt provided and instructed in joint protection strategies      Goal:* Patient will report a decrease in pain in bilateral hands after 10 minutes of constant activities.     Goal:*Patient will regain 220 degrees total arc of motion of the right middle digit to enable grasp of cylindrical objects such as a glass, handle or toothbrush. 5/13/19 - 210 degrees MCDANIEL; 5/20/19 - 244 degrees MCDANIEL but with increased pain.     Goal:* Patient will be compliant with splint wear in order to take an active role in the rehabilitation process. 5/13/19 - pt compliant with L thumb PF splint wear.     Goal:* Patient will improve FOTO outcome measure score by 15 points in order to demonstrate improved functional performance.   PLAN  []  Upgrade activities as tolerated     [x]  Continue plan of care  []  Update interventions per flow sheet       []  Discharge due to:_  []  Other:_      Tipton Chamber, OT 5/21/2019  2:50 PM    Future Appointments   Date Time Provider Boubacar Kumar   5/28/2019  1:30 PM Shelia Woodard OT [facility]   5/31/2019  1:30 PM [facility]

## 2019-05-22 ENCOUNTER — APPOINTMENT (OUTPATIENT)
Dept: PHYSICAL THERAPY | Age: 69
End: 2019-05-22
Payer: MEDICARE

## 2019-05-28 ENCOUNTER — HOSPITAL ENCOUNTER (OUTPATIENT)
Dept: PHYSICAL THERAPY | Age: 69
Discharge: HOME | End: 2019-05-28
Payer: MEDICARE

## 2019-05-28 PROCEDURE — 97035 APP MDLTY 1+ULTRASOUND EA 15: CPT

## 2019-05-28 PROCEDURE — 97140 MANUAL THERAPY 1/> REGIONS: CPT

## 2019-05-28 NOTE — PROGRESS NOTES
OT DAILY TREATMENT NOTE 10-18    Patient Name: Danielle Min  Date:2019  : 1950  [x]  Patient  Verified  Payor: [place] MEDICARE / Plan: VA MEDICARE PART A & B / Product Type: Medicare /    In time:1:30 PM  Out time:2:08 PM  Total Treatment Time (min): 38  Visit #: 6 of 8    Medicare/BCBS Only   Total Timed Codes (min):  28 1:1 Treatment Time:  38     Treatment Area: Pain in right hand [M79.641]  Pain in left hand [M79.642]    SUBJECTIVE  Pain Level (0-10 scale): 0/10  Any medication changes, allergies to medications, adverse drug reactions, diagnosis change, or new procedure performed?: [x] No    [] Yes (see summary sheet for update)  Subjective functional status/changes:   [] No changes reported  \"It is getting better because I haven't had the sharp pains like before but it is still inflamed. \"    OBJECTIVE    Modality rationale: decrease inflammation, decrease pain and increase tissue extensibility to improve the patients ability to make a fist with right hand.    Min Type Additional Details    [] Estim:  []Unatt       []IFC  []Premod                        []Other:  []w/ice   []w/heat  Position:  Location:    [] Estim: []Att    []TENS instruct  []NMES                    []Other:  []w/US   []w/ice   []w/heat  Position:  Location:    []  Traction: [] Cervical       []Lumbar                       [] Prone          []Supine                       []Intermittent   []Continuous Lbs:  [] before manual  [] after manual   10 [x]  Ultrasound: []Continuous   [x] Pulsed                           []1MHz   [x]3MHz W/cm2:1.0  Location:right middle digit DIP    []  Iontophoresis with dexamethasone         Location: [] Take home patch   [] In clinic   10 []  Ice     [x]  heat  []  Ice massage  []  Laser   []  Anodyne Position:resting  Location:right hand    []  Laser with stim  []  Other:  Position:  Location:    []  Vasopneumatic Device Pressure:       [] lo [] med [] hi   Temperature: [] lo [] med [] hi   [x] Skin assessment post-treatment:  [x]intact [x]redness- no adverse reaction    []redness  adverse reaction:   10 min Self Care/Home Management: silver ring splint sizing, insurance paperwork   Rationale: education  to improve the patients ability to reduce DIP joint deformity. 10 min Manual Therapy:  IASTM using tool #6 using sweeping technique   Rationale: decrease pain, increase ROM, increase tissue extensibility and decrease edema  to right middle digit DIP    With   [] TE   [] TA   [] neuro   [] other: Patient Education: [x] Review HEP    [] Progressed/Changed HEP based on:   [] positioning   [] body mechanics   [] transfers   [] heat/ice application   [] Splint wear/care   [] Sensory re-education   [] scar management      [] other:            Other Objective/Functional Measures: NT     Pain Level (0-10 scale) post treatment: 0/10    ASSESSMENT/Changes in Function: Pt interested in possibly being fitted for silver ring splint to reduce further DIP jt deviation deformity. Will sized patient at next visit. Patient will continue to benefit from skilled OT services to modify and progress therapeutic interventions, address ROM deficits and instruct in home and community integration to attain remaining goals. []  See Plan of Care  []  See progress note/recertification  []  See Discharge Summary         Progress towards goals / Updated goals:  Short Term Goals: To be accomplished in 2  weeks:  Goal:* Patient will be compliant with initial home exercise program to take an active role in their rehabilitation process. Status at Eval: pt instructed in AROM of bilateral hand digits     Goal:* Patient will demonstrate a good understanding of their condition and strategies for self-management. Status at Eval: pt educated in prognosis, splint wear and care, activity modifications.  5/28/19 pt educated on silver ring splint.     Goal: *Patient/Caregiver instructed in orthotic wear, care, and precautions   Status at eval: GOAL MET ON 5/8/19     Goal: * Written orthotic instructions given. Status at eval: GOAL MET ON 5/8/19     Goal: * Patient demonstrated independent donning and doffing of orthotic(s)   Status at eval: GOAL MET ON 5/8/19     Long Term Goals: To be accomplished in 4 weeks:                          Goal:* Patient will be able to state 4 joint protection techniques to reduce pain in bilateral hands. 5/13/19 - pt provided and instructed in joint protection strategies      Goal:* Patient will report a decrease in pain in bilateral hands after 10 minutes of constant activities.     Goal:*Patient will regain 220 degrees total arc of motion of the right middle digit to enable grasp of cylindrical objects such as a glass, handle or toothbrush. 5/13/19 - 210 degrees MCDANIEL; 5/20/19 - 244 degrees MCDANIEL but with increased pain.     Goal:* Patient will be compliant with splint wear in order to take an active role in the rehabilitation process. 5/13/19 - pt compliant with L thumb PF splint wear.     Goal:* Patient will improve FOTO outcome measure score by 15 points in order to demonstrate improved functional performance.     PLAN  []  Upgrade activities as tolerated     [x]  Continue plan of care  []  Update interventions per flow sheet       []  Discharge due to:_  []  Other:_      Faheem Cooley OT 5/28/2019  1:26 PM    Future Appointments   Date Time Provider Boubacar Kumar   5/28/2019  1:30 PM Ada Ho OT [facility]   5/31/2019  1:30 PM [facility]

## 2019-05-31 ENCOUNTER — HOSPITAL ENCOUNTER (OUTPATIENT)
Dept: PHYSICAL THERAPY | Age: 69
Discharge: HOME | End: 2019-05-31
Payer: MEDICARE

## 2019-05-31 PROCEDURE — 97110 THERAPEUTIC EXERCISES: CPT

## 2019-05-31 PROCEDURE — 97140 MANUAL THERAPY 1/> REGIONS: CPT

## 2019-05-31 PROCEDURE — 97535 SELF CARE MNGMENT TRAINING: CPT

## 2019-05-31 NOTE — PROGRESS NOTES
OT DAILY TREATMENT NOTE 10-18    Patient Name: Reema Share  Date:2019  : 1950  [x]  Patient  Verified  Payor: [place] MEDICARE / Plan: [place] MEDICARE PART A & B / Product Type: Medicare /    In time:1:31 PM  Out time:2:15 PM  Total Treatment Time (min): 44  Visit #: 7 of 8    Medicare/BCBS Only   Total Timed Codes (min):  44 1:1 Treatment Time:  44     Treatment Area: Pain in right hand [M79.641]  Pain in left hand [M79.642]    SUBJECTIVE  Pain Level (0-10 scale): 0/10  Any medication changes, allergies to medications, adverse drug reactions, diagnosis change, or new procedure performed?: [x] No    [] Yes (see summary sheet for update)  Subjective functional status/changes:   [] No changes reported  \"I got this new splint. \"    OBJECTIVE  9 min Therapeutic Exercise:  [x] See flow sheet :   Rationale: increase ROM and improve coordination to improve the patients ability to make a fist with right hand. 10 min Manual Therapy:  IASTM using tool #6 using sweeping and brushing technique   Rationale: increase ROM, increase tissue extensibility and decrease edema  to right MF dip jt    25 min Self Care/Home Management: silver ring splint sizing, education, ordering   Rationale: increase ROM  to improve the patients ability to reduce DIP deviation deformity. With   [] TE   [] TA   [] neuro   [] other: Patient Education: [x] Review HEP    [] Progressed/Changed HEP based on:   [] positioning   [] body mechanics   [] transfers   [] heat/ice application   [] Splint wear/care   [] Sensory re-education   [] scar management      [] other:            Other Objective/Functional Measures: Single Digit ROM CHART as measured in degrees  Digit  A/P 2019   Right MF DIP 2019 Right MF   2019  Right MF   MP 90  90  90   PIP 95  100  100   DIP 25  54  55   MCDANIEL 210  172 385      Pain Level (0-10 scale) post treatment: 0/10    ASSESSMENT/Changes in Function: Pt demonstrates improved right hand MF AROM. She reports decreased pain since beginning IASTM. She has been fitted for Silver Ring splint to correct lateral deviation of right middle digit DIP jt. Patient will continue to benefit from skilled OT services to modify and progress therapeutic interventions, address ROM deficits, address strength deficits and instruct in home and community integration to attain remaining goals. []  See Plan of Care  [x]  See progress note/recertification  []  See Discharge Summary         Progress towards goals / Updated goals:  Short Term Goals: To be accomplished in 2  weeks:  Goal:* Patient will be compliant with initial home exercise program to take an active role in their rehabilitation process. Status at [facility]: pt instructed in AROM of bilateral hand digits     Goal:* Patient will demonstrate a good understanding of their condition and strategies for self-management. Status at [facility]: pt educated in prognosis, splint wear and care, activity modifications. 5/28/19 pt educated on silver ring splint. Goal Met     Goal: *Patient/Caregiver instructed in orthotic wear, care, and precautions   Status at [facility]: GOAL MET ON 5/8/19     Goal: * Written orthotic instructions given. Status at [facility]: GOAL MET ON 5/8/19     Goal: * Patient demonstrated independent donning and doffing of orthotic(s)   Status at [facility]: GOAL MET ON 5/8/19     Long Term Goals: To be accomplished in 4 weeks:                          Goal:* Patient will be able to state 4 joint protection techniques to reduce pain in bilateral hands. 5/13/19 - pt provided and instructed in joint protection strategies Goal Met 5/31/19 - pt verbalized joint protection strategies.      Goal:* Patient will report a decrease in pain in bilateral hands after 10 minutes of constant activities.  5/31/19 - DNT     Goal:*Patient will regain 220 degrees total arc of motion of the right middle digit to enable grasp of cylindrical objects such as a glass, handle or toothbrush. 5/13/19 - 210 degrees MCDANIEL; 5/20/19 - 244 degrees MCDANIEL but with increased pain. 5/31/19 - 245 MCDANIEL Goal Met     Goal:* Patient will be compliant with splint wear in order to take an active role in the rehabilitation process. 5/13/19 - pt compliant with L thumb PF splint wear. Goal Met     Goal:* Patient will improve FOTO outcome measure score by 15 points in order to demonstrate improved functional performance. 5/31/19 - 61 (+20 points) Goal Met    PLAN  []  Upgrade activities as tolerated     [x]  Continue plan of care  []  Update interventions per flow sheet       []  Discharge due to:_  []  Other:_      Chanel Stokes OT 5/31/2019  1:36 PM    No future appointments.

## 2019-05-31 NOTE — PROGRESS NOTES
[facility]  [address]  [place], [address]  [phone] fax    Medicare Progress Report    Patient name: Claudette Parkinson Start of Care: 2019   Referral source: Wali Smith : 1950   Medical/Treatment Diagnosis: Pain in right hand [M79.641]  Pain in left hand [M79.642]  Payor: VA MEDICARE / Plan: VA MEDICARE PART A & B / Product Type: Medicare /  Onset Date:2018     Prior Hospitalization: see medical history Provider#: [id number]   Medications: Verified on Patient Summary List    Comorbidities: Heart disease w/ h/o stent (2014), osteoarthritis   Prior Level of Function:  Independent with ADL/IADL tasks without functional limitations of bilateral UEs. Visits from Start of Care: 7    Missed Visits: 1    Reporting Period: 2019 to 2019    Subjective Reports: \"It feels better. \"  Short Term Goals: To be accomplished in 2  weeks:  Goal:* Patient will be compliant with initial home exercise program to take an active role in their rehabilitation process. Status at [facility]: pt instructed in AROM of bilateral hand digits Goal Met     Goal:* Patient will demonstrate a good understanding of their condition and strategies for self-management. Status at [facility]: pt educated in prognosis, splint wear and care, activity modifications. 19 pt educated on silver ring splint. Goal Met     Goal: *Patient/Caregiver instructed in orthotic wear, care, and precautions   Status at [facility]: GOAL MET ON 19     Goal: * Written orthotic instructions given.   Status at [facility]: GOAL MET ON 19     Goal: * Patient demonstrated independent donning and doffing of orthotic(s)   Status at [facility]: GOAL MET ON 19     Long Term Goals: To be accomplished in 4 weeks:                          Goal:* Patient will be able to state 4 joint protection techniques to reduce pain in bilateral hands. 19 - pt provided and instructed in joint protection strategies Goal Met 5/31/19 - pt verbalized joint protection strategies.      Goal:* Patient will report a decrease in pain in bilateral hands after 10 minutes of constant activities. 5/31/19 - DNT     Goal:*Patient will regain 220 degrees total arc of motion of the right middle digit to enable grasp of cylindrical objects such as a glass, handle or toothbrush. 5/13/19 - 210 degrees MCDANIEL; 5/20/19 - 244 degrees MCDANIEL but with increased pain. 5/31/19 - 245 MCDANIEL Goal Met     Goal:* Patient will be compliant with splint wear in order to take an active role in the rehabilitation process. 5/13/19 - pt compliant with L thumb PF splint wear. Goal Met     Goal:* Patient will improve FOTO outcome measure score by 15 points in order to demonstrate improved functional performance. 5/31/19 - 61 (+20 points) Goal Met    Key functional changes: decreased pain with functional tasks, improved FOTO outcome measure score, improved MCDANIEL    Problems/ barriers to goal attainment: none   Assessment / Recommendations:Pt demonstrates improved ROM of right hand. Pt would benefit from continued skilled OT to increase functional activity tolerance using right hand. Problem List: Pain effecting function, Decreased strength, Decreased coordination/prehension, Decreased ADL/functional abilities  and Decreased activity tolerance   Treatment Plan: Therapeutic exercise, Therapeutic activities, Splinting/orthoses, Patient education and ADLs/IADLs  Patient Goal (s) has been updated and includes: \"use my hand. \"   Updated Goals to be accomplished in 2 weeks:  1. Patient will report a decrease in pain in bilateral hands after 10 minutes of constant activities.     Frequency / Duration: Patient to be seen 2 times per week for 2 weeks:  Kevin Salazar OT 5/31/2019 5:00 PM

## 2019-06-12 NOTE — PROGRESS NOTES
[facility]  [address]  [place], [address]  [phone] fax    Occupational Therapy Discharge Summary    Patient name: Whit Valero Start of Care: 2019   Referral source: Jaime Butler : 1950   Medical/Treatment Diagnosis: Pain in right hand [M79.641]  Pain in left hand [M79.642]  Payor: VA MEDICARE / Plan: VA MEDICARE PART A & B / Product Type: Medicare /  Onset Date:2018     Prior Hospitalization: see medical history Provider#: [id number]   Medications: Verified on Patient Summary List    Comorbidities: Heart disease w/ h/o stent (2014), osteoarthritis   Prior Level of Function:  Independent with ADL/IADL tasks without functional limitations of bilateral UEs. Visits from Start of Care: 7    Missed Visits: 1  Reporting Period : 2019 to 2019    Summary of Care:  Updated Goals to be accomplished in 2 weeks:  1. Patient will report a decrease in pain in bilateral hands after 10 minutes of constant activities. ASSESSMENT/RECOMMENDATIONS: Unable to assess goal due to pt requested self d/c. Pt demonstrated improved ROM of hands and significant decrease in pain during her course of skilled OT treatment. Pt was fitted for silver ring splint during her last treatment session to be ordered by self if needed. Patient saw referring physician recently and stated that her hand is better so she requested to self d/c. Will d/c at this time.  Thank you for this referral.   [x]Discontinue therapy: []Patient has reached or is progressing toward set goals      [x]Patient is non-compliant or has abdicated      []Due to lack of appreciable progress towards set goals    China Ocampo, OT 2019 5:32 PM

## 2020-04-21 ENCOUNTER — VIRTUAL VISIT (OUTPATIENT)
Dept: CARDIOLOGY CLINIC | Age: 70
End: 2020-04-21

## 2020-04-21 VITALS — HEIGHT: 62 IN | BODY MASS INDEX: 36.4 KG/M2

## 2020-04-21 DIAGNOSIS — E78.5 DYSLIPIDEMIA: ICD-10-CM

## 2020-04-21 DIAGNOSIS — I34.0 NON-RHEUMATIC MITRAL REGURGITATION: ICD-10-CM

## 2020-04-21 DIAGNOSIS — I25.10 CORONARY ARTERY DISEASE INVOLVING NATIVE CORONARY ARTERY OF NATIVE HEART WITHOUT ANGINA PECTORIS: Primary | ICD-10-CM

## 2020-04-21 RX ORDER — EZETIMIBE 10 MG/1
10 TABLET ORAL DAILY
COMMUNITY
Start: 2020-04-11 | End: 2021-08-31 | Stop reason: SDUPTHER

## 2020-04-21 NOTE — PROGRESS NOTES
Consent: Grabiel Giles, who was seen by synchronous (real-time) audio-video technology, and/or her healthcare decision maker, is aware that this patient-initiated, Telehealth encounter on 4/21/2020 is a billable service, with coverage as determined by her insurance carrier. She is aware that she may receive a bill and has provided verbal consent to proceed: Yes. Assessment & Plan:   Diagnoses and all orders for this visit:    1. Coronary artery disease involving native coronary artery of native heart without angina pectoris    2. Non-rheumatic mitral regurgitation  -     ECHOCARDIOGRAM; Future    3. Dyslipidemia      Coronary artery disease. The patient had a 85-90% stenosis of her mid left circumflex extending into OM1 diagnosed by cardiac catheterization in February 2014. She underwent scheduled PCI In July 2014, with a Resolute drug-eluting stent. Her symptoms improved following the intervention. I suspect she did have a small infarction of the inferolateral region in January 2014. Ejection fraction is now back to normal by echocardiogram in 2018, EF 60%. She she remains on an aspirin, beta blocker and high-dose potent statin. No new symptoms concerning for angina. I would continue her current medical therapy. Mitral regurgitation. Non-rheumatic, more likely ischemic versus myxomatous in nature. This was moderate on her recent follow-up echocardiogram from November 2018. This is not significant change over the past 2 years. She does complain of some chronic shortness of breath, which has not progressed. I have recommended repeating an echocardiogram in 6 months to evaluate for any significant worsening of her valvular heart disease. Dyslipidemia. Patient is now taking both rosuvastatin and Zetia. This is being followed closely by her PCP. Her goal LDL should be less than 70. Follow-up and Dispositions    · Return in about 9 months (around 1/21/2021).            I spent at least 15 minutes with this established patient, and >50% of the time was spent counseling and/or coordinating care regarding Chronic coronary artery disease and mitral regurgitation  712  Subjective:   Wally Nguyen is a 68 y.o. female who was seen for Follow-up (1 year follow up) and Shortness of Breath (with and without extertion)    Patient presents for  a virtual office visit. The patient had an episode of fairly significant back pain in January 2014 that awoke her from sleep, it radiated around to the front of her chest up into her neck and down both arms associated with tingling and numbness. She subsequently underwent a cardiac catheterization in February 2014, which showed a high-grade stenosis of her mid left circumflex extending into obtuse marginal branch 1.         She was continued to complain of exertional dyspnea and some chest pain between her shoulder blades, which is felt to be her anginal equivalent. She was also afraid to do any exertional activity because of her high-grade left circumflex stenosis, so she underwent scheduled percutaneous coronary intervention over her left circumflex lesion with a drug-eluting stent In July 2014. She underwent a followup echocardiogram in April 2016 which showed overall improvement of her well ejection fraction of 55-60% without mention of any regional wall motion abnormalities. Her mitral regurgitation was in the moderate range, which may have been minimally increased compared to the prior study.       The patient then underwent a follow-up echocardiogram November 2018 which showed preserved LV systolic function, EF 19-74%, evidence of diastolic dysfunction, mitral valve prolapse with moderate mitral regurgitation, essentially unchanged compared to the study from 2017. Patient was last seen in our office over a year ago. Since that time, she still complains of chronic shortness of breath which she will notice with both rest and exertion. The symptoms have not progressed over the past year or 2. She denies any major change in her activity level. However, she admits she does not exercise regularly. No new chest pain or pressure, no leg swelling or weight gain. No orthopnea or PND. Past Medical History:   Diagnosis Date    Arthritis     CAD (coronary artery disease), native coronary artery 7/2014    ULISES to LCx    Cardiac cath 07/07/2014    RCA 15%. LM patent. mLAD 30-50%. CX/OM1 85% (2.75 x 26-mm Resolute ULISES, resid 0%).  Cardiac echocardiogram 04/05/2016    EF 55-60%. No WMA. Gr 1 DDfx. Mod MR.  Cardiac nuclear imaging test, abnormal 02/05/2014    Mod-sized, mild-mod mid lateral infarction w/mild-mod rosa isela-infarct ischemia. Mod lateral hypk. EF 56%. Normal EKG on pharm stress test.  Intermediate risk.  Chronic kidney disease     denies     Dyslipidemia     Gallstone     Hernia     Kidney stone     Nausea & vomiting     Obesity     PONV (postoperative nausea and vomiting)     Sinus infection          Prior to Admission medications    Medication Sig Start Date End Date Taking? Authorizing Provider   ezetimibe (ZETIA) 10 mg tablet  4/11/20  Yes Provider, Historical   RABEprazole (ACIPHEX) 20 mg tablet  12/15/18  Yes Provider, Historical   calcium polycarbophil (FIBER LAXATIVE, CA POLYCARBO,) 625 mg tablet Take 625 mg by mouth daily. Yes Provider, Historical   diclofenac (VOLTAREN) 1 % gel Apply  to affected area as needed. Yes Provider, Historical   traMADol (ULTRAM) 50 mg tablet Take 1-2 Tabs by mouth every six (6) hours as needed for Pain. Max Daily Amount: 400 mg. 21/4/65  Yes Haley Card MD   cholecalciferol, vitamin D3, (VITAMIN D3) 2,000 unit tab Take  by mouth daily. Yes Provider, Historical   folic acid/multivit-min/lutein (CENTRUM SILVER PO) Take  by mouth.    Yes Provider, Historical   metoprolol succinate (TOPROL-XL) 50 mg XL tablet TAKE 1 TABLET BY MOUTH DAILY 12/11/17  Yes Mercedes Mccabe MD rosuvastatin (CRESTOR) 20 mg tablet Take 20 mg by mouth daily. Yes Provider, Historical   mometasone 100 mcg/actuation HFAA Take 100 mcg by inhalation daily. Yes Provider, Historical   aspirin delayed-release 81 mg tablet Take 162 mg by mouth daily. Yes Provider, Historical   desloratadine-pseudoephedrine (CLARINEX-D 12 HOUR) 2.5-120 mg per tablet Take 1 Tab by mouth two (2) times a day. Yes Provider, Historical   montelukast (SINGULAIR) 10 mg tablet Take 10 mg by mouth daily. Yes Provider, Historical   HYDROcodone-acetaminophen (NORCO) 5-325 mg per tablet Take 1 Tab by mouth every four (4) hours as needed for Pain. Max Daily Amount: 6 Tabs. 12/28/18   Miley Turk MD   PREMARIN 0.625 mg/gram vaginal cream  6/4/18   Provider, Historical   acetaminophen (TYLENOL ARTHRITIS PAIN) 650 mg TbER Take 650 mg by mouth every eight (8) hours. Provider, Historical     Allergies   Allergen Reactions    Celebrex [Celecoxib] Swelling           Review of Systems   Constitutional: Negative for chills, fever and weight loss. HENT: Negative for nosebleeds. Eyes: Negative for blurred vision and double vision. Respiratory: Positive for shortness of breath. Negative for cough and wheezing. Cardiovascular: Negative for chest pain, palpitations, orthopnea, claudication, leg swelling and PND. Gastrointestinal: Negative for abdominal pain, heartburn, nausea and vomiting. Genitourinary: Negative for dysuria and hematuria. Musculoskeletal: Negative for falls and myalgias. Skin: Negative for rash. Neurological: Negative for dizziness, focal weakness and headaches. Endo/Heme/Allergies: Does not bruise/bleed easily. Psychiatric/Behavioral: Negative for substance abuse.          Objective:     Visit Vitals  Ht 5' 2\" (1.575 m)   BMI 36.40 kg/m²      General: alert, cooperative, no distress   Mental  status: normal mood, behavior, speech, dress, motor activity, and thought processes, able to follow commands   HENT: NCAT   Neck: no visualized mass   Resp: no respiratory distress   Neuro: no gross deficits   Skin: no discoloration or lesions of concern on visible areas   Psychiatric: normal affect, consistent with stated mood, no evidence of hallucinations     Additional exam findings: We discussed the expected course, resolution and complications of the diagnosis(es) in detail. Medication risks, benefits, costs, interactions, and alternatives were discussed as indicated. I advised her to contact the office if her condition worsens, changes or fails to improve as anticipated. She expressed understanding with the diagnosis(es) and plan. Juani Nino is a 68 y.o. female being evaluated by a video visit encounter for concerns as above. A caregiver was present when appropriate. Due to this being a TeleHealth encounter (During RUST-59 public health emergency), evaluation of the following organ systems was limited: Vitals/Constitutional/EENT/Resp/CV/GI//MS/Neuro/Skin/Heme-Lymph-Imm. Pursuant to the emergency declaration under the [facility], [facility] waiver authority and the [employer] and [employer] Act, this Virtual  Visit was conducted, with patient's (and/or legal guardian's) consent, to reduce the patient's risk of exposure to COVID-19 and provide necessary medical care. Services were provided through a video synchronous discussion virtually to substitute for in-person clinic visit. Patient was at home and provider was in the office.         Oumou Ogden MD

## 2020-04-21 NOTE — PROGRESS NOTES
Veronica Mccoy presents today for   Chief Complaint   Patient presents with    Follow-up     1 year follow up    Shortness of Breath     with and without extertion       Veronica Mccoy preferred language for health care discussion is english/other. Is someone accompanying this pt? Virtual Visit    Is the patient using any DME equipment during OV? Virtual Visit    Depression Screening:  3 most recent PHQ Screens 4/21/2020   Little interest or pleasure in doing things Not at all   Feeling down, depressed, irritable, or hopeless Not at all   Total Score PHQ 2 0       Learning Assessment:  Learning Assessment 11/20/2017   PRIMARY LEARNER Patient   HIGHEST LEVEL OF EDUCATION - PRIMARY LEARNER  -   BARRIERS PRIMARY LEARNER -   CO-LEARNER CAREGIVER -   PRIMARY LANGUAGE ENGLISH    NEED -   LEARNER PREFERENCE PRIMARY DEMONSTRATION   LEARNING SPECIAL TOPICS -   ANSWERED BY Patient   RELATIONSHIP SELF       Abuse Screening:  Abuse Screening Questionnaire 4/21/2020   Do you ever feel afraid of your partner? N   Are you in a relationship with someone who physically or mentally threatens you? N   Is it safe for you to go home? -       Fall Risk  Fall Risk Assessment, last 12 mths 4/21/2020   Able to walk? Yes   Fall in past 12 months? No       Pt currently taking Anticoagulant therapy? ASA 81 mg twice a day    Coordination of Care:  1. Have you been to the ER, urgent care clinic since your last visit? Hospitalized since your last visit? no    2. Have you seen or consulted any other health care providers outside of the [facility] since your last visit? Include any pap smears or colon screening.  no

## 2020-06-09 ENCOUNTER — OFFICE VISIT (OUTPATIENT)
Dept: ORTHOPEDIC SURGERY | Facility: CLINIC | Age: 70
End: 2020-06-09

## 2020-06-09 VITALS
DIASTOLIC BLOOD PRESSURE: 55 MMHG | SYSTOLIC BLOOD PRESSURE: 121 MMHG | HEART RATE: 65 BPM | BODY MASS INDEX: 37.65 KG/M2 | HEIGHT: 62 IN | TEMPERATURE: 97.3 F | WEIGHT: 204.6 LBS

## 2020-06-09 DIAGNOSIS — M25.561 CHRONIC PAIN OF RIGHT KNEE: Primary | ICD-10-CM

## 2020-06-09 DIAGNOSIS — G89.29 CHRONIC PAIN OF RIGHT KNEE: Primary | ICD-10-CM

## 2020-06-09 DIAGNOSIS — M17.11 PRIMARY OSTEOARTHRITIS OF RIGHT KNEE: ICD-10-CM

## 2020-06-09 RX ORDER — BETAMETHASONE SODIUM PHOSPHATE AND BETAMETHASONE ACETATE 3; 3 MG/ML; MG/ML
6 INJECTION, SUSPENSION INTRA-ARTICULAR; INTRALESIONAL; INTRAMUSCULAR; SOFT TISSUE ONCE
Qty: 0.5 ML | Refills: 0
Start: 2020-06-09 | End: 2020-06-09

## 2020-06-09 NOTE — PROGRESS NOTES
Verbal order given by Dr. Zurdo Romeo to draw up 4 mL 0.25% Sensorcaine and 0.5 mL 30 mg/5mL Betamethasone.

## 2020-06-09 NOTE — PROGRESS NOTES
Patient: Peter Rivera                MRN: [id number]       SSN: [id number]  YOB: 1950        AGE: 68 y.o. SEX: female    PCP: Gabriel Lemon MD  06/09/20    Chief Complaint   Patient presents with    Knee Pain     Rt     HISTORY:  Peter Rivera is a 68 y.o. female who is seen for right knee pain. She has been experiencing right knee pain for the past month. She does not recall any injury. She experiences medial and posterior pain. She tried using diclofenac gel without much success. She notes pain with standing, walking and stair climbing. She experiences startup pain after sitting. She had her knee aspirated at [facility] on 5/20/20. She states that [facility] thought she had a meniscus tear. Pain Assessment  6/9/2020   Location of Pain Knee   Location Modifiers Right   Severity of Pain 8   Quality of Pain Popping;Aching   Duration of Pain A few hours   Frequency of Pain Several times daily   Aggravating Factors Bending;Stretching;Straightening;Walking;Standing;Stairs   Limiting Behavior Yes   Relieving Factors Ice       Occupation, etc:  Ms. Mauricio Nunez she previously worked as a  for [employer]. She retired in 2013. She lives alone in [place]. She recently had a dog that passed away. She has a new tenorio retriever puppy--Leander. She is not diabetic. She has a heart stent and uses aspirin. Ms. Mauricio Nunez weighs 204 lbs and is 5'2\" tall.        Lab Results   Component Value Date/Time    Hemoglobin A1c 6.3 (H) 07/26/2018 08:00 AM     Weight Metrics 6/9/2020 4/21/2020 2/25/2019 1/22/2019 1/11/2019 1/9/2019 1/2/2019   Weight 204 lb 9.6 oz - 199 lb 199 lb 199 lb 197 lb 12.8 oz 197 lb   BMI 37.42 kg/m2 36.4 kg/m2 36.4 kg/m2 36.4 kg/m2 36.4 kg/m2 36.18 kg/m2 35.46 kg/m2       Patient Active Problem List   Diagnosis Code    Dyslipidemia E78.5    Obesity E66.9    Abnormal nuclear stress test R94.39    Chest pain R07.9    CAD (coronary artery disease), native coronary artery I25.10    S/P cardiac catheterization Z98.890    Nephrolithiasis N20.0    Ureteral stone N20.1    Non-rheumatic mitral regurgitation I34.0    Severe obesity (BMI 35.0-39. 9) E66.01     REVIEW OF SYSTEMS:    Constitutional Symptoms: Negative   Eyes: Negative   Ears, Nose, Throat and Mouth: Negative   Cardiovascular: Negative   Respiratory: Negative   Genitourinary: Per HPI   Gastrointestinal: Per HPI   Integumentary (Skin and/or Breast): Negative   Musculoskeletal: Per HPI   Endocrine/Rheumatologic: Negative   Neurological: Per HPI   Hematology/Lymphatic: Negative    Allergic/Immunologic: Negative   Phychiatric: Negative    Social History     Socioeconomic History    Marital status:      Spouse name: Not on file    Number of children: Not on file    Years of education: Not on file    Highest education level: Not on file   Occupational History    Not on file   Social Needs    Financial resource strain: Not on file    Food insecurity     Worry: Not on file     Inability: Not on file    Transportation needs     Medical: Not on file     Non-medical: Not on file   Tobacco Use    Smoking status: Never Smoker    Smokeless tobacco: Never Used   Substance and Sexual Activity    Alcohol use: No    Drug use: No    Sexual activity: Yes     Partners: Male     Birth control/protection: None   Lifestyle    Physical activity     Days per week: Not on file     Minutes per session: Not on file    Stress: Not on file   Relationships    Social connections     Talks on phone: Not on file     Gets together: Not on file     Attends Tenriism service: Not on file     Active member of club or organization: Not on file     Attends meetings of clubs or organizations: Not on file     Relationship status: Not on file    Intimate partner violence     Fear of current or ex partner: Not on file     Emotionally abused: Not on file     Physically abused: Not on file     Forced sexual activity: Not on file   Other Topics Concern    Not on file   Social History Narrative    Not on file      Allergies   Allergen Reactions    Celebrex [Celecoxib] Swelling      Current Outpatient Medications   Medication Sig    vitB6/mag cit,ox/potassium cit (THERALITH XR PO) Take  by mouth.  ezetimibe (ZETIA) 10 mg tablet     RABEprazole (ACIPHEX) 20 mg tablet     diclofenac (VOLTAREN) 1 % gel Apply  to affected area as needed.  cholecalciferol, vitamin D3, (VITAMIN D3) 2,000 unit tab Take  by mouth daily.  folic acid/multivit-min/lutein (CENTRUM SILVER PO) Take  by mouth.  metoprolol succinate (TOPROL-XL) 50 mg XL tablet TAKE 1 TABLET BY MOUTH DAILY    rosuvastatin (CRESTOR) 20 mg tablet Take 20 mg by mouth daily.  mometasone 100 mcg/actuation HFAA Take 100 mcg by inhalation daily.  aspirin delayed-release 81 mg tablet Take 162 mg by mouth daily.  desloratadine-pseudoephedrine (CLARINEX-D 12 HOUR) 2.5-120 mg per tablet Take 1 Tab by mouth two (2) times a day.  montelukast (SINGULAIR) 10 mg tablet Take 10 mg by mouth daily.  HYDROcodone-acetaminophen (NORCO) 5-325 mg per tablet Take 1 Tab by mouth every four (4) hours as needed for Pain. Max Daily Amount: 6 Tabs.  calcium polycarbophil (FIBER LAXATIVE, CA POLYCARBO,) 625 mg tablet Take 625 mg by mouth daily.  traMADol (ULTRAM) 50 mg tablet Take 1-2 Tabs by mouth every six (6) hours as needed for Pain. Max Daily Amount: 400 mg.    PREMARIN 0.625 mg/gram vaginal cream     acetaminophen (TYLENOL ARTHRITIS PAIN) 650 mg TbER Take 650 mg by mouth every eight (8) hours. No current facility-administered medications for this visit.        PHYSICAL EXAMINATION:  Visit Vitals  /55   Pulse 65   Temp 97.3 °F (36.3 °C)   Ht 5' 2\" (1.575 m)   Wt 204 lb 9.6 oz (92.8 kg)   BMI 37.42 kg/m²        ORTHO EXAMINATION:  Examination Right knee Left knee   Skin Intact Intact   Range of motion 115-0 120-0   Effusion - -   Medial joint line tenderness + -   Lateral joint line tenderness - -   Popliteal tenderness - -   Osteophytes palpable + -   Ronas - -   Patella crepitus + -   Anterior drawer - -   Lateral laxity - -   Medial laxity - -   Varus deformity - -   Valgus deformity - -   Pretibial edema - -   Calf tenderness - -       TIME OUT:  Chart reviewed for the following:   IAriane MD, have reviewed the History, Physical and updated the Allergic reactions for [place] performed immediately prior to start of procedure:  Veronica Seip, MD, have performed the following reviews on Teodora Sorto prior to the start of the procedure:          * Patient was identified by name and date of birth   * Agreement on procedure being performed was verified  * Risks and Benefits explained to the patient  * Procedure site verified and marked as necessary  * Patient was positioned for comfort  * Consent was obtained     Time: 8:59 AM     Date of procedure: 6/9/2020  Procedure performed by:  Ariane Nieves MD  Ms. Fox tolerated the procedure well with no complications. RADIOGRAPHS:  XR RIGHT KNEE 6/9/20 [facility]  IMPRESSION:  Three views - No fractures, no effusion, mild joint space narrowing, no osteophytes present. Kellgren Corby grade 1, condylar flattening     XR RIGHT KNEE 5/20/20 [facility]  IMPRESSION:    No acute abnormality seen. Enthesophyte arising from superior patella    IMPRESSION:      ICD-10-CM ICD-9-CM    1. Chronic pain of right knee M25.561 719.46 betamethasone (Celestone Soluspan) 6 mg/mL injection    G89.29 338.29 BETAMETHASONE ACETATE & SODIUM PHOSPHATE INJECTION 3 MG EA.      DRAIN/INJECT LARGE JOINT/BURSA      PROCEDURE AUTHORIZATION TO       AMB POC X-RAY KNEE 3 VIEW   2.  Primary osteoarthritis of right knee M17.11 715.16 betamethasone (Celestone Soluspan) 6 mg/mL injection      BETAMETHASONE ACETATE & SODIUM PHOSPHATE INJECTION 3 MG EA.      DRAIN/INJECT LARGE JOINT/BURSA      PROCEDURE AUTHORIZATION TO      PLAN:  After discussing treatment options, patient's right knee was injected with 4 cc Marcaine and 1/2 cc Celestone. Consider visco supplementation if pain continues. There is no need for surgery at this time. She will follow up as needed.       Scribed by Anjana Torres (Tristian Nino) as dictated by Chris Fernandez MD

## 2020-07-06 ENCOUNTER — OFFICE VISIT (OUTPATIENT)
Dept: ORTHOPEDIC SURGERY | Facility: CLINIC | Age: 70
End: 2020-07-06

## 2020-07-06 VITALS
RESPIRATION RATE: 16 BRPM | TEMPERATURE: 97.4 F | HEART RATE: 70 BPM | OXYGEN SATURATION: 94 % | WEIGHT: 205 LBS | BODY MASS INDEX: 37.73 KG/M2 | HEIGHT: 62 IN

## 2020-07-06 DIAGNOSIS — M17.11 PRIMARY OSTEOARTHRITIS OF RIGHT KNEE: ICD-10-CM

## 2020-07-06 DIAGNOSIS — M25.561 CHRONIC PAIN OF RIGHT KNEE: Primary | ICD-10-CM

## 2020-07-06 DIAGNOSIS — G89.29 CHRONIC PAIN OF RIGHT KNEE: Primary | ICD-10-CM

## 2020-07-06 RX ORDER — HYALURONATE SODIUM 10 MG/ML
2 SYRINGE (ML) INTRAARTICULAR ONCE
Qty: 2 ML | Refills: 0
Start: 2020-07-06 | End: 2020-07-06

## 2020-07-06 NOTE — PROGRESS NOTES
Patient: Piter                 MRN: [id number]       SSN: [id number]  YOB: 1950        AGE: 65 y.o. SEX: female    PCP: Allie German MD  07/06/20    Chief Complaint   Patient presents with    Follow-up     right knee Euflexxa #1     HISTORY:  Piter  is a 65 y.o. female who is seen for right knee pain. She has been experiencing right knee pain for the past month. She does not recall any injury. She experiences medial and posterior pain. She tried using diclofenac gel without much success. She notes pain with standing, walking and stair climbing. She experiences startup pain after sitting. She had her knee aspirated at [facility] on 5/20/20. She states that the [facility] provider thought she may have a meniscus tear. Pain Assessment  7/6/2020   Location of Pain Knee   Location Modifiers Right   Severity of Pain 3   Quality of Pain Dull;Aching   Duration of Pain Persistent   Frequency of Pain Constant   Aggravating Factors Standing;Walking;Stairs   Limiting Behavior Some   Relieving Factors -       Occupation, etc:  Ms. Skip Rivera she previously worked as a  for [employer]. She retired in 2013. She lives alone in [place]. She has 1 adult daughter. She recently had a dog that passed away. She has a new tenorio retriever puppy--Leander. Her daughter adopted Leander's brother and named him Alysa Ramos. She is not diabetic. She has a heart stent and uses aspirin. Ms. Skip Rivera weighs 205 lbs and is 5'2\" tall.        Lab Results   Component Value Date/Time    Hemoglobin A1c 6.3 (H) 07/26/2018 08:00 AM     Weight Metrics 7/6/2020 6/9/2020 4/21/2020 2/25/2019 1/22/2019 1/11/2019 1/9/2019   Weight 205 lb 204 lb 9.6 oz - 199 lb 199 lb 199 lb 197 lb 12.8 oz   BMI 37.49 kg/m2 37.42 kg/m2 36.4 kg/m2 36.4 kg/m2 36.4 kg/m2 36.4 kg/m2 36.18 kg/m2       Patient Active Problem List   Diagnosis Code    Dyslipidemia E78.5    Obesity E66.9    Abnormal nuclear stress test R94.39    Chest pain R07.9    CAD (coronary artery disease), native coronary artery I25.10    S/P cardiac catheterization Z98.890    Nephrolithiasis N20.0    Ureteral stone N20.1    Non-rheumatic mitral regurgitation I34.0    Severe obesity (BMI 35.0-39. 9) E66.01     REVIEW OF SYSTEMS:    Constitutional Symptoms: Negative   Eyes: Negative   Ears, Nose, Throat and Mouth: Negative   Cardiovascular: Negative   Respiratory: Negative   Genitourinary: Per HPI   Gastrointestinal: Per HPI   Integumentary (Skin and/or Breast): Negative   Musculoskeletal: Per HPI   Endocrine/Rheumatologic: Negative   Neurological: Per HPI   Hematology/Lymphatic: Negative    Allergic/Immunologic: Negative   Phychiatric: Negative    Social History     Socioeconomic History    Marital status:      Spouse name: Not on file    Number of children: Not on file    Years of education: Not on file    Highest education level: Not on file   Occupational History    Not on file   Social Needs    Financial resource strain: Not on file    Food insecurity     Worry: Not on file     Inability: Not on file    Transportation needs     Medical: Not on file     Non-medical: Not on file   Tobacco Use    Smoking status: Never Smoker    Smokeless tobacco: Never Used   Substance and Sexual Activity    Alcohol use: No    Drug use: No    Sexual activity: Yes     Partners: Male     Birth control/protection: None   Lifestyle    Physical activity     Days per week: Not on file     Minutes per session: Not on file    Stress: Not on file   Relationships    Social connections     Talks on phone: Not on file     Gets together: Not on file     Attends Scientologist service: Not on file     Active member of club or organization: Not on file     Attends meetings of clubs or organizations: Not on file     Relationship status: Not on file    Intimate partner violence     Fear of current or ex partner: Not on file Emotionally abused: Not on file     Physically abused: Not on file     Forced sexual activity: Not on file   Other Topics Concern    Not on file   Social History Narrative    Not on file      Allergies   Allergen Reactions    Celebrex [Celecoxib] Swelling      Current Outpatient Medications   Medication Sig    vitB6/mag cit,ox/potassium cit (THERALITH XR PO) Take  by mouth.  ezetimibe (ZETIA) 10 mg tablet     RABEprazole (ACIPHEX) 20 mg tablet     diclofenac (VOLTAREN) 1 % gel Apply  to affected area as needed.  cholecalciferol, vitamin D3, (VITAMIN D3) 2,000 unit tab Take  by mouth daily.  folic acid/multivit-min/lutein (CENTRUM SILVER PO) Take  by mouth.  metoprolol succinate (TOPROL-XL) 50 mg XL tablet TAKE 1 TABLET BY MOUTH DAILY    rosuvastatin (CRESTOR) 20 mg tablet Take 20 mg by mouth daily.  mometasone 100 mcg/actuation HFAA Take 100 mcg by inhalation daily.  aspirin delayed-release 81 mg tablet Take 162 mg by mouth daily.  desloratadine-pseudoephedrine (CLARINEX-D 12 HOUR) 2.5-120 mg per tablet Take 1 Tab by mouth two (2) times a day.  montelukast (SINGULAIR) 10 mg tablet Take 10 mg by mouth daily.  HYDROcodone-acetaminophen (NORCO) 5-325 mg per tablet Take 1 Tab by mouth every four (4) hours as needed for Pain. Max Daily Amount: 6 Tabs.  calcium polycarbophil (FIBER LAXATIVE, CA POLYCARBO,) 625 mg tablet Take 625 mg by mouth daily.  traMADol (ULTRAM) 50 mg tablet Take 1-2 Tabs by mouth every six (6) hours as needed for Pain. Max Daily Amount: 400 mg.    PREMARIN 0.625 mg/gram vaginal cream     acetaminophen (TYLENOL ARTHRITIS PAIN) 650 mg TbER Take 650 mg by mouth every eight (8) hours. No current facility-administered medications for this visit.        PHYSICAL EXAMINATION:  Visit Vitals  Pulse 70   Temp 97.4 °F (36.3 °C) (Temporal)   Resp 16   Ht 5' 2\" (1.575 m)   Wt 205 lb (93 kg)   SpO2 94%   BMI 37.49 kg/m²        ORTHO EXAMINATION:  Examination Right knee Left knee   Skin Intact Intact   Range of motion 115-0 120-0   Effusion - -   Medial joint line tenderness + -   Lateral joint line tenderness - -   Popliteal tenderness - -   Osteophytes palpable + -   Ronas - -   Patella crepitus + -   Anterior drawer - -   Lateral laxity - -   Medial laxity - -   Varus deformity - -   Valgus deformity - -   Pretibial edema - -   Calf tenderness - -       TIME OUT:  Chart reviewed for the following:   I, Edson De Paz MD, have reviewed the History, Physical and updated the Allergic reactions for [place] Gisella   TIME OUT performed immediately prior to start of procedure:  Cornelius Borden MD, have performed the following reviews on [place] Gisella prior to the start of the procedure:          * Patient was identified by name and date of birth   * Agreement on procedure being performed was verified  * Risks and Benefits explained to the patient  * Procedure site verified and marked as necessary  * Patient was positioned for comfort  * Consent was obtained     Time: 11:04 AM    Date of procedure: 7/6/2020  Procedure performed by:  Edson De Paz MD  Ms. Fox tolerated the procedure well with no complications. RADIOGRAPHS:  XR RIGHT KNEE 6/9/20 [facility]  IMPRESSION:  Three views - No fractures, no effusion, mild joint space narrowing, no osteophytes present. Kellgren Corby grade 1, condylar flattening     XR RIGHT KNEE 5/20/20 [facility]  IMPRESSION:    No acute abnormality seen. Enthesophyte arising from superior patella    IMPRESSION:      ICD-10-CM ICD-9-CM    1. Chronic pain of right knee M25.561 719.46 ID DRAIN/INJECT LARGE JOINT/BURSA    G89.29 338.29 EUFLEXXA INJECTION PER DOSE      sodium hyaluronate (SUPARTZ FX/HYALGAN/GENIVSC) 10 mg/mL syrg injection   2.  Primary osteoarthritis of right knee M17.11 715.16 ID DRAIN/INJECT LARGE JOINT/BURSA      EUFLEXXA INJECTION PER DOSE      sodium hyaluronate (SUPARTZ FX/HYALGAN/GENIVSC) 10 mg/mL jenise injection     PLAN:  After discussing treatment options, patient's right knee was injected with 2 cc Euflexxa. There is no need for surgery at this time. She will follow up in 1 week for Euflexxa #2.        Scribed by Maisha Terry ([address]) as dictated by Saundra Interiano MD

## 2020-07-13 ENCOUNTER — OFFICE VISIT (OUTPATIENT)
Dept: ORTHOPEDIC SURGERY | Facility: CLINIC | Age: 70
End: 2020-07-13

## 2020-07-13 VITALS
HEIGHT: 62 IN | BODY MASS INDEX: 37.8 KG/M2 | TEMPERATURE: 97.4 F | SYSTOLIC BLOOD PRESSURE: 117 MMHG | WEIGHT: 205.4 LBS | HEART RATE: 75 BPM | DIASTOLIC BLOOD PRESSURE: 67 MMHG

## 2020-07-13 DIAGNOSIS — M17.11 PRIMARY OSTEOARTHRITIS OF RIGHT KNEE: ICD-10-CM

## 2020-07-13 DIAGNOSIS — G89.29 CHRONIC PAIN OF RIGHT KNEE: Primary | ICD-10-CM

## 2020-07-13 DIAGNOSIS — M25.561 CHRONIC PAIN OF RIGHT KNEE: Primary | ICD-10-CM

## 2020-07-13 RX ORDER — HYALURONATE SODIUM 10 MG/ML
2 SYRINGE (ML) INTRAARTICULAR ONCE
Qty: 2 ML | Refills: 0
Start: 2020-07-13 | End: 2020-07-13

## 2020-07-13 NOTE — PROGRESS NOTES
Patient: Debbie Lofton                MRN: [id number]       SSN: [id number]  YOB: 1950        AGE: 65 y.o. SEX: female  Body mass index is 37.57 kg/m². PCP: Linda Sarkar MD  07/13/20    Chief Complaint   Patient presents with    Knee Pain     Rt     HISTORY:  Debbie Lofton is a 65 y.o. female who is seen for right knee pain. ICD-10-CM ICD-9-CM    1. Chronic pain of right knee  M25.561 719.46 NE DRAIN/INJECT LARGE JOINT/BURSA    G89.29 338.29 EUFLEXXA INJECTION PER DOSE      sodium hyaluronate (SUPARTZ FX/HYALGAN/GENIVSC) 10 mg/mL syrg injection   2. Primary osteoarthritis of right knee  M17.11 715.16 NE DRAIN/INJECT LARGE JOINT/BURSA      EUFLEXXA INJECTION PER DOSE      sodium hyaluronate (SUPARTZ FX/HYALGAN/GENIVSC) 10 mg/mL syrg injection       Chart reviewed for the following:   Henrik Jonas MD, have reviewed the History, Physical and updated the Allergic reactions for [facility] performed immediately prior to start of procedure:  Henrik Jonas MD, have performed the following reviews on Debbie Lofton prior to the start of the procedure:            * Patient was identified by name and date of birth   * Agreement on procedure being performed was verified  * Risks and Benefits explained to the patient  * Procedure site verified and marked as necessary  * Patient was positioned for comfort  * Consent was obtained     Time: 11:05 AM    Date of procedure: 7/13/2020    Procedure performed by:  Saundra Interiano MD    Ms. Fox tolerated the procedure well with no complications. PLAN:  After discussing treatment options, patient's right knee was injected with 2 cc of Euflexxa. Ms. Terri Zapata will follow up in one week to complete her visco supplementation injection series.       Scribed by Maisha Terry ([address] [place] [address] [address]) as dictated by Saundra Interiano MD

## 2020-07-20 ENCOUNTER — OFFICE VISIT (OUTPATIENT)
Dept: ORTHOPEDIC SURGERY | Facility: CLINIC | Age: 70
End: 2020-07-20

## 2020-07-20 VITALS
WEIGHT: 206.2 LBS | RESPIRATION RATE: 12 BRPM | DIASTOLIC BLOOD PRESSURE: 62 MMHG | HEIGHT: 62 IN | TEMPERATURE: 96.9 F | SYSTOLIC BLOOD PRESSURE: 133 MMHG | OXYGEN SATURATION: 95 % | BODY MASS INDEX: 37.94 KG/M2 | HEART RATE: 71 BPM

## 2020-07-20 DIAGNOSIS — G89.29 CHRONIC PAIN OF RIGHT KNEE: ICD-10-CM

## 2020-07-20 DIAGNOSIS — M25.561 CHRONIC PAIN OF RIGHT KNEE: ICD-10-CM

## 2020-07-20 DIAGNOSIS — M17.11 PRIMARY OSTEOARTHRITIS OF RIGHT KNEE: Primary | ICD-10-CM

## 2020-07-20 RX ORDER — HYALURONATE SODIUM 10 MG/ML
2 SYRINGE (ML) INTRAARTICULAR ONCE
Qty: 2 ML | Refills: 0
Start: 2020-07-20 | End: 2020-07-20

## 2020-07-20 NOTE — PROGRESS NOTES
Patient: Adeola Toussaint                MRN: [id number]       SSN: [id number]  YOB: 1950        AGE: 65 y.o. SEX: female  Body mass index is 37.71 kg/m². PCP: Saeed Gee MD  07/20/20    Chief Complaint   Patient presents with    Knee Pain     Right     HISTORY:  Adeola Toussaint is a 65 y.o. female who is seen for right knee pain. TIME OUT performed immediately prior to start of procedure:  Renetta Johnson MD, have performed the following reviews on Adeola Toussaint prior to the start of the procedure:            * Patient was identified by name and date of birth   * Agreement on procedure being performed was verified  * Risks and Benefits explained to the patient  * Procedure site verified and marked as necessary  * Patient was positioned for comfort  * Consent was obtained     Time: 11:05 AM     Date of procedure: 7/20/2020    Procedure performed by:  Ann-Marie Shah MD    Ms. Fox tolerated the procedure well with no complications      [id number] ICD-9-CM    1. Primary osteoarthritis of right knee  M17.11 715.16 MO DRAIN/INJECT LARGE JOINT/BURSA      EUFLEXXA INJECTION PER DOSE      sodium hyaluronate (SUPARTZ FX/HYALGAN/GENIVSC) 10 mg/mL syrg injection   2. Chronic pain of right knee  M25.561 719.46 MO DRAIN/INJECT LARGE JOINT/BURSA    G89.29 338.29 EUFLEXXA INJECTION PER DOSE      sodium hyaluronate (SUPARTZ FX/HYALGAN/GENIVSC) 10 mg/mL syrg injection     PLAN:  After discussing treatment options, patient's right knee was injected with 2 cc of Euflexxa. Ms. Panda Lambert will follow up PRN now that she has completed her visco supplementation injection series.       Scribed by Shannon Brambila ([address]) as dictated by Ann-Marie Shah MD

## 2020-10-14 ENCOUNTER — HOSPITAL ENCOUNTER (OUTPATIENT)
Dept: CT IMAGING | Age: 70
Discharge: HOME | End: 2020-10-14
Attending: FAMILY MEDICINE
Payer: MEDICARE

## 2020-10-14 ENCOUNTER — TRANSCRIBE ORDER (OUTPATIENT)
Dept: SCHEDULING | Age: 70
End: 2020-10-14

## 2020-10-14 DIAGNOSIS — R42 DIZZINESS: ICD-10-CM

## 2020-10-14 DIAGNOSIS — G44.52 HEADACHE, NEW DAILY PERSISTENT (NDPH): ICD-10-CM

## 2020-10-14 DIAGNOSIS — G44.52 HEADACHE, NEW DAILY PERSISTENT (NDPH): Primary | ICD-10-CM

## 2020-10-14 PROCEDURE — 70450 CT HEAD/BRAIN W/O DYE: CPT

## 2020-10-30 LAB — MAMMOGRAPHY, EXTERNAL: NORMAL

## 2020-11-13 ENCOUNTER — OFFICE VISIT (OUTPATIENT)
Dept: CARDIOLOGY CLINIC | Age: 70
End: 2020-11-13
Payer: MEDICARE

## 2020-11-13 VITALS
DIASTOLIC BLOOD PRESSURE: 62 MMHG | BODY MASS INDEX: 37.73 KG/M2 | SYSTOLIC BLOOD PRESSURE: 116 MMHG | HEART RATE: 59 BPM | HEIGHT: 62 IN | WEIGHT: 205 LBS | OXYGEN SATURATION: 95 %

## 2020-11-13 DIAGNOSIS — R06.02 SOB (SHORTNESS OF BREATH): ICD-10-CM

## 2020-11-13 DIAGNOSIS — I34.0 NON-RHEUMATIC MITRAL REGURGITATION: ICD-10-CM

## 2020-11-13 DIAGNOSIS — I25.119 CORONARY ARTERY DISEASE INVOLVING NATIVE CORONARY ARTERY OF NATIVE HEART WITH ANGINA PECTORIS (HCC): ICD-10-CM

## 2020-11-13 DIAGNOSIS — I25.119 CORONARY ARTERY DISEASE INVOLVING NATIVE CORONARY ARTERY OF NATIVE HEART WITH ANGINA PECTORIS (HCC): Primary | ICD-10-CM

## 2020-11-13 DIAGNOSIS — E78.5 DYSLIPIDEMIA: ICD-10-CM

## 2020-11-13 PROCEDURE — G8510 SCR DEP NEG, NO PLAN REQD: HCPCS | Performed by: INTERNAL MEDICINE

## 2020-11-13 PROCEDURE — 93000 ELECTROCARDIOGRAM COMPLETE: CPT | Performed by: INTERNAL MEDICINE

## 2020-11-13 PROCEDURE — G8399 PT W/DXA RESULTS DOCUMENT: HCPCS | Performed by: INTERNAL MEDICINE

## 2020-11-13 PROCEDURE — G8536 NO DOC ELDER MAL SCRN: HCPCS | Performed by: INTERNAL MEDICINE

## 2020-11-13 PROCEDURE — G8427 DOCREV CUR MEDS BY ELIG CLIN: HCPCS | Performed by: INTERNAL MEDICINE

## 2020-11-13 PROCEDURE — G8417 CALC BMI ABV UP PARAM F/U: HCPCS | Performed by: INTERNAL MEDICINE

## 2020-11-13 PROCEDURE — 99214 OFFICE O/P EST MOD 30 MIN: CPT | Performed by: INTERNAL MEDICINE

## 2020-11-13 RX ORDER — GUAIFENESIN 600 MG/1
600 TABLET, EXTENDED RELEASE ORAL 2 TIMES DAILY
COMMUNITY

## 2020-11-13 NOTE — PROGRESS NOTES
HISTORY OF PRESENT ILLNESS  Stevenson Granados is a 65 y.o. female. Hypertension   Associated symptoms include shortness of breath. Pertinent negatives include no chest pain, no abdominal pain and no headaches. Follow-up   Associated symptoms include shortness of breath. Pertinent negatives include no chest pain, no abdominal pain and no headaches. Patient presents for a follow-up office visit. The patient had an episode of fairly significant back pain in January 2014 that awoke her from sleep, it radiated around to the front of her chest up into her neck and down both arms associated with tingling and numbness. She subsequently underwent a cardiac catheterization in February 2014, which showed a high-grade stenosis of her mid left circumflex extending into obtuse marginal branch 1. She was continued to complain of exertional dyspnea and some chest pain between her shoulder blades, which is felt to be her anginal equivalent. She was also afraid to do any exertional activity because of her high-grade left circumflex stenosis, so she underwent scheduled percutaneous coronary intervention over her left circumflex lesion with a drug-eluting stent In July 2014. She underwent a followup echocardiogram in April 2016 which showed overall improvement of her well ejection fraction of 55-60% without mention of any regional wall motion abnormalities. Her mitral regurgitation was in the moderate range, which may have been minimally increased compared to the prior study. The patient then underwent a follow-up echocardiogram November 2018 which showed preserved LV systolic function, EF 96-12%, evidence of diastolic dysfunction, mitral valve prolapse with moderate mitral regurgitation, essentially unchanged compared to the study from 2017. Patient was last evaluated via a virtual visit 6 to 7 months ago. She was feeling well at that time.   More recently, she has been noticing increased shortness of breath especially with activity and has been more fatigued and tired than usual.  She first noted the symptoms 3 to 4 months ago. She also has noticed some swelling in both legs, right greater than left. She states she has been having issues with her right knee which did require some injections. She denies any overt chest pain or pressure, no orthopnea or PND. No major change in her weight. No heart palpitations, dizziness, nor syncope. Past Medical History:   Diagnosis Date    Arthritis     CAD (coronary artery disease), native coronary artery 7/2014    ULISES to LCx    Cardiac cath 07/07/2014    RCA 15%. LM patent. mLAD 30-50%. CX/OM1 85% (2.75 x 26-mm Resolute ULISES, resid 0%).  Cardiac echocardiogram 04/05/2016    EF 55-60%. No WMA. Gr 1 DDfx. Mod MR.  Cardiac nuclear imaging test, abnormal 02/05/2014    Mod-sized, mild-mod mid lateral infarction w/mild-mod rosa isela-infarct ischemia. Mod lateral hypk. EF 56%. Normal EKG on pharm stress test.  Intermediate risk.  Chronic kidney disease     denies     Dyslipidemia     Gallstone     Hernia     Kidney stone     Nausea & vomiting     Obesity     PONV (postoperative nausea and vomiting)     Sinus infection       Current Outpatient Medications   Medication Sig Dispense Refill    guaiFENesin ER (Mucinex) 600 mg ER tablet Take 600 mg by mouth two (2) times a day.  vitB6/mag cit,ox/potassium cit (THERALITH XR PO) Take  by mouth.  ezetimibe (ZETIA) 10 mg tablet       RABEprazole (ACIPHEX) 20 mg tablet       diclofenac (VOLTAREN) 1 % gel Apply  to affected area as needed.  cholecalciferol, vitamin D3, (VITAMIN D3) 2,000 unit tab Take  by mouth daily.  folic acid/multivit-min/lutein (CENTRUM SILVER PO) Take  by mouth.  metoprolol succinate (TOPROL-XL) 50 mg XL tablet TAKE 1 TABLET BY MOUTH DAILY 90 Tab 3    rosuvastatin (CRESTOR) 20 mg tablet Take 20 mg by mouth daily.       mometasone 100 mcg/actuation HFAA Take 100 mcg by inhalation daily.  aspirin delayed-release 81 mg tablet Take 162 mg by mouth daily.  montelukast (SINGULAIR) 10 mg tablet Take 10 mg by mouth daily. Allergies   Allergen Reactions    Celebrex [Celecoxib] Swelling      Social History     Tobacco Use    Smoking status: Never Smoker    Smokeless tobacco: Never Used   Substance Use Topics    Alcohol use: No    Drug use: No            Review of Systems   Constitutional: Positive for malaise/fatigue. Negative for chills, fever and weight loss. HENT: Negative for nosebleeds. Eyes: Negative for blurred vision and double vision. Respiratory: Positive for shortness of breath. Negative for cough and wheezing. Cardiovascular: Negative for chest pain, palpitations, orthopnea, claudication, leg swelling and PND. Gastrointestinal: Negative for abdominal pain, heartburn, nausea and vomiting. Genitourinary: Negative for dysuria and hematuria. Musculoskeletal: Negative for falls and myalgias. Skin: Negative for rash. Neurological: Negative for dizziness, focal weakness and headaches. Endo/Heme/Allergies: Does not bruise/bleed easily. Psychiatric/Behavioral: Negative for substance abuse. Visit Vitals  /62 (BP 1 Location: Left arm, BP Patient Position: Sitting)   Pulse (!) 59   Ht 5' 2\" (1.575 m)   Wt 93 kg (205 lb)   SpO2 95%   BMI 37.49 kg/m²      Physical Exam   Constitutional: She is oriented to person, place, and time. She appears well-developed and well-nourished. HENT:   Head: Normocephalic and atraumatic. Eyes: Conjunctivae are normal.   Neck: Neck supple. No JVD present. Carotid bruit is not present. Cardiovascular: Normal rate, regular rhythm, S1 normal, S2 normal and normal pulses. Exam reveals no gallop. Murmur heard. High-pitched blowing holosystolic murmur is present with a grade of 2/6 at the apex. Pulmonary/Chest: Effort normal and breath sounds normal. She has no wheezes.  She has no rales.   Abdominal: Soft. Bowel sounds are normal. There is no abdominal tenderness. Musculoskeletal:         General: No edema. Neurological: She is alert and oriented to person, place, and time. Skin: Skin is warm and dry. EKG: Normal sinus rhythm, Normal axis, Poor R wave progression, Normal QTc interval, No ST or T wave abnormalities concerning for ischemia. Compared to previous EKG, No significant interval change. ASSESSMENT and PLAN    Dyspnea on exertion with increased fatigue. This may be her anginal equivalent, so I have recommended a pharmacologic nuclear stress test for further evaluation. Would also like to reevaluate her mitral valve with a follow-up echocardiogram.    Coronary artery disease. The patient had a 85-90% stenosis of her mid left circumflex extending into OM1 diagnosed by cardiac catheterization in February 2014. She underwent scheduled PCI In July 2014, with a Resolute drug-eluting stent. Her symptoms improved following the intervention. I suspect she did have a small infarction of the inferolateral region in January 2014. Ejection fraction is now back to normal by echocardiogram in 2018, EF 60%. She is now taking an aspirin, beta blocker and high-dose potent statin. All of which I would continue. She is complaining of possible anginal symptoms as described above which will be evaluate with a repeat stress test.    Mitral regurgitation. Non-rheumatic, more likely ischemic versus myxomatous in nature. This was moderate on her recent follow-up echocardiogram from November 2018. I have recommended reevaluating for worsening disease with a follow-up echocardiogram.    Dyslipidemia. Patient is now being managed with both rosuvastatin and Zetia. This is being followed by her PCP. I prefer her LDL to be less than 70 if possible. Followup in 6 months, sooner if needed.

## 2020-11-13 NOTE — PROGRESS NOTES
Hugo Sherman presents today for   Chief Complaint   Patient presents with    Follow-up     7 month follow up since [address] preferred language for health care discussion is english/other. Is someone accompanying this pt? no    Is the patient using any DME equipment during [address]? no    Depression Screening:  3 most recent PHQ Screens 11/13/2020   PHQ Not Done -   Little interest or pleasure in doing things Not at all   Feeling down, depressed, irritable, or hopeless Not at all   Total Score PHQ 2 0       Learning Assessment:  Learning Assessment 11/13/2020   PRIMARY LEARNER Patient   HIGHEST LEVEL OF EDUCATION - PRIMARY LEARNER  -   BARRIERS PRIMARY LEARNER -   [facility]    NEED -   LEARNER PREFERENCE PRIMARY DEMONSTRATION   LEARNING SPECIAL TOPICS -   ANSWERED BY patient   RELATIONSHIP SELF       Abuse Screening:  Abuse Screening Questionnaire 11/13/2020   Do you ever feel afraid of your partner? N   Are you in a relationship with someone who physically or mentally threatens you? N   Is it safe for you to go home? Y       Fall Risk  Fall Risk Assessment, last 12 mths 11/13/2020   Able to walk? Yes   Fall in past 12 months? No       Pt currently taking Anticoagulant therapy? no    Coordination of Care:  1. Have you been to the ER, urgent care clinic since your last visit? Hospitalized since your last visit? no    2. Have you seen or consulted any other health care providers outside of the [address] [place] [address] since your last visit? Include any pap smears or colon screening.  no

## 2020-11-16 ENCOUNTER — TELEPHONE (OUTPATIENT)
Dept: CARDIOLOGY CLINIC | Age: 70
End: 2020-11-16

## 2020-11-17 LAB
ECHO LA AREA 4C: 24.28 CM2
ECHO LA VOL 2C: 50.41 ML (ref 22–52)
ECHO LA VOL 4C: 76.55 ML (ref 22–52)
ECHO LA VOL BP: 52.02 ML (ref 22–52)
ECHO LA VOL BP: 66.54 ML (ref 22–52)
ECHO LA VOLUME INDEX A2C: 26.1 ML/M2 (ref 16–28)
ECHO LA VOLUME INDEX A4C: 39.63 ML/M2 (ref 16–28)
ECHO LV E' LATERAL VELOCITY: 9.23 CM/S
ECHO LV E' SEPTAL VELOCITY: 7.35 CM/S
ECHO LV INTERNAL DIMENSION DIASTOLIC: 4.61 CM (ref 3.9–5.3)
ECHO LV INTERNAL DIMENSION SYSTOLIC: 3.64 CM
ECHO LV IVSD: 1.04 CM (ref 0.6–0.9)
ECHO LV MASS 2D: 177.2 G (ref 67–162)
ECHO LV MASS INDEX 2D: 91.8 G/M2 (ref 43–95)
ECHO LV POSTERIOR WALL DIASTOLIC: 1.12 CM (ref 0.6–0.9)
ECHO LVOT PEAK GRADIENT: 5.67 MMHG
ECHO LVOT PEAK VELOCITY: 119.03 CM/S
ECHO LVOT VTI: 28.26 CM
ECHO MV A VELOCITY: 100.24 CM/S
ECHO MV AREA PISA: 0.22 CM2
ECHO MV E DECELERATION TIME (DT): 117.29 MS
ECHO MV E VELOCITY: 113.63 CM/S
ECHO MV E/A RATIO: 1.13
ECHO MV E/E' LATERAL: 12.31
ECHO MV E/E' RATIO (AVERAGED): 13.89
ECHO MV E/E' SEPTAL: 15.46
ECHO MV REGURGITANT RADIUS PISA: 0.83 CM
ECHO MV REGURGITANT VOLUME: 46.91 ML
ECHO MV REGURGITANT VTIA: 210.83 CM
ECHO PV REGURGITANT MAX VELOCITY: 202.86 CM/S
ECHO PV REGURGITANT MAX VELOCITY: 601.45 CM/S
ECHO RV TAPSE: 2.43 CM (ref 1.5–2)
ECHO TV REGURGITANT PEAK GRADIENT: 16.46 MMHG
LVOT MG: 3.02 MMHG
STRESS BASELINE DIAS BP: 70 MMHG
STRESS BASELINE HR: 58 BPM
STRESS BASELINE SYS BP: 120 MMHG
STRESS PEAK DIAS BP: 60 MMHG
STRESS PEAK SYS BP: 100 MMHG
STRESS PERCENT HR ACHIEVED: 61 %
STRESS POST PEAK HR: 90 BPM
STRESS RATE PRESSURE PRODUCT: 9000 BPM*MMHG
STRESS ST DEPRESSION: 0 MM
STRESS ST ELEVATION: 0 MM
STRESS STAGE 1 BP: NORMAL MMHG
STRESS STAGE 1 DURATION: 3 MIN:SEC
STRESS STAGE 1 HR: 90 BPM
STRESS STAGE RECOVERY 1 BP: NORMAL MMHG
STRESS STAGE RECOVERY 1 DURATION: 1 MIN:SEC
STRESS STAGE RECOVERY 1 HR: 79 BPM
STRESS TARGET HR: 148 BPM

## 2020-11-17 NOTE — PROGRESS NOTES
Per your last note \" Dyspnea on exertion with increased fatigue. This may be her anginal equivalent, so I have recommended a pharmacologic nuclear stress test for further evaluation. Would also like to reevaluate her mitral valve with a follow-up echocardiogram.     Coronary artery disease. The patient had a 85-90% stenosis of her mid left circumflex extending into OM1 diagnosed by cardiac catheterization in February 2014. She underwent scheduled PCI In July 2014, with a Resolute drug-eluting stent. Her symptoms improved following the intervention. I suspect she did have a small infarction of the inferolateral region in January 2014. Ejection fraction is now back to normal by echocardiogram in 2018, EF 60%. She is now taking an aspirin, beta blocker and high-dose potent statin. All of which I would continue.   She is complaining of possible anginal symptoms as described above which will be evaluate with a repeat stress test.

## 2021-01-26 ENCOUNTER — TRANSCRIBE ORDER (OUTPATIENT)
Dept: SCHEDULING | Age: 71
End: 2021-01-26

## 2021-01-26 DIAGNOSIS — J32.2 CHRONIC ETHMOIDAL SINUSITIS: Primary | ICD-10-CM

## 2021-01-28 ENCOUNTER — HOSPITAL ENCOUNTER (OUTPATIENT)
Dept: CT IMAGING | Age: 71
Discharge: HOME | End: 2021-01-28
Payer: MEDICARE

## 2021-01-28 DIAGNOSIS — J32.2 CHRONIC ETHMOIDAL SINUSITIS: ICD-10-CM

## 2021-01-28 PROCEDURE — 70486 CT MAXILLOFACIAL W/O DYE: CPT

## 2021-02-18 ENCOUNTER — TRANSCRIBE ORDER (OUTPATIENT)
Dept: SCHEDULING | Age: 71
End: 2021-02-18

## 2021-04-02 PROBLEM — R05.3 CHRONIC COUGH: Status: ACTIVE | Noted: 2021-04-02

## 2021-04-02 PROBLEM — H81.10 BENIGN POSITIONAL VERTIGO: Status: ACTIVE | Noted: 2021-04-02

## 2021-04-02 PROBLEM — J31.0 CHRONIC RHINITIS: Status: ACTIVE | Noted: 2021-04-02

## 2021-04-05 ENCOUNTER — OFFICE VISIT (OUTPATIENT)
Dept: INTERNAL MEDICINE CLINIC | Age: 71
End: 2021-04-05
Payer: MEDICARE

## 2021-04-05 VITALS
RESPIRATION RATE: 12 BRPM | HEART RATE: 66 BPM | DIASTOLIC BLOOD PRESSURE: 55 MMHG | BODY MASS INDEX: 37.21 KG/M2 | TEMPERATURE: 97.7 F | SYSTOLIC BLOOD PRESSURE: 133 MMHG | HEIGHT: 62 IN | WEIGHT: 202.2 LBS | OXYGEN SATURATION: 96 %

## 2021-04-05 DIAGNOSIS — I25.10 CORONARY ARTERY DISEASE INVOLVING NATIVE CORONARY ARTERY OF NATIVE HEART WITHOUT ANGINA PECTORIS: ICD-10-CM

## 2021-04-05 DIAGNOSIS — E78.5 DYSLIPIDEMIA: Primary | ICD-10-CM

## 2021-04-05 DIAGNOSIS — N20.0 NEPHROLITHIASIS: ICD-10-CM

## 2021-04-05 DIAGNOSIS — K58.9 IRRITABLE BOWEL SYNDROME, UNSPECIFIED TYPE: ICD-10-CM

## 2021-04-05 DIAGNOSIS — R10.9 ACUTE RIGHT FLANK PAIN: ICD-10-CM

## 2021-04-05 LAB
BILIRUB UR QL STRIP: NORMAL
GLUCOSE UR-MCNC: NEGATIVE MG/DL
KETONES P FAST UR STRIP-MCNC: NEGATIVE MG/DL
PH UR STRIP: 5.5 [PH] (ref 4.6–8)
PROT UR QL STRIP: NORMAL
SP GR UR STRIP: 1.03 (ref 1–1.03)
UA UROBILINOGEN AMB POC: NORMAL (ref 0.2–1)
URINALYSIS CLARITY POC: NORMAL
URINALYSIS COLOR POC: NORMAL
URINE BLOOD POC: NEGATIVE
URINE LEUKOCYTES POC: NEGATIVE
URINE NITRITES POC: NEGATIVE

## 2021-04-05 PROCEDURE — G0463 HOSPITAL OUTPT CLINIC VISIT: HCPCS | Performed by: INTERNAL MEDICINE

## 2021-04-05 PROCEDURE — 99215 OFFICE O/P EST HI 40 MIN: CPT | Performed by: INTERNAL MEDICINE

## 2021-04-05 PROCEDURE — 81002 URINALYSIS NONAUTO W/O SCOPE: CPT | Performed by: INTERNAL MEDICINE

## 2021-04-05 RX ORDER — DESLORATADINE 5 MG/1
5 TABLET ORAL
COMMUNITY
End: 2021-08-31

## 2021-04-05 RX ORDER — PANTOPRAZOLE SODIUM 40 MG/1
40 TABLET, DELAYED RELEASE ORAL DAILY
COMMUNITY
End: 2021-05-11

## 2021-04-05 RX ORDER — ROSUVASTATIN CALCIUM 20 MG/1
20 TABLET, COATED ORAL DAILY
Qty: 90 TAB | Refills: 3 | Status: CANCELLED | OUTPATIENT
Start: 2021-04-05

## 2021-04-05 RX ORDER — DICYCLOMINE HYDROCHLORIDE 10 MG/1
10 CAPSULE ORAL 4 TIMES DAILY
Qty: 90 CAP | Refills: 3 | Status: CANCELLED | OUTPATIENT
Start: 2021-04-05

## 2021-04-05 RX ORDER — ROSUVASTATIN CALCIUM 40 MG/1
40 TABLET, COATED ORAL DAILY
Qty: 90 TAB | Refills: 3 | Status: SHIPPED | OUTPATIENT
Start: 2021-04-05 | End: 2021-08-31 | Stop reason: SDUPTHER

## 2021-04-05 RX ORDER — METOPROLOL SUCCINATE 50 MG/1
TABLET, EXTENDED RELEASE ORAL
Qty: 90 TAB | Refills: 3 | Status: SHIPPED | OUTPATIENT
Start: 2021-04-05 | End: 2021-08-31 | Stop reason: SDUPTHER

## 2021-04-05 RX ORDER — DEXTROMETHORPHAN HYDROBROMIDE, GUAIFENESIN 5; 100 MG/5ML; MG/5ML
650 LIQUID ORAL EVERY 8 HOURS
COMMUNITY

## 2021-04-05 RX ORDER — DICYCLOMINE HYDROCHLORIDE 10 MG/1
10 CAPSULE ORAL
COMMUNITY
End: 2021-04-05 | Stop reason: SDUPTHER

## 2021-04-05 RX ORDER — METOPROLOL SUCCINATE 50 MG/1
TABLET, EXTENDED RELEASE ORAL
Qty: 90 TAB | Refills: 3 | Status: CANCELLED | OUTPATIENT
Start: 2021-04-05

## 2021-04-05 RX ORDER — DICYCLOMINE HYDROCHLORIDE 10 MG/1
10 CAPSULE ORAL
Qty: 90 CAP | Refills: 2 | Status: SHIPPED | OUTPATIENT
Start: 2021-04-05 | End: 2022-01-31 | Stop reason: SDUPTHER

## 2021-04-05 NOTE — PROGRESS NOTES
HPI     Jamar Soriano is a 65 y.o. female complaining of three weeks of R posterior flank, comes and goes, (+) colicky. (+) hx of nephrolithiasis. Had sinus balloon procedure by dR. Vargas, helped chronic congestion and postnasal drip. For her back, using Tylenol, Voltaren gel, and heat or ice, seems to help some. Not complete typical for her neprholithiasis pain in past in that site of pain has not moved. (+) radiates to RLQ    Past Medical History:   Diagnosis Date    Age-related osteoporosis without current pathological fracture 4/5/2016    Arthritis     Benign positional vertigo 4/2/2021    CAD (coronary artery disease), native coronary artery 7/2014    ULISES to LCx    Cardiac echocardiogram 04/05/2016    EF 55-60%. No WMA. Gr 1 DDfx. Mod MR.  Cardiac nuclear imaging test, abnormal 02/05/2014    Mod-sized, mild-mod mid lateral infarction w/mild-mod rosa isela-infarct ischemia. Mod lateral hypk. EF 56%. Normal EKG on pharm stress test.  Intermediate risk.  Chronic cough 4/2/2021    Chronic rhinitis 4/2/2021    Dyslipidemia     Fatty pancreas 4/5/2016    On imaging    Hernia     Nephrolithiasis         Past Surgical History:   Procedure Laterality Date    HX CHOLECYSTECTOMY      HX CORONARY STENT PLACEMENT  07/07/2014    HX HEART CATHETERIZATION      HX HERNIA REPAIR      HX LYSIS OF ADHESIONS      HX OOPHORECTOMY Right 12/05/2018    HX OTHER SURGICAL  11/2018    repair of anal fissure    NE REMOVAL OF ANAL FISSURE  11/02/2018    REMOVAL OF KIDNEY STONE  12/28/2018        Current Outpatient Medications   Medication Sig Dispense Refill    pantoprazole (PROTONIX) 40 mg tablet Take 40 mg by mouth daily.  desloratadine (CLARINEX) 5 mg tablet Take 5 mg by mouth.  acetaminophen (Tylenol Arthritis Pain) 650 mg TbER Take 650 mg by mouth every eight (8) hours.  ezetimibe (ZETIA) 10 mg tablet Take 10 mg by mouth daily.       diclofenac (VOLTAREN) 1 % gel Apply  to affected area as needed.  cholecalciferol, vitamin D3, (VITAMIN D3) 2,000 unit tab Take  by mouth daily.  folic acid/multivit-min/lutein (CENTRUM SILVER PO) Take  by mouth.  aspirin delayed-release 81 mg tablet Take 162 mg by mouth daily.  montelukast (SINGULAIR) 10 mg tablet Take 10 mg by mouth daily.  dicyclomine (BENTYL) 10 mg capsule Take 1 Cap by mouth four (4) times daily as needed for Abdominal Cramps. 90 Cap 2    metoprolol succinate (TOPROL-XL) 50 mg XL tablet TAKE 1 TABLET BY MOUTH DAILY 90 Tab 3    rosuvastatin (CRESTOR) 40 mg tablet Take 1 Tab by mouth daily. 90 Tab 3    guaiFENesin ER (Mucinex) 600 mg ER tablet Take 600 mg by mouth two (2) times a day.  vitB6/mag cit,ox/potassium cit (THERALITH XR PO) Take  by mouth.  RABEprazole (ACIPHEX) 20 mg tablet       mometasone 100 mcg/actuation HFAA Take 100 mcg by inhalation daily.           Allergies   Allergen Reactions    Celebrex [Celecoxib] Swelling        Social History     Socioeconomic History    Marital status:      Spouse name: Not on file    Number of children: Not on file    Years of education: Not on file    Highest education level: Not on file   Occupational History    Not on file   Social Needs    Financial resource strain: Not on file    Food insecurity     Worry: Not on file     Inability: Not on file    Transportation needs     Medical: Not on file     Non-medical: Not on file   Tobacco Use    Smoking status: Never Smoker    Smokeless tobacco: Never Used   Substance and Sexual Activity    Alcohol use: No    Drug use: No    Sexual activity: Yes     Partners: Male     Birth control/protection: None   Lifestyle    Physical activity     Days per week: Not on file     Minutes per session: Not on file    Stress: Not on file   Relationships    Social connections     Talks on phone: Not on file     Gets together: Not on file     Attends Buddhist service: Not on file     Active member of club or organization: Not on file     Attends meetings of clubs or organizations: Not on file     Relationship status: Not on file    Intimate partner violence     Fear of current or ex partner: Not on file     Emotionally abused: Not on file     Physically abused: Not on file     Forced sexual activity: Not on file   Other Topics Concern    Not on file   Social History Narrative    Not on file        Family History   Problem Relation Age of Onset    Hypertension Mother     Atrial Fibrillation Mother     Cancer Father         colon, breast, throat,  at 72    Heart Disease Maternal Grandmother     Diabetes Paternal Grandmother            Visit Vitals  BP (!) 133/55 (BP 1 Location: Left upper arm, BP Patient Position: Sitting)   Pulse 66   Temp 97.7 °F (36.5 °C) (Oral)   Resp 12   Ht 5' 2\" (1.575 m)   Wt 202 lb 3.2 oz (91.7 kg)   SpO2 96%   BMI 36.98 kg/m²        Review of Systems   Constitutional: Negative for fever. Eyes: Negative for blurred vision. Respiratory: Negative for shortness of breath. Cardiovascular: Negative for chest pain. Gastrointestinal: Negative for blood in stool. Genitourinary: Negative for hematuria. No vaginal bleeding. Neurological: Negative for headaches. Psychiatric/Behavioral: Negative for depression. Physical Exam  Vitals signs reviewed. Constitutional:       General: She is not in acute distress. Appearance: She is obese. Eyes:      General: No scleral icterus. Conjunctiva/sclera: Conjunctivae normal.   Neck:      Musculoskeletal: Neck supple. Cardiovascular:      Rate and Rhythm: Normal rate and regular rhythm. Pulses: Normal pulses. Heart sounds: Murmur present. No friction rub. No gallop. Comments: (+) II/VI systolic murmur, old per pt. Pulmonary:      Effort: Pulmonary effort is normal.      Breath sounds: Normal breath sounds. Comments: Speaks ull sentences easily. Abdominal:      General: Abdomen is flat. Palpations: Abdomen is soft. There is no mass. Tenderness: There is no abdominal tenderness. Genitourinary:     Comments: (+) right CVA tenderness, none on left  Musculoskeletal:         General: No swelling or tenderness. Skin:     General: Skin is warm and dry. Comments: No rash. Neurological:      Mental Status: She is alert and oriented to person, place, and time. Psychiatric:         Mood and Affect: Mood normal.                  Diagnoses and all orders for this visit:    1. Dyslipidemia    2. Nephrolithiasis    3. Acute right flank pain    4. Irritable bowel syndrome, unspecified type    5. Coronary artery disease involving native coronary artery of native heart without angina pectoris    Other orders  -     rosuvastatin (Crestor) 20 mg tablet; Take 1 Tab by mouth daily. lipids-- refill med, goal LDL under 70 with CAD, check with next labs    2) R flank pain--check helical CT abd, pelvis, rule out stone. Flomax if small stone, to urology if large. 3) CAD-- continue beta blocker and statin    4) IBS--dycyclomine effective for PRN use. 5)  Nephrolithiasis-- hx of, check CTs, rule out recurrence with R flank pain    Time spent on encounter in minutes    Records review-- 4 minutes  Precharting-- 5 minutes  Review of PMH, fam hx, soc hx with pt--12 minutes  ROS-- 11 minutes  Exam 6 minutes  Ordering of meds and tests-- 4 minutes        Follow-up and Dispositions    · Return in about 6 months (around 10/5/2021) for 30 minute wellness visit.               Katelyn Perez MD

## 2021-04-05 NOTE — PROGRESS NOTES
1. Have you been to the ER, urgent care clinic or hospitalized since your last visit? YES, [facility] 3/2021           2. Have you seen or consulted any other health care providers outside of the [address] [place] [address] since your last visit (Include any pap smears or colon screening)? YES, Dr. Siva Stark [facility]    Do you have an Advanced Directive? YES    Would you like information on Advanced Directives?  NO

## 2021-04-06 ENCOUNTER — HOSPITAL ENCOUNTER (OUTPATIENT)
Dept: CT IMAGING | Age: 71
Discharge: HOME | End: 2021-04-06
Attending: INTERNAL MEDICINE
Payer: MEDICARE

## 2021-04-06 DIAGNOSIS — R10.9 ACUTE RIGHT FLANK PAIN: ICD-10-CM

## 2021-04-06 DIAGNOSIS — N20.0 NEPHROLITHIASIS: ICD-10-CM

## 2021-04-06 PROCEDURE — 74176 CT ABD & PELVIS W/O CONTRAST: CPT

## 2021-04-06 RX ORDER — MONTELUKAST SODIUM 10 MG/1
10 TABLET ORAL DAILY
Status: CANCELLED | OUTPATIENT
Start: 2021-04-06

## 2021-04-07 ENCOUNTER — TELEPHONE (OUTPATIENT)
Dept: INTERNAL MEDICINE CLINIC | Age: 71
End: 2021-04-07

## 2021-04-07 DIAGNOSIS — M54.50 ACUTE RIGHT-SIDED LOW BACK PAIN WITHOUT SCIATICA: Primary | ICD-10-CM

## 2021-04-07 DIAGNOSIS — J31.0 CHRONIC RHINITIS: Primary | ICD-10-CM

## 2021-04-07 RX ORDER — MONTELUKAST SODIUM 10 MG/1
10 TABLET ORAL DAILY
Qty: 90 TAB | Refills: 3 | Status: SHIPPED | OUTPATIENT
Start: 2021-04-07 | End: 2022-01-26 | Stop reason: SDUPTHER

## 2021-04-07 NOTE — TELEPHONE ENCOUNTER
I put in an order for xray of lumbosacral spine for her to do, this week if she can, and i'll let her know results

## 2021-04-07 NOTE — TELEPHONE ENCOUNTER
Last Visit: 4/5/2021 with MD Ebony Falcon Next Appointment: 8/31/2021 with MD Ebony Falcon Requested Prescriptions Pending Prescriptions Disp Refills  montelukast (Singulair) 10 mg tablet Sig: Take 1 Tab by mouth daily.

## 2021-04-07 NOTE — TELEPHONE ENCOUNTER
CT scans show NO kidney stones, so either she passed one, or it is musculoskeletal-- continue heat and/ or ice, Tylneol, topical Voltaren gel ( OTC now), and let me know if not improving

## 2021-04-07 NOTE — TELEPHONE ENCOUNTER
Pt stated she has tried all of those methods and nothing has helped. She stated she is still in pain.

## 2021-04-08 ENCOUNTER — HOSPITAL ENCOUNTER (OUTPATIENT)
Dept: GENERAL RADIOLOGY | Age: 71
Discharge: HOME | End: 2021-04-08
Payer: MEDICARE

## 2021-04-08 ENCOUNTER — TELEPHONE (OUTPATIENT)
Dept: INTERNAL MEDICINE CLINIC | Age: 71
End: 2021-04-08

## 2021-04-08 DIAGNOSIS — M54.50 ACUTE RIGHT-SIDED LOW BACK PAIN WITHOUT SCIATICA: ICD-10-CM

## 2021-04-08 PROCEDURE — 72100 X-RAY EXAM L-S SPINE 2/3 VWS: CPT

## 2021-04-08 NOTE — TELEPHONE ENCOUNTER
Her spine xray shows a lot of arthritis, especially at the lowest part of her back, looks like there is no cartilage between to of the vertebrae, and some slippage of one vertebra along another-- I think this is cause of her pain. Options are physical therapy, pain med such as tramadol, and / or med that blocks pinched nerve pain (gabapentin). Let me know which one or ones she prefers.

## 2021-04-12 DIAGNOSIS — R10.9 ACUTE RIGHT FLANK PAIN: ICD-10-CM

## 2021-04-12 DIAGNOSIS — N20.0 NEPHROLITHIASIS: ICD-10-CM

## 2021-05-11 ENCOUNTER — OFFICE VISIT (OUTPATIENT)
Dept: CARDIOLOGY CLINIC | Age: 71
End: 2021-05-11
Payer: MEDICARE

## 2021-05-11 VITALS
DIASTOLIC BLOOD PRESSURE: 64 MMHG | OXYGEN SATURATION: 96 % | HEART RATE: 59 BPM | WEIGHT: 202 LBS | HEIGHT: 62 IN | BODY MASS INDEX: 37.17 KG/M2 | SYSTOLIC BLOOD PRESSURE: 116 MMHG

## 2021-05-11 DIAGNOSIS — I25.119 CORONARY ARTERY DISEASE INVOLVING NATIVE CORONARY ARTERY OF NATIVE HEART WITH ANGINA PECTORIS (HCC): Primary | ICD-10-CM

## 2021-05-11 DIAGNOSIS — I34.0 NON-RHEUMATIC MITRAL REGURGITATION: ICD-10-CM

## 2021-05-11 DIAGNOSIS — E78.5 DYSLIPIDEMIA: ICD-10-CM

## 2021-05-11 DIAGNOSIS — R06.02 SOB (SHORTNESS OF BREATH): ICD-10-CM

## 2021-05-11 PROCEDURE — G9899 SCRN MAM PERF RSLTS DOC: HCPCS | Performed by: INTERNAL MEDICINE

## 2021-05-11 PROCEDURE — 99214 OFFICE O/P EST MOD 30 MIN: CPT | Performed by: INTERNAL MEDICINE

## 2021-05-11 PROCEDURE — G8510 SCR DEP NEG, NO PLAN REQD: HCPCS | Performed by: INTERNAL MEDICINE

## 2021-05-11 PROCEDURE — G8427 DOCREV CUR MEDS BY ELIG CLIN: HCPCS | Performed by: INTERNAL MEDICINE

## 2021-05-11 PROCEDURE — 93000 ELECTROCARDIOGRAM COMPLETE: CPT | Performed by: INTERNAL MEDICINE

## 2021-05-11 PROCEDURE — G8417 CALC BMI ABV UP PARAM F/U: HCPCS | Performed by: INTERNAL MEDICINE

## 2021-05-11 PROCEDURE — 1090F PRES/ABSN URINE INCON ASSESS: CPT | Performed by: INTERNAL MEDICINE

## 2021-05-11 PROCEDURE — G8536 NO DOC ELDER MAL SCRN: HCPCS | Performed by: INTERNAL MEDICINE

## 2021-05-11 PROCEDURE — 1101F PT FALLS ASSESS-DOCD LE1/YR: CPT | Performed by: INTERNAL MEDICINE

## 2021-05-11 PROCEDURE — 3017F COLORECTAL CA SCREEN DOC REV: CPT | Performed by: INTERNAL MEDICINE

## 2021-05-11 NOTE — PROGRESS NOTES
Rockne Galeazzi presents today for   Chief Complaint   Patient presents with    Follow-up     6 month follow up        Rockne Galeazzi preferred language for health care discussion is english/other. Is someone accompanying this pt? no    Is the patient using any DME equipment during [address]? no    Depression Screening:  3 most recent PHQ Screens 5/11/2021   PHQ Not Done -   Little interest or pleasure in doing things Not at all   Feeling down, depressed, irritable, or hopeless Not at all   Total Score PHQ 2 0       Learning Assessment:  Learning Assessment 5/11/2021   PRIMARY LEARNER Patient   HIGHEST LEVEL OF EDUCATION - PRIMARY LEARNER  -   BARRIERS PRIMARY LEARNER -   [facility]    NEED -   LEARNER PREFERENCE PRIMARY DEMONSTRATION   LEARNING SPECIAL TOPICS -   ANSWERED BY patient   RELATIONSHIP SELF       Abuse Screening:  Abuse Screening Questionnaire 5/11/2021   Do you ever feel afraid of your partner? N   Are you in a relationship with someone who physically or mentally threatens you? N   Is it safe for you to go home? Y       Fall Risk  Fall Risk Assessment, last 12 mths 5/11/2021   Able to walk? Yes   Fall in past 12 months? 0   Do you feel unsteady? 0   Are you worried about falling 0           Pt currently taking Anticoagulant therapy? no    Pt currently taking Antiplatelet therapy ? ASA 81 mg once a day      Coordination of Care:  1. Have you been to the ER, urgent care clinic since your last visit? Hospitalized since your last visit? no    2. Have you seen or consulted any other health care providers outside of the [address] [place] [address] since your last visit? Include any pap smears or colon screening.  no

## 2021-05-11 NOTE — PROGRESS NOTES
HISTORY OF PRESENT ILLNESS  Marciano Malone is a 65 y.o. female. Follow-up  Associated symptoms include shortness of breath. Pertinent negatives include no chest pain, no abdominal pain and no headaches. Hypertension  Associated symptoms include shortness of breath. Pertinent negatives include no chest pain, no abdominal pain and no headaches. Patient presents for a follow-up office visit. The patient had an episode of fairly significant back pain in January 2014 that awoke her from sleep, it radiated around to the front of her chest up into her neck and down both arms associated with tingling and numbness. She subsequently underwent a cardiac catheterization in February 2014, which showed a high-grade stenosis of her mid left circumflex extending into obtuse marginal branch 1. She was continued to complain of exertional dyspnea and some chest pain between her shoulder blades, which is felt to be her anginal equivalent. She was also afraid to do any exertional activity because of her high-grade left circumflex stenosis, so she underwent scheduled percutaneous coronary intervention over her left circumflex lesion with a drug-eluting stent In July 2014. She underwent a followup echocardiogram in April 2016 which showed overall improvement of her well ejection fraction of 55-60% without mention of any regional wall motion abnormalities. Her mitral regurgitation was in the moderate range, which may have been minimally increased compared to the prior study. The patient then underwent a follow-up echocardiogram November 2018 which showed preserved LV systolic function, EF 79-22%, evidence of diastolic dysfunction, mitral valve prolapse with moderate mitral regurgitation, essentially unchanged compared to the study from 2017. Patient subsequently underwent another echocardiogram and pharmacologic nuclear stress test in November 2020 to evaluate increased dyspnea on exertion.   Her echocardiogram was essentially unchanged showing preserved LV function, EF 60 to 87%, diastolic dysfunction, mild left atrial enlargement, posterior prolapse of her mitral valve leaflet with moderate mitral regurgitation. Her nuclear stress test showed preserved LV function with a fixed inferolateral perfusion defect and hypokinesis of the inferolateral wall consistent with her known coronary anatomy. She was last seen in the office 6 months ago. Since that time she states her activity tolerance has not significantly changed. She still will get short of breath with physical activity but is no worse than it was at last visit. She denies any orthopnea, PND or leg swelling. She has lost a few pounds of weight intentionally. Past Medical History:   Diagnosis Date    Age-related osteoporosis without current pathological fracture 4/5/2016    Arthritis     Benign positional vertigo 4/2/2021    CAD (coronary artery disease), native coronary artery 7/2014    ULISES to LCx    Cardiac echocardiogram 04/05/2016    EF 55-60%. No WMA. Gr 1 DDfx. Mod MR.  Cardiac nuclear imaging test, abnormal 02/05/2014    Mod-sized, mild-mod mid lateral infarction w/mild-mod rosa isela-infarct ischemia. Mod lateral hypk. EF 56%. Normal EKG on pharm stress test.  Intermediate risk.  Chronic cough 4/2/2021    Chronic rhinitis 4/2/2021    Dyslipidemia     Fatty pancreas 4/5/2016    On imaging    Hernia     Nephrolithiasis       Current Outpatient Medications   Medication Sig Dispense Refill    montelukast (Singulair) 10 mg tablet Take 1 Tab by mouth daily. 90 Tab 3    desloratadine (CLARINEX) 5 mg tablet Take 5 mg by mouth.  acetaminophen (Tylenol Arthritis Pain) 650 mg TbER Take 650 mg by mouth every eight (8) hours.  dicyclomine (BENTYL) 10 mg capsule Take 1 Cap by mouth four (4) times daily as needed for Abdominal Cramps.  90 Cap 2    metoprolol succinate (TOPROL-XL) 50 mg XL tablet TAKE 1 TABLET BY MOUTH DAILY 90 Tab 3    rosuvastatin (CRESTOR) 40 mg tablet Take 1 Tab by mouth daily. 90 Tab 3    guaiFENesin ER (Mucinex) 600 mg ER tablet Take 600 mg by mouth two (2) times a day.  ezetimibe (ZETIA) 10 mg tablet Take 10 mg by mouth daily.  diclofenac (VOLTAREN) 1 % gel Apply  to affected area as needed.  cholecalciferol, vitamin D3, (VITAMIN D3) 2,000 unit tab Take  by mouth daily.  folic acid/multivit-min/lutein (CENTRUM SILVER PO) Take  by mouth.  aspirin delayed-release 81 mg tablet Take 162 mg by mouth daily. Allergies   Allergen Reactions    Celebrex [Celecoxib] Swelling      Social History     Tobacco Use    Smoking status: Never Smoker    Smokeless tobacco: Never Used   Substance Use Topics    Alcohol use: No    Drug use: No            Review of Systems   Constitutional: Negative for chills, fever, malaise/fatigue and weight loss. HENT: Negative for nosebleeds. Eyes: Negative for blurred vision and double vision. Respiratory: Positive for shortness of breath. Negative for cough and wheezing. Cardiovascular: Negative for chest pain, palpitations, orthopnea, claudication, leg swelling and PND. Gastrointestinal: Negative for abdominal pain, heartburn, nausea and vomiting. Genitourinary: Negative for dysuria and hematuria. Musculoskeletal: Negative for falls and myalgias. Skin: Negative for rash. Neurological: Negative for dizziness, focal weakness and headaches. Endo/Heme/Allergies: Does not bruise/bleed easily. Psychiatric/Behavioral: Negative for substance abuse. Visit Vitals  /64 (BP 1 Location: Left upper arm, BP Patient Position: Sitting, BP Cuff Size: Small adult)   Pulse (!) 59   Ht 5' 2\" (1.575 m)   Wt 91.6 kg (202 lb)   SpO2 96%   BMI 36.95 kg/m²      Physical Exam   Constitutional: She is oriented to person, place, and time. She appears well-developed and well-nourished. HENT:   Head: Normocephalic and atraumatic.    Eyes: Conjunctivae are normal.   Neck: Neck supple. No JVD present. Carotid bruit is not present. Cardiovascular: Normal rate, regular rhythm, S1 normal, S2 normal and normal pulses. Exam reveals no gallop. Murmur heard. High-pitched blowing holosystolic murmur is present with a grade of 2/6 at the apex. Pulmonary/Chest: Effort normal and breath sounds normal. She has no wheezes. She has no rales. Abdominal: Soft. Bowel sounds are normal. There is no abdominal tenderness. Musculoskeletal:         General: No edema. Neurological: She is alert and oriented to person, place, and time. Skin: Skin is warm and dry. EKG: Normal sinus rhythm, Normal axis, Poor R wave progression, Normal QTc interval, No ST or T wave abnormalities concerning for ischemia. Compared to previous EKG, No significant interval change. ASSESSMENT and PLAN    Coronary artery disease. The patient had a 85-90% stenosis of her mid left circumflex extending into OM1 diagnosed by cardiac catheterization in February 2014. She underwent scheduled PCI In July 2014, with a Resolute drug-eluting stent. Her symptoms improved following the intervention. I suspect she did have a small infarction of the inferolateral region in January 2014. Patient recently underwent a follow-up pharmacologic nuclear stress test in November 2020. This demonstrated a small area of infarction of the inferolateral wall with an area of hypokinesis, EF 55%. She remains on an aspirin, beta-blocker and potent statin. All of which I would continue. I suspect her shortness of breath on exertion may be more related to her valvular heart disease. Mitral regurgitation. This remains moderate to possibly moderate to severe in severity on her most recent echocardiogram in November 2020. She has significant mitral valve prolapse of the posterior leaflet. She has not interested in surgical or endovascular repair at this time.   I suspect this may be causing her dyspnea on exertion. We will continue to monitor this with annual echocardiograms. Dyslipidemia. Patient is now being managed with both rosuvastatin and Zetia. This is being followed by her PCP. I prefer her LDL to be less than 70 if possible. Followup in 6 months, sooner if needed.

## 2021-07-26 ENCOUNTER — OFFICE VISIT (OUTPATIENT)
Dept: INTERNAL MEDICINE CLINIC | Age: 71
End: 2021-07-26
Payer: MEDICARE

## 2021-07-26 VITALS
SYSTOLIC BLOOD PRESSURE: 124 MMHG | HEIGHT: 62 IN | RESPIRATION RATE: 12 BRPM | DIASTOLIC BLOOD PRESSURE: 68 MMHG | OXYGEN SATURATION: 96 % | HEART RATE: 69 BPM | BODY MASS INDEX: 37.69 KG/M2 | WEIGHT: 204.8 LBS | TEMPERATURE: 97.3 F

## 2021-07-26 DIAGNOSIS — M79.605 ACUTE LEG PAIN, LEFT: Primary | ICD-10-CM

## 2021-07-26 PROCEDURE — G0463 HOSPITAL OUTPT CLINIC VISIT: HCPCS | Performed by: INTERNAL MEDICINE

## 2021-07-26 PROCEDURE — 99214 OFFICE O/P EST MOD 30 MIN: CPT | Performed by: INTERNAL MEDICINE

## 2021-07-26 RX ORDER — METHOCARBAMOL 500 MG/1
500 TABLET, FILM COATED ORAL 4 TIMES DAILY
Qty: 30 TABLET | Refills: 1 | Status: SHIPPED | OUTPATIENT
Start: 2021-07-26 | End: 2021-11-09

## 2021-07-26 NOTE — PROGRESS NOTES
PEARL Bhatti presents with 5 days of intermittent left lateral knee pain. It comes and goes about every 20 minutes. And feels like an tooth ache or throbbing. She denies left-sided back pain. She has been using ice, heat, Tylenol, and Voltaren gel inconsistently, pain persists. She does not recall any injury. Past Medical History:   Diagnosis Date    Age-related osteoporosis without current pathological fracture 4/5/2016    Arthritis     Benign positional vertigo 4/2/2021    CAD (coronary artery disease), native coronary artery 7/2014    ULISES to LCx    Cardiac echocardiogram 04/05/2016    EF 55-60%. No WMA. Gr 1 DDfx. Mod MR.  Cardiac nuclear imaging test, abnormal 02/05/2014    Mod-sized, mild-mod mid lateral infarction w/mild-mod rosa isela-infarct ischemia. Mod lateral hypk. EF 56%. Normal EKG on pharm stress test.  Intermediate risk.  Chronic cough 4/2/2021    Chronic rhinitis 4/2/2021    Dyslipidemia     Fatty pancreas 4/5/2016    On imaging    Hernia     Nephrolithiasis         Past Surgical History:   Procedure Laterality Date    HX CHOLECYSTECTOMY      HX CORONARY STENT PLACEMENT  07/07/2014    HX HEART CATHETERIZATION      HX HERNIA REPAIR      HX LYSIS OF ADHESIONS      HX OOPHORECTOMY Right 12/05/2018    HX OTHER SURGICAL  11/2018    repair of anal fissure    GA REMOVAL OF ANAL FISSURE  11/02/2018    REMOVAL OF KIDNEY STONE  12/28/2018        Current Outpatient Medications   Medication Sig Dispense Refill    methocarbamoL (ROBAXIN) 500 mg tablet Take 1 Tablet by mouth four (4) times daily. 30 Tablet 1    montelukast (Singulair) 10 mg tablet Take 1 Tab by mouth daily. 90 Tab 3    desloratadine (CLARINEX) 5 mg tablet Take 5 mg by mouth.  acetaminophen (Tylenol Arthritis Pain) 650 mg TbER Take 650 mg by mouth every eight (8) hours.  dicyclomine (BENTYL) 10 mg capsule Take 1 Cap by mouth four (4) times daily as needed for Abdominal Cramps.  80 Cap 2    metoprolol succinate (TOPROL-XL) 50 mg XL tablet TAKE 1 TABLET BY MOUTH DAILY 90 Tab 3    rosuvastatin (CRESTOR) 40 mg tablet Take 1 Tab by mouth daily. 90 Tab 3    guaiFENesin ER (Mucinex) 600 mg ER tablet Take 600 mg by mouth two (2) times a day.  ezetimibe (ZETIA) 10 mg tablet Take 10 mg by mouth daily.  diclofenac (VOLTAREN) 1 % gel Apply  to affected area as needed.  cholecalciferol, vitamin D3, (VITAMIN D3) 2,000 unit tab Take  by mouth daily.  folic acid/multivit-min/lutein (CENTRUM SILVER PO) Take  by mouth.  aspirin delayed-release 81 mg tablet Take 162 mg by mouth daily. Allergies   Allergen Reactions    Celebrex [Celecoxib] Swelling        Social History     Socioeconomic History    Marital status:      Spouse name: Not on file    Number of children: Not on file    Years of education: Not on file    Highest education level: Not on file   Occupational History    Not on file   Tobacco Use    Smoking status: Never Smoker    Smokeless tobacco: Never Used   Substance and Sexual Activity    Alcohol use: No    Drug use: No    Sexual activity: Yes     Partners: Male     Birth control/protection: None   Other Topics Concern    Not on file   Social History Narrative    Not on file     Social Determinants of Health     Financial Resource Strain:     Difficulty of Paying Living Expenses:    Food Insecurity:     Worried About Running Out of Food in the Last Year:     [address] in the Last Year:    Transportation Needs:     Lack of Transportation (Medical):      Lack of Transportation (Non-Medical):    Physical Activity:     Days of Exercise per Week:     Minutes of Exercise per Session:    Stress:     Feeling of Stress :    Social Connections:     Frequency of Communication with Friends and Family:     Frequency of Social Gatherings with Friends and Family:     Attends [employer] Services:     Active Member of Clubs or Organizations:  Attends Club or Organization Meetings:     Marital Status:    Intimate Partner Violence:     Fear of Current or Ex-Partner:     Emotionally Abused:     Physically Abused:     Sexually Abused:         Family History   Problem Relation Age of Onset    Hypertension Mother     Atrial Fibrillation Mother     Cancer Father         colon, breast, throat,  at 72    Heart Disease Maternal Grandmother     Diabetes Paternal Grandmother            Visit Vitals  /68 (BP 1 Location: Right upper arm, BP Patient Position: Sitting)   Pulse 69   Temp 97.3 °F (36.3 °C) (Temporal)   Resp 12   Ht 5' 2\" (1.575 m)   Wt 204 lb 12.8 oz (92.9 kg)   SpO2 96%   BMI 37.46 kg/m²        Review of Systems   Skin: Negative for rash. Neurological: Negative for tingling. Physical Exam  Constitutional:       General: She is not in acute distress. Appearance: She is not ill-appearing. Eyes:      General: No scleral icterus. Conjunctiva/sclera: Conjunctivae normal.   Neck:      Comments: Normal carotid upstrokes to palpation bilaterally. Lymph: No cervical or supraclavicular lymphadenopathy. Cardiovascular:      Rate and Rhythm: Normal rate and regular rhythm. Pulses: Normal pulses. Heart sounds: Murmur heard. No friction rub. No gallop. Pulmonary:      Effort: Pulmonary effort is normal.      Breath sounds: Normal breath sounds. Abdominal:      General: Abdomen is flat. Palpations: Abdomen is soft. There is no mass. Tenderness: There is no abdominal tenderness. Musculoskeletal:      Cervical back: Neck supple. Comments: Calves nontender, no cords. No clubbing, cyanosis, or edema. Negative straight leg raise left. Positive for tenderness to palpation along left lateral distal thigh. Negative Homans   Skin:     General: Skin is warm and dry. Neurological:      Mental Status: She is alert and oriented to person, place, and time.    Psychiatric:         Mood and Affect: Mood normal.                  Diagnoses and all orders for this visit:    1. Acute leg pain, left  Comments:  exam not suggestive radicular. Check area tBID for development of vesicles, report if recurs. Voltaren gel, Robaxin 1 or 2 every 6 hours as needed, sedating  Orders:  -     methocarbamoL (ROBAXIN) 500 mg tablet; Take 1 Tablet by mouth four (4) times daily.                   Bruce Enriquez MD

## 2021-08-13 ENCOUNTER — TELEPHONE (OUTPATIENT)
Dept: INTERNAL MEDICINE CLINIC | Age: 71
End: 2021-08-13

## 2021-08-13 DIAGNOSIS — E55.9 VITAMIN D DEFICIENCY: ICD-10-CM

## 2021-08-13 DIAGNOSIS — N20.0 NEPHROLITHIASIS: ICD-10-CM

## 2021-08-13 DIAGNOSIS — I25.10 CORONARY ARTERY DISEASE INVOLVING NATIVE CORONARY ARTERY OF NATIVE HEART WITHOUT ANGINA PECTORIS: ICD-10-CM

## 2021-08-13 DIAGNOSIS — E78.5 DYSLIPIDEMIA: ICD-10-CM

## 2021-08-13 DIAGNOSIS — M81.0 AGE-RELATED OSTEOPOROSIS WITHOUT CURRENT PATHOLOGICAL FRACTURE: Primary | ICD-10-CM

## 2021-08-17 ENCOUNTER — HOSPITAL ENCOUNTER (OUTPATIENT)
Dept: LAB | Age: 71
Discharge: HOME | End: 2021-08-17
Payer: MEDICARE

## 2021-08-17 ENCOUNTER — APPOINTMENT (OUTPATIENT)
Dept: INTERNAL MEDICINE CLINIC | Age: 71
End: 2021-08-17

## 2021-08-17 DIAGNOSIS — N20.0 NEPHROLITHIASIS: ICD-10-CM

## 2021-08-17 DIAGNOSIS — E78.5 DYSLIPIDEMIA: ICD-10-CM

## 2021-08-17 DIAGNOSIS — M81.0 AGE-RELATED OSTEOPOROSIS WITHOUT CURRENT PATHOLOGICAL FRACTURE: ICD-10-CM

## 2021-08-17 DIAGNOSIS — I25.10 CORONARY ARTERY DISEASE INVOLVING NATIVE CORONARY ARTERY OF NATIVE HEART WITHOUT ANGINA PECTORIS: ICD-10-CM

## 2021-08-17 DIAGNOSIS — E55.9 VITAMIN D DEFICIENCY: ICD-10-CM

## 2021-08-17 LAB
25(OH)D3 SERPL-MCNC: 68.8 NG/ML (ref 30–100)
ALBUMIN SERPL-MCNC: 3.7 G/DL (ref 3.4–5)
ALBUMIN/GLOB SERPL: 1.2 {RATIO} (ref 0.8–1.7)
ALP SERPL-CCNC: 113 U/L (ref 45–117)
ALT SERPL-CCNC: 25 U/L (ref 13–56)
ANION GAP SERPL CALC-SCNC: 4 MMOL/L (ref 3–18)
AST SERPL-CCNC: 23 U/L (ref 10–38)
BILIRUB SERPL-MCNC: 0.7 MG/DL (ref 0.2–1)
BUN SERPL-MCNC: 13 MG/DL (ref 7–18)
BUN/CREAT SERPL: 15 (ref 12–20)
CALCIUM SERPL-MCNC: 8.9 MG/DL (ref 8.5–10.1)
CHLORIDE SERPL-SCNC: 110 MMOL/L (ref 100–111)
CHOLEST SERPL-MCNC: 158 MG/DL
CO2 SERPL-SCNC: 27 MMOL/L (ref 21–32)
CREAT SERPL-MCNC: 0.85 MG/DL (ref 0.6–1.3)
GLOBULIN SER CALC-MCNC: 3.1 G/DL (ref 2–4)
GLUCOSE SERPL-MCNC: 101 MG/DL (ref 74–99)
HDLC SERPL-MCNC: 63 MG/DL (ref 40–60)
HDLC SERPL: 2.5 {RATIO} (ref 0–5)
LDLC SERPL CALC-MCNC: 70.2 MG/DL (ref 0–100)
LIPID PROFILE,FLP: ABNORMAL
POTASSIUM SERPL-SCNC: 4.7 MMOL/L (ref 3.5–5.5)
PROT SERPL-MCNC: 6.8 G/DL (ref 6.4–8.2)
SODIUM SERPL-SCNC: 141 MMOL/L (ref 136–145)
TRIGL SERPL-MCNC: 124 MG/DL (ref ?–150)
VLDLC SERPL CALC-MCNC: 24.8 MG/DL

## 2021-08-17 PROCEDURE — 80061 LIPID PANEL: CPT

## 2021-08-17 PROCEDURE — 36415 COLL VENOUS BLD VENIPUNCTURE: CPT

## 2021-08-17 PROCEDURE — 82306 VITAMIN D 25 HYDROXY: CPT

## 2021-08-17 PROCEDURE — 80053 COMPREHEN METABOLIC PANEL: CPT

## 2021-08-24 ENCOUNTER — HOSPITAL ENCOUNTER (EMERGENCY)
Age: 71
Discharge: HOME | End: 2021-08-25
Attending: STUDENT IN AN ORGANIZED HEALTH CARE EDUCATION/TRAINING PROGRAM
Payer: MEDICARE

## 2021-08-24 ENCOUNTER — APPOINTMENT (OUTPATIENT)
Dept: CT IMAGING | Age: 71
End: 2021-08-24
Attending: STUDENT IN AN ORGANIZED HEALTH CARE EDUCATION/TRAINING PROGRAM
Payer: MEDICARE

## 2021-08-24 ENCOUNTER — APPOINTMENT (OUTPATIENT)
Dept: GENERAL RADIOLOGY | Age: 71
End: 2021-08-24
Attending: STUDENT IN AN ORGANIZED HEALTH CARE EDUCATION/TRAINING PROGRAM
Payer: MEDICARE

## 2021-08-24 DIAGNOSIS — R55 VASOVAGAL NEAR SYNCOPE: Primary | ICD-10-CM

## 2021-08-24 LAB
ABO + RH BLD: NORMAL
ALBUMIN SERPL-MCNC: 3.4 G/DL (ref 3.4–5)
ALBUMIN/GLOB SERPL: 0.9 {RATIO} (ref 0.8–1.7)
ALP SERPL-CCNC: 119 U/L (ref 45–117)
ALT SERPL-CCNC: 17 U/L (ref 13–56)
ANION GAP SERPL CALC-SCNC: 8 MMOL/L (ref 3–18)
APPEARANCE UR: CLEAR
AST SERPL-CCNC: 18 U/L (ref 10–38)
BASOPHILS # BLD: 0.1 K/UL (ref 0–0.1)
BASOPHILS NFR BLD: 1 % (ref 0–2)
BILIRUB SERPL-MCNC: 0.4 MG/DL (ref 0.2–1)
BILIRUB UR QL: NEGATIVE
BLOOD GROUP ANTIBODIES SERPL: NORMAL
BNP SERPL-MCNC: 265 PG/ML (ref 0–900)
BUN SERPL-MCNC: 16 MG/DL (ref 7–18)
BUN/CREAT SERPL: 17 (ref 12–20)
CALCIUM SERPL-MCNC: 8.7 MG/DL (ref 8.5–10.1)
CHLORIDE SERPL-SCNC: 110 MMOL/L (ref 100–111)
CK MB CFR SERPL CALC: NORMAL % (ref 0–4)
CK MB CFR SERPL CALC: NORMAL % (ref 0–4)
CK MB SERPL-MCNC: <1 NG/ML (ref 5–25)
CK MB SERPL-MCNC: <1 NG/ML (ref 5–25)
CK SERPL-CCNC: 109 U/L (ref 26–192)
CK SERPL-CCNC: 136 U/L (ref 26–192)
CO2 SERPL-SCNC: 21 MMOL/L (ref 21–32)
COLOR UR: YELLOW
COVID-19 RAPID TEST, COVR: NOT DETECTED
CREAT SERPL-MCNC: 0.96 MG/DL (ref 0.6–1.3)
DIFFERENTIAL METHOD BLD: ABNORMAL
EOSINOPHIL # BLD: 0.8 K/UL (ref 0–0.4)
EOSINOPHIL NFR BLD: 5 % (ref 0–5)
ERYTHROCYTE [DISTWIDTH] IN BLOOD BY AUTOMATED COUNT: 13.1 % (ref 11.6–14.5)
GLOBULIN SER CALC-MCNC: 4 G/DL (ref 2–4)
GLUCOSE SERPL-MCNC: 145 MG/DL (ref 74–99)
GLUCOSE UR STRIP.AUTO-MCNC: NEGATIVE MG/DL
HCT VFR BLD AUTO: 40.4 % (ref 35–45)
HGB BLD-MCNC: 13.2 G/DL (ref 12–16)
HGB UR QL STRIP: NEGATIVE
KETONES UR QL STRIP.AUTO: NEGATIVE MG/DL
LEUKOCYTE ESTERASE UR QL STRIP.AUTO: NEGATIVE
LYMPHOCYTES # BLD: 3.4 K/UL (ref 0.9–3.6)
LYMPHOCYTES NFR BLD: 22 % (ref 21–52)
MAGNESIUM SERPL-MCNC: 2 MG/DL (ref 1.6–2.6)
MCH RBC QN AUTO: 28.5 PG (ref 24–34)
MCHC RBC AUTO-ENTMCNC: 32.7 G/DL (ref 31–37)
MCV RBC AUTO: 87.3 FL (ref 78–100)
MONOCYTES # BLD: 0.8 K/UL (ref 0.05–1.2)
MONOCYTES NFR BLD: 5 % (ref 3–10)
NEUTS SEG # BLD: 10.3 K/UL (ref 1.8–8)
NEUTS SEG NFR BLD: 67 % (ref 40–73)
NITRITE UR QL STRIP.AUTO: NEGATIVE
PH UR STRIP: 6.5 [PH] (ref 5–8)
PLATELET # BLD AUTO: 260 K/UL (ref 135–420)
PMV BLD AUTO: 10.6 FL (ref 9.2–11.8)
POTASSIUM SERPL-SCNC: 4.1 MMOL/L (ref 3.5–5.5)
PROT SERPL-MCNC: 7.4 G/DL (ref 6.4–8.2)
PROT UR STRIP-MCNC: NEGATIVE MG/DL
RBC # BLD AUTO: 4.63 M/UL (ref 4.2–5.3)
SODIUM SERPL-SCNC: 139 MMOL/L (ref 136–145)
SOURCE, COVRS: NORMAL
SP GR UR REFRACTOMETRY: 1.01 (ref 1–1.03)
SPECIMEN EXP DATE BLD: NORMAL
TROPONIN I SERPL-MCNC: <0.02 NG/ML (ref 0–0.04)
TROPONIN I SERPL-MCNC: <0.02 NG/ML (ref 0–0.04)
UROBILINOGEN UR QL STRIP.AUTO: 0.2 EU/DL (ref 0.2–1)
WBC # BLD AUTO: 15.4 K/UL (ref 4.6–13.2)

## 2021-08-24 PROCEDURE — 87635 SARS-COV-2 COVID-19 AMP PRB: CPT

## 2021-08-24 PROCEDURE — 82553 CREATINE MB FRACTION: CPT

## 2021-08-24 PROCEDURE — 71275 CT ANGIOGRAPHY CHEST: CPT

## 2021-08-24 PROCEDURE — 71045 X-RAY EXAM CHEST 1 VIEW: CPT

## 2021-08-24 PROCEDURE — 74011250636 HC RX REV CODE- 250/636: Performed by: STUDENT IN AN ORGANIZED HEALTH CARE EDUCATION/TRAINING PROGRAM

## 2021-08-24 PROCEDURE — 83735 ASSAY OF MAGNESIUM: CPT

## 2021-08-24 PROCEDURE — 96366 THER/PROPH/DIAG IV INF ADDON: CPT

## 2021-08-24 PROCEDURE — 99285 EMERGENCY DEPT VISIT HI MDM: CPT

## 2021-08-24 PROCEDURE — 85025 COMPLETE CBC W/AUTO DIFF WBC: CPT

## 2021-08-24 PROCEDURE — 96365 THER/PROPH/DIAG IV INF INIT: CPT

## 2021-08-24 PROCEDURE — 96361 HYDRATE IV INFUSION ADD-ON: CPT

## 2021-08-24 PROCEDURE — 96374 THER/PROPH/DIAG INJ IV PUSH: CPT

## 2021-08-24 PROCEDURE — 81003 URINALYSIS AUTO W/O SCOPE: CPT

## 2021-08-24 PROCEDURE — 86901 BLOOD TYPING SEROLOGIC RH(D): CPT

## 2021-08-24 PROCEDURE — 74011000636 HC RX REV CODE- 636: Performed by: STUDENT IN AN ORGANIZED HEALTH CARE EDUCATION/TRAINING PROGRAM

## 2021-08-24 PROCEDURE — 74177 CT ABD & PELVIS W/CONTRAST: CPT

## 2021-08-24 PROCEDURE — 96375 TX/PRO/DX INJ NEW DRUG ADDON: CPT

## 2021-08-24 PROCEDURE — 93005 ELECTROCARDIOGRAM TRACING: CPT

## 2021-08-24 PROCEDURE — 80053 COMPREHEN METABOLIC PANEL: CPT

## 2021-08-24 PROCEDURE — 83880 ASSAY OF NATRIURETIC PEPTIDE: CPT

## 2021-08-24 PROCEDURE — 70450 CT HEAD/BRAIN W/O DYE: CPT

## 2021-08-24 RX ORDER — MAGNESIUM SULFATE HEPTAHYDRATE 40 MG/ML
2 INJECTION, SOLUTION INTRAVENOUS
Status: COMPLETED | OUTPATIENT
Start: 2021-08-24 | End: 2021-08-24

## 2021-08-24 RX ORDER — ONDANSETRON 2 MG/ML
4 INJECTION INTRAMUSCULAR; INTRAVENOUS
Status: COMPLETED | OUTPATIENT
Start: 2021-08-24 | End: 2021-08-24

## 2021-08-24 RX ORDER — ONDANSETRON 4 MG/1
4 TABLET, ORALLY DISINTEGRATING ORAL
Qty: 20 TABLET | Refills: 0 | Status: SHIPPED | OUTPATIENT
Start: 2021-08-24 | End: 2021-11-09

## 2021-08-24 RX ORDER — MECLIZINE HCL 12.5 MG 12.5 MG/1
25 TABLET ORAL DAILY
Status: CANCELLED | OUTPATIENT
Start: 2021-08-25

## 2021-08-24 RX ADMIN — ONDANSETRON 4 MG: 2 INJECTION INTRAMUSCULAR; INTRAVENOUS at 20:53

## 2021-08-24 RX ADMIN — IOPAMIDOL 80 ML: 755 INJECTION, SOLUTION INTRAVENOUS at 21:31

## 2021-08-24 RX ADMIN — ONDANSETRON 4 MG: 2 INJECTION INTRAMUSCULAR; INTRAVENOUS at 20:25

## 2021-08-24 RX ADMIN — MAGNESIUM SULFATE HEPTAHYDRATE 2 G: 2 INJECTION, SOLUTION INTRAVENOUS at 20:46

## 2021-08-24 RX ADMIN — SODIUM CHLORIDE 1000 ML: 900 INJECTION, SOLUTION INTRAVENOUS at 20:21

## 2021-08-24 NOTE — ED TRIAGE NOTES
Pt reports sudden onset of right sided headache at 1730 followed by tingling in her whole body that went away. Pt then reports she had diarrhea, pt states she had diarrhea for four days on Thursday. Pt also states she is nauseas.  Denies vision issues, speech issues,

## 2021-08-24 NOTE — ED PROVIDER NOTES
EMERGENCY DEPARTMENT HISTORY AND PHYSICAL EXAM    I have evaluated the patient at 7:59 PM      Date: 8/24/2021  Patient Name: Ozzy Lang    History of Presenting Illness     Chief Complaint   Patient presents with    Headache    Numbness    Diarrhea    Nausea         History Provided By: Patient and Patient's Daughter  Location/Duration/Severity/Modifying factors   This is a 75-year-old female with history of CAD, arthritis, dyslipidemia presenting to the emergency department for evaluation of lightheadedness and generally feeling unwell. Patient states that earlier today she had an episode where she became very lightheaded and had a wave of tingling, over her entire body. She nearly passed out but never fully lost consciousness. She did not fall or injure herself. At this time she had an episode of diarrhea. Patient reports that she has been experiencing diarrhea multiple times a day for the past week. It is nonbloody. She denies having any fever or chills, headache, chest pain, shortness of breath. She is reporting some nausea but has not had any episodes of emesis. PCP: Concepcion Rodriguez MD    Current Outpatient Medications   Medication Sig Dispense Refill    ondansetron (Zofran ODT) 4 mg disintegrating tablet 1 Tablet by SubLINGual route every eight (8) hours as needed for Nausea or Vomiting. 20 Tablet 0    methocarbamoL (ROBAXIN) 500 mg tablet Take 1 Tablet by mouth four (4) times daily. 30 Tablet 1    montelukast (Singulair) 10 mg tablet Take 1 Tab by mouth daily. 90 Tab 3    desloratadine (CLARINEX) 5 mg tablet Take 5 mg by mouth.  acetaminophen (Tylenol Arthritis Pain) 650 mg TbER Take 650 mg by mouth every eight (8) hours.  dicyclomine (BENTYL) 10 mg capsule Take 1 Cap by mouth four (4) times daily as needed for Abdominal Cramps.  90 Cap 2    metoprolol succinate (TOPROL-XL) 50 mg XL tablet TAKE 1 TABLET BY MOUTH DAILY 90 Tab 3    rosuvastatin (CRESTOR) 40 mg tablet Take 1 Tab by mouth daily. 90 Tab 3    guaiFENesin ER (Mucinex) 600 mg ER tablet Take 600 mg by mouth two (2) times a day.  ezetimibe (ZETIA) 10 mg tablet Take 10 mg by mouth daily.  diclofenac (VOLTAREN) 1 % gel Apply  to affected area as needed.  cholecalciferol, vitamin D3, (VITAMIN D3) 2,000 unit tab Take  by mouth daily.  folic acid/multivit-min/lutein (CENTRUM SILVER PO) Take  by mouth.  aspirin delayed-release 81 mg tablet Take 162 mg by mouth daily. Past History     Past Medical History:  Past Medical History:   Diagnosis Date    Age-related osteoporosis without current pathological fracture 2016    Arthritis     Benign positional vertigo 2021    CAD (coronary artery disease), native coronary artery 2014    ULISES to LCx    Cardiac echocardiogram 2016    EF 55-60%. No WMA. Gr 1 DDfx. Mod MR.  Cardiac nuclear imaging test, abnormal 2014    Mod-sized, mild-mod mid lateral infarction w/mild-mod rosa isela-infarct ischemia. Mod lateral hypk. EF 56%. Normal EKG on pharm stress test.  Intermediate risk.     Chronic cough 2021    Chronic rhinitis 2021    Dyslipidemia     Fatty pancreas 2016    On imaging    Hernia     Nephrolithiasis        Past Surgical History:  Past Surgical History:   Procedure Laterality Date    HX CHOLECYSTECTOMY      HX CORONARY STENT PLACEMENT  2014    HX HEART CATHETERIZATION      HX HERNIA REPAIR      HX LYSIS OF ADHESIONS      HX OOPHORECTOMY Right 2018    HX OTHER SURGICAL  2018    repair of anal fissure    [place] REMOVAL OF ANAL FISSURE  2018    REMOVAL OF KIDNEY STONE  2018       Family History:  Family History   Problem Relation Age of Onset    Hypertension Mother     Atrial Fibrillation Mother     Cancer Father         colon, breast, throat,  at 72    Heart Disease Maternal Grandmother     Diabetes Paternal Grandmother        Social History:  Social History Tobacco Use    Smoking status: Never Smoker    Smokeless tobacco: Never Used   Substance Use Topics    Alcohol use: No    Drug use: No       Allergies: Allergies   Allergen Reactions    Celebrex [Celecoxib] Swelling         Review of Systems       Review of Systems   Constitutional: Positive for activity change. Negative for chills, diaphoresis, fatigue and fever. HENT: Negative for congestion, ear pain, sinus pain and voice change. Eyes: Negative for photophobia, pain and visual disturbance. Respiratory: Negative for cough, chest tightness, shortness of breath, wheezing and stridor. Cardiovascular: Negative for chest pain and palpitations. Gastrointestinal: Positive for diarrhea and nausea. Negative for abdominal distention, abdominal pain, constipation and vomiting. Endocrine: Negative for polydipsia, polyphagia and polyuria. Genitourinary: Negative for difficulty urinating, dysuria and hematuria. Musculoskeletal: Negative for back pain, joint swelling and myalgias. Skin: Negative for rash. Neurological: Positive for light-headedness. Negative for dizziness, tremors, seizures, weakness and headaches. Psychiatric/Behavioral: Negative for agitation. The patient is not nervous/anxious. Physical Exam     Visit Vitals  BP (!) 164/66   Pulse 73   Resp 15   Ht 5' 2\" (1.575 m)   Wt 90.7 kg (200 lb)   SpO2 94%   BMI 36.58 kg/m²         Physical Exam  Constitutional:       General: She is not in acute distress. Appearance: She is not toxic-appearing. Comments: Patient is anxious appearing but not in acute distress at this time. HENT:      Head: Normocephalic and atraumatic. Mouth/Throat:      Mouth: Mucous membranes are moist.   Eyes:      Extraocular Movements: Extraocular movements intact. Pupils: Pupils are equal, round, and reactive to light. Cardiovascular:      Rate and Rhythm: Normal rate and regular rhythm. Heart sounds: Normal heart sounds.  No murmur heard. No friction rub. No gallop. Pulmonary:      Effort: Pulmonary effort is normal.      Breath sounds: Normal breath sounds. Abdominal:      General: There is no distension. Palpations: Abdomen is soft. There is no mass. Tenderness: There is no abdominal tenderness. There is no guarding. Hernia: No hernia is present. Comments: Obese but soft and nontender to palpation. Musculoskeletal:         General: No swelling, tenderness or deformity. Cervical back: Normal range of motion and neck supple. Skin:     General: Skin is warm and dry. Capillary Refill: Capillary refill takes less than 2 seconds. Findings: No rash. Neurological:      General: No focal deficit present. Mental Status: She is alert and oriented to person, place, and time.    Psychiatric:         Mood and Affect: Mood normal.           Diagnostic Study Results     Labs -  Recent Results (from the past 12 hour(s))   EKG, 12 LEAD, INITIAL    Collection Time: 08/24/21  7:56 PM   Result Value Ref Range    Ventricular Rate 60 BPM    Atrial Rate 60 BPM    P-R Interval 176 ms    QRS Duration 90 ms    Q-T Interval 424 ms    QTC Calculation (Bezet) 424 ms    Calculated P Axis 22 degrees    Calculated R Axis -2 degrees    Calculated T Axis 38 degrees    Diagnosis       Normal sinus rhythm  Moderate voltage criteria for LVH, may be normal variant  Borderline ECG  When compared with ECG of 17-NOV-2020 08:44,  No significant change was found     CBC WITH AUTOMATED DIFF    Collection Time: 08/24/21  8:01 PM   Result Value Ref Range    WBC 15.4 (H) 4.6 - 13.2 K/uL    RBC 4.63 4.20 - 5.30 M/uL    HGB 13.2 12.0 - 16.0 g/dL    HCT 40.4 35.0 - 45.0 %    MCV 87.3 78.0 - 100.0 FL    MCH 28.5 24.0 - 34.0 PG    MCHC 32.7 31.0 - 37.0 g/dL    RDW 13.1 11.6 - 14.5 %    PLATELET 589 717 - 018 K/uL    MPV 10.6 9.2 - 11.8 FL    NEUTROPHILS 67 40 - 73 %    LYMPHOCYTES 22 21 - 52 %    MONOCYTES 5 3 - 10 %    EOSINOPHILS 5 0 - 5 %    BASOPHILS 1 0 - 2 %    ABS. NEUTROPHILS 10.3 (H) 1.8 - 8.0 K/UL    ABS. LYMPHOCYTES 3.4 0.9 - 3.6 K/UL    ABS. MONOCYTES 0.8 0.05 - 1.2 K/UL    ABS. EOSINOPHILS 0.8 (H) 0.0 - 0.4 K/UL    ABS. BASOPHILS 0.1 0.0 - 0.1 K/UL    DF AUTOMATED     METABOLIC PANEL, COMPREHENSIVE    Collection Time: 08/24/21  8:01 PM   Result Value Ref Range    Sodium 139 136 - 145 mmol/L    Potassium 4.1 3.5 - 5.5 mmol/L    Chloride 110 100 - 111 mmol/L    CO2 21 21 - 32 mmol/L    Anion gap 8 3.0 - 18 mmol/L    Glucose 145 (H) 74 - 99 mg/dL    BUN 16 7.0 - 18 MG/DL    Creatinine 0.96 0.6 - 1.3 MG/DL    BUN/Creatinine ratio 17 12 - 20      GFR est AA >60 >60 ml/min/1.73m2    GFR est non-AA 57 (L) >60 ml/min/1.73m2    Calcium 8.7 8.5 - 10.1 MG/DL    Bilirubin, total 0.4 0.2 - 1.0 MG/DL    ALT (SGPT) 17 13 - 56 U/L    AST (SGOT) 18 10 - 38 U/L    Alk.  phosphatase 119 (H) 45 - 117 U/L    Protein, total 7.4 6.4 - 8.2 g/dL    Albumin 3.4 3.4 - 5.0 g/dL    Globulin 4.0 2.0 - 4.0 g/dL    A-G Ratio 0.9 0.8 - 1.7     MAGNESIUM    Collection Time: 08/24/21  8:01 PM   Result Value Ref Range    Magnesium 2.0 1.6 - 2.6 mg/dL   CARDIAC PANEL,(CK, CKMB & TROPONIN)    Collection Time: 08/24/21  8:01 PM   Result Value Ref Range    CK - MB <1.0 <3.6 ng/ml    CK-MB Index  0.0 - 4.0 %     CALCULATION NOT PERFORMED WHEN RESULT IS BELOW LINEAR LIMIT     26 - 192 U/L    Troponin-I, QT <0.02 0.0 - 0.045 NG/ML   TYPE & SCREEN    Collection Time: 08/24/21  8:01 PM   Result Value Ref Range    Crossmatch Expiration 08/27/2021,2359     ABO/Rh(D) A POSITIVE     Antibody screen NEG    COVID-19 RAPID TEST    Collection Time: 08/24/21  8:20 PM   Result Value Ref Range    Specimen source Nasopharyngeal      COVID-19 rapid test Not detected NOTD     URINALYSIS W/ RFLX MICROSCOPIC    Collection Time: 08/24/21  9:51 PM   Result Value Ref Range    Color YELLOW      Appearance CLEAR      Specific gravity 1.009 1.005 - 1.030      pH (UA) 6.5 5.0 - 8.0      Protein Negative NEG mg/dL    Glucose Negative NEG mg/dL    Ketone Negative NEG mg/dL    Bilirubin Negative NEG      Blood Negative NEG      Urobilinogen 0.2 0.2 - 1.0 EU/dL    Nitrites Negative NEG      Leukocyte Esterase Negative NEG     CARDIAC PANEL,(CK, CKMB & TROPONIN)    Collection Time: 08/24/21 10:44 PM   Result Value Ref Range    CK - MB <1.0 <3.6 ng/ml    CK-MB Index  0.0 - 4.0 %     CALCULATION NOT PERFORMED WHEN RESULT IS BELOW LINEAR LIMIT     26 - 192 U/L    Troponin-I, QT <0.02 0.0 - 0.045 NG/ML   NT-PRO BNP    Collection Time: 08/24/21 10:44 PM   Result Value Ref Range    NT pro- 0 - 900 PG/ML       Radiologic Studies -   CTA CHEST W OR W WO CONT   Final Result      No evidence of PE. Minimal bibasal discoid atelectasis. Trace pericardial effusion. Possible right thyroid nodule with punctate calcification. Recommend nonemergent   thyroid ultrasound for further evaluation. Report provided to the emergency department at 2154 hrs. CT HEAD WO CONT   Final Result      Negative CT scan of the brain with and without contrast.   There is no mass, hemorrhage, nor acute infarct. Report provided to the emergency department at 2157 hrs. .      CT ABD PELV W CONT   Final Result      Diverticulosis coli without evidence of diverticulitis. Mild distention of the right intrarenal collecting system and right renal pelvis   but without evidence of distal obstruction. Differential diagnosis of normal   physiologic peristalsis vs. potential pyelonephritis. Alternatively recently   passed calculus with resolution of distention. Report provided to the emergency department at 2202 hrs. XR CHEST PORT    (Results Pending)         Medical Decision Making   I am the first provider for this patient. I reviewed the vital signs, available nursing notes, past medical history, past surgical history, family history and social history.     Vital Signs-Reviewed the patient's vital signs.      EKG: NSR. No evidence of acute ischemia. Normal intervals. No ectopy. Records Reviewed: Nursing Notes, Old Medical Records, Previous electrocardiograms, Previous Radiology Studies and Previous Laboratory Studies (Time of Review: 7:59 PM)    ED Course: Progress Notes, Reevaluation, and Consults:         Provider Notes (Medical Decision Making):   MDM  Number of Diagnoses or Management Options  Vasovagal near syncope  Diagnosis management comments: This is a 68-year-old female who presents to the emergency department for episode of lightheadedness and presyncope with associated diarrhea. Patient is hypertensive with a blood pressure 175/58. She is afebrile here. She is borderline bradycardic with a heart rate of 58. She saturating 93% on room air. Her abdominal exam is overall reassuring. Her heart sounds are regular and lungs are clear to auscultation. Patient will be placed on 2 L nasal cannula. She will be given bolus of saline. She will be given antiemetic. Will obtain screening lab work. Differential considerations: Diarrhea, gastroenteritis, COVID-19, electrolyte imbalance, dehydration, vagal syncope, ACS, arrhythmia, pneumonia. 2051:  Called to the patient's room as the patient stated she was feeling weak and lightheaded again. There was bigeminy noted on the monitor. She will be given 2 g of magnesium. Additionally, will scan her chest and abdomen to rule out intrathoracic and intra-abdominal etiologies for her presentation. Specifically rule out PE. Patient's blood work is notable for leukocytosis of 15. Electrolytes within normal limits. Troponin is negative. We will repeat troponin and to ensure no ongoing cardiac injury. 2205:  Patient CT imaging shows no evidence of PE. There is a trace pericardial effusion that is appreciated. No intra-abdominal sources of infection. UA shows no evidence of UTI. Patient continues to have symptoms of lightheadedness. Still awaiting repeat troponin. Negative rapid Covid. 2359:  Second set of cardiac enzymes is negative. BNP unremarkable. Patient is currently feeling much improved. Patient monitored She ambulated to the bathroom without complication. Her symptoms are likely from vasovagal episode. Reassured patient but discussed close follow up with her primary care provider and cardiologust. Discussed strict ED return precautions. Patient and family verbalize good understanding and agreement with the discharge plan. Diagnosis     Clinical Impression:   1. Vasovagal near syncope        Disposition: home    Follow-up Information     [facility]  As needed, If symptoms worsen [id number]    Coco Terrazas MD Internal Medicine Call   [address]  [phone]      your cardiologist               Patient's Medications   Start Taking    ONDANSETRON (ZOFRAN ODT) 4 MG DISINTEGRATING TABLET    1 Tablet by SubLINGual route every eight (8) hours as needed for Nausea or Vomiting. Continue Taking    ACETAMINOPHEN (TYLENOL ARTHRITIS PAIN) 650 MG TBER    Take 650 mg by mouth every eight (8) hours. ASPIRIN DELAYED-RELEASE 81 MG TABLET    Take 162 mg by mouth daily. CHOLECALCIFEROL, VITAMIN D3, (VITAMIN D3) 2,000 UNIT TAB    Take  by mouth daily. DESLORATADINE (CLARINEX) 5 MG TABLET    Take 5 mg by mouth. DICLOFENAC (VOLTAREN) 1 % GEL    Apply  to affected area as needed. DICYCLOMINE (BENTYL) 10 MG CAPSULE    Take 1 Cap by mouth four (4) times daily as needed for Abdominal Cramps. EZETIMIBE (ZETIA) 10 MG TABLET    Take 10 mg by mouth daily. FOLIC ACID/MULTIVIT-MIN/LUTEIN (CENTRUM SILVER PO)    Take  by mouth. GUAIFENESIN ER (MUCINEX) 600 MG ER TABLET    Take 600 mg by mouth two (2) times a day.     METHOCARBAMOL (ROBAXIN) 500 MG TABLET    Take 1 Tablet by mouth four (4) times daily. METOPROLOL SUCCINATE (TOPROL-XL) 50 MG XL TABLET    TAKE 1 TABLET BY MOUTH DAILY    MONTELUKAST (SINGULAIR) 10 MG TABLET    Take 1 Tab by mouth daily. ROSUVASTATIN (CRESTOR) 40 MG TABLET    Take 1 Tab by mouth daily. These Medications have changed    No medications on file   Stop Taking    No medications on file     Disclaimer: Sections of this note are dictated using utilizing voice recognition software. Minor typographical errors may be present. If questions arise, please do not hesitate to contact me or call our department.

## 2021-08-25 VITALS
DIASTOLIC BLOOD PRESSURE: 66 MMHG | SYSTOLIC BLOOD PRESSURE: 164 MMHG | RESPIRATION RATE: 16 BRPM | HEIGHT: 62 IN | HEART RATE: 69 BPM | BODY MASS INDEX: 36.8 KG/M2 | WEIGHT: 200 LBS | OXYGEN SATURATION: 93 %

## 2021-08-25 NOTE — ED NOTES
Pt is in CT on table with monitor in place; intermittent sinus rhythm vs bigeminy. Pt reports intermittent flushed sensation with nausea; cool cloth provided.

## 2021-08-25 NOTE — ED NOTES
Pt remains awake/alert/calm/conversant without distress. Dysphagia screen performed; pt tolerated well with exception of brief throat clearing after applesauce. Pt report this is her baseline; denies shortness of breath/aspiration and states regular throat clearing is baseline for her. MD notified; per MD can continue swallow evaluation. Pt tolerated cup of water without straw and with straw without difficulty. Pt remains awake/alert/calm/conversant without distress.

## 2021-08-25 NOTE — ED NOTES
Dr Estefania Jones speaking with pt at bedside. Pt ambulatory to/from to toilet without difficulty; denies complaints at this time. Denies nausea; intermittent hot flush without nausea. Affect remains calm/conversant, respirations regular/non labored/skin warm and dry. No distress noted. Pt is back on room air.

## 2021-08-25 NOTE — ED NOTES
Pt discharged to home. Affect is calm/conversant, respirations regular/non labored/skin warm and dry. Denies dizziness/shortness of breath/complaints/other concerns. Instructed to ensure follow up with her PCP and cardiologist in the am.  Gabriella Wise to follow up with ED for resumption of worsening/other concerns. Pt voiced her understanding.

## 2021-08-25 NOTE — DISCHARGE INSTRUCTIONS
Make an appointment with your cardiologist. Call tomorrow. Make sure you are drinking plenty of fluids. Take the zofran for nausea only as needed. Return to the ED for any new or worsening symptoms.

## 2021-08-25 NOTE — ED NOTES
Pt has had intermittent episodes of bigeminy; reports ongoing nausea. MD notified order for magnesium iv given and Zofran repeat dose.  Pt remains awake/alert

## 2021-08-25 NOTE — ED NOTES
Pt intermittently dropping oxygen saturation to 89 percent before increasing/holding at 91 percent; intermittently gets to 93 percent. Dr Rosa Turner notified/per VO MD 2 L NC applied. Pt denies shortness of breath/lung sounds clear throughout. Pt awake/alert/calm/conversant; denies complaints. Cooling ice packs applied for comfort; denies nausea/discomfort at this time.

## 2021-08-25 NOTE — ED NOTES
Pt is awake/alert/calm/conversant without distress. Affect calm/conversant, respirations regular/non labored/skin warm and dry. Reports feeling weak with dull HA for forehead with nausea. No distress noted at this time/family at bedside.

## 2021-08-26 LAB
ATRIAL RATE: 60 BPM
CALCULATED P AXIS, ECG09: 22 DEGREES
CALCULATED R AXIS, ECG10: -2 DEGREES
CALCULATED T AXIS, ECG11: 38 DEGREES
DIAGNOSIS, 93000: NORMAL
P-R INTERVAL, ECG05: 176 MS
Q-T INTERVAL, ECG07: 424 MS
QRS DURATION, ECG06: 90 MS
QTC CALCULATION (BEZET), ECG08: 424 MS
VENTRICULAR RATE, ECG03: 60 BPM

## 2021-08-30 ENCOUNTER — OFFICE VISIT (OUTPATIENT)
Dept: CARDIOLOGY CLINIC | Age: 71
End: 2021-08-30
Payer: MEDICARE

## 2021-08-30 VITALS
OXYGEN SATURATION: 97 % | HEIGHT: 62 IN | SYSTOLIC BLOOD PRESSURE: 124 MMHG | BODY MASS INDEX: 37.36 KG/M2 | HEART RATE: 60 BPM | DIASTOLIC BLOOD PRESSURE: 62 MMHG | WEIGHT: 203 LBS

## 2021-08-30 DIAGNOSIS — I34.0 NON-RHEUMATIC MITRAL REGURGITATION: ICD-10-CM

## 2021-08-30 DIAGNOSIS — E78.5 DYSLIPIDEMIA: ICD-10-CM

## 2021-08-30 DIAGNOSIS — I25.10 CORONARY ARTERY DISEASE INVOLVING NATIVE CORONARY ARTERY OF NATIVE HEART WITHOUT ANGINA PECTORIS: Primary | ICD-10-CM

## 2021-08-30 DIAGNOSIS — E66.01 SEVERE OBESITY (BMI 35.0-35.9 WITH COMORBIDITY) (HCC): ICD-10-CM

## 2021-08-30 PROCEDURE — G9899 SCRN MAM PERF RSLTS DOC: HCPCS | Performed by: INTERNAL MEDICINE

## 2021-08-30 PROCEDURE — G8536 NO DOC ELDER MAL SCRN: HCPCS | Performed by: INTERNAL MEDICINE

## 2021-08-30 PROCEDURE — G8510 SCR DEP NEG, NO PLAN REQD: HCPCS | Performed by: INTERNAL MEDICINE

## 2021-08-30 PROCEDURE — 1101F PT FALLS ASSESS-DOCD LE1/YR: CPT | Performed by: INTERNAL MEDICINE

## 2021-08-30 PROCEDURE — 1090F PRES/ABSN URINE INCON ASSESS: CPT | Performed by: INTERNAL MEDICINE

## 2021-08-30 PROCEDURE — 99214 OFFICE O/P EST MOD 30 MIN: CPT | Performed by: INTERNAL MEDICINE

## 2021-08-30 PROCEDURE — G8417 CALC BMI ABV UP PARAM F/U: HCPCS | Performed by: INTERNAL MEDICINE

## 2021-08-30 PROCEDURE — 3017F COLORECTAL CA SCREEN DOC REV: CPT | Performed by: INTERNAL MEDICINE

## 2021-08-30 PROCEDURE — G8427 DOCREV CUR MEDS BY ELIG CLIN: HCPCS | Performed by: INTERNAL MEDICINE

## 2021-08-30 NOTE — PROGRESS NOTES
HISTORY OF PRESENT ILLNESS  Hayley Linton is a 66 y.o. female. Follow-up  Associated symptoms include shortness of breath. Pertinent negatives include no chest pain, no abdominal pain and no headaches. Hypertension  Associated symptoms include shortness of breath. Pertinent negatives include no chest pain, no abdominal pain and no headaches. Hospital Follow Up  Associated symptoms include shortness of breath. Pertinent negatives include no chest pain, no abdominal pain and no headaches. Shortness of Breath  Pertinent negatives include no fever, no headaches, no cough, no wheezing, no PND, no orthopnea, no chest pain, no vomiting, no abdominal pain, no rash, no leg swelling and no claudication. Dizziness  Associated symptoms include shortness of breath. Pertinent negatives include no chest pain, no abdominal pain and no headaches. Patient presents for an add-on office visit. The patient had an episode of fairly significant back pain in January 2014 that awoke her from sleep, it radiated around to the front of her chest up into her neck and down both arms associated with tingling and numbness. She subsequently underwent a cardiac catheterization in February 2014, which showed a high-grade stenosis of her mid left circumflex extending into obtuse marginal branch 1. She was continued to complain of exertional dyspnea and some chest pain between her shoulder blades, which is felt to be her anginal equivalent. She was also afraid to do any exertional activity because of her high-grade left circumflex stenosis, so she underwent scheduled percutaneous coronary intervention over her left circumflex lesion with a drug-eluting stent In July 2014. She underwent a followup echocardiogram in April 2016 which showed overall improvement of her well ejection fraction of 55-60% without mention of any regional wall motion abnormalities.   Her mitral regurgitation was in the moderate range, which may have been minimally increased compared to the prior study. The patient then underwent a follow-up echocardiogram November 2018 which showed preserved LV systolic function, EF 78-92%, evidence of diastolic dysfunction, mitral valve prolapse with moderate mitral regurgitation, essentially unchanged compared to the study from 2017. Patient subsequently underwent another echocardiogram and pharmacologic nuclear stress test in November 2020 to evaluate increased dyspnea on exertion. Her echocardiogram was essentially unchanged showing preserved LV function, EF 60 to 26%, diastolic dysfunction, mild left atrial enlargement, posterior prolapse of her mitral valve leaflet with moderate mitral regurgitation. Her nuclear stress test showed preserved LV function with a fixed inferolateral perfusion defect and hypokinesis of the inferolateral wall consistent with her known coronary anatomy. She was last seen in our office approximately 3 to 4 months ago. She returns today after being evaluated in the emergency room last week for a variety of symptoms which included diarrhea, dizziness, and a vague warm sensation which will come over her without vanna syncope. In the emergency room, she underwent an unremarkable work-up with the exception of a mild leukocytosis and mild volume depletion. She was treated with IV fluids and states she has been feeling reasonably better since that time. She denies any worsening shortness of breath from her baseline. No new chest pain, no leg swelling. Past Medical History:   Diagnosis Date    Age-related osteoporosis without current pathological fracture 4/5/2016    Arthritis     Benign positional vertigo 4/2/2021    CAD (coronary artery disease), native coronary artery 7/2014    ULISES to LCx    Cardiac echocardiogram 04/05/2016    EF 55-60%. No WMA. Gr 1 DDfx. Mod MR.       Cardiac nuclear imaging test, abnormal 02/05/2014    Mod-sized, mild-mod mid lateral infarction w/mild-mod rosa isela-infarct ischemia. Mod lateral hypk. EF 56%. Normal EKG on pharm stress test.  Intermediate risk.  Chronic cough 4/2/2021    Chronic rhinitis 4/2/2021    Dyslipidemia     Fatty pancreas 4/5/2016    On imaging    Hernia     Nephrolithiasis       Current Outpatient Medications   Medication Sig Dispense Refill    ondansetron (Zofran ODT) 4 mg disintegrating tablet 1 Tablet by SubLINGual route every eight (8) hours as needed for Nausea or Vomiting. 20 Tablet 0    methocarbamoL (ROBAXIN) 500 mg tablet Take 1 Tablet by mouth four (4) times daily. 30 Tablet 1    montelukast (Singulair) 10 mg tablet Take 1 Tab by mouth daily. 90 Tab 3    desloratadine (CLARINEX) 5 mg tablet Take 5 mg by mouth.  acetaminophen (Tylenol Arthritis Pain) 650 mg TbER Take 650 mg by mouth every eight (8) hours.  dicyclomine (BENTYL) 10 mg capsule Take 1 Cap by mouth four (4) times daily as needed for Abdominal Cramps. 90 Cap 2    metoprolol succinate (TOPROL-XL) 50 mg XL tablet TAKE 1 TABLET BY MOUTH DAILY 90 Tab 3    rosuvastatin (CRESTOR) 40 mg tablet Take 1 Tab by mouth daily. 90 Tab 3    guaiFENesin ER (Mucinex) 600 mg ER tablet Take 600 mg by mouth two (2) times a day.  ezetimibe (ZETIA) 10 mg tablet Take 10 mg by mouth daily.  diclofenac (VOLTAREN) 1 % gel Apply  to affected area as needed.  cholecalciferol, vitamin D3, (VITAMIN D3) 2,000 unit tab Take  by mouth daily.  folic acid/multivit-min/lutein (CENTRUM SILVER PO) Take  by mouth.  aspirin delayed-release 81 mg tablet Take 162 mg by mouth daily. Allergies   Allergen Reactions    Celebrex [Celecoxib] Swelling      Social History     Tobacco Use    Smoking status: Never Smoker    Smokeless tobacco: Never Used   Substance Use Topics    Alcohol use: No    Drug use: No            Review of Systems   Constitutional: Negative for chills, fever, malaise/fatigue and weight loss. HENT: Negative for nosebleeds. Eyes: Negative for blurred vision and double vision. Respiratory: Positive for shortness of breath. Negative for cough and wheezing. Cardiovascular: Negative for chest pain, palpitations, orthopnea, claudication, leg swelling and PND. Gastrointestinal: Negative for abdominal pain, heartburn, nausea and vomiting. Genitourinary: Negative for dysuria and hematuria. Musculoskeletal: Negative for falls and myalgias. Skin: Negative for rash. Neurological: Positive for dizziness. Negative for focal weakness and headaches. Endo/Heme/Allergies: Does not bruise/bleed easily. Psychiatric/Behavioral: Negative for substance abuse. Visit Vitals  /62 (BP 1 Location: Left upper arm, BP Patient Position: Sitting, BP Cuff Size: Adult)   Pulse 60   Ht 5' 2\" (1.575 m)   Wt 92.1 kg (203 lb)   SpO2 97%   BMI 37.13 kg/m²      Physical Exam  Constitutional:       Appearance: She is well-developed. HENT:      Head: Normocephalic and atraumatic. Eyes:      Conjunctiva/sclera: Conjunctivae normal.   Neck:      Vascular: No carotid bruit or JVD. Cardiovascular:      Rate and Rhythm: Normal rate and regular rhythm. Pulses: Normal pulses. Heart sounds: S1 normal and S2 normal. Murmur heard. High-pitched blowing holosystolic murmur is present with a grade of 2/6 at the apex. No gallop. Pulmonary:      Effort: Pulmonary effort is normal.      Breath sounds: Normal breath sounds. No wheezing or rales. Abdominal:      General: Bowel sounds are normal.      Palpations: Abdomen is soft. Tenderness: There is no abdominal tenderness. Musculoskeletal:         General: No swelling or deformity. Cervical back: Neck supple. Skin:     General: Skin is warm and dry. Neurological:      General: No focal deficit present. Mental Status: She is alert and oriented to person, place, and time.    Psychiatric:         Mood and Affect: Mood normal.         Behavior: Behavior normal. August 24, 2021 EKG: Normal sinus rhythm, Normal axis, voltage criteria for LVH, poor R wave progression, Normal QTc interval, No ST or T wave abnormalities concerning for ischemia. Compared to previous EKG, No significant interval change. ASSESSMENT and PLAN    Coronary artery disease. The patient had a 85-90% stenosis of her mid left circumflex extending into OM1 diagnosed by cardiac catheterization in February 2014. She underwent scheduled PCI In July 2014, with a Resolute drug-eluting stent. Her symptoms improved following the intervention. I suspect she did have a small infarction of the inferolateral region in January 2014. Patient recently underwent a follow-up pharmacologic nuclear stress test in November 2020. This demonstrated a small area of infarction of the inferolateral wall with an area of hypokinesis, EF 55%. She remains on an aspirin, beta-blocker and potent statin. All of which I would continue. No new symptoms concerning for angina. Mitral regurgitation. This remains moderate to possibly moderate to severe in severity on her most recent echocardiogram in November 2020. She has significant mitral valve prolapse of the posterior leaflet. This will be reviewed reevaluate with an echocardiogram at the end of October of this year. Her murmur has not changed in severity on exam today. Dyslipidemia. Patient is now being managed with both rosuvastatin and Zetia. This is being followed by her PCP. I prefer her LDL to be less than 70 if possible. Dizziness. Unclear etiology at this point. I have recommended the patient check her heart rate and blood pressure whenever she is symptomatic. Followup in November of this year as previously scheduled, sooner if needed.

## 2021-08-30 NOTE — PROGRESS NOTES
Johnny Fawad presents today for   Chief Complaint   Patient presents with   [facility] Follow Up     Near Syncope Episode       Johnny Celestin preferred language for health care discussion is english/other. Is someone accompanying this pt? no    Is the patient using any DME equipment during [address]? no    Depression Screening:  3 most recent PHQ Screens 8/30/2021   PHQ Not Done -   Little interest or pleasure in doing things Not at all   Feeling down, depressed, irritable, or hopeless Not at all   Total Score PHQ 2 0       Learning Assessment:  Learning Assessment 8/30/2021   PRIMARY LEARNER Patient   HIGHEST LEVEL OF EDUCATION - PRIMARY LEARNER  -   BARRIERS PRIMARY LEARNER -   [facility]    NEED -   LEARNER PREFERENCE PRIMARY DEMONSTRATION   LEARNING SPECIAL TOPICS -   ANSWERED BY patient   RELATIONSHIP SELF       Abuse Screening:  Abuse Screening Questionnaire 8/30/2021   Do you ever feel afraid of your partner? N   Are you in a relationship with someone who physically or mentally threatens you? N   Is it safe for you to go home? Y       Fall Risk  Fall Risk Assessment, last 12 mths 8/30/2021   Able to walk? Yes   Fall in past 12 months? 0   Do you feel unsteady? 0   Are you worried about falling 0           Pt currently taking Anticoagulant therapy? no    Pt currently taking Antiplatelet therapy ?  mg once a day      Coordination of Care:  1. Have you been to the ER, urgent care clinic since your last visit? Hospitalized since your last visit? no    2. Have you seen or consulted any other health care providers outside of the [address] [place] [address] since your last visit? Include any pap smears or colon screening.  no

## 2021-08-31 ENCOUNTER — OFFICE VISIT (OUTPATIENT)
Dept: INTERNAL MEDICINE CLINIC | Age: 71
End: 2021-08-31
Payer: MEDICARE

## 2021-08-31 VITALS
HEIGHT: 62 IN | SYSTOLIC BLOOD PRESSURE: 108 MMHG | BODY MASS INDEX: 37.39 KG/M2 | RESPIRATION RATE: 17 BRPM | DIASTOLIC BLOOD PRESSURE: 50 MMHG | WEIGHT: 203.2 LBS | HEART RATE: 68 BPM | OXYGEN SATURATION: 96 % | TEMPERATURE: 97.3 F

## 2021-08-31 DIAGNOSIS — I25.10 CORONARY ARTERY DISEASE INVOLVING NATIVE CORONARY ARTERY OF NATIVE HEART WITHOUT ANGINA PECTORIS: ICD-10-CM

## 2021-08-31 DIAGNOSIS — E78.5 DYSLIPIDEMIA: ICD-10-CM

## 2021-08-31 DIAGNOSIS — M19.90 ARTHRITIS: ICD-10-CM

## 2021-08-31 DIAGNOSIS — E04.1 THYROID NODULE: Primary | ICD-10-CM

## 2021-08-31 DIAGNOSIS — C43.4 MELANOMA OF SCALP (HCC): ICD-10-CM

## 2021-08-31 DIAGNOSIS — K21.9 GASTROESOPHAGEAL REFLUX DISEASE, UNSPECIFIED WHETHER ESOPHAGITIS PRESENT: ICD-10-CM

## 2021-08-31 PROBLEM — F11.99 OPIOID USE, UNSPECIFIED WITH UNSPECIFIED OPIOID-INDUCED DISORDER (HCC): Status: ACTIVE | Noted: 2021-08-31

## 2021-08-31 PROCEDURE — 99214 OFFICE O/P EST MOD 30 MIN: CPT | Performed by: INTERNAL MEDICINE

## 2021-08-31 PROCEDURE — G0463 HOSPITAL OUTPT CLINIC VISIT: HCPCS | Performed by: INTERNAL MEDICINE

## 2021-08-31 RX ORDER — METOPROLOL SUCCINATE 50 MG/1
TABLET, EXTENDED RELEASE ORAL
Qty: 90 TABLET | Refills: 3 | Status: SHIPPED | OUTPATIENT
Start: 2021-08-31 | End: 2022-01-31 | Stop reason: SDUPTHER

## 2021-08-31 RX ORDER — PANTOPRAZOLE SODIUM 40 MG/1
40 TABLET, DELAYED RELEASE ORAL DAILY
COMMUNITY
Start: 2021-08-29 | End: 2021-08-31 | Stop reason: SDUPTHER

## 2021-08-31 RX ORDER — TRAMADOL HYDROCHLORIDE 50 MG/1
50 TABLET ORAL
COMMUNITY
End: 2021-08-31 | Stop reason: SDUPTHER

## 2021-08-31 RX ORDER — ONDANSETRON 4 MG/1
4 TABLET, ORALLY DISINTEGRATING ORAL
Qty: 20 TABLET | Refills: 0 | Status: CANCELLED | OUTPATIENT
Start: 2021-08-31

## 2021-08-31 RX ORDER — PANTOPRAZOLE SODIUM 40 MG/1
40 TABLET, DELAYED RELEASE ORAL DAILY
Qty: 90 TABLET | Refills: 2 | Status: SHIPPED | OUTPATIENT
Start: 2021-08-31 | End: 2021-11-12 | Stop reason: SDUPTHER

## 2021-08-31 RX ORDER — TRAMADOL HYDROCHLORIDE 50 MG/1
50 TABLET ORAL
Qty: 90 TABLET | Refills: 2 | Status: SHIPPED | OUTPATIENT
Start: 2021-08-31 | End: 2022-01-31 | Stop reason: SDUPTHER

## 2021-08-31 RX ORDER — EZETIMIBE 10 MG/1
10 TABLET ORAL DAILY
Qty: 90 TABLET | Refills: 1 | Status: SHIPPED | OUTPATIENT
Start: 2021-08-31 | End: 2022-01-31 | Stop reason: SDUPTHER

## 2021-08-31 RX ORDER — ROSUVASTATIN CALCIUM 40 MG/1
40 TABLET, COATED ORAL DAILY
Qty: 90 TABLET | Refills: 3 | Status: SHIPPED | OUTPATIENT
Start: 2021-08-31 | End: 2022-01-31 | Stop reason: SDUPTHER

## 2021-08-31 RX ORDER — DICLOFENAC SODIUM 10 MG/G
GEL TOPICAL EVERY 6 HOURS
Qty: 500 G | Refills: 2 | Status: SHIPPED | OUTPATIENT
Start: 2021-08-31 | End: 2022-08-05 | Stop reason: SDUPTHER

## 2021-08-31 NOTE — PROGRESS NOTES
PEARL Bhatti is here for follow-up of an ER visit after experiencing several days of diarrhea, diffuse tingling in her body. The symptoms have resolved. I reviewed the ER records and it seemed that she was volume depleted, and diagnosis of vasovagal presyncope was given. Her symptoms have not recurred. This was to be a Medicare wellness visit, but she prefers to have this be follow-up from the ER, lab review, and discussion of her recently diagnosed melanoma of the scalp on biopsy. In the ER, she had blood work done, remarkable only for slightly decreased GFR of 57. On the labs done for me a week before, GFR was normal at greater than 60. She had CT scans done, which showed diverticulosis without diverticulitis. They also showed a 2.3 cm right thyroid lower lobe nodule with calcifications. Past Medical History:   Diagnosis Date    Age-related osteoporosis without current pathological fracture 4/5/2016    Arthritis     Benign positional vertigo 4/2/2021    CAD (coronary artery disease), native coronary artery 7/2014    ULISES to LCx    Cardiac echocardiogram 04/05/2016    EF 55-60%. No WMA. Gr 1 DDfx. Mod MR.  Cardiac nuclear imaging test, abnormal 02/05/2014    Mod-sized, mild-mod mid lateral infarction w/mild-mod rosa isela-infarct ischemia. Mod lateral hypk. EF 56%. Normal EKG on pharm stress test.  Intermediate risk.     Chronic cough 4/2/2021    Chronic rhinitis 4/2/2021    Dyslipidemia     Fatty pancreas 4/5/2016    On imaging    Hernia     Nephrolithiasis         Past Surgical History:   Procedure Laterality Date    HX CHOLECYSTECTOMY      HX CORONARY STENT PLACEMENT  07/07/2014    HX HEART CATHETERIZATION      HX HERNIA REPAIR      HX LYSIS OF ADHESIONS      HX OOPHORECTOMY Right 12/05/2018    HX OTHER SURGICAL  11/2018    repair of anal fissure    TX REMOVAL OF ANAL FISSURE  11/02/2018    REMOVAL OF KIDNEY STONE  12/28/2018        Current Outpatient Medications   Medication Sig Dispense Refill    ezetimibe (ZETIA) 10 mg tablet Take 1 Tablet by mouth daily. 90 Tablet 1    rosuvastatin (CRESTOR) 40 mg tablet Take 1 Tablet by mouth daily. 90 Tablet 3    traMADoL (ULTRAM) 50 mg tablet Take 1 Tablet by mouth every six (6) hours as needed for Pain. Max Daily Amount: 200 mg. 90 Tablet 2    pantoprazole (PROTONIX) 40 mg tablet Take 1 Tablet by mouth daily. 90 Tablet 2    metoprolol succinate (TOPROL-XL) 50 mg XL tablet TAKE 1 TABLET BY MOUTH DAILY 90 Tablet 3    diclofenac (VOLTAREN) 1 % gel Apply  to affected area every six (6) hours. 500 g 2    ondansetron (Zofran ODT) 4 mg disintegrating tablet 1 Tablet by SubLINGual route every eight (8) hours as needed for Nausea or Vomiting. 20 Tablet 0    methocarbamoL (ROBAXIN) 500 mg tablet Take 1 Tablet by mouth four (4) times daily. 30 Tablet 1    montelukast (Singulair) 10 mg tablet Take 1 Tab by mouth daily. 90 Tab 3    acetaminophen (Tylenol Arthritis Pain) 650 mg TbER Take 650 mg by mouth every eight (8) hours.  dicyclomine (BENTYL) 10 mg capsule Take 1 Cap by mouth four (4) times daily as needed for Abdominal Cramps. 90 Cap 2    guaiFENesin ER (Mucinex) 600 mg ER tablet Take 600 mg by mouth two (2) times a day.  cholecalciferol, vitamin D3, (VITAMIN D3) 2,000 unit tab Take  by mouth daily.  folic acid/multivit-min/lutein (CENTRUM SILVER PO) Take  by mouth.  aspirin delayed-release 81 mg tablet Take 162 mg by mouth daily.  desloratadine (CLARINEX) 5 mg tablet Take 5 mg by mouth.  (Patient not taking: Reported on 8/31/2021)          Allergies   Allergen Reactions    Celebrex [Celecoxib] Swelling        Social History     Socioeconomic History    Marital status:      Spouse name: Not on file    Number of children: Not on file    Years of education: Not on file    Highest education level: Not on file   Occupational History    Not on file   Tobacco Use    Smoking status: Never Smoker    Smokeless tobacco: Never Used   Substance and Sexual Activity    Alcohol use: No    Drug use: No    Sexual activity: Yes     Partners: Male     Birth control/protection: None   Other Topics Concern    Not on file   Social History Narrative    Not on file     Social Determinants of Health     Financial Resource Strain:     Difficulty of Paying Living Expenses:    Food Insecurity:     Worried About Running Out of Food in the Last Year:     [address] in the Last Year:    Transportation Needs:     Lack of Transportation (Medical):  Lack of Transportation (Non-Medical):    Physical Activity:     Days of Exercise per Week:     Minutes of Exercise per Session:    Stress:     Feeling of Stress :    Social Connections:     Frequency of Communication with Friends and Family:     Frequency of Social Gatherings with Friends and Family:     Attends Jew Services:     Active Member of Clubs or Organizations:     Attends Club or Organization Meetings:     Marital Status:    Intimate Partner Violence:     Fear of Current or Ex-Partner:     Emotionally Abused:     Physically Abused:     Sexually Abused:         Family History   Problem Relation Age of Onset    Hypertension Mother     Atrial Fibrillation Mother     Cancer Father         colon, breast, throat,  at 72    Heart Disease Maternal Grandmother     Diabetes Paternal Grandmother            Visit Vitals  BP (!) 108/50 (BP 1 Location: Right arm, BP Patient Position: Sitting, BP Cuff Size: Adult)   Pulse 68   Temp 97.3 °F (36.3 °C) (Temporal)   Resp 17   Ht 5' 2\" (1.575 m)   Wt 203 lb 3.2 oz (92.2 kg)   SpO2 96%   BMI 37.17 kg/m²        Review of Systems   Constitutional: Negative for chills and fever. Respiratory: Negative for shortness of breath. Cardiovascular: Negative for chest pain. Gastrointestinal: Negative for blood in stool. Genitourinary: Negative for hematuria.         Urine output is normal. Physical Exam  Constitutional:       General: She is not in acute distress. Appearance: She is obese. She is not ill-appearing. Eyes:      General: No scleral icterus. Conjunctiva/sclera: Conjunctivae normal.   Neck:      Comments: Carotid upstrokes are normal to palpation bilaterally. I am unable to appreciate a right thyroid nodule. Lymph: No cervical or supraclavicular lymphadenopathy. Cardiovascular:      Rate and Rhythm: Normal rate and regular rhythm. Pulses: Normal pulses. Heart sounds: No friction rub. No gallop. Pulmonary:      Effort: Pulmonary effort is normal.      Breath sounds: Normal breath sounds. Abdominal:      Palpations: Abdomen is soft. Tenderness: There is no abdominal tenderness. Musculoskeletal:      Cervical back: Neck supple. Comments: No clubbing, cyanosis, or edema. Calves are nontender. Skin:     General: Skin is warm and dry. Neurological:      Mental Status: She is alert and oriented to person, place, and time. Psychiatric:         Mood and Affect: Mood normal.                  Diagnoses and all orders for this visit:    1. Thyroid nodule  Comments:  TSH was normal October 2020. Thyroid ultrasound ordered to further characterize. Orders:  -     US THYROID/PARATHYROID/SOFT TISS; Future    2. Dyslipidemia  Comments: With LDL at goal around 70 with CAD, continue meds. Orders:  -     rosuvastatin (CRESTOR) 40 mg tablet; Take 1 Tablet by mouth daily. 3. Coronary artery disease involving native coronary artery of native heart without angina pectoris  Comments:  Continue aspirin, statin, and beta-blocker. LDL at goal around 70  Orders:  -     ezetimibe (ZETIA) 10 mg tablet; Take 1 Tablet by mouth daily. -     metoprolol succinate (TOPROL-XL) 50 mg XL tablet; TAKE 1 TABLET BY MOUTH DAILY    4. Melanoma of scalp (HCC)  Comments: Will be seeing plastic surgeon, discussed probably for discussion of Mohs surgery.   Briefly reviewed that melanoma prognosis depends partly on thickness. 5. Arthritis  Comments:  Refill tramadol and Voltaren gel. Orders:  -     traMADoL (ULTRAM) 50 mg tablet; Take 1 Tablet by mouth every six (6) hours as needed for Pain. Max Daily Amount: 200 mg.  -     diclofenac (VOLTAREN) 1 % gel; Apply  to affected area every six (6) hours. 6. Gastroesophageal reflux disease, unspecified whether esophagitis present  Comments:  Controlled, refill PPI  Orders:  -     pantoprazole (PROTONIX) 40 mg tablet; Take 1 Tablet by mouth daily. Follow-up and Dispositions    · Return in about 5 months (around 1/31/2022) for 30 minute [facility]/ [facility].               Yue Yung MD

## 2021-08-31 NOTE — PROGRESS NOTES
Pt is here for   Chief Complaint   Patient presents with    Coronary Artery Disease     follow up    Cholesterol Problem     follow up     1. Have you been to the ER, urgent care clinic or hospitalized since your last visit? YES.     2. Have you seen or consulted any other health care providers outside of the [address] [place] [address] since your last visit (Include any pap smears or colon screening)? YES, Dr Miguel Gongora (Cardio)    Do you have an Advanced Directive? YES    Would you like information on Advanced Directives?  NO

## 2021-09-01 ENCOUNTER — APPOINTMENT (OUTPATIENT)
Dept: GENERAL RADIOLOGY | Age: 71
End: 2021-09-01
Attending: PHYSICIAN ASSISTANT
Payer: MEDICARE

## 2021-09-01 ENCOUNTER — HOSPITAL ENCOUNTER (EMERGENCY)
Age: 71
Discharge: HOME | End: 2021-09-01
Attending: EMERGENCY MEDICINE
Payer: MEDICARE

## 2021-09-01 VITALS
WEIGHT: 203 LBS | TEMPERATURE: 97.4 F | RESPIRATION RATE: 19 BRPM | BODY MASS INDEX: 37.36 KG/M2 | OXYGEN SATURATION: 100 % | SYSTOLIC BLOOD PRESSURE: 133 MMHG | DIASTOLIC BLOOD PRESSURE: 40 MMHG | HEART RATE: 66 BPM | HEIGHT: 62 IN

## 2021-09-01 DIAGNOSIS — S93.402A SPRAIN OF LEFT ANKLE, UNSPECIFIED LIGAMENT, INITIAL ENCOUNTER: Primary | ICD-10-CM

## 2021-09-01 PROCEDURE — 74011250637 HC RX REV CODE- 250/637: Performed by: PHYSICIAN ASSISTANT

## 2021-09-01 PROCEDURE — 99283 EMERGENCY DEPT VISIT LOW MDM: CPT

## 2021-09-01 PROCEDURE — 73630 X-RAY EXAM OF FOOT: CPT

## 2021-09-01 PROCEDURE — 73610 X-RAY EXAM OF ANKLE: CPT

## 2021-09-01 RX ORDER — ACETAMINOPHEN 500 MG
1000 TABLET ORAL
Status: COMPLETED | OUTPATIENT
Start: 2021-09-01 | End: 2021-09-01

## 2021-09-01 RX ADMIN — ACETAMINOPHEN 1000 MG: 500 TABLET, FILM COATED ORAL at 15:44

## 2021-09-01 NOTE — ED PROVIDER NOTES
EMERGENCY DEPARTMENT HISTORY AND PHYSICAL EXAM    3:06 PM      Date: 9/1/2021  Patient Name: Inessa Rudd    History of Presenting Illness     Chief Complaint   Patient presents with    Foot Pain       History Provided By: Patient    Additional History (Context): Inessa Rudd is a 66 y.o. female with noted PMH who presents with complaint of left foot pain and left lateral ankle pain after inversion injury that occurred today. Patient notes inability to bear weight since incident. Did not take any medication for the symptoms prior to arrival.  Denies head injury, loss of consciousness, numbness or tingling, weakness. Notes she has access to a walker and cane. PCP: Brennan Shah MD    Current Outpatient Medications   Medication Sig Dispense Refill    ezetimibe (ZETIA) 10 mg tablet Take 1 Tablet by mouth daily. 90 Tablet 1    rosuvastatin (CRESTOR) 40 mg tablet Take 1 Tablet by mouth daily. 90 Tablet 3    traMADoL (ULTRAM) 50 mg tablet Take 1 Tablet by mouth every six (6) hours as needed for Pain. Max Daily Amount: 200 mg. 90 Tablet 2    pantoprazole (PROTONIX) 40 mg tablet Take 1 Tablet by mouth daily. 90 Tablet 2    metoprolol succinate (TOPROL-XL) 50 mg XL tablet TAKE 1 TABLET BY MOUTH DAILY 90 Tablet 3    diclofenac (VOLTAREN) 1 % gel Apply  to affected area every six (6) hours. 500 g 2    ondansetron (Zofran ODT) 4 mg disintegrating tablet 1 Tablet by SubLINGual route every eight (8) hours as needed for Nausea or Vomiting. 20 Tablet 0    methocarbamoL (ROBAXIN) 500 mg tablet Take 1 Tablet by mouth four (4) times daily. 30 Tablet 1    montelukast (Singulair) 10 mg tablet Take 1 Tab by mouth daily. 90 Tab 3    acetaminophen (Tylenol Arthritis Pain) 650 mg TbER Take 650 mg by mouth every eight (8) hours.  dicyclomine (BENTYL) 10 mg capsule Take 1 Cap by mouth four (4) times daily as needed for Abdominal Cramps.  90 Cap 2    guaiFENesin ER (Mucinex) 600 mg ER tablet Take 600 mg by mouth two (2) times a day.  cholecalciferol, vitamin D3, (VITAMIN D3) 2,000 unit tab Take  by mouth daily.  folic acid/multivit-min/lutein (CENTRUM SILVER PO) Take  by mouth.  aspirin delayed-release 81 mg tablet Take 162 mg by mouth daily. Past History     Past Medical History:  Past Medical History:   Diagnosis Date    Age-related osteoporosis without current pathological fracture 2016    Arthritis     Benign positional vertigo 2021    CAD (coronary artery disease), native coronary artery 2014    ULISES to LCx    Cardiac echocardiogram 2016    EF 55-60%. No WMA. Gr 1 DDfx. Mod MR.  Cardiac nuclear imaging test, abnormal 2014    Mod-sized, mild-mod mid lateral infarction w/mild-mod rosa isela-infarct ischemia. Mod lateral hypk. EF 56%. Normal EKG on pharm stress test.  Intermediate risk.  Chronic cough 2021    Chronic rhinitis 2021    Dyslipidemia     Fatty pancreas 2016    On imaging    Hernia     Nephrolithiasis        Past Surgical History:  Past Surgical History:   Procedure Laterality Date    HX CHOLECYSTECTOMY      HX CORONARY STENT PLACEMENT  2014    HX HEART CATHETERIZATION      HX HERNIA REPAIR      HX LYSIS OF ADHESIONS      HX OOPHORECTOMY Right 2018    HX OTHER SURGICAL  2018    repair of anal fissure    IA REMOVAL OF ANAL FISSURE  2018    REMOVAL OF KIDNEY STONE  2018       Family History:  Family History   Problem Relation Age of Onset    Hypertension Mother     Atrial Fibrillation Mother     Cancer Father         colon, breast, throat,  at 72    Heart Disease Maternal Grandmother     Diabetes Paternal Grandmother        Social History:  Social History     Tobacco Use    Smoking status: Never Smoker    Smokeless tobacco: Never Used   Substance Use Topics    Alcohol use: No    Drug use: No       Allergies:   Allergies   Allergen Reactions    Celebrex [Celecoxib] Swelling Review of Systems       Review of Systems   Constitutional: Negative for chills and fever. Respiratory: Negative for shortness of breath. Cardiovascular: Negative for chest pain. Gastrointestinal: Negative for abdominal pain, nausea and vomiting. Musculoskeletal: Positive for arthralgias and myalgias. Skin: Negative for rash. Neurological: Negative for weakness. All other systems reviewed and are negative. Physical Exam     Visit Vitals  BP (!) 133/40 (BP 1 Location: Left upper arm, BP Patient Position: At rest)   Pulse 66   Temp 97.4 °F (36.3 °C)   Resp 19   Ht 5' 2\" (1.575 m)   Wt 92.1 kg (203 lb)   SpO2 100%   BMI 37.13 kg/m²         Physical Exam  Vitals and nursing note reviewed. Constitutional:       General: She is not in acute distress. Appearance: She is well-developed. She is not diaphoretic. HENT:      Head: Normocephalic and atraumatic. Cardiovascular:      Rate and Rhythm: Normal rate and regular rhythm. Heart sounds: Normal heart sounds. No murmur heard. No friction rub. No gallop. Pulmonary:      Effort: Pulmonary effort is normal. No respiratory distress. Breath sounds: Normal breath sounds. No wheezing or rales. Musculoskeletal:         General: Normal range of motion. Cervical back: Normal range of motion and neck supple. Left knee: Normal.      Left lower leg: Normal.      Left ankle: No swelling, deformity or ecchymosis. Tenderness present over the lateral malleolus. No medial malleolus tenderness. Normal range of motion. Left Achilles Tendon: Normal.      Left foot: Normal capillary refill. Bony tenderness (arch of foot) present. No swelling, deformity or crepitus. Normal pulse. Comments: Dorsalis pedis 2+ , sensation intact throughout digits   Skin:     General: Skin is warm. Findings: No rash. Neurological:      Mental Status: She is alert.        Diagnostic Study Results     Labs -  No results found for this or any previous visit (from the past 12 hour(s)). Radiologic Studies -   XR FOOT LT MIN 3 V   Final Result      Left ankle:   -No definite acute fracture identified. Soft tissue swelling along the lateral   ankle. Left foot:   -No definite fracture. Soft tissue swelling along lateral ankle. Plantar   calcaneal spur. XR ANKLE LT MIN 3 V   Final Result      Left ankle:   -No definite acute fracture identified. Soft tissue swelling along the lateral   ankle. Left foot:   -No definite fracture. Soft tissue swelling along lateral ankle. Plantar   calcaneal spur. Medical Decision Making   I am the first provider for this patient. I reviewed the vital signs, available nursing notes, past medical history, past surgical history, family history and social history. Vital Signs-Reviewed the patient's vital signs. Records Reviewed: Nursing Notes and Old Medical Records (Time of Review: 3:06 PM)    ED Course: Progress Notes, Reevaluation, and Consults:  4:20 PM  Reviewed results with patient. Discussed need for close outpatient follow-up this week for reassessment. Discussed strict return precautions, including numbness, discoloration, or any other medical concerns. Ankle gel stirrup applied by nurse Tamika Flynn    Provider Notes (Medical Decision Making): 27-year-old female who presents to the ED due to left foot and ankle pain after inversion injury. Extremity neurovascularly intact. No ecchymosis, deformity. X-rays without acute process. Ankle gel stirrup splint applied, patient has walker and cane at home. Stable for discharge with close outpatient follow-up for further assessment. Strict return precautions provided. Diagnosis     Clinical Impression:   1.  Sprain of left ankle, unspecified ligament, initial encounter        Disposition: home     Follow-up Information     Follow up With Specialties Details Why [place] [id number] EMERGENCY DEPT Emergency Medicine  If symptoms worsen [id number] [facility]  [phone]    Ruslan Albrecht DPM Podiatry Schedule an appointment as soon as possible for a visit   [address] [place] [id number]  [phone]             Discharge Medication List as of 9/1/2021  4:18 PM      CONTINUE these medications which have NOT CHANGED    Details   ezetimibe (ZETIA) 10 mg tablet Take 1 Tablet by mouth daily. , Normal, Disp-90 Tablet, R-1      rosuvastatin (CRESTOR) 40 mg tablet Take 1 Tablet by mouth daily. , Normal, Disp-90 Tablet, R-3      traMADoL (ULTRAM) 50 mg tablet Take 1 Tablet by mouth every six (6) hours as needed for Pain. Max Daily Amount: 200 mg., Normal, Disp-90 Tablet, R-2      pantoprazole (PROTONIX) 40 mg tablet Take 1 Tablet by mouth daily. , Normal, Disp-90 Tablet, R-2      metoprolol succinate (TOPROL-XL) 50 mg XL tablet TAKE 1 TABLET BY MOUTH DAILY, Normal, Disp-90 Tablet, R-3      diclofenac (VOLTAREN) 1 % gel Apply  to affected area every six (6) hours. , Normal, Disp-500 g, R-2      ondansetron (Zofran ODT) 4 mg disintegrating tablet 1 Tablet by SubLINGual route every eight (8) hours as needed for Nausea or Vomiting., Normal, Disp-20 Tablet, R-0      methocarbamoL (ROBAXIN) 500 mg tablet Take 1 Tablet by mouth four (4) times daily. , Normal, Disp-30 Tablet, R-11 or 2 tabs po q 6 hrs PRN pain, watch for drowsiness      montelukast (Singulair) 10 mg tablet Take 1 Tab by mouth daily. , Normal, Disp-90 Tab, R-3      acetaminophen (Tylenol Arthritis Pain) 650 mg TbER Take 650 mg by mouth every eight (8) hours. , Historical Med      dicyclomine (BENTYL) 10 mg capsule Take 1 Cap by mouth four (4) times daily as needed for Abdominal Cramps., Normal, Disp-90 Cap, R-2      guaiFENesin ER (Mucinex) 600 mg ER tablet Take 600 mg by mouth two (2) times a day., Historical Med      cholecalciferol, vitamin D3, (VITAMIN D3) 2,000 unit tab Take  by mouth daily. , Historical Med      folic acid/multivit-min/lutein (CENTRUM SILVER PO) Take  by mouth., Historical Med      aspirin delayed-release 81 mg tablet Take 162 mg by mouth daily. , Historical Med             Dictation disclaimer:  Please note that this dictation was completed with [employer], the computer voice recognition software. Quite often unanticipated grammatical, syntax, homophones, and other interpretive errors are inadvertently transcribed by the computer software. Please disregard these errors. Please excuse any errors that have escaped final proofreading.

## 2021-09-01 NOTE — ROUTINE PROCESS
Christina Trivedi is a 66 y.o. female that was discharged in stable. Pt was accompanied by son. Pt is not driving. The patients diagnosis, condition and treatment were explained to  patient and aftercare instructions were given. The patient verbalized understanding. Patient armband removed and shredded.

## 2021-09-30 DIAGNOSIS — E04.1 THYROID NODULE: ICD-10-CM

## 2021-10-15 ENCOUNTER — OFFICE VISIT (OUTPATIENT)
Dept: ORTHOPEDIC SURGERY | Age: 71
End: 2021-10-15
Payer: MEDICARE

## 2021-10-15 VITALS
TEMPERATURE: 97.5 F | HEIGHT: 62 IN | WEIGHT: 205.4 LBS | OXYGEN SATURATION: 95 % | RESPIRATION RATE: 16 BRPM | HEART RATE: 65 BPM | BODY MASS INDEX: 37.8 KG/M2

## 2021-10-15 DIAGNOSIS — M16.12 PRIMARY OSTEOARTHRITIS OF LEFT HIP: ICD-10-CM

## 2021-10-15 DIAGNOSIS — M25.561 CHRONIC PAIN OF RIGHT KNEE: ICD-10-CM

## 2021-10-15 DIAGNOSIS — M17.11 PRIMARY OSTEOARTHRITIS OF RIGHT KNEE: Primary | ICD-10-CM

## 2021-10-15 DIAGNOSIS — G89.29 CHRONIC PAIN OF RIGHT KNEE: ICD-10-CM

## 2021-10-15 DIAGNOSIS — M16.11 PRIMARY OSTEOARTHRITIS OF RIGHT HIP: ICD-10-CM

## 2021-10-15 DIAGNOSIS — M25.551 RIGHT HIP PAIN: ICD-10-CM

## 2021-10-15 PROCEDURE — 73502 X-RAY EXAM HIP UNI 2-3 VIEWS: CPT | Performed by: SPECIALIST

## 2021-10-15 PROCEDURE — 1101F PT FALLS ASSESS-DOCD LE1/YR: CPT | Performed by: SPECIALIST

## 2021-10-15 PROCEDURE — G8427 DOCREV CUR MEDS BY ELIG CLIN: HCPCS | Performed by: SPECIALIST

## 2021-10-15 PROCEDURE — G8432 DEP SCR NOT DOC, RNG: HCPCS | Performed by: SPECIALIST

## 2021-10-15 PROCEDURE — G9899 SCRN MAM PERF RSLTS DOC: HCPCS | Performed by: SPECIALIST

## 2021-10-15 PROCEDURE — G8417 CALC BMI ABV UP PARAM F/U: HCPCS | Performed by: SPECIALIST

## 2021-10-15 PROCEDURE — 3017F COLORECTAL CA SCREEN DOC REV: CPT | Performed by: SPECIALIST

## 2021-10-15 PROCEDURE — 99213 OFFICE O/P EST LOW 20 MIN: CPT | Performed by: SPECIALIST

## 2021-10-15 PROCEDURE — G8536 NO DOC ELDER MAL SCRN: HCPCS | Performed by: SPECIALIST

## 2021-10-15 PROCEDURE — 20610 DRAIN/INJ JOINT/BURSA W/O US: CPT | Performed by: SPECIALIST

## 2021-10-15 PROCEDURE — 1090F PRES/ABSN URINE INCON ASSESS: CPT | Performed by: SPECIALIST

## 2021-10-15 RX ORDER — BETAMETHASONE SODIUM PHOSPHATE AND BETAMETHASONE ACETATE 3; 3 MG/ML; MG/ML
3 INJECTION, SUSPENSION INTRA-ARTICULAR; INTRALESIONAL; INTRAMUSCULAR; SOFT TISSUE ONCE
Status: COMPLETED | OUTPATIENT
Start: 2021-10-15 | End: 2021-10-15

## 2021-10-15 RX ADMIN — BETAMETHASONE SODIUM PHOSPHATE AND BETAMETHASONE ACETATE 3 MG: 3; 3 INJECTION, SUSPENSION INTRA-ARTICULAR; INTRALESIONAL; INTRAMUSCULAR; SOFT TISSUE at 11:36

## 2021-10-15 NOTE — PROGRESS NOTES
Patient: Martha Neri                MRN: [id number]       SSN: [id number]  YOB: 1950        AGE: 66 y.o. SEX: female    PCP: Purnima Cha MD  10/15/21    CC: RIGHT KNEE AND HIP PAIN    HISTORY:  Martha Neri is a 66 y.o. female who is seen for right knee pain. She felt a sudden pain in her right knee and groin while she was getting in her bed about 2 months ago. She experiences medial and posterior pain. She tried using diclofenac gel without much success. She experiences startup pain after sitting. She feels throbbing pain that wakes her up during the night. She had her knee aspirated at [facility] on 5/20/20. She states that the [facility] provider thought she may have a meniscus tear. She competed a successful Euflexxa series on 7/20/20. She is also seen for right hip and groin pain. She has been experiencing right hip and groin pain for the past month. Her hip stiffens up after she sits for long periods of time. Pain Assessment  10/15/2021   Location of Pain Knee   Location Modifiers Right   Severity of Pain 6   Quality of Pain Aching;Burning   Duration of Pain A few hours   Frequency of Pain Intermittent   Aggravating Factors Other (Comment)   Aggravating Factors Comment at night   Limiting Behavior -   Relieving Factors Ice   Relieving Factors Comment -   Result of Injury No       Occupation, etc:  Ms. Johnnie Dove she previously worked as a  for [employer]. She retired in 2013. She lives alone in [place]. She has 1 adult daughter. She recently had a dog that passed away. She has a new tenorio retriever puppy--Leander. Her daughter adopted Leander's brother and named him Kristy Moore. She is not diabetic. She has a heart stent and uses aspirin. Ms. Johnnie Dove weighs 205 lbs and is 5'2\" tall.        Lab Results   Component Value Date/Time    Hemoglobin A1c 6.3 (H) 07/26/2018 08:00 AM     Weight Metrics 10/15/2021 9/1/2021 8/31/2021 8/30/2021 8/24/2021 7/26/2021 5/11/2021   Weight 205 lb 6.4 oz 203 lb 203 lb 3.2 oz 203 lb 200 lb 204 lb 12.8 oz 202 lb   BMI 37.57 kg/m2 37.13 kg/m2 37.17 kg/m2 37.13 kg/m2 36.58 kg/m2 37.46 kg/m2 36.95 kg/m2       Patient Active Problem List   Diagnosis Code    Dyslipidemia E78.5    Abnormal nuclear stress test R94.39    Chest pain R07.9    Nephrolithiasis N20.0    Non-rheumatic mitral regurgitation I34.0    Severe obesity (BMI 35.0-39. 9) E66.01    Chronic cough R05.3    Chronic rhinitis J31.0    Fatty pancreas K86.89    Age-related osteoporosis without current pathological fracture M81.0    CAD (coronary artery disease), native coronary artery I25.10    Irritable bowel syndrome K58.9    Opioid use, unspecified with unspecified opioid-induced disorder F11.99     REVIEW OF SYSTEMS:    Constitutional Symptoms: Negative   Eyes: Negative   Ears, Nose, Throat and Mouth: Negative   Cardiovascular: Negative   Respiratory: Negative   Genitourinary: Per HPI   Gastrointestinal: Per HPI   Integumentary (Skin and/or Breast): Negative   Musculoskeletal: Per HPI   Endocrine/Rheumatologic: Negative   Neurological: Per HPI   Hematology/Lymphatic: Negative    Allergic/Immunologic: Negative   Phychiatric: Negative    Social History     Socioeconomic History    Marital status:      Spouse name: Not on file    Number of children: Not on file    Years of education: Not on file    Highest education level: Not on file   Occupational History    Not on file   Tobacco Use    Smoking status: Never Smoker    Smokeless tobacco: Never Used   Substance and Sexual Activity    Alcohol use: No    Drug use: No    Sexual activity: Yes     Partners: Male     Birth control/protection: None   Other Topics Concern    Not on file   Social History Narrative    Not on file     Social Determinants of Health     Financial Resource Strain:     Difficulty of Paying Living Expenses:    Food Insecurity:     Worried About Running Out of Food in the Last Year:    [address] in the Last Year:    Transportation Needs:     Lack of Transportation (Medical):  Lack of Transportation (Non-Medical):    Physical Activity:     Days of Exercise per Week:     Minutes of Exercise per Session:    Stress:     Feeling of Stress :    Social Connections:     Frequency of Communication with Friends and Family:     Frequency of Social Gatherings with Friends and Family:     Attends Gnosticism Services:     Active Member of Clubs or Organizations:     Attends Club or Organization Meetings:     Marital Status:    Intimate Partner Violence:     Fear of Current or Ex-Partner:     Emotionally Abused:     Physically Abused:     Sexually Abused: Allergies   Allergen Reactions    Celebrex [Celecoxib] Swelling      Current Outpatient Medications   Medication Sig    rosuvastatin (CRESTOR) 40 mg tablet Take 1 Tablet by mouth daily.  pantoprazole (PROTONIX) 40 mg tablet Take 1 Tablet by mouth daily.  metoprolol succinate (TOPROL-XL) 50 mg XL tablet TAKE 1 TABLET BY MOUTH DAILY    diclofenac (VOLTAREN) 1 % gel Apply  to affected area every six (6) hours.  montelukast (Singulair) 10 mg tablet Take 1 Tab by mouth daily.  acetaminophen (Tylenol Arthritis Pain) 650 mg TbER Take 650 mg by mouth every eight (8) hours.  cholecalciferol, vitamin D3, (VITAMIN D3) 2,000 unit tab Take  by mouth daily.  folic acid/multivit-min/lutein (CENTRUM SILVER PO) Take  by mouth.  aspirin delayed-release 81 mg tablet Take 162 mg by mouth daily.  ezetimibe (ZETIA) 10 mg tablet Take 1 Tablet by mouth daily. (Patient not taking: Reported on 10/15/2021)    traMADoL (ULTRAM) 50 mg tablet Take 1 Tablet by mouth every six (6) hours as needed for Pain. Max Daily Amount: 200 mg.  (Patient not taking: Reported on 10/15/2021)    ondansetron (Zofran ODT) 4 mg disintegrating tablet 1 Tablet by SubLINGual route every eight (8) hours as needed for Nausea or Vomiting. (Patient not taking: Reported on 10/15/2021)    methocarbamoL (ROBAXIN) 500 mg tablet Take 1 Tablet by mouth four (4) times daily. (Patient not taking: Reported on 10/15/2021)    dicyclomine (BENTYL) 10 mg capsule Take 1 Cap by mouth four (4) times daily as needed for Abdominal Cramps. (Patient not taking: Reported on 10/15/2021)    guaiFENesin ER (Mucinex) 600 mg ER tablet Take 600 mg by mouth two (2) times a day. (Patient not taking: Reported on 10/15/2021)     No current facility-administered medications for this visit. PHYSICAL EXAMINATION:  Visit Vitals  Pulse 65   Temp 97.5 °F (36.4 °C) (Temporal)   Resp 16   Ht 5' 2\" (1.575 m)   Wt 205 lb 6.4 oz (93.2 kg)   SpO2 95%   BMI 37.57 kg/m²        ORTHO EXAMINATION:  Examination Right knee Left knee   Skin Intact Intact   Range of motion 115-0 120-0   Effusion - -   Medial joint line tenderness + -   Lateral joint line tenderness - -   Popliteal tenderness - -   Osteophytes palpable + -   Ronas - -   Patella crepitus + -   Anterior drawer - -   Lateral laxity - -   Medial laxity - -   Varus deformity - -   Valgus deformity - -   Pretibial edema - -   Calf tenderness - -       TIME OUT:  Chart reviewed for the following:   I, Rula Estrada MD, have reviewed the History, Physical and updated the Allergic reactions for Albert Falcon   TIME OUT performed immediately prior to start of procedure:  Pramod Allen MD, have performed the following reviews on Albert Falcon prior to the start of the procedure:          * Patient was identified by name and date of birth   * Agreement on procedure being performed was verified  * Risks and Benefits explained to the patient  * Procedure site verified and marked as necessary  * Patient was positioned for comfort  * Consent was obtained     Time: 11:32 AM     Date of procedure: 10/15/2021  Procedure performed by:  Rula Estrada MD  Ms. Fox tolerated the procedure well with no complications. RADIOGRAPHS:  XR RIGHT HIP 10/15/21 [facility]  IMPRESSION:  AP pelvis and two views - No fractures, moderate bilateral joint space narrowing, no osteophytes present. Tonnis grade 2, vascular clip right hemipelvis area. XR RIGHT KNEE 6/9/20 [facility]  IMPRESSION:  Three views - No fractures, no effusion, mild joint space narrowing, no osteophytes present. Kellgren Corby grade 1, condylar flattening     XR RIGHT KNEE 5/20/20 [facility]  IMPRESSION:    No acute abnormality seen. Enthesophyte arising from superior patella    IMPRESSION:      ICD-10-CM ICD-9-CM    1. Primary osteoarthritis of right knee  M17.11 715.16 betamethasone (CELESTONE) injection 3 mg      DRAIN/INJECT LARGE JOINT/BURSA      PROCEDURE AUTHORIZATION TO    2. Chronic pain of right knee  M25.561 719.46 betamethasone (CELESTONE) injection 3 mg    G89.29 338.29 DRAIN/INJECT LARGE JOINT/BURSA   3. Right hip pain  M25.551 719.45 AMB POC X-RAY RADEX HIP UNI WITH PELVIS 2-3 VIEWS   4. Primary osteoarthritis of left hip  M16.12 715.15    5. Primary osteoarthritis of right hip  M16.11 715.15      PLAN:  Consider visco supplementation if pain continues. After discussing treatment options, patient's right knee was injected with 4 cc Marcaine and 1/2 cc Celestone. There is no need for surgery at this time. She will follow up as needed.      Scribed by Howard Fraga ([address]) as dictated by Lenore Goldberg MD

## 2021-10-21 ENCOUNTER — HOSPITAL ENCOUNTER (OUTPATIENT)
Dept: ULTRASOUND IMAGING | Age: 71
Discharge: HOME | End: 2021-10-21
Attending: INTERNAL MEDICINE
Payer: MEDICARE

## 2021-10-21 PROCEDURE — 76536 US EXAM OF HEAD AND NECK: CPT

## 2021-10-22 ENCOUNTER — DOCUMENTATION ONLY (OUTPATIENT)
Dept: INTERNAL MEDICINE CLINIC | Age: 71
End: 2021-10-22

## 2021-10-22 NOTE — PROGRESS NOTES
The Prior Authorization request has been approved for Pantoprazole Sodium 40MG OR [facility]EC.   The authorization is valid from 09/22/2021 through 10/22/2022

## 2021-10-27 ENCOUNTER — TELEPHONE (OUTPATIENT)
Dept: INTERNAL MEDICINE CLINIC | Age: 71
End: 2021-10-27

## 2021-10-27 DIAGNOSIS — E04.2 MULTIPLE THYROID NODULES: Primary | ICD-10-CM

## 2021-10-27 NOTE — TELEPHONE ENCOUNTER
Patient is aware of results and verbalizes understanding. Patient asked which Endocrine does the provider refer to. Patient was advised we refer to Dr. Jacek Caldwell and Dr. Marcos Green. Patient states either one will be fine.

## 2021-10-27 NOTE — TELEPHONE ENCOUNTER
She has 2 thyroid nodules, and the radiologist is recommending possible biopsy of each 1. This is an office procedure that is done with anesthesia to the skin, and a fine-needle used to withdraw cells from each nodule. Usually an endocrinologist does this, is it okay to refer her to an endocrinologist (I do recommend.

## 2021-10-27 NOTE — TELEPHONE ENCOUNTER
Pt calling because she had a ultrasound done on her thyroid at [facility] on Thursday 10/21/21. She is calling for the results. She can be reached at [phone]. Please advise.  Thank you!!!

## 2021-11-01 ENCOUNTER — TELEPHONE (OUTPATIENT)
Dept: INTERNAL MEDICINE CLINIC | Age: 71
End: 2021-11-01

## 2021-11-01 NOTE — TELEPHONE ENCOUNTER
Dr. Lenny Mortensen can't get patient in until 12/23. She wants to know if that is ok with you or what she should do?

## 2021-11-02 ENCOUNTER — DOCUMENTATION ONLY (OUTPATIENT)
Dept: INTERNAL MEDICINE CLINIC | Age: 71
End: 2021-11-02

## 2021-11-02 NOTE — PROGRESS NOTES
Prior Authorization approved for Pantoprazole Sodium 40mg valid from 9/22/21-10/22/2022. Copy sent to pharmacy.

## 2021-11-09 ENCOUNTER — OFFICE VISIT (OUTPATIENT)
Dept: CARDIOLOGY CLINIC | Age: 71
End: 2021-11-09
Payer: MEDICARE

## 2021-11-09 VITALS
HEART RATE: 59 BPM | BODY MASS INDEX: 37.54 KG/M2 | HEIGHT: 62 IN | DIASTOLIC BLOOD PRESSURE: 60 MMHG | SYSTOLIC BLOOD PRESSURE: 110 MMHG | WEIGHT: 204 LBS | OXYGEN SATURATION: 97 %

## 2021-11-09 DIAGNOSIS — E78.5 DYSLIPIDEMIA: ICD-10-CM

## 2021-11-09 DIAGNOSIS — I25.10 CORONARY ARTERY DISEASE INVOLVING NATIVE CORONARY ARTERY OF NATIVE HEART WITHOUT ANGINA PECTORIS: Primary | ICD-10-CM

## 2021-11-09 DIAGNOSIS — I34.0 NON-RHEUMATIC MITRAL REGURGITATION: ICD-10-CM

## 2021-11-09 PROCEDURE — 1090F PRES/ABSN URINE INCON ASSESS: CPT | Performed by: INTERNAL MEDICINE

## 2021-11-09 PROCEDURE — 99215 OFFICE O/P EST HI 40 MIN: CPT | Performed by: INTERNAL MEDICINE

## 2021-11-09 PROCEDURE — 1101F PT FALLS ASSESS-DOCD LE1/YR: CPT | Performed by: INTERNAL MEDICINE

## 2021-11-09 PROCEDURE — G8427 DOCREV CUR MEDS BY ELIG CLIN: HCPCS | Performed by: INTERNAL MEDICINE

## 2021-11-09 PROCEDURE — G8536 NO DOC ELDER MAL SCRN: HCPCS | Performed by: INTERNAL MEDICINE

## 2021-11-09 PROCEDURE — G8510 SCR DEP NEG, NO PLAN REQD: HCPCS | Performed by: INTERNAL MEDICINE

## 2021-11-09 PROCEDURE — 3017F COLORECTAL CA SCREEN DOC REV: CPT | Performed by: INTERNAL MEDICINE

## 2021-11-09 PROCEDURE — G8417 CALC BMI ABV UP PARAM F/U: HCPCS | Performed by: INTERNAL MEDICINE

## 2021-11-09 PROCEDURE — 93000 ELECTROCARDIOGRAM COMPLETE: CPT | Performed by: INTERNAL MEDICINE

## 2021-11-09 PROCEDURE — G9899 SCRN MAM PERF RSLTS DOC: HCPCS | Performed by: INTERNAL MEDICINE

## 2021-11-09 NOTE — PROGRESS NOTES
HISTORY OF PRESENT ILLNESS  Anne Brandon is a 66 y.o. female. Shortness of Breath  Pertinent negatives include no fever, no headaches, no cough, no wheezing, no PND, no orthopnea, no chest pain, no vomiting, no abdominal pain, no rash, no leg swelling and no claudication. Follow-up  Associated symptoms include shortness of breath. Pertinent negatives include no chest pain, no abdominal pain and no headaches. Patient presents for a follow-up office visit. The patient had an episode of fairly significant back pain in January 2014 that awoke her from sleep, it radiated around to the front of her chest up into her neck and down both arms associated with tingling and numbness. She subsequently underwent a cardiac catheterization in February 2014, which showed a high-grade stenosis of her mid left circumflex extending into obtuse marginal branch 1. She was continued to complain of exertional dyspnea and some chest pain between her shoulder blades, which is felt to be her anginal equivalent. She was also afraid to do any exertional activity because of her high-grade left circumflex stenosis, so she underwent scheduled percutaneous coronary intervention over her left circumflex lesion with a drug-eluting stent In July 2014. She underwent a followup echocardiogram in April 2016 which showed overall improvement of her well ejection fraction of 55-60% without mention of any regional wall motion abnormalities. Her mitral regurgitation was in the moderate range, which may have been minimally increased compared to the prior study. The patient then underwent a follow-up echocardiogram November 2018 which showed preserved LV systolic function, EF 51-46%, evidence of diastolic dysfunction, mitral valve prolapse with moderate mitral regurgitation, essentially unchanged compared to the study from 2017.       Patient subsequently underwent another echocardiogram and pharmacologic nuclear stress test in November 2020 to evaluate increased dyspnea on exertion. Her echocardiogram was essentially unchanged showing preserved LV function, EF 60- 04%, diastolic dysfunction, mild left atrial enlargement, posterior prolapse of her mitral valve leaflet with moderate mitral regurgitation. Her nuclear stress test showed preserved LV function with a fixed inferolateral perfusion defect and hypokinesis of the inferolateral wall consistent with her known coronary anatomy. She just recently underwent a repeat echocardiogram at the end of October 2021 which showed a normal LV function, EF 55 to 60% with lateral wall hypokinesis and a mildly dilated left ventricle. Her mitral regurgitation appeared to be in the moderate to severe range with an eccentric jet but no obvious mitral valve prolapse. She was last seen in our office 2 to 3 months ago. Since last visit, she continues to complain of exertional dyspnea but no more severe than it has been in the past.  She denies any heart palpitations, dizziness or syncope. No new chest pain or pressure, no leg swelling, orthopnea, no PND. Past Medical History:   Diagnosis Date    Age-related osteoporosis without current pathological fracture 4/5/2016    Arthritis     Benign positional vertigo 4/2/2021    CAD (coronary artery disease), native coronary artery 7/2014    ULISES to LCx    Cardiac echocardiogram 04/05/2016    EF 55-60%. No WMA. Gr 1 DDfx. Mod MR.  Cardiac nuclear imaging test, abnormal 02/05/2014    Mod-sized, mild-mod mid lateral infarction w/mild-mod rosa isela-infarct ischemia. Mod lateral hypk. EF 56%. Normal EKG on pharm stress test.  Intermediate risk.  Chronic cough 4/2/2021    Chronic rhinitis 4/2/2021    Dyslipidemia     Fatty pancreas 4/5/2016    On imaging    Hernia     Nephrolithiasis       Current Outpatient Medications   Medication Sig Dispense Refill    ezetimibe (ZETIA) 10 mg tablet Take 1 Tablet by mouth daily.  90 Tablet 1    rosuvastatin (CRESTOR) 40 mg tablet Take 1 Tablet by mouth daily. 90 Tablet 3    traMADoL (ULTRAM) 50 mg tablet Take 1 Tablet by mouth every six (6) hours as needed for Pain. Max Daily Amount: 200 mg. 90 Tablet 2    pantoprazole (PROTONIX) 40 mg tablet Take 1 Tablet by mouth daily. 90 Tablet 2    metoprolol succinate (TOPROL-XL) 50 mg XL tablet TAKE 1 TABLET BY MOUTH DAILY 90 Tablet 3    diclofenac (VOLTAREN) 1 % gel Apply  to affected area every six (6) hours. 500 g 2    montelukast (Singulair) 10 mg tablet Take 1 Tab by mouth daily. 90 Tab 3    dicyclomine (BENTYL) 10 mg capsule Take 1 Cap by mouth four (4) times daily as needed for Abdominal Cramps. 90 Cap 2    guaiFENesin ER (Mucinex) 600 mg ER tablet Take 600 mg by mouth two (2) times a day.  folic acid/multivit-min/lutein (CENTRUM SILVER PO) Take  by mouth.  aspirin delayed-release 81 mg tablet Take 162 mg by mouth daily.  acetaminophen (Tylenol Arthritis Pain) 650 mg TbER Take 650 mg by mouth every eight (8) hours.  cholecalciferol, vitamin D3, (VITAMIN D3) 2,000 unit tab Take  by mouth daily. Allergies   Allergen Reactions    Celebrex [Celecoxib] Swelling      Social History     Tobacco Use    Smoking status: Never Smoker    Smokeless tobacco: Never Used   Substance Use Topics    Alcohol use: No    Drug use: No       Family History   Problem Relation Age of Onset    Hypertension Mother     Atrial Fibrillation Mother     Cancer Father         colon, breast, throat,  at 72    Heart Disease Maternal Grandmother     Diabetes Paternal Grandmother           Review of Systems   Constitutional: Negative for chills, fever, malaise/fatigue and weight loss. HENT: Negative for nosebleeds. Eyes: Negative for blurred vision and double vision. Respiratory: Positive for shortness of breath. Negative for cough and wheezing.     Cardiovascular: Negative for chest pain, palpitations, orthopnea, claudication, leg swelling and PND. Gastrointestinal: Negative for abdominal pain, heartburn, nausea and vomiting. Genitourinary: Negative for dysuria and hematuria. Musculoskeletal: Negative for falls and myalgias. Skin: Negative for rash. Neurological: Negative for dizziness, focal weakness and headaches. Endo/Heme/Allergies: Does not bruise/bleed easily. Psychiatric/Behavioral: Negative for substance abuse. Visit Vitals  /60 (BP 1 Location: Left upper arm, BP Patient Position: Sitting, BP Cuff Size: Adult)   Pulse (!) 59   Ht 5' 2\" (1.575 m)   Wt 92.5 kg (204 lb)   SpO2 97%   BMI 37.31 kg/m²      Physical Exam  Constitutional:       Appearance: She is well-developed. HENT:      Head: Normocephalic and atraumatic. Eyes:      Conjunctiva/sclera: Conjunctivae normal.   Neck:      Vascular: No carotid bruit or JVD. Cardiovascular:      Rate and Rhythm: Normal rate and regular rhythm. Pulses: Normal pulses. Heart sounds: S1 normal and S2 normal. Murmur heard. High-pitched blowing holosystolic murmur is present with a grade of 3/6 at the apex. No gallop. Pulmonary:      Effort: Pulmonary effort is normal.      Breath sounds: Normal breath sounds. No wheezing or rales. Abdominal:      General: Bowel sounds are normal.      Palpations: Abdomen is soft. Tenderness: There is no abdominal tenderness. Musculoskeletal:         General: No swelling or deformity. Cervical back: Neck supple. Skin:     General: Skin is warm and dry. Neurological:      General: No focal deficit present. Mental Status: She is alert and oriented to person, place, and time. Psychiatric:         Mood and Affect: Mood normal.         Behavior: Behavior normal.       EKG: Normal sinus rhythm, Normal axis, borderline voltage criteria for LVH, normal QTc interval, No ST or T wave abnormalities concerning for ischemia. Compared to previous EKG, No significant interval change.     ASSESSMENT and PLAN    Coronary artery disease. The patient had a 85-90% stenosis of her mid left circumflex extending into OM1 diagnosed by cardiac catheterization in February 2014. She underwent scheduled PCI In July 2014, with a Resolute drug-eluting stent. Her symptoms improved following the intervention. I suspect she did have a small infarction of the inferolateral region in January 2014. Patient recently underwent a follow-up pharmacologic nuclear stress test in November 2020. This demonstrated a small area of infarction of the inferolateral wall with an area of hypokinesis, EF 55%. She remains on an aspirin, beta-blocker and potent statin. All of which I would continue. No new symptoms concerning for angina. Mitral regurgitation. This was moderate to severe on her most recent echocardiogram recently completed at the end of October 2021. This may have been slightly worse than her previous study from a year ago. There was no clear-cut mitral valve prolapse on her most recent study. I have recommended further evaluation of her mitral valve pathology with a transesophageal echocardiogram.  The patient wishes to have this done around March of next year, so this will be scheduled at that time. Dyslipidemia. Patient is now being managed with both rosuvastatin and ezetimibe. This is being followed by her PCP. I prefer her LDL to be less than 70 if possible. Follow-up in 3+ months with our nurse practitioner for a preprocedure H&P. I have recommended pursuing a transesophageal echocardiogram at her convenience.

## 2021-11-09 NOTE — PROGRESS NOTES
Radha Godwin presents today for   Chief Complaint   Patient presents with    Follow-up     6 month follow up after echo     Shortness of Breath     all the time        Radha Godwin preferred language for health care discussion is english/other. Is someone accompanying this pt? Yes mother in different room    Is the patient using any DME equipment during [address]? no    Depression Screening:  3 most recent PHQ Screens 8/31/2021   PHQ Not Done -   Little interest or pleasure in doing things Not at all   Feeling down, depressed, irritable, or hopeless Not at all   Total Score PHQ 2 0       Learning Assessment:  Learning Assessment 8/30/2021   PRIMARY LEARNER Patient   HIGHEST LEVEL OF EDUCATION - PRIMARY LEARNER  -   BARRIERS PRIMARY LEARNER -   [address] [place]    NEED -   LEARNER PREFERENCE PRIMARY DEMONSTRATION   LEARNING SPECIAL TOPICS -   ANSWERED BY patient   RELATIONSHIP SELF       Abuse Screening:  Abuse Screening Questionnaire 8/30/2021   Do you ever feel afraid of your partner? N   Are you in a relationship with someone who physically or mentally threatens you? N   Is it safe for you to go home? Y       Fall Risk  Fall Risk Assessment, last 12 mths 8/30/2021   Able to walk? Yes   Fall in past 12 months? 0   Do you feel unsteady? 0   Are you worried about falling 0       Pt currently taking Anticoagulant therapy? no    Coordination of Care:  1. Have you been to the ER, urgent care clinic since your last visit? Hospitalized since your last visit? no    2. Have you seen or consulted any other health care providers outside of the [address] [place] [address] since your last visit? Include any pap smears or colon screening.  no

## 2021-11-12 DIAGNOSIS — K21.9 GASTROESOPHAGEAL REFLUX DISEASE, UNSPECIFIED WHETHER ESOPHAGITIS PRESENT: ICD-10-CM

## 2021-11-12 RX ORDER — PANTOPRAZOLE SODIUM 40 MG/1
40 TABLET, DELAYED RELEASE ORAL DAILY
Qty: 90 TABLET | Refills: 2 | Status: SHIPPED | OUTPATIENT
Start: 2021-11-12 | End: 2022-07-08 | Stop reason: SDUPTHER

## 2021-11-12 NOTE — TELEPHONE ENCOUNTER
Previous Rx was sent to [facility]    Last Visit: 8/31/21 with MD Malorie Morel  Next Appointment: 2/1/22 with MD Malorie Morel    Requested Prescriptions     Pending Prescriptions Disp Refills    pantoprazole (PROTONIX) 40 mg tablet 90 Tablet 2     Sig: Take 1 Tablet by mouth daily.

## 2021-11-18 ENCOUNTER — TELEPHONE (OUTPATIENT)
Dept: INTERNAL MEDICINE CLINIC | Age: 71
End: 2021-11-18

## 2021-11-18 NOTE — TELEPHONE ENCOUNTER
Have her stop all dairy products. Check labels, make sure that anything \"sugar-free\" that she is using including gum or mints, do not have sugar alcohols in the ingredients, as these can cause diarrhea. Have her try Imodium and/or Kaopectate. If not better in a few days, can make a virtual visit with me, 15 minutes okay.

## 2021-11-18 NOTE — TELEPHONE ENCOUNTER
Patient called and said for about the past 2 weeks, she has been having a lot of issues with the bathroom. She has been having a lot of diarrhea, and that will stop for a day or two and go back to regular bowel movements, but she will have to go around 6-7 times a day and they are all green, and the diarrhea keeps coming back. She has also been having cramping along her lower abdomen and bloating. She is having a hard time leaving her house, because she doesn't know when she will have to go, and its always urgent when it happens. Patient is asking to speak with Dr. Mayo Moore as soon as possible to see what she can possibly do or if she can be referred to someone. She can be reached at [phone].     Please advise, thank you

## 2021-11-22 ENCOUNTER — VIRTUAL VISIT (OUTPATIENT)
Dept: INTERNAL MEDICINE CLINIC | Age: 71
End: 2021-11-22
Payer: MEDICARE

## 2021-11-22 DIAGNOSIS — E04.2 MULTIPLE THYROID NODULES: ICD-10-CM

## 2021-11-22 DIAGNOSIS — R05.9 COUGH: ICD-10-CM

## 2021-11-22 DIAGNOSIS — R19.7 DIARRHEA, UNSPECIFIED TYPE: Primary | ICD-10-CM

## 2021-11-22 PROBLEM — F11.99 OPIOID USE, UNSPECIFIED WITH UNSPECIFIED OPIOID-INDUCED DISORDER (HCC): Status: RESOLVED | Noted: 2021-08-31 | Resolved: 2021-11-22

## 2021-11-22 PROCEDURE — G8427 DOCREV CUR MEDS BY ELIG CLIN: HCPCS | Performed by: INTERNAL MEDICINE

## 2021-11-22 PROCEDURE — 3017F COLORECTAL CA SCREEN DOC REV: CPT | Performed by: INTERNAL MEDICINE

## 2021-11-22 PROCEDURE — 99214 OFFICE O/P EST MOD 30 MIN: CPT | Performed by: INTERNAL MEDICINE

## 2021-11-22 PROCEDURE — 1090F PRES/ABSN URINE INCON ASSESS: CPT | Performed by: INTERNAL MEDICINE

## 2021-11-22 PROCEDURE — G8536 NO DOC ELDER MAL SCRN: HCPCS | Performed by: INTERNAL MEDICINE

## 2021-11-22 PROCEDURE — G8417 CALC BMI ABV UP PARAM F/U: HCPCS | Performed by: INTERNAL MEDICINE

## 2021-11-22 PROCEDURE — G0463 HOSPITAL OUTPT CLINIC VISIT: HCPCS | Performed by: INTERNAL MEDICINE

## 2021-11-22 PROCEDURE — G8432 DEP SCR NOT DOC, RNG: HCPCS | Performed by: INTERNAL MEDICINE

## 2021-11-22 PROCEDURE — 1101F PT FALLS ASSESS-DOCD LE1/YR: CPT | Performed by: INTERNAL MEDICINE

## 2021-11-22 PROCEDURE — G9899 SCRN MAM PERF RSLTS DOC: HCPCS | Performed by: INTERNAL MEDICINE

## 2021-11-22 RX ORDER — HYDROCODONE POLISTIREX AND CHLORPHENIRAMINE POLISTIREX 10; 8 MG/5ML; MG/5ML
1 SUSPENSION, EXTENDED RELEASE ORAL
Qty: 100 ML | Refills: 0 | Status: SHIPPED | OUTPATIENT
Start: 2021-11-22 | End: 2021-12-02

## 2021-11-22 NOTE — PROGRESS NOTES
Anne Brandon is a 66 y.o. female who was seen by synchronous (real-time) audio-video technology on 11/22/2021 for Diarrhea and Cough        Assessment & Plan:   Diagnoses and all orders for this visit:    1. Diarrhea, unspecified type  Comments:  Better on Imodium, no obvious etiology. Recommend contact gastroenterology. TSH 1 year ago normal, advised hyperthyroidism can cause frequent stool    2. Cough  Comments:  No fever or purulent mucus. Particularly with upcoming specialist appointment, recommend get Covid test.  Tussionex 100 cc.  1 teaspoon every 12 hours as neede  Orders:  -     HYDROcodone-chlorpheniramine (TUSSIONEX) 10-8 mg/5 mL suspension; Take 5 mL by mouth every twelve (12) hours as needed for Cough for up to 10 days. Max Daily Amount: 10 mL. 3. Multiple thyroid nodules  Comments:  Reminded to reschedule her visit if she is unable to keep her endocrinology appointment tomorrow. 712  Subjective:     Has an appointment with endocrinology tomorrow for thyroid nodules. She thinks she has a sinus infection, has been coughing and feeling poorly for about 10 days. She has no fever, facial pain, or purulent discharge. She also has loose stool, which is improved with Imodium. She had some Bentyl on hand which was not helpful. She thinks she may be overdue for a colonoscopy. She denies recent use of antibiotics, dairy products, nonsugar alcohols, travel, new foods. Prior to Admission medications    Medication Sig Start Date End Date Taking? Authorizing Provider   HYDROcodone-chlorpheniramine (TUSSIONEX) 10-8 mg/5 mL suspension Take 5 mL by mouth every twelve (12) hours as needed for Cough for up to 10 days. Max Daily Amount: 10 mL. 11/22/21 12/2/21 Yes Fernie Triplett MD   pantoprazole (PROTONIX) 40 mg tablet Take 1 Tablet by mouth daily. 11/12/21   Fernie Triplett MD   ezetimibe (ZETIA) 10 mg tablet Take 1 Tablet by mouth daily.  8/31/21   Fernie Triplett MD   rosuvastatin (CRESTOR) 40 mg tablet Take 1 Tablet by mouth daily. 8/31/21   Stephanie Moralez MD   traMADoL [facility] 50 mg tablet Take 1 Tablet by mouth every six (6) hours as needed for Pain. Max Daily Amount: 200 mg. 8/31/21 8/31/22  Stephanie Moralez MD   metoprolol succinate (TOPROL-XL) 50 mg XL tablet TAKE 1 TABLET BY MOUTH DAILY 8/31/21   Stephanie Moralez MD   diclofenac (VOLTAREN) 1 % gel Apply  to affected area every six (6) hours. 8/31/21   Stephanie Moralez MD   montelukast (Singulair) 10 mg tablet Take 1 Tab by mouth daily. 4/7/21   Stephanie Moralez MD   acetaminophen (Tylenol Arthritis Pain) 650 mg TbER Take 650 mg by mouth every eight (8) hours. Provider, Historical   dicyclomine (BENTYL) 10 mg capsule Take 1 Cap by mouth four (4) times daily as needed for Abdominal Cramps. 4/5/21   Stephanie Moralez MD   guaiFENesin ER (Mucinex) 600 mg ER tablet Take 600 mg by mouth two (2) times a day. Provider, Historical   cholecalciferol, vitamin D3, (VITAMIN D3) 2,000 unit tab Take  by mouth daily. Provider, Historical   folic acid/multivit-min/lutein (CENTRUM SILVER PO) Take  by mouth. Provider, Historical   aspirin delayed-release 81 mg tablet Take 162 mg by mouth daily. Provider, Historical         Review of Systems   Constitutional: Negative for chills and fever. HENT: Negative for sore throat. Respiratory: Negative for sputum production and shortness of breath. Gastrointestinal: Positive for abdominal pain. Negative for blood in stool. Genitourinary: Negative for hematuria. Objective:   No flowsheet data found.    General: alert, cooperative, no distress   Mental  status: normal mood, behavior, speech, dress, motor activity, and thought processes, able to follow commands   HENT: NCAT   Neck: no visualized mass   Resp: no respiratory distress   Neuro: no gross deficits   Skin: no discoloration or lesions of concern on visible areas   Psychiatric: normal affect, consistent with stated mood, no evidence of hallucinations     Additional exam findings:   Frequent coughing, not unusual for this patient. We discussed the expected course, resolution and complications of the diagnosis(es) in detail. Medication risks, benefits, costs, interactions, and alternatives were discussed as indicated. I advised her to contact the office if her condition worsens, changes or fails to improve as anticipated. She expressed understanding with the diagnosis(es) and plan. Tutu Jorge, was evaluated through a synchronous (real-time) audio-video encounter. The patient (or guardian if applicable) is aware that this is a billable service. Verbal consent to proceed has been obtained within the past 12 months. The visit was conducted pursuant to the emergency declaration under the [address] [place] [address] authority and the [employer] General Act. Patient identification was verified, and a caregiver was present when appropriate. The patient was located in a state where the provider was credentialed to provide care.     Jeremy Lira MD

## 2022-01-26 DIAGNOSIS — J31.0 CHRONIC RHINITIS: ICD-10-CM

## 2022-01-26 RX ORDER — MONTELUKAST SODIUM 10 MG/1
10 TABLET ORAL DAILY
Qty: 90 TABLET | Refills: 3 | Status: SHIPPED | OUTPATIENT
Start: 2022-01-26 | End: 2022-01-31 | Stop reason: SDUPTHER

## 2022-01-26 NOTE — TELEPHONE ENCOUNTER
Last Visit: 11/22/21 with MD Chris Polo  Next Appointment: 1/31/22 with MD Chris Polo  Previous Refill Encounter(s): 4/7/21 #90 with 3 refills    Requested Prescriptions     Pending Prescriptions Disp Refills    montelukast (Singulair) 10 mg tablet 90 Tablet 3     Sig: Take 1 Tablet by mouth daily.

## 2022-01-31 ENCOUNTER — TELEPHONE (OUTPATIENT)
Dept: INTERNAL MEDICINE CLINIC | Age: 72
End: 2022-01-31

## 2022-01-31 ENCOUNTER — OFFICE VISIT (OUTPATIENT)
Dept: INTERNAL MEDICINE CLINIC | Age: 72
End: 2022-01-31
Payer: MEDICARE

## 2022-01-31 VITALS
HEIGHT: 62 IN | RESPIRATION RATE: 18 BRPM | TEMPERATURE: 97.2 F | DIASTOLIC BLOOD PRESSURE: 43 MMHG | HEART RATE: 56 BPM | OXYGEN SATURATION: 96 % | WEIGHT: 200 LBS | SYSTOLIC BLOOD PRESSURE: 119 MMHG | BODY MASS INDEX: 36.8 KG/M2

## 2022-01-31 DIAGNOSIS — I25.10 CORONARY ARTERY DISEASE INVOLVING NATIVE CORONARY ARTERY OF NATIVE HEART WITHOUT ANGINA PECTORIS: ICD-10-CM

## 2022-01-31 DIAGNOSIS — J31.0 CHRONIC RHINITIS: ICD-10-CM

## 2022-01-31 DIAGNOSIS — K58.9 IRRITABLE BOWEL SYNDROME, UNSPECIFIED TYPE: ICD-10-CM

## 2022-01-31 DIAGNOSIS — E78.5 DYSLIPIDEMIA: ICD-10-CM

## 2022-01-31 DIAGNOSIS — Z71.89 ADVANCED DIRECTIVES, COUNSELING/DISCUSSION: ICD-10-CM

## 2022-01-31 DIAGNOSIS — M19.90 ARTHRITIS: ICD-10-CM

## 2022-01-31 DIAGNOSIS — Z00.00 MEDICARE ANNUAL WELLNESS VISIT, SUBSEQUENT: Primary | ICD-10-CM

## 2022-01-31 DIAGNOSIS — R05.9 COUGH: ICD-10-CM

## 2022-01-31 PROCEDURE — 99497 ADVNCD CARE PLAN 30 MIN: CPT | Performed by: INTERNAL MEDICINE

## 2022-01-31 PROCEDURE — G0439 PPPS, SUBSEQ VISIT: HCPCS | Performed by: INTERNAL MEDICINE

## 2022-01-31 PROCEDURE — 99214 OFFICE O/P EST MOD 30 MIN: CPT | Performed by: INTERNAL MEDICINE

## 2022-01-31 PROCEDURE — G8417 CALC BMI ABV UP PARAM F/U: HCPCS | Performed by: INTERNAL MEDICINE

## 2022-01-31 PROCEDURE — G8427 DOCREV CUR MEDS BY ELIG CLIN: HCPCS | Performed by: INTERNAL MEDICINE

## 2022-01-31 PROCEDURE — G9899 SCRN MAM PERF RSLTS DOC: HCPCS | Performed by: INTERNAL MEDICINE

## 2022-01-31 PROCEDURE — 3017F COLORECTAL CA SCREEN DOC REV: CPT | Performed by: INTERNAL MEDICINE

## 2022-01-31 PROCEDURE — G8536 NO DOC ELDER MAL SCRN: HCPCS | Performed by: INTERNAL MEDICINE

## 2022-01-31 PROCEDURE — G8510 SCR DEP NEG, NO PLAN REQD: HCPCS | Performed by: INTERNAL MEDICINE

## 2022-01-31 PROCEDURE — G0463 HOSPITAL OUTPT CLINIC VISIT: HCPCS | Performed by: INTERNAL MEDICINE

## 2022-01-31 PROCEDURE — 1090F PRES/ABSN URINE INCON ASSESS: CPT | Performed by: INTERNAL MEDICINE

## 2022-01-31 PROCEDURE — 1101F PT FALLS ASSESS-DOCD LE1/YR: CPT | Performed by: INTERNAL MEDICINE

## 2022-01-31 RX ORDER — TRAMADOL HYDROCHLORIDE 50 MG/1
50 TABLET ORAL
Qty: 90 TABLET | Refills: 2 | Status: SHIPPED | OUTPATIENT
Start: 2022-01-31 | End: 2022-08-05

## 2022-01-31 RX ORDER — DICYCLOMINE HYDROCHLORIDE 10 MG/1
10 CAPSULE ORAL
Qty: 90 CAPSULE | Refills: 5 | Status: SHIPPED | OUTPATIENT
Start: 2022-01-31 | End: 2022-08-05

## 2022-01-31 RX ORDER — HYDROCODONE POLISTIREX AND CHLORPHENIRAMINE POLISTIREX 10; 8 MG/5ML; MG/5ML
1 SUSPENSION, EXTENDED RELEASE ORAL
Qty: 120 ML | Refills: 0 | Status: SHIPPED | OUTPATIENT
Start: 2022-01-31 | End: 2022-02-12

## 2022-01-31 RX ORDER — FLUTICASONE PROPIONATE 50 MCG
SPRAY, SUSPENSION (ML) NASAL
Qty: 3 EACH | Refills: 3 | Status: SHIPPED | OUTPATIENT
Start: 2022-01-31

## 2022-01-31 RX ORDER — METOPROLOL SUCCINATE 50 MG/1
TABLET, EXTENDED RELEASE ORAL
Qty: 90 TABLET | Refills: 3 | Status: SHIPPED | OUTPATIENT
Start: 2022-01-31

## 2022-01-31 RX ORDER — MONTELUKAST SODIUM 10 MG/1
10 TABLET ORAL DAILY
Qty: 90 TABLET | Refills: 3 | Status: SHIPPED | OUTPATIENT
Start: 2022-01-31

## 2022-01-31 RX ORDER — ROSUVASTATIN CALCIUM 40 MG/1
40 TABLET, COATED ORAL DAILY
Qty: 90 TABLET | Refills: 3 | Status: SHIPPED | OUTPATIENT
Start: 2022-01-31

## 2022-01-31 RX ORDER — EZETIMIBE 10 MG/1
10 TABLET ORAL DAILY
Qty: 90 TABLET | Refills: 2 | Status: SHIPPED | OUTPATIENT
Start: 2022-01-31 | End: 2022-08-05

## 2022-01-31 NOTE — PROGRESS NOTES
Judith Maldonado presents today for   Chief Complaint   Patient presents with    Annual Wellness Visit       Is someone accompanying this pt? no    Is the patient using any DME equipment during OV? no    Depression Screening:  3 most recent PHQ Screens 1/31/2022   PHQ Not Done -   Little interest or pleasure in doing things Not at all   Feeling down, depressed, irritable, or hopeless Not at all   Total Score PHQ 2 0       Learning Assessment:  Learning Assessment 8/30/2021   PRIMARY LEARNER Patient   HIGHEST LEVEL OF EDUCATION - PRIMARY LEARNER  -   BARRIERS PRIMARY LEARNER -   [facility]    NEED -   LEARNER PREFERENCE PRIMARY DEMONSTRATION   LEARNING SPECIAL TOPICS -   ANSWERED BY patient   RELATIONSHIP SELF       Abuse Screening:  Abuse Screening Questionnaire 1/31/2022   Do you ever feel afraid of your partner? N   Are you in a relationship with someone who physically or mentally threatens you? N   Is it safe for you to go home? Y       Fall Risk  Fall Risk Assessment, last 12 mths 1/31/2022   Able to walk? Yes   Fall in past 12 months? 0   Do you feel unsteady?  0   Are you worried about falling 0       ADL  ADL Assessment 4/5/2021   Feeding yourself No Help Needed   Getting from bed to chair No Help Needed   Getting dressed No Help Needed   Bathing or showering No Help Needed   Walk across the room (includes cane/walker) No Help Needed   Using the telphone No Help Needed   Taking your medications No Help Needed   Preparing meals No Help Needed   Managing money (expenses/bills) No Help Needed   Moderately strenuous housework (laundry) No Help Needed   Shopping for personal items (toiletries/medicines) No Help Needed   Shopping for groceries No Help Needed   Driving No Help Needed   Climbing a flight of stairs No Help Needed   Getting to places beyond walking distances No Help Needed       Health Maintenance reviewed and discussed and ordered per Provider. Health Maintenance Due   Topic Date Due    DTaP/Tdap/Td series (1 - Tdap) Never done    Colorectal Cancer Screening Combo  Never done    Shingrix Vaccine Age 50> (1 of 2) Never done    COVID-19 Vaccine (3 - Booster for Rosa Dunia series) 08/26/2021    Medicare Yearly Exam  08/29/2021    Flu Vaccine (1) Never done   . Coordination of Care:  1. Have you been to the ER, urgent care clinic since your last visit? Hospitalized since your last visit? no    2. Have you seen or consulted any other health care providers outside of the [address] [place] [address] since your last visit? Include any pap smears or colon screening. no    3. For patients aged 39-70: Has the patient had a colonoscopy? No     If the patient is female:    4. For patients aged 41-77: Has the patient had a mammogram within the past 2 years? Yes - no Care Gap present    5. For patients aged 21-65: Has the patient had a pap smear?  Yes - no Care Gap present

## 2022-01-31 NOTE — ACP (ADVANCE CARE PLANNING)
Advance Care Planning     Advance Care Planning (ACP) Physician/NP/PA Conversation      Date of Conversation: 1/31/2022  Conducted with: Patient with Decision Making Capacity    Healthcare Decision Maker:   No healthcare decision makers have been documented. Click here to complete [address] including selection of the Healthcare Decision Maker Relationship (ie \"Primary\")      Today we documented Decision Maker(s) consistent with Legal Next of Kin hierarchy. Care Preferences:    Hospitalization: \"If your health worsens and it becomes clear that your chance of recovery is unlikely, what would be your preference regarding hospitalization? \"  The patient would prefer comfort-focused treatment without hospitalization. Ventilation: \"If you were unable to breathe on your own and your chance of recovery was unlikely, what would be your preference about the use of a ventilator (breathing machine) if it was available to you? \"   The patient would NOT desire the use of a ventilator. Resuscitation: \"In the event your heart stopped as a result of an underlying serious health condition, would you want attempts to be made to restart your heart, or would you prefer a natural death? \"   Yes, attempt to resuscitate.     Additional topics discussed: artificial nutrition, ventilation preferences, hospitalization preferences and resuscitation preferences    Conversation Outcomes / Follow-Up Plan:   ACP complete - no further action today  Reviewed DNR/DNI and patient elects Full Code (Attempt Resuscitation)     Length of Voluntary ACP Conversation in minutes:  16 minutes    Jaxon Oliveira MD

## 2022-01-31 NOTE — PATIENT INSTRUCTIONS

## 2022-01-31 NOTE — PROGRESS NOTES
HPI     Odalis Pacheco is here for her annual Medicare wellness/advanced care planning visit. She continues to have diarrhea. She does try to avoid dairy after previous diagnosis of lactose intolerance. She denies eating any sugar-free foods, and denies any antibiotic use prior to the diarrhea starting. She does take magnesium for leg cramps sometimes, and advised her to stop taking that since magnesium is in a colonoscopy prep. It has been many years since she had a colonoscopy. She would like to get her GYN care here, if possible. She reports she has kept up-to-date with her Pap smears, and is not having any bleeding or history of precancerous polyps. She has been having more dyspnea on exertion, and she reports she had her cardiologist have talked about simple valve evaluation/procedure. She will be seeing the endocrinologist about her thyroid nodules this week. She denies heat intolerance. Past Medical History:   Diagnosis Date    Age-related osteoporosis without current pathological fracture 4/5/2016    Arthritis     Benign positional vertigo 4/2/2021    CAD (coronary artery disease), native coronary artery 7/2014    ULISES to LCx    Cardiac echocardiogram 04/05/2016    EF 55-60%. No WMA. Gr 1 DDfx. Mod MR.  Cardiac nuclear imaging test, abnormal 02/05/2014    Mod-sized, mild-mod mid lateral infarction w/mild-mod rosa isela-infarct ischemia. Mod lateral hypk. EF 56%. Normal EKG on pharm stress test.  Intermediate risk.     Chronic cough 4/2/2021    Chronic rhinitis 4/2/2021    Dyslipidemia     Fatty pancreas 4/5/2016    On imaging    Hernia     Nephrolithiasis         Past Surgical History:   Procedure Laterality Date    HX CHOLECYSTECTOMY      HX CORONARY STENT PLACEMENT  07/07/2014    HX HEART CATHETERIZATION      HX HERNIA REPAIR      HX LYSIS OF ADHESIONS      HX OOPHORECTOMY Right 12/05/2018    HX OTHER SURGICAL  11/2018    repair of anal fissure    DC REMOVAL OF ANAL FISSURE  11/02/2018    REMOVAL OF KIDNEY STONE  12/28/2018        Current Outpatient Medications   Medication Sig Dispense Refill    rosuvastatin (CRESTOR) 40 mg tablet Take 1 Tablet by mouth daily. 90 Tablet 3    traMADoL (ULTRAM) 50 mg tablet Take 1 Tablet by mouth every six (6) hours as needed for Pain. Max Daily Amount: 200 mg. 90 Tablet 2    HYDROcodone-chlorpheniramine (TUSSIONEX) 10-8 mg/5 mL suspension Take 5 mL by mouth every twelve (12) hours as needed for Cough for up to 12 days. Max Daily Amount: 10 mL. 120 mL 0    dicyclomine (BENTYL) 10 mg capsule Take 1 Capsule by mouth four (4) times daily as needed for Abdominal Cramps. 90 Capsule 5    metoprolol succinate (TOPROL-XL) 50 mg XL tablet TAKE 1 TABLET BY MOUTH DAILY 90 Tablet 3    montelukast (Singulair) 10 mg tablet Take 1 Tablet by mouth daily. 90 Tablet 3    ezetimibe (ZETIA) 10 mg tablet Take 1 Tablet by mouth daily. 90 Tablet 2    fluticasone propionate (FLONASE) 50 mcg/actuation nasal spray 2 sprays in each nostril daily 3 Each 3    pantoprazole (PROTONIX) 40 mg tablet Take 1 Tablet by mouth daily. 90 Tablet 2    diclofenac (VOLTAREN) 1 % gel Apply  to affected area every six (6) hours. 500 g 2    acetaminophen (Tylenol Arthritis Pain) 650 mg TbER Take 650 mg by mouth every eight (8) hours.  guaiFENesin ER (Mucinex) 600 mg ER tablet Take 600 mg by mouth two (2) times a day.  cholecalciferol, vitamin D3, (VITAMIN D3) 2,000 unit tab Take  by mouth daily.  folic acid/multivit-min/lutein (CENTRUM SILVER PO) Take  by mouth.  aspirin delayed-release 81 mg tablet Take 162 mg by mouth daily.           Allergies   Allergen Reactions    Celebrex [Celecoxib] Swelling        Social History     Socioeconomic History    Marital status:      Spouse name: Not on file    Number of children: Not on file    Years of education: Not on file    Highest education level: Not on file   Occupational History    Not on file Tobacco Use    Smoking status: Never Smoker    Smokeless tobacco: Never Used   Substance and Sexual Activity    Alcohol use: No    Drug use: No    Sexual activity: Yes     Partners: Male     Birth control/protection: None   Other Topics Concern    Not on file   Social History Narrative    Not on file     Social Determinants of Health     Financial Resource Strain:     Difficulty of Paying Living Expenses: Not on file   Food Insecurity:     Worried About Running Out of Food in the Last Year: Not on file    Jerry of Food in the Last Year: Not on file   Transportation Needs:     Lack of Transportation (Medical): Not on file    Lack of Transportation (Non-Medical):  Not on file   Physical Activity:     Days of Exercise per Week: Not on file    Minutes of Exercise per Session: Not on file   Stress:     Feeling of Stress : Not on file   Social Connections:     Frequency of Communication with Friends and Family: Not on file    Frequency of Social Gatherings with Friends and Family: Not on file    Attends Denominational Services: Not on file    Active Member of [address] [place] [address] or Organizations: Not on file    Attends Club or Organization Meetings: Not on file    Marital Status: Not on file   Intimate Partner Violence:     Fear of Current or Ex-Partner: Not on file    Emotionally Abused: Not on file    Physically Abused: Not on file    Sexually Abused: Not on file   Housing Stability:     Unable to Pay for Housing in the Last Year: Not on file    Number of [place] in the Last Year: Not on file    Unstable Housing in the Last Year: Not on file        Family History   Problem Relation Age of Onset    Hypertension Mother     Atrial Fibrillation Mother     Cancer Father         colon, breast, throat,  at 72    Heart Disease Maternal Grandmother     Diabetes Paternal Grandmother            Visit Vitals  BP (!) 119/43   Pulse (!) 56   Temp 97.2 °F (36.2 °C) (Temporal)   Resp 18   Ht 5' 2\" (1.575 m) Wt 200 lb (90.7 kg)   SpO2 96%   BMI 36.58 kg/m²        Review of Systems   Respiratory: Positive for cough and shortness of breath. Cardiovascular: Negative for chest pain. Gastrointestinal: Negative for blood in stool. Genitourinary: Negative for hematuria. Physical Exam  Constitutional:       General: She is not in acute distress. Appearance: She is obese. HENT:      Head: Normocephalic and atraumatic. Right Ear: Tympanic membrane, ear canal and external ear normal.      Left Ear: Tympanic membrane, ear canal and external ear normal.   Eyes:      General: No scleral icterus. Conjunctiva/sclera: Conjunctivae normal.   Neck:      Comments: Bilateral carotid upstrokes normal to palpation. Lymph: No cervical, supraclavicular, or axillary lymphadenopathy. Cardiovascular:      Rate and Rhythm: Normal rate and regular rhythm. Heart sounds: Murmur heard. No friction rub. No gallop. Pulmonary:      Effort: Pulmonary effort is normal.      Breath sounds: Normal breath sounds. Abdominal:      Palpations: Abdomen is soft. Tenderness: There is no abdominal tenderness. Musculoskeletal:      Cervical back: Neck supple. Comments: No clubbing, cyanosis, edema, or calf pain. Skin:     General: Skin is warm and dry. Neurological:      Mental Status: She is alert and oriented to person, place, and time. Psychiatric:         Mood and Affect: Mood normal.                  Diagnoses and all orders for this visit:    1. Coronary artery disease involving native coronary artery of native heart without angina pectoris  -     METABOLIC PANEL, COMPREHENSIVE; Future  -     metoprolol succinate (TOPROL-XL) 50 mg XL tablet; TAKE 1 TABLET BY MOUTH DAILY  -     ezetimibe (ZETIA) 10 mg tablet; Take 1 Tablet by mouth daily. 2. Advanced directives, counseling/discussion  -     ADVANCE CARE PLANNING FIRST 30 MINS    3. Medicare annual wellness visit, subsequent    4.  Dyslipidemia  - LIPID PANEL; Future  -     rosuvastatin (CRESTOR) 40 mg tablet; Take 1 Tablet by mouth daily. 5. Dyslipidemia  Comments: With LDL at goal around 70 with CAD, continue meds. Orders:  -     LIPID PANEL; Future  -     rosuvastatin (CRESTOR) 40 mg tablet; Take 1 Tablet by mouth daily. 6. Arthritis  Comments:  Refill tramadol and Voltaren gel. Orders:  -     traMADoL (ULTRAM) 50 mg tablet; Take 1 Tablet by mouth every six (6) hours as needed for Pain. Max Daily Amount: 200 mg.    7. Cough  -     HYDROcodone-chlorpheniramine (TUSSIONEX) 10-8 mg/5 mL suspension; Take 5 mL by mouth every twelve (12) hours as needed for Cough for up to 12 days. Max Daily Amount: 10 mL. 8. Irritable bowel syndrome, unspecified type  -     dicyclomine (BENTYL) 10 mg capsule; Take 1 Capsule by mouth four (4) times daily as needed for Abdominal Cramps. 9. Coronary artery disease involving native coronary artery of native heart without angina pectoris  Comments:  Continue aspirin, statin, and beta-blocker. LDL at goal around 70  Orders:  -     METABOLIC PANEL, COMPREHENSIVE; Future  -     metoprolol succinate (TOPROL-XL) 50 mg XL tablet; TAKE 1 TABLET BY MOUTH DAILY  -     ezetimibe (ZETIA) 10 mg tablet; Take 1 Tablet by mouth daily. 10. Chronic rhinitis  -     montelukast (Singulair) 10 mg tablet; Take 1 Tablet by mouth daily. -     fluticasone propionate (FLONASE) 50 mcg/actuation nasal spray; 2 sprays in each nostril daily         Follow-up and Dispositions    · Return in about 6 months (around 7/31/2022) for routine check-- labs 1 or 2 weeks before. Dania Paredes MD   This is the Subsequent Medicare Annual Wellness Exam, performed 12 months or more after the Initial AWV or the last Subsequent AWV    I have reviewed the patient's medical history in detail and updated the computerized patient record.        Assessment/Plan   Education and counseling provided:  Are appropriate based on today's review and evaluation    1. Advanced directives, counseling/discussion  2. Medicare annual wellness visit, subsequent       Depression Risk Factor Screening     3 most recent PHQ Screens 11/9/2021   PHQ Not Done -   Little interest or pleasure in doing things Not at all   Feeling down, depressed, irritable, or hopeless Not at all   Total Score PHQ 2 0       Alcohol & Drug Abuse Risk Screen    Do you average more than 1 drink per night or more than 7 drinks a week:  No    On any one occasion in the past three months have you have had more than 3 drinks containing alcohol:  No          Functional Ability and Level of Safety    Hearing: The patient wears hearing aids. Activities of Daily Living: The home contains: no safety equipment. Patient does total self care      Ambulation: with no difficulty     Fall Risk:  Fall Risk Assessment, last 12 mths 11/9/2021   Able to walk? Yes   Fall in past 12 months? 0   Do you feel unsteady?  0   Are you worried about falling 0      Abuse Screen:  Patient is not abused       Cognitive Screening    Has your family/caregiver stated any concerns about your memory: no     Cognitive Screening: Normal - Mini Cog Test    Health Maintenance Due     Health Maintenance Due   Topic Date Due    DTaP/Tdap/Td series (1 - Tdap) Never done    Colorectal Cancer Screening Combo  Never done    Shingrix Vaccine Age 50> (1 of 2) Never done    COVID-19 Vaccine (3 - Booster for Moderna series) 08/26/2021    Medicare Yearly Exam  08/29/2021    Flu Vaccine (1) Never done       Patient Care Team   Patient Care Team:  Arden Reynoso MD as PCP - General (Internal Medicine)  Arden Reynoso [facility]  Fredy Browning MD (Cardiology)  Stacia Ballard CNM (Certified Nurse Midwife)  Josesito Gaston MD as Physician (Urology)    History     Patient Active Problem List   Diagnosis Code    Dyslipidemia E78.5    Abnormal nuclear stress test R94.39    Chest pain R07.9    Nephrolithiasis N20.0    Non-rheumatic mitral regurgitation I34.0    Severe obesity (BMI 35.0-39. 9) E66.01    Chronic cough R05.3    Chronic rhinitis J31.0    Fatty pancreas K86.89    Age-related osteoporosis without current pathological fracture M81.0    CAD (coronary artery disease), native coronary artery I25.10    Irritable bowel syndrome K58.9     Past Medical History:   Diagnosis Date    Age-related osteoporosis without current pathological fracture 4/5/2016    Arthritis     Benign positional vertigo 4/2/2021    CAD (coronary artery disease), native coronary artery 7/2014    ULISES to LCx    Cardiac echocardiogram 04/05/2016    EF 55-60%. No WMA. Gr 1 DDfx. Mod MR.  Cardiac nuclear imaging test, abnormal 02/05/2014    Mod-sized, mild-mod mid lateral infarction w/mild-mod rosa isela-infarct ischemia. Mod lateral hypk. EF 56%. Normal EKG on pharm stress test.  Intermediate risk.  Chronic cough 4/2/2021    Chronic rhinitis 4/2/2021    Dyslipidemia     Fatty pancreas 4/5/2016    On imaging    Hernia     Nephrolithiasis       Past Surgical History:   Procedure Laterality Date    HX CHOLECYSTECTOMY      HX CORONARY STENT PLACEMENT  07/07/2014    HX HEART CATHETERIZATION      HX HERNIA REPAIR      HX LYSIS OF ADHESIONS      HX OOPHORECTOMY Right 12/05/2018    HX OTHER SURGICAL  11/2018    repair of anal fissure    DE REMOVAL OF ANAL FISSURE  11/02/2018    REMOVAL OF KIDNEY STONE  12/28/2018     Current Outpatient Medications   Medication Sig Dispense Refill    montelukast (Singulair) 10 mg tablet Take 1 Tablet by mouth daily. 90 Tablet 3    pantoprazole (PROTONIX) 40 mg tablet Take 1 Tablet by mouth daily. 90 Tablet 2    ezetimibe (ZETIA) 10 mg tablet Take 1 Tablet by mouth daily. 90 Tablet 1    rosuvastatin (CRESTOR) 40 mg tablet Take 1 Tablet by mouth daily. 90 Tablet 3    traMADoL (ULTRAM) 50 mg tablet Take 1 Tablet by mouth every six (6) hours as needed for Pain.  Max Daily Amount: 200 mg. 90 Tablet 2    metoprolol succinate (TOPROL-XL) 50 mg XL tablet TAKE 1 TABLET BY MOUTH DAILY 90 Tablet 3    diclofenac (VOLTAREN) 1 % gel Apply  to affected area every six (6) hours. 500 g 2    acetaminophen (Tylenol Arthritis Pain) 650 mg TbER Take 650 mg by mouth every eight (8) hours.  dicyclomine (BENTYL) 10 mg capsule Take 1 Cap by mouth four (4) times daily as needed for Abdominal Cramps. 90 Cap 2    guaiFENesin ER (Mucinex) 600 mg ER tablet Take 600 mg by mouth two (2) times a day.  cholecalciferol, vitamin D3, (VITAMIN D3) 2,000 unit tab Take  by mouth daily.  folic acid/multivit-min/lutein (CENTRUM SILVER PO) Take  by mouth.  aspirin delayed-release 81 mg tablet Take 162 mg by mouth daily.        Allergies   Allergen Reactions    Celebrex [Celecoxib] Swelling       Family History   Problem Relation Age of Onset    Hypertension Mother     Atrial Fibrillation Mother     Cancer Father         colon, breast, throat,  at 72    Heart Disease Maternal Grandmother     Diabetes Paternal Grandmother      Social History     Tobacco Use    Smoking status: Never Smoker    Smokeless tobacco: Never Used   Substance Use Topics    Alcohol use: No         Riley Friend MD

## 2022-02-10 ENCOUNTER — OFFICE VISIT (OUTPATIENT)
Dept: CARDIOLOGY CLINIC | Age: 72
End: 2022-02-10
Payer: MEDICARE

## 2022-02-10 VITALS
SYSTOLIC BLOOD PRESSURE: 119 MMHG | BODY MASS INDEX: 36.25 KG/M2 | OXYGEN SATURATION: 93 % | HEART RATE: 49 BPM | HEIGHT: 62 IN | DIASTOLIC BLOOD PRESSURE: 60 MMHG | WEIGHT: 197 LBS

## 2022-02-10 DIAGNOSIS — I34.0 NON-RHEUMATIC MITRAL REGURGITATION: Primary | ICD-10-CM

## 2022-02-10 DIAGNOSIS — E78.5 DYSLIPIDEMIA: ICD-10-CM

## 2022-02-10 DIAGNOSIS — Z01.818 PRE-OP EXAMINATION: ICD-10-CM

## 2022-02-10 DIAGNOSIS — R06.02 SOB (SHORTNESS OF BREATH): ICD-10-CM

## 2022-02-10 DIAGNOSIS — E66.01 SEVERE OBESITY (BMI 35.0-39.9) WITH COMORBIDITY (HCC): ICD-10-CM

## 2022-02-10 PROCEDURE — 1090F PRES/ABSN URINE INCON ASSESS: CPT | Performed by: NURSE PRACTITIONER

## 2022-02-10 PROCEDURE — 99214 OFFICE O/P EST MOD 30 MIN: CPT | Performed by: NURSE PRACTITIONER

## 2022-02-10 PROCEDURE — G8417 CALC BMI ABV UP PARAM F/U: HCPCS | Performed by: NURSE PRACTITIONER

## 2022-02-10 PROCEDURE — 1101F PT FALLS ASSESS-DOCD LE1/YR: CPT | Performed by: NURSE PRACTITIONER

## 2022-02-10 PROCEDURE — G8536 NO DOC ELDER MAL SCRN: HCPCS | Performed by: NURSE PRACTITIONER

## 2022-02-10 PROCEDURE — 3017F COLORECTAL CA SCREEN DOC REV: CPT | Performed by: NURSE PRACTITIONER

## 2022-02-10 PROCEDURE — G9899 SCRN MAM PERF RSLTS DOC: HCPCS | Performed by: NURSE PRACTITIONER

## 2022-02-10 PROCEDURE — G8432 DEP SCR NOT DOC, RNG: HCPCS | Performed by: NURSE PRACTITIONER

## 2022-02-10 PROCEDURE — G8427 DOCREV CUR MEDS BY ELIG CLIN: HCPCS | Performed by: NURSE PRACTITIONER

## 2022-02-10 NOTE — PROGRESS NOTES
Blake Bustos presents today for a 3 month check-up as well as a pre-procedure history and physical.  She was last seen by Dr. Nirali Flores on 11/9/21 and during that visit, he recommended a TARUN to further evaluate her worsening mitral valve regurgitation as noted on the echocardiogram done in late Oct. 2021. Her mitral valve regurgitation at that time was noted to be moderate to severe. She states that since her last visit, she has had other problems arise. She was found to have 2 nodules on her thyroid gland and it is being followed by Dr. Darcy Connor. She states that he did an ultrasound of her thyroid gland in his office and measurements were different from the ultrasound done at [facility]. She has also developed some problems with chronic diarrhea being followed by Dr. Katheryn Adan. She reports that she is scheduled for a colonoscopy on April 1, 2022. She would like to discuss postponing the TARUN until after her GI issues have settled down. She is a 66year old female with history of dyslipidemia, arthritis, and CAD (high-grade stenosis of her mid left circumflex extending into obtuse marginal branch 1 noted on cath in Feb. 2014). Her anginal equivalent is felt to be exertional dyspnea and pain between her shoulder blades. In July 2014, she underwent PCI/stenting to the left circumflex. An echo done in April 2016 which showed overall improvement of her well ejection fraction of 55-60% without mention of any regional wall motion abnormalities. Her mitral regurgitation was in the moderate range, which may have been minimally increased compared to the prior study. Another echocardiogram done in November 2018 showed preserved LV systolic function, EF 81-64%, evidence of diastolic dysfunction, mitral valve prolapse with moderate mitral regurgitation, essentially unchanged compared to the study from 2017.   She then underwent another echocardiogram and pharmacologic nuclear stress test in November 2020 to evaluate increased dyspnea on exertion. Her echocardiogram was essentially unchanged showing preserved LV function, EF 60- 93%, diastolic dysfunction, mild left atrial enlargement, posterior prolapse of her mitral valve leaflet with moderate mitral regurgitation. Her nuclear stress test showed preserved LV function with a fixed inferolateral perfusion defect and hypokinesis of the inferolateral wall consistent with her known coronary anatomy. Her last echo was done at the end of October 2021 which showed a normal LV function, EF 55 to 60% with lateral wall hypokinesis and a mildly dilated left ventricle. Her mitral regurgitation appeared to be in the moderate to severe range with an eccentric jet but no obvious mitral valve prolapse. Denies chest pain, tightness, heaviness, and palpitations. She has not had any back pain or DEL CID. Denies shortness of breath at rest, admits to occasional dyspnea on exertion, denies orthopnea and PND. Denies abdominal bloating. Denies lightheadedness, dizziness, and syncope. Denies lower extremity edema and claudication. Denies nausea, vomiting, admits to diarrhea, denies melena, hematochezia. Denies hematuria, urgency, frequency. Denies fever, chills. PMH:  Past Medical History:   Diagnosis Date    Age-related osteoporosis without current pathological fracture 4/5/2016    Arthritis     Benign positional vertigo 4/2/2021    CAD (coronary artery disease), native coronary artery 7/2014    ULISES to LCx    Cardiac echocardiogram 04/05/2016    EF 55-60%. No WMA. Gr 1 DDfx. Mod MR.  Cardiac nuclear imaging test, abnormal 02/05/2014    Mod-sized, mild-mod mid lateral infarction w/mild-mod rosa isela-infarct ischemia. Mod lateral hypk. EF 56%. Normal EKG on pharm stress test.  Intermediate risk.     Chronic cough 4/2/2021    Chronic rhinitis 4/2/2021    Dyslipidemia     Fatty pancreas 4/5/2016    On imaging    Hernia     Nephrolithiasis        PSH:  Past Surgical History:   Procedure Laterality Date    HX CHOLECYSTECTOMY      HX CORONARY STENT PLACEMENT  07/07/2014    HX HEART CATHETERIZATION      HX HERNIA REPAIR      HX LYSIS OF ADHESIONS      HX OOPHORECTOMY Right 12/05/2018    HX OTHER SURGICAL  11/2018    repair of anal fissure    DE REMOVAL OF ANAL FISSURE  11/02/2018    REMOVAL OF KIDNEY STONE  12/28/2018       MEDS:  Current Outpatient Medications   Medication Sig    rosuvastatin (CRESTOR) 40 mg tablet Take 1 Tablet by mouth daily.  traMADoL (ULTRAM) 50 mg tablet Take 1 Tablet by mouth every six (6) hours as needed for Pain. Max Daily Amount: 200 mg.    HYDROcodone-chlorpheniramine (TUSSIONEX) 10-8 mg/5 mL suspension Take 5 mL by mouth every twelve (12) hours as needed for Cough for up to 12 days. Max Daily Amount: 10 mL.  dicyclomine (BENTYL) 10 mg capsule Take 1 Capsule by mouth four (4) times daily as needed for Abdominal Cramps.  metoprolol succinate (TOPROL-XL) 50 mg XL tablet TAKE 1 TABLET BY MOUTH DAILY    montelukast (Singulair) 10 mg tablet Take 1 Tablet by mouth daily.  ezetimibe (ZETIA) 10 mg tablet Take 1 Tablet by mouth daily.  fluticasone propionate (FLONASE) 50 mcg/actuation nasal spray 2 sprays in each nostril daily    pantoprazole (PROTONIX) 40 mg tablet Take 1 Tablet by mouth daily.  diclofenac (VOLTAREN) 1 % gel Apply  to affected area every six (6) hours.  acetaminophen (Tylenol Arthritis Pain) 650 mg TbER Take 650 mg by mouth every eight (8) hours.  guaiFENesin ER (Mucinex) 600 mg ER tablet Take 600 mg by mouth two (2) times a day.  cholecalciferol, vitamin D3, (VITAMIN D3) 2,000 unit tab Take  by mouth daily.  folic acid/multivit-min/lutein (CENTRUM SILVER PO) Take  by mouth.  aspirin delayed-release 81 mg tablet Take 162 mg by mouth two (2) times a day. No current facility-administered medications for this visit.        Allergies and Sensitivities:  Allergies   Allergen Reactions    Celebrex [Celecoxib] Swelling       Family History:  Family History   Problem Relation Age of Onset    Hypertension Mother     Atrial Fibrillation Mother     Cancer Father         colon, breast, throat,  at 72    Heart Disease Maternal Grandmother     Diabetes Paternal Grandmother        Social History:  She  reports that she has never smoked. She has never used smokeless tobacco.  She  reports no history of alcohol use. Physical:  Visit Vitals  /60 (BP 1 Location: Left upper arm, BP Patient Position: Sitting, BP Cuff Size: Adult)   Pulse (!) 49   Ht 5' 2\" (1.575 m)   Wt 89.4 kg (197 lb)   SpO2 93%   BMI 36.03 kg/m²         Exam:  Neck:  Supple, no JVD, no carotid bruits  CV:  Normal S1 and  S2, grade II/VI blowing holosystolic murmur noted, no rubs, or gallops noted  Lungs:  Clear to ausculation throughout, no wheezes or rales  Abd:  Soft, non-tender, non-distended with good bowel sounds. No hepatosplenomegaly  Extremities:  No edema      Data:  EKG:      LABS:  Lab Results   Component Value Date/Time    Sodium 139 2021 08:01 PM    Potassium 4.1 2021 08:01 PM    Chloride 110 2021 08:01 PM    CO2 21 2021 08:01 PM    Glucose 145 (H) 2021 08:01 PM    BUN 16 2021 08:01 PM    Creatinine 0.96 2021 08:01 PM     Lab Results   Component Value Date/Time    Cholesterol, total 158 2021 10:14 AM    HDL Cholesterol 63 (H) 2021 10:14 AM    LDL, calculated 70.2 2021 10:14 AM    Triglyceride 124 2021 10:14 AM    CHOL/HDL Ratio 2.5 2021 10:14 AM     Lab Results   Component Value Date/Time    ALT (SGPT) 2021 08:01 PM         Impression/Plan:  1. Mitral valve regurgitation, moderate to severe by echo done in Oct. 2021  2. CAD, s/p PCI/stenting to the left circumflex in 2014, stable  3. Dyslipidemia, on rosuvastatin 40mg  4. Arthritis  5. Diarrhea, being evaluated by GI  6.   Thyroid nodules, followed by endocrinology    Ms. Dominique was seen today for a 3 month check-up and pre-procedure history and physical.  She states that she would like to post-pone the TARUN as she has had some other medical issues arise since her last appointment with Dr. Makenna Craig in Nov. 2021. She wanted to know why she needed to proceed with TARUN at this time if her mitral valve regurgitation is about the same as the previous echo. I reviewed the echo results with her. The echo done in Oct. 2021, showed moderate to severe mitral josiane regurgitation while the one done in 2020, only showed moderate. She is aware that the results are subjective. Her primary concern at this time is chronic diarrhea which is being evaluated by GI (Dr. Tasha Osei). She states that she is scheduled for a colonoscopy on 4/1/22. Her other issue is thyroid nodules being followed by endocrinology (Dr. Shannon Barnes). I discussed her concerns with Dr. Makenna Craig and he states that it is okay to proceed with GI work-up and okay to postpone the TARUN until after her GI issues have improved/resolved. She offers no cardiac complaints other than some mild DEL CID. She has not had any pain between her shoulder blades accompanied by exertional dyspnea which are believed to be her anginal equivalents. Her blood pressure is well controlled and she offers no other complaints. She will follow-up with Dr Makenna Craig in 3 months as scheduled and as needed. She states that she will call and let us know once her GI issues have improved/resolved so that the TARUN can be scheduled. Time spent: 30 minutes      Santino Vargas MSN, FNP-BC    Please note:  Portions of this chart were created with Dragon medical speech to text program.  Unrecognized errors may be present.

## 2022-02-10 NOTE — PROGRESS NOTES
Ryanhelene Golden presents today for   Chief Complaint   Patient presents with    Follow-up     3 month       Ryan Golden preferred language for health care discussion is english/other. Is someone accompanying this pt? no    Is the patient using any DME equipment during [address]? no    Depression Screening:  3 most recent PHQ Screens 2/10/2022   PHQ Not Done -   Little interest or pleasure in doing things Not at all   Feeling down, depressed, irritable, or hopeless Not at all   Total Score PHQ 2 0       Learning Assessment:  Learning Assessment 2/10/2022   PRIMARY LEARNER Patient   HIGHEST LEVEL OF EDUCATION - PRIMARY LEARNER  -   BARRIERS PRIMARY LEARNER -   [facility]    NEED -   LEARNER PREFERENCE PRIMARY DEMONSTRATION   LEARNING SPECIAL TOPICS -   ANSWERED BY patient   RELATIONSHIP SELF       Abuse Screening:  Abuse Screening Questionnaire 2/10/2022   Do you ever feel afraid of your partner? N   Are you in a relationship with someone who physically or mentally threatens you? N   Is it safe for you to go home? Y       Fall Risk  Fall Risk Assessment, last 12 mths 2/10/2022   Able to walk? Yes   Fall in past 12 months? 0   Do you feel unsteady? 0   Are you worried about falling 0           Pt currently taking Anticoagulant therapy? no    Pt currently taking Antiplatelet therapy ? Aspirin 81 mg      Coordination of Care:  1. Have you been to the ER, urgent care clinic since your last visit? Hospitalized since your last visit? no    2. Have you seen or consulted any other health care providers outside of the [address] [place] [address] since your last visit? Include any pap smears or colon screening.  no

## 2022-02-10 NOTE — PATIENT INSTRUCTIONS
Continue present medication regimen  Okay to delay TARUN per Dr. Yamile Adan, until after GI work-up is completed  Follow-up with Dr. Yamile Adan as scheduled and as needed.

## 2022-03-18 PROBLEM — K58.9 IRRITABLE BOWEL SYNDROME: Status: ACTIVE | Noted: 2021-04-05

## 2022-03-20 PROBLEM — J31.0 CHRONIC RHINITIS: Status: ACTIVE | Noted: 2021-04-02

## 2022-03-20 PROBLEM — R05.3 CHRONIC COUGH: Status: ACTIVE | Noted: 2021-04-02

## 2022-04-18 NOTE — MR AVS SNAPSHOT
[address] [facility] [address] [address] 
[phone] Patient: Davey Daly MRN: [id number] Visit Information Date & Time Provider Department Dept. Phone Encounter #  
 8/21/2018  2:15 PM [facility] [phone] [id number] Your Appointments 9/6/2018  2:00 PM  
PROCEDURE with Tyson Moe MD  
[facility]) Appt Note: cystoscopy [address] [facility] [address] [facility] [address] [address]  
[phone] [address] [id number]  
  
    
 11/19/2018  1:00 PM  
Follow Up with Bard Shad MD  
[facility] ([facility]) Appt Note: 1 year follow up with an EKG  
 Sangeetha Bergeron [facility] [id number] [phone] [facility] [address] Upcoming Health Maintenance Date Due DTaP/Tdap/Td series (1 - Tdap) 6/25/1969 BREAST CANCER SCRN MAMMOGRAM 6/25/1998 FOBT Q 1 YEAR AGE 50-75 6/25/1998 ZOSTER VACCINE AGE 60> 4/25/2008 GLAUCOMA SCREENING Q2Y 6/25/2013 Pneumococcal 65+ Low/Medium Risk (1 of 2 - PCV13) 6/25/2013 MEDICARE YEARLY EXAM 3/14/2018 Influenza Age 5 to Adult 8/1/2018 Allergies as of 8/21/2018  Review Complete On: 8/21/2018 By: Susie Velasco Severity Noted Reaction Type Reactions Celebrex [Celecoxib]  10/17/2012    Swelling Current Immunizations  Never Reviewed No immunizations on file. Not reviewed this visit You Were Diagnosed With   
  
 Codes Comments Microscopic hematuria    -  Primary ICD-10-CM: R31.29 ICD-9-CM: 599.72 Vitals  BP Pulse Temp Height(growth percentile) Weight(growth percentile) SpO2  
 121/68 (BP 1 Location: Left arm, BP Patient Position: Sitting) 76 97.3 °F (36.3 °C) (Oral) 5' 2\" (1.575 m) 202 lb (91.6 kg) 94% BMI OB Status Smoking Status 36.95 kg/m2 Postmenopausal Never Smoker Vitals History BMI and BSA Data Body Mass Index Body Surface Area  
 36.95 kg/m 2 2 m 2 Preferred Pharmacy Pharmacy Name Phone [facility] [place] [address] [place] [address] [phone] Your Updated Medication List  
  
   
This list is accurate as of 8/21/18  3:31 PM.  Always use your most recent med list.  
  
  
  
  
 ARTHROTEC 50 PO Take  by mouth. aspirin delayed-release 81 mg tablet Take 162 mg by mouth daily. BENTYL 10 mg capsule Generic drug:  dicyclomine Take 10 mg by mouth daily as needed. calcium 500 mg Tab Take  by mouth. CENTRUM SILVER PO Take  by mouth. CLARINEX-D 12 HOUR 2.5-120 mg per tablet Generic drug:  desloratadine-pseudoephedrine Take 1 Tab by mouth two (2) times a day. CRESTOR 20 mg tablet Generic drug:  rosuvastatin Take 20 mg by mouth nightly. metoprolol succinate 50 mg XL tablet Commonly known as:  TOPROL-XL  
TAKE 1 TABLET BY MOUTH DAILY  
  
 mometasone 100 mcg/actuation Hfaa Take 100 mcg by inhalation daily. multivit-mins 25-folic acid-D3 3-3,512 mg-unit Tab Take  by mouth. nitroglycerin 0.4 mg SL tablet Commonly known as:  NITROSTAT  
1 Tab by SubLINGual route every five (5) minutes as needed for Chest Pain. SINGULAIR 10 mg tablet Generic drug:  montelukast  
Take 10 mg by mouth daily. TYLENOL ARTHRITIS PAIN 650 mg Marisol Rand Generic drug:  acetaminophen Take 650 mg by mouth every eight (8) hours. VITAMIN B-6 100 mg tablet Generic drug:  pyridoxine (vitamin B6) Take 100 mg by mouth daily. We Performed the Following AMB POC URINALYSIS DIP STICK AUTO W/O MICRO [66586 CPT(R)] To-Do List   
 11/07/2018 12:30 PM  
 Appointment with [facility] S[facility] at [facility] NON-INVASIVE CARD ([phone]) Age Limit for ALL Heart procedures @ all [address] [place] [address] facilities: 18 yrs and older only. Under the age of 25, refer to [facility] ([phone]). Wt Limit: 350lbs. This study requires patient to bring a written physician's order or MD office may fax the order to Central Scheduling at [phone]. Patient needs to bring a current list of all medications. No preparation is required for this study. Patients should report 15 minutes prior to their appointment time to the [facility], [address] [place] [address]/[address]. Patient Instructions Blood in the Urine: Care Instructions Your Care Instructions Blood in the urine, or hematuria, may make the urine look red, brown, or pink. There may be blood every time you urinate or just from time to time. You cannot always see blood in the urine, but it will show up in a urine test. 
Blood in the urine may be serious. It should always be checked by a doctor. Your doctor may recommend more tests, including an X-ray, a CT scan, or a cystoscopy (which lets a doctor look inside the urethra and bladder). Blood in the urine can be a sign of another problem. Common causes are bladder infections and kidney stones. An injury to your groin or your genital area can also cause bleeding in the urinary tract. Very hard exercise-such as running a marathon-can cause blood in the urine. Blood in the urine can also be a sign of kidney disease or cancer in the bladder or kidney. Many cases of blood in the urine are caused by a harmless condition that runs in families. This is called benign familial hematuria. It does not need any treatment. Sometimes your urine may look red or brown even though it does not contain blood.  For example, not getting enough fluids (dehydration), taking certain medicines, or having a liver problem can change the color of your urine. Eating foods such as beets, rhubarb, or blackberries or foods with red food coloring can make your urine look red or pink. Follow-up care is a key part of your treatment and safety. Be sure to make and go to all appointments, and call your doctor if you are having problems. It's also a good idea to know your test results and keep a list of the medicines you take. When should you call for help? Call your doctor now or seek immediate medical care if: 
  · You have symptoms of a urinary infection. For example: ¨ You have pus in your urine. ¨ You have pain in your back just below your rib cage. This is called flank pain. ¨ You have a fever, chills, or body aches. ¨ It hurts to urinate. ¨ You have groin or belly pain.  
  · You have more blood in your urine.  
 Watch closely for changes in your health, and be sure to contact your doctor if: 
  · You have new urination problems.  
  · You do not get better as expected. Where can you learn more? Go to http://ofelia-yamilex.info/. Enter W376 in the search box to learn more about \"Blood in the Urine: Care Instructions. \" Current as of: May 12, 2017 Content Version: 11.7 © 4280-1602 Mobileye, NotesFirst. Care instructions adapted under license by [employer] (which disclaims liability or warranty for this information). If you have questions about a medical condition or this instruction, always ask your healthcare professional. [place] [address] any warranty or liability for your use of this information. Introducing [facility] & HEALTH SERVICES! Dear Jose Lyman: Thank you for requesting a O[employer]FFMIX (?????) account. Our records indicate that you already have an active O[employer]FFMIX (?????) account. You can access your account anytime at [email or web address] Did you know that you can access your hospital and ER discharge instructions at any time in ONOFFMIX (?????)?   You can also review all of your test results from your hospital stay or ER visit. Additional Information If you have questions, please visit the Frequently Asked Questions section of the [employer] website at [email or web address] Remember, [employer] is NOT to be used for urgent needs. For medical emergencies, dial 911. Now available from your iPhone and Android! Please provide this summary of care documentation to your next provider. Your primary care clinician is listed as Chelsea Viera. If you have any questions after today's visit, please call [phone]. [Fatigue] : fatigue [Dyspnea on Exertion] : dyspnea on exertion [Negative] : Heme/Lymph

## 2022-05-12 ENCOUNTER — OFFICE VISIT (OUTPATIENT)
Dept: CARDIOLOGY CLINIC | Age: 72
End: 2022-05-12
Payer: MEDICARE

## 2022-05-12 VITALS
BODY MASS INDEX: 36.62 KG/M2 | HEART RATE: 54 BPM | OXYGEN SATURATION: 96 % | DIASTOLIC BLOOD PRESSURE: 66 MMHG | SYSTOLIC BLOOD PRESSURE: 118 MMHG | HEIGHT: 62 IN | WEIGHT: 199 LBS

## 2022-05-12 DIAGNOSIS — E66.01 SEVERE OBESITY (BMI 35.0-39.9) WITH COMORBIDITY (HCC): ICD-10-CM

## 2022-05-12 DIAGNOSIS — I34.0 NON-RHEUMATIC MITRAL REGURGITATION: Primary | ICD-10-CM

## 2022-05-12 DIAGNOSIS — E78.5 DYSLIPIDEMIA: ICD-10-CM

## 2022-05-12 DIAGNOSIS — I25.10 CORONARY ARTERY DISEASE INVOLVING NATIVE CORONARY ARTERY OF NATIVE HEART WITHOUT ANGINA PECTORIS: ICD-10-CM

## 2022-05-12 PROBLEM — N18.30 CHRONIC RENAL DISEASE, STAGE III (HCC): Status: ACTIVE | Noted: 2022-05-12

## 2022-05-12 PROCEDURE — G8417 CALC BMI ABV UP PARAM F/U: HCPCS | Performed by: INTERNAL MEDICINE

## 2022-05-12 PROCEDURE — G9899 SCRN MAM PERF RSLTS DOC: HCPCS | Performed by: INTERNAL MEDICINE

## 2022-05-12 PROCEDURE — 1090F PRES/ABSN URINE INCON ASSESS: CPT | Performed by: INTERNAL MEDICINE

## 2022-05-12 PROCEDURE — G8427 DOCREV CUR MEDS BY ELIG CLIN: HCPCS | Performed by: INTERNAL MEDICINE

## 2022-05-12 PROCEDURE — 1101F PT FALLS ASSESS-DOCD LE1/YR: CPT | Performed by: INTERNAL MEDICINE

## 2022-05-12 PROCEDURE — 3017F COLORECTAL CA SCREEN DOC REV: CPT | Performed by: INTERNAL MEDICINE

## 2022-05-12 PROCEDURE — G8536 NO DOC ELDER MAL SCRN: HCPCS | Performed by: INTERNAL MEDICINE

## 2022-05-12 PROCEDURE — G8510 SCR DEP NEG, NO PLAN REQD: HCPCS | Performed by: INTERNAL MEDICINE

## 2022-05-12 PROCEDURE — 99214 OFFICE O/P EST MOD 30 MIN: CPT | Performed by: INTERNAL MEDICINE

## 2022-05-12 NOTE — PROGRESS NOTES
HISTORY OF PRESENT ILLNESS  Hayley Linton is a 66 y.o. female. Follow-up  Associated symptoms include shortness of breath. Pertinent negatives include no chest pain, no abdominal pain and no headaches. Shortness of Breath  Pertinent negatives include no fever, no headaches, no cough, no wheezing, no PND, no orthopnea, no chest pain, no vomiting, no abdominal pain, no rash, no leg swelling and no claudication. Patient presents for a follow-up office visit. The patient had an episode of fairly significant back pain in January 2014 that awoke her from sleep, it radiated around to the front of her chest up into her neck and down both arms associated with tingling and numbness. She subsequently underwent a cardiac catheterization in February 2014, which showed a high-grade stenosis of her mid left circumflex extending into obtuse marginal branch 1. She was continued to complain of exertional dyspnea and some chest pain between her shoulder blades, which is felt to be her anginal equivalent. She was also afraid to do any exertional activity because of her high-grade left circumflex stenosis, so she underwent scheduled percutaneous coronary intervention over her left circumflex lesion with a drug-eluting stent In July 2014. She underwent a followup echocardiogram in April 2016 which showed overall improvement of her well ejection fraction of 55-60% without mention of any regional wall motion abnormalities. Her mitral regurgitation was in the moderate range, which may have been minimally increased compared to the prior study. Patient subsequently underwent another echocardiogram and pharmacologic nuclear stress test in November 2020 to evaluate increased dyspnea on exertion.   Her echocardiogram was essentially unchanged showing preserved LV function, EF 60- 21%, diastolic dysfunction, mild left atrial enlargement, posterior prolapse of her mitral valve leaflet with moderate mitral regurgitation. Her nuclear stress test showed preserved LV function with a fixed inferolateral perfusion defect and hypokinesis of the inferolateral wall consistent with her known coronary anatomy. She just recently underwent a repeat echocardiogram at the end of October 2021 which showed a normal LV function, EF 55 to 60% with lateral wall hypokinesis and a mildly dilated left ventricle. Her mitral regurgitation appeared to be in the moderate to severe range with an eccentric jet but no obvious mitral valve prolapse. She was last seen in our office 3 months ago by our nurse practitioner. At that visit, she was feeling about the same with chronic exertional dyspnea. No new chest pain, leg swelling, orthopnea or PND. She had decided against pursuing a TARUN until her GI issues had resolved. Since last visit, she has not noted any worsening shortness of breath. She still does not wish to pursue a TARUN at this time. Past Medical History:   Diagnosis Date    Age-related osteoporosis without current pathological fracture 4/5/2016    Arthritis     Benign positional vertigo 4/2/2021    CAD (coronary artery disease), native coronary artery 7/2014    ULISES to LCx    Cardiac echocardiogram 04/05/2016    EF 55-60%. No WMA. Gr 1 DDfx. Mod MR.  Cardiac nuclear imaging test, abnormal 02/05/2014    Mod-sized, mild-mod mid lateral infarction w/mild-mod rosa isela-infarct ischemia. Mod lateral hypk. EF 56%. Normal EKG on pharm stress test.  Intermediate risk.  Chronic cough 4/2/2021    Chronic rhinitis 4/2/2021    Dyslipidemia     Fatty pancreas 4/5/2016    On imaging    Hernia     Nephrolithiasis       Current Outpatient Medications   Medication Sig Dispense Refill    rosuvastatin (CRESTOR) 40 mg tablet Take 1 Tablet by mouth daily. 90 Tablet 3    traMADoL (ULTRAM) 50 mg tablet Take 1 Tablet by mouth every six (6) hours as needed for Pain.  Max Daily Amount: 200 mg. 90 Tablet 2    dicyclomine (BENTYL) 10 mg capsule Take 1 Capsule by mouth four (4) times daily as needed for Abdominal Cramps. 90 Capsule 5    metoprolol succinate (TOPROL-XL) 50 mg XL tablet TAKE 1 TABLET BY MOUTH DAILY 90 Tablet 3    montelukast (Singulair) 10 mg tablet Take 1 Tablet by mouth daily. 90 Tablet 3    ezetimibe (ZETIA) 10 mg tablet Take 1 Tablet by mouth daily. 90 Tablet 2    fluticasone propionate (FLONASE) 50 mcg/actuation nasal spray 2 sprays in each nostril daily 3 Each 3    pantoprazole (PROTONIX) 40 mg tablet Take 1 Tablet by mouth daily. 90 Tablet 2    diclofenac (VOLTAREN) 1 % gel Apply  to affected area every six (6) hours. 500 g 2    acetaminophen (Tylenol Arthritis Pain) 650 mg TbER Take 650 mg by mouth every eight (8) hours.  guaiFENesin ER (Mucinex) 600 mg ER tablet Take 600 mg by mouth two (2) times a day.  cholecalciferol, vitamin D3, (VITAMIN D3) 2,000 unit tab Take  by mouth daily.  folic acid/multivit-min/lutein (CENTRUM SILVER PO) Take  by mouth.  aspirin delayed-release 81 mg tablet Take 162 mg by mouth daily. Allergies   Allergen Reactions    Celebrex [Celecoxib] Swelling      Social History     Tobacco Use    Smoking status: Never Smoker    Smokeless tobacco: Never Used   Vaping Use    Vaping Use: Never used   Substance Use Topics    Alcohol use: No    Drug use: No       Family History   Problem Relation Age of Onset    Hypertension Mother     Atrial Fibrillation Mother     Cancer Father         colon, breast, throat,  at 72    Heart Disease Maternal Grandmother     Diabetes Paternal Grandmother           Review of Systems   Constitutional: Negative for chills, fever, malaise/fatigue and weight loss. HENT: Negative for nosebleeds. Eyes: Negative for blurred vision and double vision. Respiratory: Positive for shortness of breath. Negative for cough and wheezing.     Cardiovascular: Negative for chest pain, palpitations, orthopnea, claudication, leg swelling and PND.   Gastrointestinal: Negative for abdominal pain, heartburn, nausea and vomiting. Genitourinary: Negative for dysuria and hematuria. Musculoskeletal: Negative for falls and myalgias. Skin: Negative for rash. Neurological: Negative for dizziness, focal weakness and headaches. Endo/Heme/Allergies: Does not bruise/bleed easily. Psychiatric/Behavioral: Negative for substance abuse. Visit Vitals  /66 (BP 1 Location: Right arm, BP Patient Position: Sitting, BP Cuff Size: Large adult)   Pulse (!) 54   Ht 5' 2\" (1.575 m)   Wt 90.3 kg (199 lb)   SpO2 96%   BMI 36.40 kg/m²      Physical Exam  Constitutional:       Appearance: She is well-developed. HENT:      Head: Normocephalic and atraumatic. Eyes:      Conjunctiva/sclera: Conjunctivae normal.   Neck:      Vascular: No carotid bruit or JVD. Cardiovascular:      Rate and Rhythm: Regular rhythm. Bradycardia present. Pulses: Normal pulses. Heart sounds: S1 normal and S2 normal. Murmur heard. High-pitched blowing holosystolic murmur is present with a grade of 3/6 at the apex. No gallop. Pulmonary:      Effort: Pulmonary effort is normal.      Breath sounds: Normal breath sounds. No wheezing or rales. Abdominal:      General: Bowel sounds are normal.      Palpations: Abdomen is soft. Tenderness: There is no abdominal tenderness. Musculoskeletal:         General: No swelling or deformity. Cervical back: Neck supple. Skin:     General: Skin is warm and dry. Neurological:      General: No focal deficit present. Mental Status: She is alert and oriented to person, place, and time. Psychiatric:         Mood and Affect: Mood normal.         Behavior: Behavior normal.         ASSESSMENT and PLAN    Coronary artery disease. The patient had a 85-90% stenosis of her mid left circumflex extending into OM1 diagnosed by cardiac catheterization in February 2014.  She underwent scheduled PCI In July 2014, with a Resolute drug-eluting stent. Her symptoms improved following the intervention. I suspect she did have a small infarction of the inferolateral region in January 2014. Patient last underwent a follow-up pharmacologic nuclear stress test in November 2020. This demonstrated a small area of infarction of the inferolateral wall with an area of hypokinesis, EF 55%. She remains on an aspirin, beta-blocker and potent statin. All of which I would continue. No new symptoms concerning for angina. Mitral regurgitation. This was moderate to severe on her most recent echocardiogram recently completed at the end of October 2021. This may have been slightly worse than her previous study from a year ago. There was no clear-cut mitral valve prolapse on her most recent study. I previously recommended further evaluation of her mitral valve pathology with a transesophageal echocardiogram.  Patient does not wish to undergo a TARUN unless her symptoms significantly worsen. She is agreeable to an annual transthoracic echocardiogram.  This will be scheduled prior to her next visit. Dyslipidemia. Patient is now being managed with both rosuvastatin and ezetimibe. This is being followed by her PCP. I prefer her LDL to be less than 70 if possible. Follow-up in 6 months, sooner if needed.

## 2022-05-27 ENCOUNTER — OFFICE VISIT (OUTPATIENT)
Dept: INTERNAL MEDICINE CLINIC | Age: 72
End: 2022-05-27
Payer: MEDICARE

## 2022-05-27 ENCOUNTER — HOSPITAL ENCOUNTER (OUTPATIENT)
Dept: LAB | Age: 72
Discharge: HOME | End: 2022-05-27
Payer: MEDICARE

## 2022-05-27 ENCOUNTER — NURSE TRIAGE (OUTPATIENT)
Dept: OTHER | Facility: CLINIC | Age: 72
End: 2022-05-27

## 2022-05-27 VITALS
WEIGHT: 201 LBS | HEART RATE: 63 BPM | HEIGHT: 62 IN | BODY MASS INDEX: 36.99 KG/M2 | TEMPERATURE: 97 F | OXYGEN SATURATION: 97 % | SYSTOLIC BLOOD PRESSURE: 118 MMHG | DIASTOLIC BLOOD PRESSURE: 51 MMHG | RESPIRATION RATE: 18 BRPM

## 2022-05-27 DIAGNOSIS — R10.30 LOWER ABDOMINAL PAIN: ICD-10-CM

## 2022-05-27 DIAGNOSIS — R10.30 LOWER ABDOMINAL PAIN: Primary | ICD-10-CM

## 2022-05-27 LAB
BASOPHILS # BLD: 0.1 K/UL (ref 0–0.1)
BASOPHILS NFR BLD: 1 % (ref 0–2)
BILIRUB UR QL STRIP: NORMAL
DIFFERENTIAL METHOD BLD: ABNORMAL
EOSINOPHIL # BLD: 0.4 K/UL (ref 0–0.4)
EOSINOPHIL NFR BLD: 3 % (ref 0–5)
ERYTHROCYTE [DISTWIDTH] IN BLOOD BY AUTOMATED COUNT: 13 % (ref 11.6–14.5)
GLUCOSE UR-MCNC: NEGATIVE MG/DL
HCT VFR BLD AUTO: 42.5 % (ref 35–45)
HGB BLD-MCNC: 13.6 G/DL (ref 12–16)
IMM GRANULOCYTES # BLD AUTO: 0 K/UL (ref 0–0.04)
IMM GRANULOCYTES NFR BLD AUTO: 0 % (ref 0–0.5)
KETONES P FAST UR STRIP-MCNC: NORMAL MG/DL
LYMPHOCYTES # BLD: 2.3 K/UL (ref 0.9–3.6)
LYMPHOCYTES NFR BLD: 20 % (ref 21–52)
MCH RBC QN AUTO: 28.5 PG (ref 24–34)
MCHC RBC AUTO-ENTMCNC: 32 G/DL (ref 31–37)
MCV RBC AUTO: 89.1 FL (ref 78–100)
MONOCYTES # BLD: 0.8 K/UL (ref 0.05–1.2)
MONOCYTES NFR BLD: 7 % (ref 3–10)
NEUTS SEG # BLD: 8.3 K/UL (ref 1.8–8)
NEUTS SEG NFR BLD: 70 % (ref 40–73)
NRBC # BLD: 0 K/UL (ref 0–0.01)
NRBC BLD-RTO: 0 PER 100 WBC
PH UR STRIP: 5.5 [PH] (ref 4.6–8)
PLATELET # BLD AUTO: 240 K/UL (ref 135–420)
PMV BLD AUTO: 10.9 FL (ref 9.2–11.8)
PROT UR QL STRIP: NORMAL
RBC # BLD AUTO: 4.77 M/UL (ref 4.2–5.3)
SP GR UR STRIP: 1.03 (ref 1–1.03)
UA UROBILINOGEN AMB POC: NORMAL (ref 0.2–1)
URINALYSIS CLARITY POC: CLEAR
URINALYSIS COLOR POC: YELLOW
URINE BLOOD POC: NEGATIVE
URINE LEUKOCYTES POC: NEGATIVE
URINE NITRITES POC: NEGATIVE
WBC # BLD AUTO: 12 K/UL (ref 4.6–13.2)

## 2022-05-27 PROCEDURE — 81003 URINALYSIS AUTO W/O SCOPE: CPT | Performed by: INTERNAL MEDICINE

## 2022-05-27 PROCEDURE — G8427 DOCREV CUR MEDS BY ELIG CLIN: HCPCS | Performed by: INTERNAL MEDICINE

## 2022-05-27 PROCEDURE — 99214 OFFICE O/P EST MOD 30 MIN: CPT | Performed by: INTERNAL MEDICINE

## 2022-05-27 PROCEDURE — 85025 COMPLETE CBC W/AUTO DIFF WBC: CPT

## 2022-05-27 PROCEDURE — 1101F PT FALLS ASSESS-DOCD LE1/YR: CPT | Performed by: INTERNAL MEDICINE

## 2022-05-27 PROCEDURE — 1090F PRES/ABSN URINE INCON ASSESS: CPT | Performed by: INTERNAL MEDICINE

## 2022-05-27 PROCEDURE — G0463 HOSPITAL OUTPT CLINIC VISIT: HCPCS | Performed by: INTERNAL MEDICINE

## 2022-05-27 PROCEDURE — 1123F ACP DISCUSS/DSCN MKR DOCD: CPT | Performed by: INTERNAL MEDICINE

## 2022-05-27 PROCEDURE — G9899 SCRN MAM PERF RSLTS DOC: HCPCS | Performed by: INTERNAL MEDICINE

## 2022-05-27 PROCEDURE — G8510 SCR DEP NEG, NO PLAN REQD: HCPCS | Performed by: INTERNAL MEDICINE

## 2022-05-27 PROCEDURE — G8536 NO DOC ELDER MAL SCRN: HCPCS | Performed by: INTERNAL MEDICINE

## 2022-05-27 PROCEDURE — G8417 CALC BMI ABV UP PARAM F/U: HCPCS | Performed by: INTERNAL MEDICINE

## 2022-05-27 PROCEDURE — 3017F COLORECTAL CA SCREEN DOC REV: CPT | Performed by: INTERNAL MEDICINE

## 2022-05-27 RX ORDER — METRONIDAZOLE 500 MG/1
500 TABLET ORAL 2 TIMES DAILY
Qty: 14 TABLET | Refills: 0 | Status: SHIPPED | OUTPATIENT
Start: 2022-05-27 | End: 2022-06-03

## 2022-05-27 RX ORDER — CIPROFLOXACIN 250 MG/1
250 TABLET, FILM COATED ORAL EVERY 12 HOURS
Qty: 14 TABLET | Refills: 0 | Status: SHIPPED | OUTPATIENT
Start: 2022-05-27 | End: 2022-06-03

## 2022-05-27 NOTE — PROGRESS NOTES
Desi Woodruff presents today for   Chief Complaint   Patient presents with    Abdominal Pain       Is someone accompanying this pt? no    Is the patient using any DME equipment during OV? no    Depression Screening:  3 most recent PHQ Screens 5/27/2022   PHQ Not Done -   Little interest or pleasure in doing things Not at all   Feeling down, depressed, irritable, or hopeless Not at all   Total Score PHQ 2 0       Learning Assessment:  Learning Assessment 5/12/2022   PRIMARY LEARNER Patient   HIGHEST LEVEL OF EDUCATION - PRIMARY LEARNER  -   BARRIERS PRIMARY LEARNER -   [facility]    NEED -   LEARNER PREFERENCE PRIMARY DEMONSTRATION   LEARNING SPECIAL TOPICS -   ANSWERED BY patient   RELATIONSHIP SELF       Abuse Screening:  Abuse Screening Questionnaire 5/27/2022   Do you ever feel afraid of your partner? N   Are you in a relationship with someone who physically or mentally threatens you? N   Is it safe for you to go home? Y       Fall Risk  Fall Risk Assessment, last 12 mths 5/27/2022   Able to walk? Yes   Fall in past 12 months? 0   Do you feel unsteady?  0   Are you worried about falling 0       ADL  ADL Assessment 4/5/2021   Feeding yourself No Help Needed   Getting from bed to chair No Help Needed   Getting dressed No Help Needed   Bathing or showering No Help Needed   Walk across the room (includes cane/walker) No Help Needed   Using the telphone No Help Needed   Taking your medications No Help Needed   Preparing meals No Help Needed   Managing money (expenses/bills) No Help Needed   Moderately strenuous housework (laundry) No Help Needed   Shopping for personal items (toiletries/medicines) No Help Needed   Shopping for groceries No Help Needed   Driving No Help Needed   Climbing a flight of stairs No Help Needed   Getting to places beyond walking distances No Help Needed       Health Maintenance reviewed and discussed and ordered per Provider. Health Maintenance Due   Topic Date Due    DTaP/Tdap/Td series (1 - Tdap) Never done    Shingrix Vaccine Age 50> (1 of 2) Never done    Pneumococcal 65+ years (1 - PCV) Never done    COVID-19 Vaccine (3 - Booster for Moderna series) 08/26/2021   .  1. \"Have you been to the ER, urgent care clinic since your last visit? Hospitalized since your last visit? \" No    2. \"Have you seen or consulted any other health care providers outside of the [address] [place] [address] since your last visit? \" No     3. For patients aged 39-70: Has the patient had a colonoscopy / FIT/ Cologuard? No      If the patient is female:    4. For patients aged 41-77: Has the patient had a mammogram within the past 2 years? Yes - no Care Gap present      5. For patients aged 21-65: Has the patient had a pap smear?  NA - based on age or sex

## 2022-05-27 NOTE — TELEPHONE ENCOUNTER
Received call from Blaise at [facility] with [employer] Complaint. Subjective: Caller states \"I am having cramps below my stomach across my middle. \"     Current Symptoms: Abdominal pain- cramping below belly button. Onset: Wednesday afternoon    Associated Symptoms: Rectal pain with bowel movements. Pain Severity: 7 or 8/10- intermittent    Temperature: Denies    What has been tried: Tylenol    Recommended disposition: See in Office Today    Care advice provided, patient verbalizes understanding; denies any other questions or concerns; instructed to call back for any new or worsening symptoms. Patient/Caller agrees with recommended disposition; writer provided warm transfer to Kearny Math at [facility] for appointment scheduling    Attention Provider: Thank you for allowing me to participate in the care of your patient. The patient was connected to triage in response to information provided to the [facility]. Please do not respond through this encounter as the response is not directed to a shared pool.     Reason for Disposition   MODERATE pain (e.g., interferes with normal activities that comes and goes (cramps) lasts > 24 hours (Exception: pain with Vomiting or Diarrhea - see that Protocol)    Protocols used: ABDOMINAL PAIN - [facility] - DENNIS HENSON

## 2022-05-28 ENCOUNTER — TELEPHONE (OUTPATIENT)
Dept: INTERNAL MEDICINE CLINIC | Age: 72
End: 2022-05-28

## 2022-05-28 NOTE — TELEPHONE ENCOUNTER
Let her know her white blood cell count was mildly elevated, consistent with some sort of infection. Make sure she is feeling better with the antibiotics, let me know if not.

## 2022-05-28 NOTE — PROGRESS NOTES
PEARL Ambrose is here with a few days of lower abdominal pain. She has had more frequent bowel movements, thinner stools on questioning. She has occasionally been a little nauseous, but has not vomited. She believes she has had bilateral oophorectomies. She believes she still has her appendix. On chart review, she did had diverticulosis on the CT scan, and mentioned on a colonoscopy. She did eat corn on the cob 1 or 2 days ago, but the pain had started before that. Past Medical History:   Diagnosis Date    Age-related osteoporosis without current pathological fracture 4/5/2016    Arthritis     Benign positional vertigo 4/2/2021    CAD (coronary artery disease), native coronary artery 7/2014    ULISES to LCx    Cardiac echocardiogram 04/05/2016    EF 55-60%. No WMA. Gr 1 DDfx. Mod MR.  Cardiac nuclear imaging test, abnormal 02/05/2014    Mod-sized, mild-mod mid lateral infarction w/mild-mod rosa isela-infarct ischemia. Mod lateral hypk. EF 56%. Normal EKG on pharm stress test.  Intermediate risk.  Chronic cough 4/2/2021    Chronic rhinitis 4/2/2021    Dyslipidemia     Fatty pancreas 4/5/2016    On imaging    Hernia     Nephrolithiasis         Past Surgical History:   Procedure Laterality Date    HX CHOLECYSTECTOMY      HX CORONARY STENT PLACEMENT  07/07/2014    HX HEART CATHETERIZATION      HX HERNIA REPAIR      HX LYSIS OF ADHESIONS      HX OOPHORECTOMY Right 12/05/2018    HX OTHER SURGICAL  11/2018    repair of anal fissure    MT REMOVAL OF ANAL FISSURE  11/02/2018    REMOVAL OF KIDNEY STONE  12/28/2018        Current Outpatient Medications   Medication Sig Dispense Refill    ciprofloxacin HCl (CIPRO) 250 mg tablet Take 1 Tablet by mouth every twelve (12) hours for 7 days. 14 Tablet 0    metroNIDAZOLE (FLAGYL) 500 mg tablet Take 1 Tablet by mouth two (2) times a day for 7 days. 14 Tablet 0    rosuvastatin (CRESTOR) 40 mg tablet Take 1 Tablet by mouth daily.  80 Tablet 3    traMADoL (ULTRAM) 50 mg tablet Take 1 Tablet by mouth every six (6) hours as needed for Pain. Max Daily Amount: 200 mg. 90 Tablet 2    dicyclomine (BENTYL) 10 mg capsule Take 1 Capsule by mouth four (4) times daily as needed for Abdominal Cramps. 90 Capsule 5    metoprolol succinate (TOPROL-XL) 50 mg XL tablet TAKE 1 TABLET BY MOUTH DAILY 90 Tablet 3    montelukast (Singulair) 10 mg tablet Take 1 Tablet by mouth daily. 90 Tablet 3    ezetimibe (ZETIA) 10 mg tablet Take 1 Tablet by mouth daily. 90 Tablet 2    fluticasone propionate (FLONASE) 50 mcg/actuation nasal spray 2 sprays in each nostril daily 3 Each 3    pantoprazole (PROTONIX) 40 mg tablet Take 1 Tablet by mouth daily. 90 Tablet 2    diclofenac (VOLTAREN) 1 % gel Apply  to affected area every six (6) hours. 500 g 2    acetaminophen (Tylenol Arthritis Pain) 650 mg TbER Take 650 mg by mouth every eight (8) hours.  guaiFENesin ER (Mucinex) 600 mg ER tablet Take 600 mg by mouth two (2) times a day.  cholecalciferol, vitamin D3, (VITAMIN D3) 2,000 unit tab Take  by mouth daily.  folic acid/multivit-min/lutein (CENTRUM SILVER PO) Take  by mouth.  aspirin delayed-release 81 mg tablet Take 162 mg by mouth daily.           Allergies   Allergen Reactions    Celebrex [Celecoxib] Swelling        Social History     Socioeconomic History    Marital status:      Spouse name: Not on file    Number of children: Not on file    Years of education: Not on file    Highest education level: Not on file   Occupational History    Not on file   Tobacco Use    Smoking status: Never Smoker    Smokeless tobacco: Never Used   Vaping Use    Vaping Use: Never used   Substance and Sexual Activity    Alcohol use: No    Drug use: No    Sexual activity: Yes     Partners: Male     Birth control/protection: None   Other Topics Concern    Not on file   Social History Narrative    Not on file     Social Determinants of Health Financial Resource Strain:     Difficulty of Paying Living Expenses: Not on file   Food Insecurity:     Worried About Running Out of Food in the Last Year: Not on file    Jerry of Food in the Last Year: Not on file   Transportation Needs:     Lack of Transportation (Medical): Not on file    Lack of Transportation (Non-Medical): Not on file   Physical Activity:     Days of Exercise per Week: Not on file    Minutes of Exercise per Session: Not on file   Stress:     Feeling of Stress : Not on file   Social Connections:     Frequency of Communication with Friends and Family: Not on file    Frequency of Social Gatherings with Friends and Family: Not on file    Attends Sabianism Services: Not on file    Active Member of [address] [place] [address] [employer] or Organizations: Not on file    Attends Club or Organization Meetings: Not on file    Marital Status: Not on file   Intimate Partner Violence:     Fear of Current or Ex-Partner: Not on file    Emotionally Abused: Not on file    Physically Abused: Not on file    Sexually Abused: Not on file   Housing Stability:     Unable to Pay for Housing in the Last Year: Not on file    Number of [place] in the Last Year: Not on file    Unstable Housing in the Last Year: Not on file        Family History   Problem Relation Age of Onset    Hypertension Mother     Atrial Fibrillation Mother     Cancer Father         colon, breast, throat,  at 72    Heart Disease Maternal Grandmother     Diabetes Paternal Grandmother            Visit Vitals  BP (!) 118/51   Pulse 63   Temp 97 °F (36.1 °C) (Temporal)   Resp 18   Ht 5' 2\" (1.575 m)   Wt 201 lb (91.2 kg)   SpO2 97%   BMI 36.76 kg/m²        Review of Systems   Constitutional: Negative for chills and fever. HENT:        Denies URI symptoms   Respiratory: Negative for shortness of breath. Cardiovascular: Negative for chest pain. Gastrointestinal: Negative for blood in stool and diarrhea.    Genitourinary: Negative for dysuria, flank pain and hematuria. Physical Exam  Constitutional:       General: She is not in acute distress. Appearance: She is not toxic-appearing. Comments: Weight stable   Eyes:      General: No scleral icterus. Conjunctiva/sclera: Conjunctivae normal.   Neck:      Comments: Cervical or supraclavicular lymphadenopathy. Cardiovascular:      Rate and Rhythm: Normal rate and regular rhythm. Pulses: Normal pulses. Heart sounds: No friction rub. No gallop. Pulmonary:      Effort: Pulmonary effort is normal.      Breath sounds: Normal breath sounds. Abdominal:      Palpations: Abdomen is soft. Tenderness: There is no right CVA tenderness or left CVA tenderness. Comments: Positive for tenderness in suprapubic region, no rebound, no guarding, no bruit or pulsatile mass. Bowel sounds are normal.   Musculoskeletal:      Cervical back: Neck supple. Skin:     General: Skin is warm and dry. Neurological:      Mental Status: She is alert and oriented to person, place, and time. Psychiatric:         Mood and Affect: Mood normal.                  Diagnoses and all orders for this visit:    1. Lower abdominal pain  Comments:  Urine clear, DDx incl diverticulitis, appendicitis, GYN etiology. Check CBC, empiric Cipro and Flagyl d. Has tramadol for pain, seek care ASAP ig worse/change  Orders:  -     CBC WITH AUTOMATED DIFF; Future  -     AMB POC URINALYSIS DIP STICK AUTO W/O MICRO  -     ciprofloxacin HCl (CIPRO) 250 mg tablet; Take 1 Tablet by mouth every twelve (12) hours for 7 days. -     metroNIDAZOLE (FLAGYL) 500 mg tablet; Take 1 Tablet by mouth two (2) times a day for 7 days.                   Artemio Bojorquez MD

## 2022-07-08 DIAGNOSIS — K21.9 GASTROESOPHAGEAL REFLUX DISEASE, UNSPECIFIED WHETHER ESOPHAGITIS PRESENT: ICD-10-CM

## 2022-07-11 RX ORDER — PANTOPRAZOLE SODIUM 40 MG/1
40 TABLET, DELAYED RELEASE ORAL DAILY
Qty: 90 TABLET | Refills: 2 | Status: SHIPPED | OUTPATIENT
Start: 2022-07-11

## 2022-07-11 NOTE — TELEPHONE ENCOUNTER
Last Visit: 5/27/22 with MD Malena Miles  Next Appointment: 8/5/22 with MD Malena Miles  Previous Refill Encounter(s): 11/12/21 #90 with 2 refills    Requested Prescriptions     Pending Prescriptions Disp Refills    pantoprazole (PROTONIX) 40 mg tablet 90 Tablet 2     Sig: Take 1 Tablet by mouth daily.          For [id number] in place:    Recommendation Provided To:    Intervention Detail: New Rx: 1, reason: Patient Preference   Gap Closed?:    Intervention Accepted By:   Zannie Cowden Time Spent (min): 5

## 2022-07-25 ENCOUNTER — APPOINTMENT (OUTPATIENT)
Dept: INTERNAL MEDICINE CLINIC | Age: 72
End: 2022-07-25

## 2022-07-25 ENCOUNTER — HOSPITAL ENCOUNTER (OUTPATIENT)
Dept: LAB | Age: 72
Discharge: HOME | End: 2022-07-25
Payer: MEDICARE

## 2022-07-25 DIAGNOSIS — I25.10 CORONARY ARTERY DISEASE INVOLVING NATIVE CORONARY ARTERY OF NATIVE HEART WITHOUT ANGINA PECTORIS: ICD-10-CM

## 2022-07-25 DIAGNOSIS — E78.5 DYSLIPIDEMIA: ICD-10-CM

## 2022-07-25 LAB
ALBUMIN SERPL-MCNC: 3.8 G/DL (ref 3.4–5)
ALBUMIN/GLOB SERPL: 1.1 {RATIO} (ref 0.8–1.7)
ALP SERPL-CCNC: 96 U/L (ref 45–117)
ALT SERPL-CCNC: 18 U/L (ref 13–56)
ANION GAP SERPL CALC-SCNC: 9 MMOL/L (ref 3–18)
AST SERPL-CCNC: 20 U/L (ref 10–38)
BILIRUB SERPL-MCNC: 0.6 MG/DL (ref 0.2–1)
BUN SERPL-MCNC: 14 MG/DL (ref 7–18)
BUN/CREAT SERPL: 16 (ref 12–20)
CALCIUM SERPL-MCNC: 9.5 MG/DL (ref 8.5–10.1)
CHLORIDE SERPL-SCNC: 110 MMOL/L (ref 100–111)
CHOLEST SERPL-MCNC: 132 MG/DL
CO2 SERPL-SCNC: 23 MMOL/L (ref 21–32)
CREAT SERPL-MCNC: 0.86 MG/DL (ref 0.6–1.3)
GLOBULIN SER CALC-MCNC: 3.5 G/DL (ref 2–4)
GLUCOSE SERPL-MCNC: 105 MG/DL (ref 74–99)
HDLC SERPL-MCNC: 57 MG/DL (ref 40–60)
HDLC SERPL: 2.3 {RATIO} (ref 0–5)
LDLC SERPL CALC-MCNC: 52 MG/DL (ref 0–100)
LIPID PROFILE,FLP: NORMAL
POTASSIUM SERPL-SCNC: 4.4 MMOL/L (ref 3.5–5.5)
PROT SERPL-MCNC: 7.3 G/DL (ref 6.4–8.2)
SODIUM SERPL-SCNC: 142 MMOL/L (ref 136–145)
TRIGL SERPL-MCNC: 115 MG/DL (ref ?–150)
VLDLC SERPL CALC-MCNC: 23 MG/DL

## 2022-07-25 PROCEDURE — 80061 LIPID PANEL: CPT

## 2022-07-25 PROCEDURE — 80053 COMPREHEN METABOLIC PANEL: CPT

## 2022-07-25 PROCEDURE — 36415 COLL VENOUS BLD VENIPUNCTURE: CPT

## 2022-08-05 ENCOUNTER — OFFICE VISIT (OUTPATIENT)
Dept: INTERNAL MEDICINE CLINIC | Age: 72
End: 2022-08-05
Payer: MEDICARE

## 2022-08-05 VITALS
OXYGEN SATURATION: 95 % | DIASTOLIC BLOOD PRESSURE: 45 MMHG | RESPIRATION RATE: 18 BRPM | BODY MASS INDEX: 35.88 KG/M2 | HEART RATE: 60 BPM | HEIGHT: 62 IN | TEMPERATURE: 97.9 F | WEIGHT: 195 LBS | SYSTOLIC BLOOD PRESSURE: 115 MMHG

## 2022-08-05 DIAGNOSIS — E04.1 THYROID NODULE: Primary | ICD-10-CM

## 2022-08-05 DIAGNOSIS — N39.41 URINARY INCONTINENCE, URGE: ICD-10-CM

## 2022-08-05 DIAGNOSIS — R21 RASH: ICD-10-CM

## 2022-08-05 DIAGNOSIS — K58.9 IRRITABLE BOWEL SYNDROME, UNSPECIFIED TYPE: ICD-10-CM

## 2022-08-05 DIAGNOSIS — I25.10 CORONARY ARTERY DISEASE INVOLVING NATIVE CORONARY ARTERY OF NATIVE HEART WITHOUT ANGINA PECTORIS: ICD-10-CM

## 2022-08-05 DIAGNOSIS — M19.90 ARTHRITIS: ICD-10-CM

## 2022-08-05 PROCEDURE — G0463 HOSPITAL OUTPT CLINIC VISIT: HCPCS | Performed by: INTERNAL MEDICINE

## 2022-08-05 PROCEDURE — G9899 SCRN MAM PERF RSLTS DOC: HCPCS | Performed by: INTERNAL MEDICINE

## 2022-08-05 PROCEDURE — G8427 DOCREV CUR MEDS BY ELIG CLIN: HCPCS | Performed by: INTERNAL MEDICINE

## 2022-08-05 PROCEDURE — 3017F COLORECTAL CA SCREEN DOC REV: CPT | Performed by: INTERNAL MEDICINE

## 2022-08-05 PROCEDURE — 1123F ACP DISCUSS/DSCN MKR DOCD: CPT | Performed by: INTERNAL MEDICINE

## 2022-08-05 PROCEDURE — G8510 SCR DEP NEG, NO PLAN REQD: HCPCS | Performed by: INTERNAL MEDICINE

## 2022-08-05 PROCEDURE — 1101F PT FALLS ASSESS-DOCD LE1/YR: CPT | Performed by: INTERNAL MEDICINE

## 2022-08-05 PROCEDURE — 1090F PRES/ABSN URINE INCON ASSESS: CPT | Performed by: INTERNAL MEDICINE

## 2022-08-05 PROCEDURE — G8536 NO DOC ELDER MAL SCRN: HCPCS | Performed by: INTERNAL MEDICINE

## 2022-08-05 PROCEDURE — G8417 CALC BMI ABV UP PARAM F/U: HCPCS | Performed by: INTERNAL MEDICINE

## 2022-08-05 PROCEDURE — 99214 OFFICE O/P EST MOD 30 MIN: CPT | Performed by: INTERNAL MEDICINE

## 2022-08-05 RX ORDER — EZETIMIBE 10 MG/1
10 TABLET ORAL DAILY
Qty: 90 TABLET | Refills: 3 | Status: SHIPPED | OUTPATIENT
Start: 2022-08-05

## 2022-08-05 RX ORDER — CLOTRIMAZOLE AND BETAMETHASONE DIPROPIONATE 10; .64 MG/G; MG/G
CREAM TOPICAL
Qty: 15 G | Refills: 5 | Status: SHIPPED | OUTPATIENT
Start: 2022-08-05

## 2022-08-05 RX ORDER — DICYCLOMINE HYDROCHLORIDE 10 MG/1
10 CAPSULE ORAL
Qty: 360 CAPSULE | Refills: 1 | Status: SHIPPED | OUTPATIENT
Start: 2022-08-05

## 2022-08-05 RX ORDER — TRAMADOL HYDROCHLORIDE 50 MG/1
50 TABLET ORAL
Qty: 360 TABLET | Refills: 1 | Status: SHIPPED | OUTPATIENT
Start: 2022-08-05 | End: 2023-08-05

## 2022-08-05 RX ORDER — OXYBUTYNIN CHLORIDE 5 MG/1
5 TABLET ORAL 2 TIMES DAILY
Qty: 180 TABLET | Refills: 1 | Status: SHIPPED | OUTPATIENT
Start: 2022-08-05

## 2022-08-05 RX ORDER — DICLOFENAC SODIUM 10 MG/G
GEL TOPICAL EVERY 6 HOURS
Qty: 500 G | Refills: 2 | Status: SHIPPED | OUTPATIENT
Start: 2022-08-05

## 2022-08-05 NOTE — PROGRESS NOTES
Veronica Mccoy presents today for No chief complaint on file. Is someone accompanying this pt? no    Is the patient using any DME equipment during [address]? no    Depression Screening:  3 most recent PHQ Screens 5/27/2022   PHQ Not Done -   Little interest or pleasure in doing things Not at all   Feeling down, depressed, irritable, or hopeless Not at all   Total Score PHQ 2 0       Learning Assessment:  Learning Assessment 5/12/2022   PRIMARY LEARNER Patient   HIGHEST LEVEL OF EDUCATION - PRIMARY LEARNER  -   BARRIERS PRIMARY LEARNER -   [facility]    NEED -   LEARNER PREFERENCE PRIMARY DEMONSTRATION   LEARNING SPECIAL TOPICS -   ANSWERED BY patient   RELATIONSHIP SELF       Abuse Screening:  Abuse Screening Questionnaire 5/27/2022   Do you ever feel afraid of your partner? N   Are you in a relationship with someone who physically or mentally threatens you? N   Is it safe for you to go home? Y       Fall Risk  Fall Risk Assessment, last 12 mths 5/27/2022   Able to walk? Yes   Fall in past 12 months? 0   Do you feel unsteady? 0   Are you worried about falling 0       ADL  ADL Assessment 4/5/2021   Feeding yourself No Help Needed   Getting from bed to chair No Help Needed   Getting dressed No Help Needed   Bathing or showering No Help Needed   Walk across the room (includes cane/walker) No Help Needed   Using the telphone No Help Needed   Taking your medications No Help Needed   Preparing meals No Help Needed   Managing money (expenses/bills) No Help Needed   Moderately strenuous housework (laundry) No Help Needed   Shopping for personal items (toiletries/medicines) No Help Needed   Shopping for groceries No Help Needed   Driving No Help Needed   Climbing a flight of stairs No Help Needed   Getting to places beyond walking distances No Help Needed       Health Maintenance reviewed and discussed and ordered per Provider.     Health Maintenance Due   Topic Date Due DTaP/Tdap/Td series (1 - Tdap) Never done    Shingrix Vaccine Age 50> (1 of 2) Never done    Pneumococcal 65+ years (1 - PCV) Never done    COVID-19 Vaccine (3 - Booster for Moderna series) 08/26/2021   . Coordination of Care:  1. Have you been to the ER, urgent care clinic since your last visit? Hospitalized since your last visit? no    2. Have you seen or consulted any other health care providers outside of the [address] [place] [address] since your last visit? Include any pap smears or colon screening. no    3. For patients aged 39-70: Has the patient had a colonoscopy? Yes - no Care Gap present     If the patient is female:    4. For patients aged 41-77: Has the patient had a mammogram within the past 2 years? Yes - no Care Gap present    5. For patients aged 21-65: Has the patient had a pap smear?  NA - based on age

## 2022-08-06 NOTE — PROGRESS NOTES
HPI     Kris Elena is here for routine check. She reports that a partial thyroidectomy has been recommended by endocrinology, due to the thyroid nodule being located behind her clavicle. She denies signs or symptoms of infection, easy bruising or bleeding, personal or family history of postoperative DVT or PE, and family or personal history of malignant hyperthermia. She complains of urinary urge and urinary stress incontinence. She also complains of fecal urgency, sometimes cannot make it out with having to turn back home. She avoids dairy, she did not realize the Bentyl is for fecal urgency and cramping. Past Medical History:   Diagnosis Date    Age-related osteoporosis without current pathological fracture 4/5/2016    Arthritis     Benign positional vertigo 4/2/2021    CAD (coronary artery disease), native coronary artery 7/2014    ULISES to LCx    Cardiac echocardiogram 04/05/2016    EF 55-60%. No WMA. Gr 1 DDfx. Mod MR. Cardiac nuclear imaging test, abnormal 02/05/2014    Mod-sized, mild-mod mid lateral infarction w/mild-mod rosa isela-infarct ischemia. Mod lateral hypk. EF 56%. Normal EKG on pharm stress test.  Intermediate risk. Chronic cough 4/2/2021    Chronic rhinitis 4/2/2021    Dyslipidemia     Fatty pancreas 4/5/2016    On imaging    Hernia     Nephrolithiasis         Past Surgical History:   Procedure Laterality Date    HX CHOLECYSTECTOMY      HX CORONARY STENT PLACEMENT  07/07/2014    HX HEART CATHETERIZATION      HX HERNIA REPAIR      HX LYSIS OF ADHESIONS      HX OOPHORECTOMY Right 12/05/2018    HX OTHER SURGICAL  11/2018    repair of anal fissure    [place] REMOVAL OF ANAL FISSURE  11/02/2018    REMOVAL OF KIDNEY STONE  12/28/2018        Current Outpatient Medications   Medication Sig Dispense Refill    ezetimibe (ZETIA) 10 mg tablet Take 1 Tablet by mouth in the morning.  90 Tablet 3    dicyclomine (BENTYL) 10 mg capsule Take 1 Capsule by mouth four (4) times daily as needed for Abdominal Cramps (diarrhea, fecal urgency). 360 Capsule 1    diclofenac (VOLTAREN) 1 % gel Apply  to affected area every six (6) hours. 500 g 2    clotrimazole-betamethasone (LOTRISONE) topical cream Pea- sized amoutn to affected areas BID PRN 15 g 5    oxybutynin (DITROPAN) 5 mg tablet Take 1 Tablet by mouth two (2) times a day. 180 Tablet 1    traMADoL (ULTRAM) 50 mg tablet Take 1 Tablet by mouth every six (6) hours as needed for Pain. Max Daily Amount: 200 mg. For chronic pain 360 Tablet 1    pantoprazole (PROTONIX) 40 mg tablet Take 1 Tablet by mouth daily. 90 Tablet 2    rosuvastatin (CRESTOR) 40 mg tablet Take 1 Tablet by mouth daily. 90 Tablet 3    metoprolol succinate (TOPROL-XL) 50 mg XL tablet TAKE 1 TABLET BY MOUTH DAILY 90 Tablet 3    montelukast (Singulair) 10 mg tablet Take 1 Tablet by mouth daily. 90 Tablet 3    fluticasone propionate (FLONASE) 50 mcg/actuation nasal spray 2 sprays in each nostril daily 3 Each 3    acetaminophen (TYLENOL) 650 mg TbER Take 650 mg by mouth every eight (8) hours. guaiFENesin ER (MUCINEX) 600 mg ER tablet Take 600 mg by mouth two (2) times a day. cholecalciferol, vitamin D3, 50 mcg (2,000 unit) tab Take  by mouth daily. folic acid/multivit-min/lutein (CENTRUM SILVER PO) Take  by mouth. aspirin delayed-release 81 mg tablet Take 162 mg by mouth daily.           Allergies   Allergen Reactions    Celebrex [Celecoxib] Swelling        Social History     Socioeconomic History    Marital status:      Spouse name: Not on file    Number of children: Not on file    Years of education: Not on file    Highest education level: Not on file   Occupational History    Not on file   Tobacco Use    Smoking status: Never    Smokeless tobacco: Never   Vaping Use    Vaping Use: Never used   Substance and Sexual Activity    Alcohol use: No    Drug use: No    Sexual activity: Yes     Partners: Male     Birth control/protection: None   Other Topics Concern    Not on file   Social History Narrative    Not on file     Social Determinants of Health     Financial Resource Strain: Not on file   Food Insecurity: Not on file   Transportation Needs: Not on file   Physical Activity: Not on file   Stress: Not on file   Social Connections: Not on file   Intimate Partner Violence: Not on file   Housing Stability: Not on file        Family History   Problem Relation Age of Onset    Hypertension Mother     Atrial Fibrillation Mother     Cancer Father         colon, breast, throat,  at 72    Heart Disease Maternal Grandmother     Diabetes Paternal Grandmother            Visit Vitals  BP (!) 115/45   Pulse 60   Temp 97.9 °F (36.6 °C) (Temporal)   Resp 18   Ht 5' 2\" (1.575 m)   Wt 195 lb (88.5 kg)   SpO2 95%   BMI 35.67 kg/m²        Review of Systems   Constitutional:  Negative for chills and fever. HENT:          No URI symptoms. Eyes:  Negative for blurred vision. Respiratory:  Negative for shortness of breath. Cardiovascular:  Negative for chest pain. Gastrointestinal:  Negative for blood in stool. Genitourinary:  Negative for dysuria and hematuria. Psychiatric/Behavioral:  Negative for depression. The patient is not nervous/anxious. Physical Exam  Constitutional:       General: She is not in acute distress. Appearance: She is obese. Eyes:      General: No scleral icterus. Conjunctiva/sclera: Conjunctivae normal.   Neck:      Comments: Bilateral carotid upstrokes normal to palpation. Lymph: No cervical or supraclavicular lymphadenopathy. Cardiovascular:      Rate and Rhythm: Normal rate and regular rhythm. Pulses: Normal pulses. Heart sounds: Murmur heard. No friction rub. No gallop. Pulmonary:      Effort: Pulmonary effort is normal.      Breath sounds: Normal breath sounds. Abdominal:      Palpations: Abdomen is soft. Tenderness: There is no abdominal tenderness. Musculoskeletal:      Cervical back: Neck supple.       Comments: No clubbing, cyanosis, or edema. Calves nontender no cords. Skin:     General: Skin is warm and dry. Neurological:      Mental Status: She is alert and oriented to person, place, and time. Psychiatric:         Mood and Affect: Mood normal.                Diagnoses and all orders for this visit:    1. Thyroid nodule  Comments:  Reports partial thyroidectomy is planned. Based on today's H&P, no medical contraindication to planned procedure. Return for preop closer to sgy PRN. 2. Coronary artery disease involving native coronary artery of native heart without angina pectoris  Comments:  Continue aspirin, statin, and beta-blocker. LDL at goal around 70  Orders:  -     ezetimibe (ZETIA) 10 mg tablet; Take 1 Tablet by mouth in the morning. 3. Irritable bowel syndrome, unspecified type  Comments:  Refill Bentyl, continue to try to avoid dairy. Orders:  -     dicyclomine (BENTYL) 10 mg capsule; Take 1 Capsule by mouth four (4) times daily as needed for Abdominal Cramps (diarrhea, fecal urgency). 4. Arthritis  Comments:  Refill tramadol and Voltaren gel. Orders:  -     diclofenac (VOLTAREN) 1 % gel; Apply  to affected area every six (6) hours. -     traMADoL (ULTRAM) 50 mg tablet; Take 1 Tablet by mouth every six (6) hours as needed for Pain. Max Daily Amount: 200 mg. For chronic pain    5. Rash  Comments:  Refill Lotrisone  Orders:  -     clotrimazole-betamethasone (LOTRISONE) topical cream; Pea- sized amoutn to affected areas BID PRN    6. Urinary incontinence, urge  Comments:  Denies known diagnosis of glaucoma. Oxybutynin 5 mg once or twice daily as needed watch for constipation, dry mouth. Orders:  -     oxybutynin (DITROPAN) 5 mg tablet; Take 1 Tablet by mouth two (2) times a day. Follow-up and Dispositions    Return in about 6 months (around 2/5/2023) for [facility]/ ACP.               Santos Wade MD

## 2022-09-02 ENCOUNTER — TELEPHONE (OUTPATIENT)
Dept: INTERNAL MEDICINE CLINIC | Age: 72
End: 2022-09-02

## 2022-09-02 DIAGNOSIS — J01.01 ACUTE RECURRENT MAXILLARY SINUSITIS: Primary | ICD-10-CM

## 2022-09-02 RX ORDER — CODEINE PHOSPHATE AND GUAIFENESIN 10; 100 MG/5ML; MG/5ML
5 SOLUTION ORAL
Qty: 150 ML | Refills: 0 | Status: SHIPPED | OUTPATIENT
Start: 2022-09-02 | End: 2022-09-12

## 2022-09-02 RX ORDER — AMOXICILLIN AND CLAVULANATE POTASSIUM 875; 125 MG/1; MG/1
1 TABLET, FILM COATED ORAL 2 TIMES DAILY
Qty: 20 TABLET | Refills: 0 | Status: SHIPPED | OUTPATIENT
Start: 2022-09-02 | End: 2022-09-12

## 2022-09-02 NOTE — TELEPHONE ENCOUNTER
Pt called and stated she's been fighting sinus infection for 2 weeks and would like Dr. Bela Golden to prescribe some codeine cough syrup and antibiotic. [facility]. Please and thank you.

## 2022-09-02 NOTE — TELEPHONE ENCOUNTER
Patient c/o sinus pain and pressure worsening and fatigue over the last two weeks, non productive cough and chest congestion worsening recently . Patient denies any dyspnea, fever , gi upset,  body aches, sweats, or chills. Patient tested negative for Covid yesterday at home. Patient is asking for an antibiotic and cough medication to be sent in to her pharmacy. Would you prefer the patient to be seen somewhere ? Please advise.

## 2022-09-02 NOTE — TELEPHONE ENCOUNTER
Let her know all her medication was sent to her pharmacy. Let her know that morning messages often have to wait till lunchtime to be answered, since we are seen patients.

## 2022-09-02 NOTE — TELEPHONE ENCOUNTER
Pt calling back, as she has not heard from the office. Stated Dr Hayden Hernandez knows her and knows her body and this cough congestion will get worse without treatment. Pt request please expedite.

## 2022-10-06 ENCOUNTER — TELEPHONE (OUTPATIENT)
Dept: CARDIOLOGY CLINIC | Age: 72
End: 2022-10-06

## 2022-10-06 NOTE — TELEPHONE ENCOUNTER
----- Message from Raoul Johnson MD sent at 10/5/2022  3:52 PM EDT -----  Please let the patient know that her heart function remains normal on her echocardiogram.  Her mitral valve regurgitation remains in the moderate to severe range which is unchanged from a year ago.  ----- Message -----  From: Bambi San LPN  Sent: 09/7/8278   1:22 PM EDT  To: Raoul Johnson MD    Per your note - Mitral regurgitation. This was moderate to severe on her most recent echocardiogram recently completed at the end of October 2021. This may have been slightly worse than her previous study from a year ago. There was no clear-cut mitral valve prolapse on her most recent study. I previously recommended further evaluation of her mitral valve pathology with a transesophageal echocardiogram.  Patient does not wish to undergo a TARUN unless her symptoms significantly worsen. She is agreeable to an annual transthoracic echocardiogram.  This will be scheduled prior to her next visit.

## 2022-10-06 NOTE — TELEPHONE ENCOUNTER
Patient made aware of echo results and Dr. Sweta Proctor remarks. No questions or concerns at present.

## 2022-11-10 ENCOUNTER — OFFICE VISIT (OUTPATIENT)
Dept: CARDIOLOGY CLINIC | Age: 72
End: 2022-11-10
Payer: MEDICARE

## 2022-11-10 VITALS
HEIGHT: 62 IN | SYSTOLIC BLOOD PRESSURE: 128 MMHG | BODY MASS INDEX: 36.07 KG/M2 | WEIGHT: 196 LBS | HEART RATE: 57 BPM | DIASTOLIC BLOOD PRESSURE: 72 MMHG | OXYGEN SATURATION: 96 %

## 2022-11-10 DIAGNOSIS — E66.01 SEVERE OBESITY (BMI 35.0-39.9) WITH COMORBIDITY (HCC): ICD-10-CM

## 2022-11-10 DIAGNOSIS — I25.10 CORONARY ARTERY DISEASE INVOLVING NATIVE CORONARY ARTERY OF NATIVE HEART WITHOUT ANGINA PECTORIS: ICD-10-CM

## 2022-11-10 DIAGNOSIS — E78.5 DYSLIPIDEMIA: ICD-10-CM

## 2022-11-10 DIAGNOSIS — I34.0 NON-RHEUMATIC MITRAL REGURGITATION: Primary | ICD-10-CM

## 2022-11-10 PROCEDURE — G8536 NO DOC ELDER MAL SCRN: HCPCS | Performed by: INTERNAL MEDICINE

## 2022-11-10 PROCEDURE — 99214 OFFICE O/P EST MOD 30 MIN: CPT | Performed by: INTERNAL MEDICINE

## 2022-11-10 PROCEDURE — 3017F COLORECTAL CA SCREEN DOC REV: CPT | Performed by: INTERNAL MEDICINE

## 2022-11-10 PROCEDURE — 93000 ELECTROCARDIOGRAM COMPLETE: CPT | Performed by: INTERNAL MEDICINE

## 2022-11-10 PROCEDURE — G9899 SCRN MAM PERF RSLTS DOC: HCPCS | Performed by: INTERNAL MEDICINE

## 2022-11-10 PROCEDURE — 1090F PRES/ABSN URINE INCON ASSESS: CPT | Performed by: INTERNAL MEDICINE

## 2022-11-10 PROCEDURE — 1123F ACP DISCUSS/DSCN MKR DOCD: CPT | Performed by: INTERNAL MEDICINE

## 2022-11-10 PROCEDURE — G8510 SCR DEP NEG, NO PLAN REQD: HCPCS | Performed by: INTERNAL MEDICINE

## 2022-11-10 PROCEDURE — G8427 DOCREV CUR MEDS BY ELIG CLIN: HCPCS | Performed by: INTERNAL MEDICINE

## 2022-11-10 PROCEDURE — G8417 CALC BMI ABV UP PARAM F/U: HCPCS | Performed by: INTERNAL MEDICINE

## 2022-11-10 PROCEDURE — 1101F PT FALLS ASSESS-DOCD LE1/YR: CPT | Performed by: INTERNAL MEDICINE

## 2022-11-10 NOTE — PROGRESS NOTES
Girma Lugo presents today for   Chief Complaint   Patient presents with    Follow-up     6 month       Girma Lugo preferred language for health care discussion is english/other. Is someone accompanying this pt? no    Is the patient using any DME equipment during [address]? no    Depression Screening:  3 most recent PHQ Screens 11/10/2022   PHQ Not Done -   Little interest or pleasure in doing things Not at all   Feeling down, depressed, irritable, or hopeless Not at all   Total Score PHQ 2 0       Learning Assessment:  Learning Assessment 11/10/2022   PRIMARY LEARNER Patient   HIGHEST LEVEL OF EDUCATION - PRIMARY LEARNER  -   BARRIERS PRIMARY LEARNER -   [address]  CAREGIVER -   PRIMARY LANGUAGE ENGLISH    NEED -   LEARNER PREFERENCE PRIMARY DEMONSTRATION   LEARNING SPECIAL TOPICS -   ANSWERED BY patient   RELATIONSHIP SELF       Abuse Screening:  Abuse Screening Questionnaire 11/10/2022   Do you ever feel afraid of your partner? N   Are you in a relationship with someone who physically or mentally threatens you? N   Is it safe for you to go home? Y       Fall Risk  Fall Risk Assessment, last 12 mths 11/10/2022   Able to walk? Yes   Fall in past 12 months? 0   Do you feel unsteady? 0   Are you worried about falling 0           Pt currently taking Anticoagulant therapy? no    Pt currently taking Antiplatelet therapy ? Aspirin 81 mg daily      Coordination of Care:  1. Have you been to the ER, urgent care clinic since your last visit? Hospitalized since your last visit? no    2. Have you seen or consulted any other health care providers outside of the [address] [place] [address] since your last visit? Include any pap smears or colon screening.  no

## 2022-11-10 NOTE — PROGRESS NOTES
HISTORY OF PRESENT ILLNESS  Nazia Goode is a 74 y.o. female. Follow-up  Associated symptoms include shortness of breath. Pertinent negatives include no chest pain, no abdominal pain and no headaches. Shortness of Breath  Pertinent negatives include no fever, no headaches, no cough, no wheezing, no PND, no orthopnea, no chest pain, no vomiting, no abdominal pain, no rash, no leg swelling and no claudication. Patient presents for a follow-up office visit. The patient had an episode of fairly significant back pain in January 2014 that awoke her from sleep, it radiated around to the front of her chest up into her neck and down both arms associated with tingling and numbness. She subsequently underwent a cardiac catheterization in February 2014, which showed a high-grade stenosis of her mid left circumflex extending into obtuse marginal branch 1. She underwent scheduled percutaneous coronary intervention over her left circumflex lesion with a drug-eluting stent In July 2014. She underwent a followup echocardiogram in April 2016 which showed overall improvement of her well ejection fraction of 55-60% without mention of any regional wall motion abnormalities. Her mitral regurgitation was in the moderate range, which may have been minimally increased compared to the prior study. Patient subsequently underwent another echocardiogram and pharmacologic nuclear stress test in November 2020 to evaluate increased dyspnea on exertion. Her echocardiogram was essentially unchanged showing preserved LV function, EF 60- 63%, diastolic dysfunction, mild left atrial enlargement, posterior prolapse of her mitral valve leaflet with moderate mitral regurgitation. Her nuclear stress test showed preserved LV function with a fixed inferolateral perfusion defect and hypokinesis of the inferolateral wall consistent with her known coronary anatomy.     She just recently underwent a repeat echocardiogram in October 2022 which showed a normal LV function, EF 68% with lateral wall hypokinesis and a mildly dilated left ventricle. Her mitral regurgitation appeared to be in the moderate to severe range with an eccentric jet but no obvious mitral valve prolapse. This was unchanged compared to her prior study. She was last seen in our office 6 months ago. She was recently diagnosed with thyroid nodules and is scheduled for a partial thyroidectomy at the beginning of next year. She has not noted any significant change in her chronic exertional dyspnea. No leg swelling, orthopnea, no PND. No heart palpitations, dizzy spells, nor syncope. She still does not wish to pursue a transesophageal echocardiogram to better evaluate her mitral valve. Past Medical History:   Diagnosis Date    Age-related osteoporosis without current pathological fracture 4/5/2016    Arthritis     Benign positional vertigo 4/2/2021    CAD (coronary artery disease), native coronary artery 7/2014    ULISES to LCx    Cardiac echocardiogram 04/05/2016    EF 55-60%. No WMA. Gr 1 DDfx. Mod MR. Cardiac nuclear imaging test, abnormal 02/05/2014    Mod-sized, mild-mod mid lateral infarction w/mild-mod rosa isela-infarct ischemia. Mod lateral hypk. EF 56%. Normal EKG on pharm stress test.  Intermediate risk. Chronic cough 4/2/2021    Chronic rhinitis 4/2/2021    Dyslipidemia     Fatty pancreas 4/5/2016    On imaging    Hernia     Nephrolithiasis       Current Outpatient Medications   Medication Sig Dispense Refill    ezetimibe (ZETIA) 10 mg tablet Take 1 Tablet by mouth in the morning. 90 Tablet 3    dicyclomine (BENTYL) 10 mg capsule Take 1 Capsule by mouth four (4) times daily as needed for Abdominal Cramps (diarrhea, fecal urgency). 360 Capsule 1    diclofenac (VOLTAREN) 1 % gel Apply  to affected area every six (6) hours.  500 g 2    clotrimazole-betamethasone (LOTRISONE) topical cream Pea- sized amoutn to affected areas BID PRN 15 g 5 oxybutynin (DITROPAN) 5 mg tablet Take 1 Tablet by mouth two (2) times a day. 180 Tablet 1    traMADoL (ULTRAM) 50 mg tablet Take 1 Tablet by mouth every six (6) hours as needed for Pain. Max Daily Amount: 200 mg. For chronic pain 360 Tablet 1    pantoprazole (PROTONIX) 40 mg tablet Take 1 Tablet by mouth daily. 90 Tablet 2    rosuvastatin (CRESTOR) 40 mg tablet Take 1 Tablet by mouth daily. 90 Tablet 3    metoprolol succinate (TOPROL-XL) 50 mg XL tablet TAKE 1 TABLET BY MOUTH DAILY 90 Tablet 3    montelukast (Singulair) 10 mg tablet Take 1 Tablet by mouth daily. 90 Tablet 3    fluticasone propionate (FLONASE) 50 mcg/actuation nasal spray 2 sprays in each nostril daily 3 Each 3    acetaminophen (TYLENOL) 650 mg TbER Take 650 mg by mouth every eight (8) hours. guaiFENesin ER (MUCINEX) 600 mg ER tablet Take 600 mg by mouth two (2) times a day. cholecalciferol, vitamin D3, 50 mcg (2,000 unit) tab Take  by mouth daily. folic acid/multivit-min/lutein (CENTRUM SILVER PO) Take  by mouth. aspirin delayed-release 81 mg tablet Take 162 mg by mouth daily. Allergies   Allergen Reactions    Celebrex [Celecoxib] Swelling      Social History     Tobacco Use    Smoking status: Never    Smokeless tobacco: Never   Vaping Use    Vaping Use: Never used   Substance Use Topics    Alcohol use: No    Drug use: No       Family History   Problem Relation Age of Onset    Hypertension Mother     Atrial Fibrillation Mother     Cancer Father         colon, breast, throat,  at 72    Heart Disease Maternal Grandmother     Diabetes Paternal Grandmother           Review of Systems   Constitutional: Negative for chills, fever, malaise/fatigue and weight loss. HENT: Negative for nosebleeds. Eyes: Negative for blurred vision and double vision. Respiratory: Positive for shortness of breath. Negative for cough and wheezing.     Cardiovascular: Negative for chest pain, palpitations, orthopnea, claudication, leg swelling and PND. Gastrointestinal: Negative for abdominal pain, heartburn, nausea and vomiting. Genitourinary: Negative for dysuria and hematuria. Musculoskeletal: Negative for falls and myalgias. Skin: Negative for rash. Neurological: Negative for dizziness, focal weakness and headaches. Endo/Heme/Allergies: Does not bruise/bleed easily. Psychiatric/Behavioral: Negative for substance abuse. Visit Vitals  /72 (BP 1 Location: Left upper arm, BP Patient Position: Sitting, BP Cuff Size: Adult)   Pulse (!) 57   Ht 5' 2\" (1.575 m)   Wt 88.9 kg (196 lb)   SpO2 96%   BMI 35.85 kg/m²      Physical Exam  Constitutional:       Appearance: She is well-developed. HENT:      Head: Normocephalic and atraumatic. Eyes:      Conjunctiva/sclera: Conjunctivae normal.   Neck:      Vascular: No carotid bruit or JVD. Cardiovascular:      Rate and Rhythm: Regular rhythm. Bradycardia present. Pulses: Normal pulses. Heart sounds: S1 normal and S2 normal. Murmur heard. High-pitched blowing holosystolic murmur is present with a grade of 3/6 at the apex. No gallop. Pulmonary:      Effort: Pulmonary effort is normal.      Breath sounds: Normal breath sounds. No wheezing or rales. Abdominal:      General: Bowel sounds are normal.      Palpations: Abdomen is soft. Tenderness: There is no abdominal tenderness. Musculoskeletal:         General: No swelling or deformity. Cervical back: Neck supple. Skin:     General: Skin is warm and dry. Neurological:      General: No focal deficit present. Mental Status: She is alert and oriented to person, place, and time. Psychiatric:         Mood and Affect: Mood normal.         Behavior: Behavior normal.       EKG: Sinus bradycardia, normal axis, normal QTc interval, no ST-T wave changes concerning for ischemia. No change compared to the previous tracing.     ASSESSMENT and PLAN    Moderate but acceptable risk to undergo thyroidectomy under general anesthesia in February 2023. She can stop her aspirin briefly for 5 days if necessary. I would avoid excessive IV fluids perioperatively. Coronary artery disease. The patient had a 85-90% stenosis of her mid left circumflex extending into OM1 diagnosed by cardiac catheterization in February 2014. She underwent scheduled PCI In July 2014, with a Resolute drug-eluting stent. Her symptoms improved following the intervention. I suspect she did have a small infarction of the inferolateral region in January 2014. Patient last underwent a follow-up pharmacologic nuclear stress test in November 2020. This demonstrated a small area of infarction of the inferolateral wall with an area of hypokinesis, EF 55%. She remains on an aspirin, beta-blocker and potent statin. All of which I would continue. No new concerning symptoms for unstable angina. Mitral regurgitation. This was moderate to severe on her follow-up echocardiogram recently completed in October 2022. This was unchanged compared to her previous study. There was no clear-cut mitral valve prolapse on her most recent study. I previously recommended further evaluation of her mitral valve pathology with a transesophageal echocardiogram.  Patient does not wish to undergo a TARUN unless her symptoms significantly worsen. Dyslipidemia. Patient is now being managed with both rosuvastatin and ezetimibe. This is being followed by her PCP. I prefer her LDL to be less than 70 if possible. Follow-up in 6 months, sooner if needed.

## 2023-02-06 NOTE — PROGRESS NOTES
Veronica Villa presents today for No chief complaint on file. Is someone accompanying this pt? no    Is the patient using any DME equipment during [address]? no    Depression Screening:  3 most recent PHQ Screens 11/10/2022   PHQ Not Done -   Little interest or pleasure in doing things Not at all   Feeling down, depressed, irritable, or hopeless Not at all   Total Score PHQ 2 0       Learning Assessment:  Learning Assessment 11/10/2022   PRIMARY LEARNER Patient   HIGHEST LEVEL OF EDUCATION - PRIMARY LEARNER  -   BARRIERS PRIMARY LEARNER -   [address]  CAREGIVER -   PRIMARY LANGUAGE ENGLISH    NEED -   LEARNER PREFERENCE PRIMARY DEMONSTRATION   LEARNING SPECIAL TOPICS -   ANSWERED BY patient   RELATIONSHIP SELF       Abuse Screening:  Abuse Screening Questionnaire 11/10/2022   Do you ever feel afraid of your partner? N   Are you in a relationship with someone who physically or mentally threatens you? N   Is it safe for you to go home? Y       Fall Risk  Fall Risk Assessment, last 12 mths 11/10/2022   Able to walk? Yes   Fall in past 12 months? 0   Do you feel unsteady? 0   Are you worried about falling 0       ADL  ADL Assessment 4/5/2021   Feeding yourself No Help Needed   Getting from bed to chair No Help Needed   Getting dressed No Help Needed   Bathing or showering No Help Needed   Walk across the room (includes cane/walker) No Help Needed   Using the telphone No Help Needed   Taking your medications No Help Needed   Preparing meals No Help Needed   Managing money (expenses/bills) No Help Needed   Moderately strenuous housework (laundry) No Help Needed   Shopping for personal items (toiletries/medicines) No Help Needed   Shopping for groceries No Help Needed   Driving No Help Needed   Climbing a flight of stairs No Help Needed   Getting to places beyond walking distances No Help Needed       Health Maintenance reviewed and discussed and ordered per Provider.     Health Maintenance Due   Topic Date Due    Pneumococcal 65+ years (1 - PCV) Never done    DTaP/Tdap/Td series (1 - Tdap) Never done    Shingles Vaccine (1 of 2) Never done    COVID-19 Vaccine (3 - Booster for Moderna series) 05/21/2021    Flu Vaccine (1) Never done    Medicare Yearly Exam  02/01/2023   . Coordination of Care:  1. Have you been to the ER, urgent care clinic since your last visit? Hospitalized since your last visit? no    2. Have you seen or consulted any other health care providers outside of the [address] [place] [address] since your last visit? Include any pap smears or colon screening. no    3. For patients aged 39-70: Has the patient had a colonoscopy? Yes - no Care Gap present     If the patient is female:    4. For patients aged 41-77: Has the patient had a mammogram within the past 2 years? Yes - no Care Gap present    5. For patients aged 21-65: Has the patient had a pap smear?  NA - based on age

## 2023-02-07 ENCOUNTER — TRANSCRIBE ORDER (OUTPATIENT)
Dept: REGISTRATION | Age: 73
End: 2023-02-07

## 2023-02-07 ENCOUNTER — HOSPITAL ENCOUNTER (OUTPATIENT)
Dept: LAB | Age: 73
Discharge: HOME | End: 2023-02-07
Payer: MEDICARE

## 2023-02-07 ENCOUNTER — OFFICE VISIT (OUTPATIENT)
Dept: INTERNAL MEDICINE CLINIC | Age: 73
End: 2023-02-07
Payer: MEDICARE

## 2023-02-07 DIAGNOSIS — E78.5 DYSLIPIDEMIA: ICD-10-CM

## 2023-02-07 DIAGNOSIS — K21.9 GASTROESOPHAGEAL REFLUX DISEASE, UNSPECIFIED WHETHER ESOPHAGITIS PRESENT: ICD-10-CM

## 2023-02-07 DIAGNOSIS — Z71.89 ADVANCED DIRECTIVES, COUNSELING/DISCUSSION: Primary | ICD-10-CM

## 2023-02-07 DIAGNOSIS — R05.3 CHRONIC COUGH: ICD-10-CM

## 2023-02-07 DIAGNOSIS — J31.0 CHRONIC RHINITIS: ICD-10-CM

## 2023-02-07 DIAGNOSIS — Z01.818 OTHER SPECIFIED PRE-OPERATIVE EXAMINATION: Primary | ICD-10-CM

## 2023-02-07 DIAGNOSIS — N39.41 URINARY INCONTINENCE, URGE: ICD-10-CM

## 2023-02-07 DIAGNOSIS — C43.4 MELANOMA OF SCALP (HCC): ICD-10-CM

## 2023-02-07 DIAGNOSIS — M19.90 ARTHRITIS: ICD-10-CM

## 2023-02-07 DIAGNOSIS — I25.119 CORONARY ARTERY DISEASE INVOLVING NATIVE CORONARY ARTERY OF NATIVE HEART WITH ANGINA PECTORIS (HCC): ICD-10-CM

## 2023-02-07 DIAGNOSIS — Z00.00 MEDICARE ANNUAL WELLNESS VISIT, SUBSEQUENT: ICD-10-CM

## 2023-02-07 DIAGNOSIS — I25.10 CORONARY ARTERY DISEASE INVOLVING NATIVE CORONARY ARTERY OF NATIVE HEART WITHOUT ANGINA PECTORIS: ICD-10-CM

## 2023-02-07 DIAGNOSIS — E04.1 THYROID NODULE: ICD-10-CM

## 2023-02-07 PROBLEM — N18.30 CHRONIC RENAL DISEASE, STAGE III (HCC): Status: RESOLVED | Noted: 2022-05-12 | Resolved: 2023-02-07

## 2023-02-07 LAB
ANION GAP SERPL CALC-SCNC: 4 MMOL/L (ref 3–18)
BASOPHILS # BLD: 0.1 K/UL (ref 0–0.1)
BASOPHILS NFR BLD: 1 % (ref 0–2)
BUN SERPL-MCNC: 15 MG/DL (ref 7–18)
BUN/CREAT SERPL: 16 (ref 12–20)
CALCIUM SERPL-MCNC: 9.3 MG/DL (ref 8.5–10.1)
CHLORIDE SERPL-SCNC: 110 MMOL/L (ref 100–111)
CO2 SERPL-SCNC: 27 MMOL/L (ref 21–32)
CREAT SERPL-MCNC: 0.92 MG/DL (ref 0.6–1.3)
DIFFERENTIAL METHOD BLD: NORMAL
EOSINOPHIL # BLD: 0.4 K/UL (ref 0–0.4)
EOSINOPHIL NFR BLD: 4 % (ref 0–5)
ERYTHROCYTE [DISTWIDTH] IN BLOOD BY AUTOMATED COUNT: 12.8 % (ref 11.6–14.5)
EST. AVERAGE GLUCOSE BLD GHB EST-MCNC: 123 MG/DL
GLUCOSE SERPL-MCNC: 89 MG/DL (ref 74–99)
HBA1C MFR BLD: 5.9 % (ref 4.2–5.6)
HCT VFR BLD AUTO: 40.8 % (ref 35–45)
HGB BLD-MCNC: 13.2 G/DL (ref 12–16)
IMM GRANULOCYTES # BLD AUTO: 0 K/UL (ref 0–0.04)
IMM GRANULOCYTES NFR BLD AUTO: 0 % (ref 0–0.5)
LYMPHOCYTES # BLD: 2.6 K/UL (ref 0.9–3.6)
LYMPHOCYTES NFR BLD: 31 % (ref 21–52)
MCH RBC QN AUTO: 29.3 PG (ref 24–34)
MCHC RBC AUTO-ENTMCNC: 32.4 G/DL (ref 31–37)
MCV RBC AUTO: 90.5 FL (ref 78–100)
MONOCYTES # BLD: 0.6 K/UL (ref 0.05–1.2)
MONOCYTES NFR BLD: 7 % (ref 3–10)
NEUTS SEG # BLD: 4.8 K/UL (ref 1.8–8)
NEUTS SEG NFR BLD: 57 % (ref 40–73)
NRBC # BLD: 0 K/UL (ref 0–0.01)
NRBC BLD-RTO: 0 PER 100 WBC
PLATELET # BLD AUTO: 217 K/UL (ref 135–420)
PMV BLD AUTO: 11.5 FL (ref 9.2–11.8)
POTASSIUM SERPL-SCNC: 4.3 MMOL/L (ref 3.5–5.5)
RBC # BLD AUTO: 4.51 M/UL (ref 4.2–5.3)
SODIUM SERPL-SCNC: 141 MMOL/L (ref 136–145)
WBC # BLD AUTO: 8.4 K/UL (ref 4.6–13.2)

## 2023-02-07 PROCEDURE — G9899 SCRN MAM PERF RSLTS DOC: HCPCS | Performed by: INTERNAL MEDICINE

## 2023-02-07 PROCEDURE — 1123F ACP DISCUSS/DSCN MKR DOCD: CPT | Performed by: INTERNAL MEDICINE

## 2023-02-07 PROCEDURE — 83036 HEMOGLOBIN GLYCOSYLATED A1C: CPT

## 2023-02-07 PROCEDURE — G8510 SCR DEP NEG, NO PLAN REQD: HCPCS | Performed by: INTERNAL MEDICINE

## 2023-02-07 PROCEDURE — 1090F PRES/ABSN URINE INCON ASSESS: CPT | Performed by: INTERNAL MEDICINE

## 2023-02-07 PROCEDURE — 1101F PT FALLS ASSESS-DOCD LE1/YR: CPT | Performed by: INTERNAL MEDICINE

## 2023-02-07 PROCEDURE — G0463 HOSPITAL OUTPT CLINIC VISIT: HCPCS | Performed by: INTERNAL MEDICINE

## 2023-02-07 PROCEDURE — G8417 CALC BMI ABV UP PARAM F/U: HCPCS | Performed by: INTERNAL MEDICINE

## 2023-02-07 PROCEDURE — 36415 COLL VENOUS BLD VENIPUNCTURE: CPT

## 2023-02-07 PROCEDURE — 99497 ADVNCD CARE PLAN 30 MIN: CPT | Performed by: INTERNAL MEDICINE

## 2023-02-07 PROCEDURE — G0439 PPPS, SUBSEQ VISIT: HCPCS | Performed by: INTERNAL MEDICINE

## 2023-02-07 PROCEDURE — 93005 ELECTROCARDIOGRAM TRACING: CPT

## 2023-02-07 PROCEDURE — G8427 DOCREV CUR MEDS BY ELIG CLIN: HCPCS | Performed by: INTERNAL MEDICINE

## 2023-02-07 PROCEDURE — 85025 COMPLETE CBC W/AUTO DIFF WBC: CPT

## 2023-02-07 PROCEDURE — 80048 BASIC METABOLIC PNL TOTAL CA: CPT

## 2023-02-07 PROCEDURE — 99214 OFFICE O/P EST MOD 30 MIN: CPT | Performed by: INTERNAL MEDICINE

## 2023-02-07 PROCEDURE — G8536 NO DOC ELDER MAL SCRN: HCPCS | Performed by: INTERNAL MEDICINE

## 2023-02-07 PROCEDURE — 3017F COLORECTAL CA SCREEN DOC REV: CPT | Performed by: INTERNAL MEDICINE

## 2023-02-07 NOTE — PROGRESS NOTES
PEARL Mccord is here for Medicare wellness/ACP visit. She needs some medication prescriptions, and reports that she could not take oxybutynin for her urinary urge incontinence due to dizziness. On questioning, she also had dry mouth. She has upcoming thyroid surgery with planned intraoperative frozen section to determine if she needs a partial or complete thyroidectomy. She reports her surgeon told her that she could not cough during or after her surgery, so she is requesting a refill of her Tussionex. I reminded her that if she receives postoperative narcotics after her surgery, she may not take the Tussionex, since those are also good cough suppressants, and the combination would be excessively sedated. She voices understanding. She needs several medications refilled. Past Medical History:   Diagnosis Date    Age-related osteoporosis without current pathological fracture 4/5/2016    Arthritis     Benign positional vertigo 4/2/2021    CAD (coronary artery disease), native coronary artery 7/2014    ULISES to LCx    Cardiac echocardiogram 04/05/2016    EF 55-60%. No WMA. Gr 1 DDfx. Mod MR. Cardiac nuclear imaging test, abnormal 02/05/2014    Mod-sized, mild-mod mid lateral infarction w/mild-mod rosa isela-infarct ischemia. Mod lateral hypk. EF 56%. Normal EKG on pharm stress test.  Intermediate risk.     Chronic cough 4/2/2021    Chronic rhinitis 4/2/2021    Dyslipidemia     Fatty pancreas 4/5/2016    On imaging    Hernia     Nephrolithiasis         Past Surgical History:   Procedure Laterality Date    HX CHOLECYSTECTOMY      HX CORONARY STENT PLACEMENT  07/07/2014    HX HEART CATHETERIZATION      HX HERNIA REPAIR      HX LYSIS OF ADHESIONS      HX OOPHORECTOMY Right 12/05/2018    HX OTHER SURGICAL  11/2018    repair of anal fissure    [facility] REMOVAL OF ANAL FISSURE  11/02/2018    REMOVAL OF KIDNEY STONE  12/28/2018        Current Outpatient Medications   Medication Sig Dispense Refill HYDROcodone-chlorpheniramine (TUSSIONEX) 10-8 mg/5 mL suspension Take 5 mL by mouth every twelve (12) hours as needed for Cough for up to 30 days. Max Daily Amount: 10 mL. 240 mL 0    rosuvastatin (CRESTOR) 40 mg tablet Take 1 Tablet by mouth daily. 90 Tablet 3    diclofenac (VOLTAREN) 1 % gel Apply  to affected area every six (6) hours. 500 g 2    ezetimibe (ZETIA) 10 mg tablet Take 1 Tablet by mouth daily. 90 Tablet 3    metoprolol succinate (TOPROL-XL) 50 mg XL tablet TAKE 1 TABLET BY MOUTH DAILY 90 Tablet 3    montelukast (Singulair) 10 mg tablet Take 1 Tablet by mouth daily. 90 Tablet 3    pantoprazole (PROTONIX) 40 mg tablet Take 1 Tablet by mouth daily. 90 Tablet 3    mirabegron ER (Myrbetriq) 25 mg ER tablet Take 1 Tablet by mouth daily. 90 Tablet 1    dicyclomine (BENTYL) 10 mg capsule Take 1 Capsule by mouth four (4) times daily as needed for Abdominal Cramps (diarrhea, fecal urgency). 360 Capsule 1    clotrimazole-betamethasone (LOTRISONE) topical cream Pea- sized amoutn to affected areas BID PRN 15 g 5    oxybutynin (DITROPAN) 5 mg tablet Take 1 Tablet by mouth two (2) times a day. 180 Tablet 1    traMADoL (ULTRAM) 50 mg tablet Take 1 Tablet by mouth every six (6) hours as needed for Pain. Max Daily Amount: 200 mg. For chronic pain 360 Tablet 1    fluticasone propionate (FLONASE) 50 mcg/actuation nasal spray 2 sprays in each nostril daily 3 Each 3    acetaminophen (TYLENOL) 650 mg TbER Take 650 mg by mouth every eight (8) hours. guaiFENesin ER (MUCINEX) 600 mg ER tablet Take 600 mg by mouth two (2) times a day. cholecalciferol, vitamin D3, 50 mcg (2,000 unit) tab Take  by mouth daily. folic acid/multivit-min/lutein (CENTRUM SILVER PO) Take  by mouth. aspirin delayed-release 81 mg tablet Take 162 mg by mouth daily.           Allergies   Allergen Reactions    Celebrex [Celecoxib] Swelling        Social History     Socioeconomic History    Marital status:      Spouse name: Not on file    Number of children: Not on file    Years of education: Not on file    Highest education level: Not on file   Occupational History    Not on file   Tobacco Use    Smoking status: Never    Smokeless tobacco: Never   Vaping Use    Vaping Use: Never used   Substance and Sexual Activity    Alcohol use: No    Drug use: No    Sexual activity: Yes     Partners: Male     Birth control/protection: None   Other Topics Concern    Not on file   Social History Narrative    Not on file     Social Determinants of Health     Financial Resource Strain: Not on file   Food Insecurity: Not on file   Transportation Needs: Not on file   Physical Activity: Not on file   Stress: Not on file   Social Connections: Not on file   Intimate Partner Violence: Not on file   Housing Stability: Not on file        Family History   Problem Relation Age of Onset    Hypertension Mother     Atrial Fibrillation Mother     Cancer Father         colon, breast, throat,  at 72    Heart Disease Maternal Grandmother     Diabetes Paternal Grandmother            Visit Vitals  Temp 97.3 °F (36.3 °C)   Resp 18   Wt 197 lb (89.4 kg)   BMI 36.03 kg/m²        Review of Systems   Constitutional:  Negative for chills and fever. Eyes:  Negative for blurred vision. Respiratory:  Positive for cough. Negative for shortness of breath. Cardiovascular:  Negative for chest pain and orthopnea. Gastrointestinal:  Negative for blood in stool. Genitourinary:  Negative for hematuria. Neurological:  Negative for headaches. Psychiatric/Behavioral:  Negative for depression. The patient is not nervous/anxious. Physical Exam  Constitutional:       General: She is not in acute distress. Appearance: She is obese. HENT:      Right Ear: Tympanic membrane, ear canal and external ear normal.      Left Ear: Tympanic membrane, ear canal and external ear normal.   Eyes:      General: No scleral icterus.      Conjunctiva/sclera: Conjunctivae normal.   Neck: Comments: Carotid upstrokes normal to palpation. Lymph: No cervical, supraclavicular, or axillary lymphadenopathy. Cardiovascular:      Rate and Rhythm: Normal rate and regular rhythm. Pulses: Normal pulses. Heart sounds: Murmur heard. No friction rub. No gallop. Pulmonary:      Effort: Pulmonary effort is normal.      Breath sounds: Normal breath sounds. Abdominal:      Palpations: Abdomen is soft. Tenderness: There is no abdominal tenderness. Musculoskeletal:      Comments: No clubbing, cyanosis, or edema. Calves nontender, no cords. Skin:     General: Skin is warm and dry. Neurological:      Mental Status: She is alert and oriented to person, place, and time. Psychiatric:         Mood and Affect: Mood normal.      1.  Advance care planning visit            Reviewed end-of-life preferences and family friends to contact    2. Medicare wellness visit           Declined to put on exam breast, so breast exam not done. Attempt to obtain records of outstanding vaccinations. Non-smoker. 3. Chronic cough          Sent refill for Tussionex to prevent intraoperative and postoperative cough during thyroid surgery. Advised her not to take Tussionex is is given narcotics postoperatively, since that would be excessively sedating and duplicate therapy, because narcotics in her pain medications would also suppress her cough. Voices understanding. 4.  Coronary artery disease              Check fasting lipids shortly before next visit, LDL goal under 70    5. Melanoma of scalp          Reminded to see dermatology on a regular basis. 6.  Urinary urge incontinence                : Oxybutynin (dizziness, dry mouth, trial of Myrbetriq.                           Dedrick Richardson MD     This is the Subsequent Medicare Annual Wellness Exam, performed 12 months or more after the Initial AWV or the last Subsequent AWV    I have reviewed the patient's medical history in detail and updated the computerized patient record. Assessment/Plan   Education and counseling provided:  Are appropriate based on today's review and evaluation    1. Advanced directives, counseling/discussion  2. Melanoma of scalp ([facility] [address]  3. Coronary artery disease involving native coronary artery of native heart with angina pectoris ([facility] [address]  4. Medicare annual wellness visit, subsequent       Depression Risk Factor Screening     3 most recent PHQ Screens 11/10/2022   PHQ Not Done -   Little interest or pleasure in doing things Not at all   Feeling down, depressed, irritable, or hopeless Not at all   Total Score PHQ 2 0       Alcohol & Drug Abuse Risk Screen    Do you average more than 1 drink per night or more than 7 drinks a week:  No    On any one occasion in the past three months have you have had more than 3 drinks containing alcohol:  No       Opioid Risk: (Low risk score <55, High risk score ?55)  Opioid risk score: 6      Click here to complete the Controlled Substance Monitoring SmartForm    Last PDMP [place] as Reviewed:  Review User Review Instant Review Result   Mario Yost 2/7/2023  7:40 AM Reviewed PDMP [1]         Functional Ability and Level of Safety    Hearing: Hearing is good. Activities of Daily Living: The home contains: handrails  Patient does total self care      Ambulation: with no difficulty     Fall Risk:  Fall Risk Assessment, last 12 mths 11/10/2022   Able to walk? Yes   Fall in past 12 months? 0   Do you feel unsteady?  0   Are you worried about falling 0      Abuse Screen:  Patient is not abused       Cognitive Screening    Has your family/caregiver stated any concerns about your memory: no     Cognitive Screening: Normal - Mini Cog Test    Health Maintenance Due     Health Maintenance Due   Topic Date Due    Pneumococcal 65+ years (1 - PCV) Never done    DTaP/Tdap/Td series (1 - Tdap) Never done    Shingles Vaccine (1 of 2) Never done    COVID-19 Vaccine (3 - Booster for Lendon Fu series) 05/21/2021 Flu Vaccine (1) Never done    Medicare Yearly Exam  02/01/2023       Patient Care Team   Patient Care Team:  Luis Manuel Amaya MD as PCP - General (Internal Medicine Physician)  Luis Manuel Amaya MD as PCP - [facility] Provider  Corry Bee MD (Cardiovascular Disease Physician)  Oralia Murphy CNM (Certified Nurse Midwife)  Hari Vásquez MD as Physician (Urology)    History     Patient Active Problem List   Diagnosis Code    Dyslipidemia E78.5    Abnormal nuclear stress test R94.39    Chest pain R07.9    Nephrolithiasis N20.0    Non-rheumatic mitral regurgitation I34.0    Severe obesity (BMI 35.0-39. 9) E66.01    Chronic cough R05.3    Chronic rhinitis J31.0    Fatty pancreas K86.89    Age-related osteoporosis without current pathological fracture M81.0    CAD (coronary artery disease), native coronary artery I25.10    Irritable bowel syndrome K58.9    Chronic renal disease, stage III N18.30     Past Medical History:   Diagnosis Date    Age-related osteoporosis without current pathological fracture 4/5/2016    Arthritis     Benign positional vertigo 4/2/2021    CAD (coronary artery disease), native coronary artery 7/2014    ULISES to LCx    Cardiac echocardiogram 04/05/2016    EF 55-60%. No WMA. Gr 1 DDfx. Mod MR. Cardiac nuclear imaging test, abnormal 02/05/2014    Mod-sized, mild-mod mid lateral infarction w/mild-mod rosa isela-infarct ischemia. Mod lateral hypk. EF 56%. Normal EKG on pharm stress test.  Intermediate risk.     Chronic cough 4/2/2021    Chronic rhinitis 4/2/2021    Dyslipidemia     Fatty pancreas 4/5/2016    On imaging    Hernia     Nephrolithiasis       Past Surgical History:   Procedure Laterality Date    HX CHOLECYSTECTOMY      HX CORONARY STENT PLACEMENT  07/07/2014    HX HEART CATHETERIZATION      HX HERNIA REPAIR      HX LYSIS OF ADHESIONS      HX OOPHORECTOMY Right 12/05/2018    HX OTHER SURGICAL  11/2018    repair of anal fissure    [facility] REMOVAL OF ANAL FISSURE 2018    REMOVAL OF KIDNEY STONE  2018     Current Outpatient Medications   Medication Sig Dispense Refill    ezetimibe (ZETIA) 10 mg tablet Take 1 Tablet by mouth in the morning. 90 Tablet 3    dicyclomine (BENTYL) 10 mg capsule Take 1 Capsule by mouth four (4) times daily as needed for Abdominal Cramps (diarrhea, fecal urgency). 360 Capsule 1    diclofenac (VOLTAREN) 1 % gel Apply  to affected area every six (6) hours. 500 g 2    clotrimazole-betamethasone (LOTRISONE) topical cream Pea- sized amoutn to affected areas BID PRN 15 g 5    oxybutynin (DITROPAN) 5 mg tablet Take 1 Tablet by mouth two (2) times a day. 180 Tablet 1    traMADoL (ULTRAM) 50 mg tablet Take 1 Tablet by mouth every six (6) hours as needed for Pain. Max Daily Amount: 200 mg. For chronic pain 360 Tablet 1    pantoprazole (PROTONIX) 40 mg tablet Take 1 Tablet by mouth daily. 90 Tablet 2    rosuvastatin (CRESTOR) 40 mg tablet Take 1 Tablet by mouth daily. 90 Tablet 3    metoprolol succinate (TOPROL-XL) 50 mg XL tablet TAKE 1 TABLET BY MOUTH DAILY 90 Tablet 3    montelukast (Singulair) 10 mg tablet Take 1 Tablet by mouth daily. 90 Tablet 3    fluticasone propionate (FLONASE) 50 mcg/actuation nasal spray 2 sprays in each nostril daily 3 Each 3    acetaminophen (TYLENOL) 650 mg TbER Take 650 mg by mouth every eight (8) hours. guaiFENesin ER (MUCINEX) 600 mg ER tablet Take 600 mg by mouth two (2) times a day. cholecalciferol, vitamin D3, 50 mcg (2,000 unit) tab Take  by mouth daily. folic acid/multivit-min/lutein (CENTRUM SILVER PO) Take  by mouth. aspirin delayed-release 81 mg tablet Take 162 mg by mouth daily.        Allergies   Allergen Reactions    Celebrex [Celecoxib] Swelling       Family History   Problem Relation Age of Onset    Hypertension Mother     Atrial Fibrillation Mother     Cancer Father         colon, breast, throat,  at 72    Heart Disease Maternal Grandmother     Diabetes Paternal Grandmother Social History     Tobacco Use    Smoking status: Never    Smokeless tobacco: Never   Substance Use Topics    Alcohol use: No         Anita Casas MD

## 2023-02-07 NOTE — PATIENT INSTRUCTIONS
Medicare Wellness Visit, Female     The best way to live healthy is to have a lifestyle where you eat a well-balanced diet, exercise regularly, limit alcohol use, and quit all forms of tobacco/nicotine, if applicable. Regular preventive services are another way to keep healthy. Preventive services (vaccines, screening tests, monitoring & exams) can help personalize your care plan, which helps you manage your own care. Screening tests can find health problems at the earliest stages, when they are easiest to treat. [facility] follows the current, evidence-based guidelines published by the [facility] ([facility]) when recommending preventive services for our patients. Because we follow these guidelines, sometimes recommendations change over time as research supports it. (For example, mammograms used to be recommended annually. Even though Medicare will still pay for an annual mammogram, the newer guidelines recommend a mammogram every two years for women of average risk). Of course, you and your doctor may decide to screen more often for some diseases, based on your risk and your co-morbidities (chronic disease you are already diagnosed with). Preventive services for you include:  - Medicare offers their members a free annual wellness visit, which is time for you and your primary care provider to discuss and plan for your preventive service needs.  Take advantage of this benefit every year!    -Over the age of 72 should receive the recommended pneumonia vaccines.    -All adults should have a flu vaccine yearly.  -All adults should have a tetanus vaccine every 10 years.   -Over the age 48 should receive the shingles vaccines.        -All adults should be screened once for Hepatitis C.  -All adults age 38-68 who are overweight should have a diabetes screening test once every three years.   -Other screening tests and preventive services for persons with diabetes include: an eye exam to screen for diabetic retinopathy, a kidney function test, a foot exam, and stricter control over your cholesterol.   -Cardiovascular screening for adults with routine risk involves an electrocardiogram (ECG) at intervals determined by your doctor.     -Colorectal cancer screenings should be done for adults age 39-70 with no increased risk factors for colorectal cancer. There are a number of acceptable methods of screening for this type of cancer. Each test has its own benefits and drawbacks. Discuss with your doctor what is most appropriate for you during your annual wellness visit. The different tests include: colonoscopy (considered the best screening method), a fecal occult blood test, a fecal DNA test, and sigmoidoscopy.    -Lung cancer screening is recommended annually with a low dose CT scan for adults between age 54 and 68, who have smoked at least 30 pack years (equivalent of 1 pack per day for 30 days), and who is a current smoker or quit less than 15 years ago.    -A bone mass density test is recommended when a woman turns 65 to screen for osteoporosis. This test is only recommended one time, as a screening. Some providers will use this same test as a disease monitoring tool if you already have osteoporosis. -Breast cancer screenings are recommended every other year for women of normal risk, age 54-69.    -Cervical cancer screenings for women over age 72 are only recommended with certain risk factors.      Here is a list of your current Health Maintenance items (your personalized list of preventive services) with a due date:  Health Maintenance Due   Topic Date Due    Pneumococcal Vaccine (1 - PCV) Never done    DTaP/Tdap/Td  (1 - Tdap) Never done    Shingles Vaccine (1 of 2) Never done    COVID-19 Vaccine (3 - Booster for Wallie Salon series) 05/21/2021    Yearly Flu Vaccine (1) Never done    Annual Well Visit  02/01/2023

## 2023-02-07 NOTE — ACP (ADVANCE CARE PLANNING)
Advance Care Planning     Advance Care Planning (ACP) Physician/NP/PA Conversation      Date of Conversation: 2/7/2023  Conducted with: Patient with Decision Making Capacity    Healthcare Decision Maker:     Primary Decision Maker: Lizzie Nugent - Mother - [phone]    Secondary Decision Maker: Addi Wong - Daughter - [phone]  Click here to complete [address] including selection of the Healthcare Decision Maker Relationship (ie \"Primary\")      Today we documented Decision Maker(s) consistent with Legal Next of Kin hierarchy. Care Preferences:    Hospitalization: \"If your health worsens and it becomes clear that your chance of recovery is unlikely, what would be your preference regarding hospitalization? \"  The patient would prefer comfort-focused treatment without hospitalization. Ventilation: \"If you were unable to breathe on your own and your chance of recovery was unlikely, what would be your preference about the use of a ventilator (breathing machine) if it was available to you? \"   The patient would NOT desire the use of a ventilator. Resuscitation: \"In the event your heart stopped as a result of an underlying serious health condition, would you want attempts to be made to restart your heart, or would you prefer a natural death? \"   Yes, attempt to resuscitate.     Additional topics discussed: artificial nutrition and end of life care preferences (vegetative state/imminent death)    Conversation Outcomes / Follow-Up Plan:   ACP complete - no further action today  Reviewed DNR/DNI and patient elects Full Code (Attempt Resuscitation)     Length of Voluntary ACP Conversation in minutes:  16 minutes    Taty Sullivan MD

## 2023-02-08 VITALS — WEIGHT: 197 LBS | TEMPERATURE: 97.3 F | RESPIRATION RATE: 18 BRPM | BODY MASS INDEX: 36.03 KG/M2

## 2023-02-08 RX ORDER — MONTELUKAST SODIUM 10 MG/1
10 TABLET ORAL DAILY
Qty: 90 TABLET | Refills: 3 | Status: SHIPPED | OUTPATIENT
Start: 2023-02-08

## 2023-02-08 RX ORDER — DICLOFENAC SODIUM 10 MG/G
GEL TOPICAL EVERY 6 HOURS
Qty: 500 G | Refills: 2 | Status: SHIPPED | OUTPATIENT
Start: 2023-02-08

## 2023-02-08 RX ORDER — ROSUVASTATIN CALCIUM 40 MG/1
40 TABLET, COATED ORAL DAILY
Qty: 90 TABLET | Refills: 3 | Status: SHIPPED | OUTPATIENT
Start: 2023-02-08

## 2023-02-08 RX ORDER — METOPROLOL SUCCINATE 50 MG/1
TABLET, EXTENDED RELEASE ORAL
Qty: 90 TABLET | Refills: 3 | Status: SHIPPED | OUTPATIENT
Start: 2023-02-08

## 2023-02-08 RX ORDER — EZETIMIBE 10 MG/1
10 TABLET ORAL DAILY
Qty: 90 TABLET | Refills: 3 | Status: SHIPPED | OUTPATIENT
Start: 2023-02-08

## 2023-02-08 RX ORDER — HYDROCODONE POLISTIREX AND CHLORPHENIRAMINE POLISTIREX 10; 8 MG/5ML; MG/5ML
5 SUSPENSION, EXTENDED RELEASE ORAL
Qty: 240 ML | Refills: 0 | Status: SHIPPED | OUTPATIENT
Start: 2023-02-08 | End: 2023-03-10

## 2023-02-08 RX ORDER — PANTOPRAZOLE SODIUM 40 MG/1
40 TABLET, DELAYED RELEASE ORAL DAILY
Qty: 90 TABLET | Refills: 3 | Status: SHIPPED | OUTPATIENT
Start: 2023-02-08

## 2023-02-09 LAB
ATRIAL RATE: 53 BPM
CALCULATED P AXIS, ECG09: 23 DEGREES
CALCULATED R AXIS, ECG10: 4 DEGREES
CALCULATED T AXIS, ECG11: 43 DEGREES
DIAGNOSIS, 93000: NORMAL
P-R INTERVAL, ECG05: 172 MS
Q-T INTERVAL, ECG07: 444 MS
QRS DURATION, ECG06: 88 MS
QTC CALCULATION (BEZET), ECG08: 416 MS
VENTRICULAR RATE, ECG03: 53 BPM

## 2023-02-14 DIAGNOSIS — M15.9 PRIMARY OSTEOARTHRITIS INVOLVING MULTIPLE JOINTS: Primary | ICD-10-CM

## 2023-02-14 RX ORDER — M-VIT,TX,IRON,MINS/CALC/FOLIC 27MG-0.4MG
1 TABLET ORAL DAILY
COMMUNITY

## 2023-02-14 RX ORDER — MONTELUKAST SODIUM 10 MG/1
10 TABLET ORAL DAILY
COMMUNITY
Start: 2022-01-31

## 2023-02-14 RX ORDER — EZETIMIBE 10 MG/1
10 TABLET ORAL DAILY
COMMUNITY
Start: 2022-08-05

## 2023-02-14 RX ORDER — OXYBUTYNIN CHLORIDE 5 MG/1
5 TABLET ORAL 2 TIMES DAILY
COMMUNITY
Start: 2022-08-05

## 2023-02-14 RX ORDER — SENNOSIDES 8.6 MG
650 CAPSULE ORAL EVERY 8 HOURS
COMMUNITY
End: 2023-02-22 | Stop reason: ALTCHOICE

## 2023-02-14 RX ORDER — ROSUVASTATIN CALCIUM 40 MG/1
40 TABLET, COATED ORAL DAILY
COMMUNITY
Start: 2022-01-31

## 2023-02-14 RX ORDER — GUAIFENESIN 600 MG/1
600 TABLET, EXTENDED RELEASE ORAL 2 TIMES DAILY
COMMUNITY
End: 2023-02-22 | Stop reason: ALTCHOICE

## 2023-02-14 RX ORDER — METOPROLOL SUCCINATE 50 MG/1
TABLET, EXTENDED RELEASE ORAL
COMMUNITY
Start: 2022-01-31

## 2023-02-14 RX ORDER — TRAMADOL HYDROCHLORIDE 50 MG/1
50 TABLET ORAL EVERY 6 HOURS PRN
COMMUNITY
Start: 2022-08-05 | End: 2023-02-15 | Stop reason: SDUPTHER

## 2023-02-14 RX ORDER — CLOTRIMAZOLE AND BETAMETHASONE DIPROPIONATE 10; .64 MG/G; MG/G
CREAM TOPICAL
COMMUNITY
Start: 2022-08-05

## 2023-02-14 RX ORDER — ASPIRIN 81 MG/1
162 TABLET ORAL DAILY
Status: ON HOLD | COMMUNITY
End: 2023-02-21 | Stop reason: HOSPADM

## 2023-02-14 RX ORDER — PANTOPRAZOLE SODIUM 40 MG/1
40 TABLET, DELAYED RELEASE ORAL DAILY
COMMUNITY
Start: 2022-07-11

## 2023-02-14 RX ORDER — FLUTICASONE PROPIONATE 50 MCG
1 SPRAY, SUSPENSION (ML) NASAL AS NEEDED
COMMUNITY
Start: 2022-01-31

## 2023-02-14 NOTE — PROGRESS NOTES
PRE-SURGICAL INSTRUCTIONS        Patient's Name:  Claudia Pope      [id number] Date:  2/14/2023            Covid Testing Date and Time:    Surgery Date:  [unfilled]                Do NOT eat or drink anything, including candy, gum, or ice chips after midnight on 2/20, unless you have specific instructions from your surgeon or anesthesia provider to do so. You may brush your teeth before coming to the hospital.  No smoking 24 hours prior to the day of surgery. No alcohol 24 hours prior to the day of surgery. No recreational drugs for one week prior to the day of surgery. Leave all valuables, including money/purse, at home. Remove all jewelry, nail polish, acrylic nails, and makeup (including mascara); no lotions powders, deodorant, or perfume/cologne/after shave on the skin. Follow instruction for Hibiclens washes and CHG wipes from surgeon's office. Glasses/contact lenses and dentures may be worn to the hospital.  They will be removed prior to surgery. Call your doctor if symptoms of a cold or illness develop within 24-48 hours prior to your surgery. 11.  If you are having an outpatient procedure, please make arrangements for a responsible ADULT TO [address] [place] [address] and stay with you for 24 hours after your surgery. 12. ONE VISITOR in the hospital at this time for outpatient procedures. Exceptions may be made for surgical admissions, per nursing unit guidelines      Special Instructions:      Bring list of CURRENT medications. Bring any pertinent legal medical records. Take these medications the morning of surgery with a sip of water:  per office        On the day of surgery, come in the main entrance of [facility] [place]. Let the  at the desk know you are there for surgery. A staff member will come escort you to the surgical area on the second floor.     If you have any questions or concerns, please do not hesitate to call:     (Prior to the day of surgery) MERY department:  [phone]   (Day of surgery) Pre-Op department:  [phone]    These surgical instructions were reviewed with the patient during the PAT phone call.

## 2023-02-15 RX ORDER — TRAMADOL HYDROCHLORIDE 50 MG/1
50 TABLET ORAL EVERY 6 HOURS PRN
Qty: 360 TABLET | Refills: 1 | Status: ON HOLD | OUTPATIENT
Start: 2023-02-15 | End: 2023-02-21 | Stop reason: HOSPADM

## 2023-02-17 ENCOUNTER — ANESTHESIA EVENT (OUTPATIENT)
Facility: HOSPITAL | Age: 73
End: 2023-02-17
Payer: MEDICARE

## 2023-02-20 ENCOUNTER — ANESTHESIA (OUTPATIENT)
Facility: HOSPITAL | Age: 73
End: 2023-02-20
Payer: MEDICARE

## 2023-02-20 ENCOUNTER — HOSPITAL ENCOUNTER (OUTPATIENT)
Facility: HOSPITAL | Age: 73
Setting detail: OBSERVATION
Discharge: HOME | End: 2023-02-21
Attending: OTOLARYNGOLOGY | Admitting: OTOLARYNGOLOGY
Payer: MEDICARE

## 2023-02-20 DIAGNOSIS — E04.1 THYROID NODULE: Primary | ICD-10-CM

## 2023-02-20 PROCEDURE — 2580000003 HC RX 258: Performed by: NURSE ANESTHETIST, CERTIFIED REGISTERED

## 2023-02-20 PROCEDURE — 2709999900 HC NON-CHARGEABLE SUPPLY: Performed by: OTOLARYNGOLOGY

## 2023-02-20 PROCEDURE — 3600000012 HC SURGERY LEVEL 2 ADDTL 15MIN: Performed by: OTOLARYNGOLOGY

## 2023-02-20 PROCEDURE — 6370000000 HC RX 637 (ALT 250 FOR IP): Performed by: OTOLARYNGOLOGY

## 2023-02-20 PROCEDURE — 88331 PATH CONSLTJ SURG 1 BLK 1SPC: CPT

## 2023-02-20 PROCEDURE — 6360000002 HC RX W HCPCS: Performed by: NURSE ANESTHETIST, CERTIFIED REGISTERED

## 2023-02-20 PROCEDURE — 7100000000 HC PACU RECOVERY - FIRST 15 MIN: Performed by: OTOLARYNGOLOGY

## 2023-02-20 PROCEDURE — 3600000002 HC SURGERY LEVEL 2 BASE: Performed by: OTOLARYNGOLOGY

## 2023-02-20 PROCEDURE — 2720000010 HC SURG SUPPLY STERILE: Performed by: OTOLARYNGOLOGY

## 2023-02-20 PROCEDURE — C1713 ANCHOR/SCREW BN/BN,TIS/BN: HCPCS | Performed by: OTOLARYNGOLOGY

## 2023-02-20 PROCEDURE — 2500000003 HC RX 250 WO HCPCS: Performed by: NURSE ANESTHETIST, CERTIFIED REGISTERED

## 2023-02-20 PROCEDURE — 3700000000 HC ANESTHESIA ATTENDED CARE: Performed by: OTOLARYNGOLOGY

## 2023-02-20 PROCEDURE — 7100000001 HC PACU RECOVERY - ADDTL 15 MIN: Performed by: OTOLARYNGOLOGY

## 2023-02-20 PROCEDURE — 88307 TISSUE EXAM BY PATHOLOGIST: CPT

## 2023-02-20 PROCEDURE — 3700000001 HC ADD 15 MINUTES (ANESTHESIA): Performed by: OTOLARYNGOLOGY

## 2023-02-20 PROCEDURE — G0378 HOSPITAL OBSERVATION PER HR: HCPCS

## 2023-02-20 PROCEDURE — 6370000000 HC RX 637 (ALT 250 FOR IP): Performed by: NURSE ANESTHETIST, CERTIFIED REGISTERED

## 2023-02-20 PROCEDURE — 2580000003 HC RX 258: Performed by: OTOLARYNGOLOGY

## 2023-02-20 RX ORDER — SODIUM CHLORIDE 0.9 % (FLUSH) 0.9 %
5-40 SYRINGE (ML) INJECTION EVERY 12 HOURS SCHEDULED
Status: DISCONTINUED | OUTPATIENT
Start: 2023-02-20 | End: 2023-02-20 | Stop reason: HOSPADM

## 2023-02-20 RX ORDER — LIDOCAINE HYDROCHLORIDE 20 MG/ML
INJECTION, SOLUTION EPIDURAL; INFILTRATION; INTRACAUDAL; PERINEURAL PRN
Status: DISCONTINUED | OUTPATIENT
Start: 2023-02-20 | End: 2023-02-20 | Stop reason: SDUPTHER

## 2023-02-20 RX ORDER — DEXAMETHASONE SODIUM PHOSPHATE 4 MG/ML
INJECTION, SOLUTION INTRA-ARTICULAR; INTRALESIONAL; INTRAMUSCULAR; INTRAVENOUS; SOFT TISSUE PRN
Status: DISCONTINUED | OUTPATIENT
Start: 2023-02-20 | End: 2023-02-20 | Stop reason: SDUPTHER

## 2023-02-20 RX ORDER — ONDANSETRON 2 MG/ML
INJECTION INTRAMUSCULAR; INTRAVENOUS PRN
Status: DISCONTINUED | OUTPATIENT
Start: 2023-02-20 | End: 2023-02-20 | Stop reason: SDUPTHER

## 2023-02-20 RX ORDER — HYDROMORPHONE HYDROCHLORIDE 1 MG/ML
0.5 INJECTION, SOLUTION INTRAMUSCULAR; INTRAVENOUS; SUBCUTANEOUS EVERY 5 MIN PRN
Status: DISCONTINUED | OUTPATIENT
Start: 2023-02-20 | End: 2023-02-20 | Stop reason: HOSPADM

## 2023-02-20 RX ORDER — EPHEDRINE SULFATE/0.9% NACL/PF 25 MG/5 ML
SYRINGE (ML) INTRAVENOUS PRN
Status: DISCONTINUED | OUTPATIENT
Start: 2023-02-20 | End: 2023-02-20 | Stop reason: SDUPTHER

## 2023-02-20 RX ORDER — GLYCOPYRROLATE 0.2 MG/ML
INJECTION INTRAMUSCULAR; INTRAVENOUS PRN
Status: DISCONTINUED | OUTPATIENT
Start: 2023-02-20 | End: 2023-02-20 | Stop reason: SDUPTHER

## 2023-02-20 RX ORDER — FENTANYL CITRATE 50 UG/ML
INJECTION, SOLUTION INTRAMUSCULAR; INTRAVENOUS PRN
Status: DISCONTINUED | OUTPATIENT
Start: 2023-02-20 | End: 2023-02-20 | Stop reason: SDUPTHER

## 2023-02-20 RX ORDER — CEPHALEXIN 250 MG/1
500 CAPSULE ORAL EVERY 8 HOURS SCHEDULED
Status: DISCONTINUED | OUTPATIENT
Start: 2023-02-20 | End: 2023-02-21 | Stop reason: HOSPADM

## 2023-02-20 RX ORDER — SODIUM CHLORIDE 9 MG/ML
INJECTION, SOLUTION INTRAVENOUS PRN
Status: DISCONTINUED | OUTPATIENT
Start: 2023-02-20 | End: 2023-02-20 | Stop reason: HOSPADM

## 2023-02-20 RX ORDER — PROPOFOL 10 MG/ML
INJECTION, EMULSION INTRAVENOUS PRN
Status: DISCONTINUED | OUTPATIENT
Start: 2023-02-20 | End: 2023-02-20 | Stop reason: SDUPTHER

## 2023-02-20 RX ORDER — HYDROMORPHONE HYDROCHLORIDE 1 MG/ML
0.5 INJECTION, SOLUTION INTRAMUSCULAR; INTRAVENOUS; SUBCUTANEOUS EVERY 4 HOURS PRN
Status: DISCONTINUED | OUTPATIENT
Start: 2023-02-20 | End: 2023-02-21 | Stop reason: HOSPADM

## 2023-02-20 RX ORDER — FAMOTIDINE 20 MG/1
20 TABLET, FILM COATED ORAL ONCE
Status: COMPLETED | OUTPATIENT
Start: 2023-02-20 | End: 2023-02-20

## 2023-02-20 RX ORDER — ROCURONIUM BROMIDE 10 MG/ML
INJECTION, SOLUTION INTRAVENOUS PRN
Status: DISCONTINUED | OUTPATIENT
Start: 2023-02-20 | End: 2023-02-20 | Stop reason: SDUPTHER

## 2023-02-20 RX ORDER — EZETIMIBE 10 MG/1
10 TABLET ORAL DAILY
Status: DISCONTINUED | OUTPATIENT
Start: 2023-02-20 | End: 2023-02-21 | Stop reason: HOSPADM

## 2023-02-20 RX ORDER — SODIUM CHLORIDE 9 MG/ML
INJECTION, SOLUTION INTRAVENOUS PRN
Status: DISCONTINUED | OUTPATIENT
Start: 2023-02-20 | End: 2023-02-21 | Stop reason: HOSPADM

## 2023-02-20 RX ORDER — HYDROCODONE BITARTRATE AND ACETAMINOPHEN 5; 325 MG/1; MG/1
1 TABLET ORAL EVERY 4 HOURS PRN
Status: DISCONTINUED | OUTPATIENT
Start: 2023-02-20 | End: 2023-02-21 | Stop reason: HOSPADM

## 2023-02-20 RX ORDER — MIDAZOLAM HYDROCHLORIDE 1 MG/ML
INJECTION INTRAMUSCULAR; INTRAVENOUS PRN
Status: DISCONTINUED | OUTPATIENT
Start: 2023-02-20 | End: 2023-02-20 | Stop reason: SDUPTHER

## 2023-02-20 RX ORDER — SODIUM CHLORIDE 0.9 % (FLUSH) 0.9 %
5-40 SYRINGE (ML) INJECTION PRN
Status: DISCONTINUED | OUTPATIENT
Start: 2023-02-20 | End: 2023-02-21 | Stop reason: HOSPADM

## 2023-02-20 RX ORDER — SODIUM CHLORIDE 0.9 % (FLUSH) 0.9 %
5-40 SYRINGE (ML) INJECTION PRN
Status: DISCONTINUED | OUTPATIENT
Start: 2023-02-20 | End: 2023-02-20 | Stop reason: HOSPADM

## 2023-02-20 RX ORDER — ROSUVASTATIN CALCIUM 20 MG/1
40 TABLET, COATED ORAL NIGHTLY
Status: DISCONTINUED | OUTPATIENT
Start: 2023-02-20 | End: 2023-02-21 | Stop reason: HOSPADM

## 2023-02-20 RX ORDER — NEOSTIGMINE METHYLSULFATE 1 MG/ML
INJECTION, SOLUTION INTRAVENOUS PRN
Status: DISCONTINUED | OUTPATIENT
Start: 2023-02-20 | End: 2023-02-20 | Stop reason: SDUPTHER

## 2023-02-20 RX ORDER — METOPROLOL SUCCINATE 50 MG/1
50 TABLET, EXTENDED RELEASE ORAL DAILY
Status: DISCONTINUED | OUTPATIENT
Start: 2023-02-21 | End: 2023-02-21 | Stop reason: HOSPADM

## 2023-02-20 RX ORDER — SODIUM CHLORIDE 450 MG/100ML
INJECTION, SOLUTION INTRAVENOUS CONTINUOUS
Status: DISCONTINUED | OUTPATIENT
Start: 2023-02-20 | End: 2023-02-21 | Stop reason: HOSPADM

## 2023-02-20 RX ORDER — SODIUM CHLORIDE, SODIUM LACTATE, POTASSIUM CHLORIDE, CALCIUM CHLORIDE 600; 310; 30; 20 MG/100ML; MG/100ML; MG/100ML; MG/100ML
INJECTION, SOLUTION INTRAVENOUS CONTINUOUS
Status: DISCONTINUED | OUTPATIENT
Start: 2023-02-20 | End: 2023-02-20 | Stop reason: HOSPADM

## 2023-02-20 RX ORDER — PANTOPRAZOLE SODIUM 40 MG/1
40 TABLET, DELAYED RELEASE ORAL
Status: DISCONTINUED | OUTPATIENT
Start: 2023-02-21 | End: 2023-02-21 | Stop reason: HOSPADM

## 2023-02-20 RX ORDER — ONDANSETRON 2 MG/ML
4 INJECTION INTRAMUSCULAR; INTRAVENOUS EVERY 8 HOURS PRN
Status: DISCONTINUED | OUTPATIENT
Start: 2023-02-20 | End: 2023-02-21 | Stop reason: HOSPADM

## 2023-02-20 RX ORDER — ONDANSETRON 2 MG/ML
4 INJECTION INTRAMUSCULAR; INTRAVENOUS
Status: DISCONTINUED | OUTPATIENT
Start: 2023-02-20 | End: 2023-02-20 | Stop reason: HOSPADM

## 2023-02-20 RX ORDER — SUCCINYLCHOLINE/SOD CL,ISO/PF 100 MG/5ML
SYRINGE (ML) INTRAVENOUS PRN
Status: DISCONTINUED | OUTPATIENT
Start: 2023-02-20 | End: 2023-02-20 | Stop reason: SDUPTHER

## 2023-02-20 RX ORDER — SODIUM CHLORIDE 0.9 % (FLUSH) 0.9 %
5-40 SYRINGE (ML) INJECTION EVERY 12 HOURS SCHEDULED
Status: DISCONTINUED | OUTPATIENT
Start: 2023-02-20 | End: 2023-02-21 | Stop reason: HOSPADM

## 2023-02-20 RX ORDER — ONDANSETRON 4 MG/1
4 TABLET, ORALLY DISINTEGRATING ORAL EVERY 8 HOURS PRN
Status: DISCONTINUED | OUTPATIENT
Start: 2023-02-20 | End: 2023-02-21 | Stop reason: HOSPADM

## 2023-02-20 RX ORDER — BACITRACIN ZINC AND POLYMYXIN B SULFATE 500; 1000 [USP'U]/G; [USP'U]/G
OINTMENT TOPICAL 2 TIMES DAILY
Status: DISCONTINUED | OUTPATIENT
Start: 2023-02-20 | End: 2023-02-21 | Stop reason: HOSPADM

## 2023-02-20 RX ORDER — MONTELUKAST SODIUM 10 MG/1
10 TABLET ORAL DAILY
Status: DISCONTINUED | OUTPATIENT
Start: 2023-02-21 | End: 2023-02-21 | Stop reason: HOSPADM

## 2023-02-20 RX ADMIN — ROCURONIUM BROMIDE 25 MG: 10 INJECTION, SOLUTION INTRAVENOUS at 11:45

## 2023-02-20 RX ADMIN — GLYCOPYRROLATE 0.4 MG: 0.2 INJECTION, SOLUTION INTRAMUSCULAR; INTRAVENOUS at 13:03

## 2023-02-20 RX ADMIN — Medication 10 MG: at 12:13

## 2023-02-20 RX ADMIN — FENTANYL CITRATE 50 MCG: 50 INJECTION INTRAMUSCULAR; INTRAVENOUS at 13:12

## 2023-02-20 RX ADMIN — PROPOFOL 150 MG: 10 INJECTION, EMULSION INTRAVENOUS at 11:40

## 2023-02-20 RX ADMIN — FENTANYL CITRATE 100 MCG: 50 INJECTION INTRAMUSCULAR; INTRAVENOUS at 13:20

## 2023-02-20 RX ADMIN — EZETIMIBE 10 MG: 10 TABLET ORAL at 17:00

## 2023-02-20 RX ADMIN — SODIUM CHLORIDE, SODIUM LACTATE, POTASSIUM CHLORIDE, AND CALCIUM CHLORIDE: 600; 310; 30; 20 INJECTION, SOLUTION INTRAVENOUS at 10:10

## 2023-02-20 RX ADMIN — SODIUM CHLORIDE, PRESERVATIVE FREE 10 ML: 5 INJECTION INTRAVENOUS at 21:32

## 2023-02-20 RX ADMIN — Medication 100 MG: at 11:40

## 2023-02-20 RX ADMIN — LIDOCAINE HYDROCHLORIDE 80 MG: 20 INJECTION, SOLUTION EPIDURAL; INFILTRATION; INTRACAUDAL; PERINEURAL at 11:40

## 2023-02-20 RX ADMIN — BACITRACIN ZINC AND POLYMYXIN B SULFATE: 500; 10000 OINTMENT TOPICAL at 21:33

## 2023-02-20 RX ADMIN — HYDROCODONE BITARTRATE AND ACETAMINOPHEN 1 TABLET: 5; 325 TABLET ORAL at 16:30

## 2023-02-20 RX ADMIN — ROCURONIUM BROMIDE 5 MG: 10 INJECTION, SOLUTION INTRAVENOUS at 11:40

## 2023-02-20 RX ADMIN — CEPHALEXIN 500 MG: 250 CAPSULE ORAL at 17:00

## 2023-02-20 RX ADMIN — DEXAMETHASONE SODIUM PHOSPHATE 8 MG: 4 INJECTION, SOLUTION INTRAMUSCULAR; INTRAVENOUS at 12:18

## 2023-02-20 RX ADMIN — FENTANYL CITRATE 50 MCG: 50 INJECTION INTRAMUSCULAR; INTRAVENOUS at 11:46

## 2023-02-20 RX ADMIN — MIDAZOLAM HYDROCHLORIDE 2 MG: 2 INJECTION, SOLUTION INTRAMUSCULAR; INTRAVENOUS at 11:33

## 2023-02-20 RX ADMIN — ROSUVASTATIN CALCIUM 40 MG: 20 TABLET, COATED ORAL at 21:28

## 2023-02-20 RX ADMIN — ONDANSETRON 4 MG: 2 INJECTION INTRAMUSCULAR; INTRAVENOUS at 12:18

## 2023-02-20 RX ADMIN — FENTANYL CITRATE 50 MCG: 50 INJECTION INTRAMUSCULAR; INTRAVENOUS at 11:40

## 2023-02-20 RX ADMIN — FAMOTIDINE 20 MG: 20 TABLET, FILM COATED ORAL at 10:10

## 2023-02-20 RX ADMIN — SODIUM CHLORIDE: 450 INJECTION, SOLUTION INTRAVENOUS at 16:30

## 2023-02-20 RX ADMIN — HYDROCODONE BITARTRATE AND ACETAMINOPHEN 1 TABLET: 5; 325 TABLET ORAL at 21:28

## 2023-02-20 RX ADMIN — NEOSTIGMINE METHYLSULFATE 3 MG: 1 INJECTION, SOLUTION INTRAVENOUS at 13:02

## 2023-02-20 RX ADMIN — SODIUM CHLORIDE, SODIUM LACTATE, POTASSIUM CHLORIDE, AND CALCIUM CHLORIDE: 600; 310; 30; 20 INJECTION, SOLUTION INTRAVENOUS at 13:20

## 2023-02-20 RX ADMIN — ROCURONIUM BROMIDE 10 MG: 10 INJECTION, SOLUTION INTRAVENOUS at 12:17

## 2023-02-20 ASSESSMENT — PAIN - FUNCTIONAL ASSESSMENT: PAIN_FUNCTIONAL_ASSESSMENT: 0-10

## 2023-02-20 ASSESSMENT — PAIN DESCRIPTION - DESCRIPTORS: DESCRIPTORS: ACHING

## 2023-02-20 ASSESSMENT — PAIN SCALES - GENERAL
PAINLEVEL_OUTOF10: 0
PAINLEVEL_OUTOF10: 5
PAINLEVEL_OUTOF10: 6
PAINLEVEL_OUTOF10: 6
PAINLEVEL_OUTOF10: 0

## 2023-02-20 ASSESSMENT — ENCOUNTER SYMPTOMS: SHORTNESS OF BREATH: 1

## 2023-02-20 ASSESSMENT — PAIN DESCRIPTION - LOCATION
LOCATION: NECK
LOCATION: HEAD

## 2023-02-20 NOTE — PERIOP NOTE
TRANSFER - OUT REPORT:    Telephone report given to Ankur Hernadez on Janine Perez  being transferred to [address] [place] [address] for routine post-op       Report consisted of patient's Situation, Background, Assessment and   Recommendations(SBAR). Information from the following report(s) ED Encounter Summary, ED SBAR, Surgery Report, and MAR was reviewed with the receiving nurse. [place] Assessment: No data recorded  Lines:   Peripheral IV 02/20/23 Left; Anterior Forearm (Active)   Site Assessment Clean, dry & intact 02/20/23 1332   Line Status Infusing 02/20/23 1332   Line Care Connections checked and tightened 02/20/23 1332   Phlebitis Assessment No symptoms 02/20/23 1332   Infiltration Assessment 0 02/20/23 1332   Alcohol Cap Used No 02/20/23 1332   Dressing Status Clean, dry & intact 02/20/23 1332   Dressing Type Transparent 02/20/23 1332   Dressing Intervention Other (Comment) 02/20/23 1332        Opportunity for questions and clarification was provided.       Patient transported with:  O2 @ 3lpm and Tech

## 2023-02-20 NOTE — PROGRESS NOTES
Postop check  Vital signs stable  Patient doing well  Drain functioning well  No evidence of hematoma  Anticipate discharge to  home in a.m.   Discussion with patient and daughter    Jono Derrick

## 2023-02-20 NOTE — OP NOTE
Operative Note      Patient: Rian Shannon  YOB: 1950  MRN: [id number]    Date of Procedure: 2/20/2023    Pre-Op Diagnosis: Thyroid nodule [E04.1]    Post-Op Diagnosis: Same       Procedure(s):  RIGHT THYROID LOBECTOMY, POSSIBLE TOTAL THYROIDECTOMY; 23HR    Surgeon(s):  Smith Rodney MD    Assistant:   Surgical Assistant: Sarah Hollis    Anesthesia: General    Estimated Blood Loss (mL): less than 50     Complications: None    Specimens:   ID Type Source Tests Collected by Time Destination   A : Right Thyroid Tissue Thyroid SURGICAL PATHOLOGY Smith Rodney MD 2/20/2023 1248        Implants:  * No implants in log *      Drains:   Closed/Suction Drain Anterior;Midline Neck Bulb (Active)       Findings: RLN identified  FS unremarkable    Detailed Description of Procedure:   See op note    Electronically signed by Smith Rodney MD on 2/20/2023 at 1:07 PM

## 2023-02-20 NOTE — PROGRESS NOTES
Patient admitted to the floor from PACU. She is awake and alert. No distress noted. ROXANNE drain patent and set low wall suction;bulb compressed. Trach set is at the bedside.

## 2023-02-20 NOTE — ANESTHESIA PRE PROCEDURE
Department of Anesthesiology  Preprocedure Note       Name:  Divine Hassan   Age:  74 y.o.  :  1950                                          MRN:  [id number]         Date:  2023      Surgeon: Tiffany Betancourt):  Sinan Earl MD    Procedure: Procedure(s):  RIGHT THYROID LOBECTOMY, POSSIBLE TOTAL THYROIDECTOMY; 23HR    Medications prior to admission:   Prior to Admission medications    Medication Sig Start Date End Date Taking? Authorizing Provider   ezetimibe (ZETIA) 10 MG tablet Take 10 mg by mouth daily 22  Yes Historical Provider, MD   metoprolol succinate (TOPROL XL) 50 MG extended release tablet TAKE 1 TABLET BY MOUTH DAILY 22  Yes Historical Provider, MD   montelukast (SINGULAIR) 10 MG tablet Take 10 mg by mouth daily 22  Yes Historical Provider, MD   oxybutynin (DITROPAN) 5 MG tablet Take 5 mg by mouth 2 times daily  Patient not taking: Reported on 2023  Yes Historical Provider, MD   pantoprazole (PROTONIX) 40 MG tablet Take 40 mg by mouth daily 22  Yes Historical Provider, MD   rosuvastatin (CRESTOR) 40 MG tablet Take 40 mg by mouth daily 22  Yes Historical Provider, MD   fluticasone (FLONASE) 50 MCG/ACT nasal spray 2 sprays in each nostril daily 22  Yes Historical Provider, MD   diclofenac sodium (VOLTAREN) 1 % GEL Apply topically every 6 hours 22  Yes Historical Provider, MD   clotrimazole-betamethasone (Luis Robes) 1-0.05 % cream Pea- sized amoutn to affected areas BID PRN 22  Yes Historical Provider, MD   Multiple Vitamins-Minerals (THERAPEUTIC MULTIVITAMIN-MINERALS) tablet Take 1 tablet by mouth daily   Yes Historical Provider, MD   traMADol (ULTRAM) 50 MG tablet Take 1 tablet by mouth every 6 hours as needed for Pain for up to 180 days.  Max Daily Amount: 200 mg 2/15/23 8/14/23  Babita Nava MD   Cholecalciferol 50 MCG ( UT) TABS Take by mouth daily    Historical Provider, MD   aspirin 81 MG EC tablet Take 162 mg by mouth daily Historical Provider, MD   guaiFENesin (MUCINEX) 600 MG extended release tablet Take 600 mg by mouth 2 times daily    Historical Provider, MD   acetaminophen (TYLENOL) 650 MG extended release tablet Take 650 mg by mouth in the morning and 650 mg at noon and 650 mg in the evening. Historical Provider, MD       Current medications:    Current Facility-Administered Medications   Medication Dose Route Frequency Provider Last Rate Last Admin    sodium chloride flush 0.9 % injection 5-40 mL  5-40 mL IntraVENous 2 times per day Eura Hillock, APRN - CRNA        sodium chloride flush 0.9 % injection 5-40 mL  5-40 mL IntraVENous PRN Eura Hillock, APRN - CRNA        0.9 % sodium chloride infusion   IntraVENous PRN Eura Hillock, APRN - CRNA        lactated ringers IV soln infusion   IntraVENous Continuous Eura Hillock, APRN - CRNA 100 mL/hr at 02/20/23 1120 NoRateChange at 02/20/23 1120       Allergies: Allergies   Allergen Reactions    Celebrex [Celecoxib] Swelling     Swelling of hands       Problem List:    Patient Active Problem List   Diagnosis Code    CAD (coronary artery disease), native coronary artery I25.10    Fatty pancreas K86.89    Irritable bowel syndrome K58.9    Dyslipidemia E78.5    Chest pain R07.9    Nephrolithiasis N20.0    Abnormal nuclear stress test R94.39    Age-related osteoporosis without current pathological fracture M81.0    Non-rheumatic mitral regurgitation I34.0    Chronic rhinitis J31.0    Chronic cough R05.3       Past Medical History:        Diagnosis Date    Abnormal myocardial perfusion study 02/05/2014    Mod-sized, mild-mod mid lateral infarction w/mild-mod ninfa-infarct ischemia. Mod lateral hypk. EF 56%. Normal EKG on pharm stress test.  Intermediate risk.     Age-related osteoporosis without current pathological fracture 4/5/2016    Arthritis     Benign positional vertigo 4/2/2021    CAD (coronary artery disease), native coronary artery 7/2014 MINERVA to LCx    Cancer [facility])     skin ca    Chronic cough 4/2/2021    Chronic rhinitis 4/2/2021    Dyslipidemia     Fatty pancreas 4/5/2016    On imaging    Hernia     History of echocardiogram 04/05/2016    EF 55-60%. No WMA. Gr 1 DDfx. Mod MR.  Nephrolithiasis     Thyroid disease        Past Surgical History:        Procedure Laterality Date    CARDIAC CATHETERIZATION      CHOLECYSTECTOMY      CORONARY ANGIOPLASTY WITH STENT PLACEMENT  07/07/2014    HERNIA REPAIR      LYSIS OF ADHESIONS      OTHER SURGICAL HISTORY  11/2018    repair of anal fissure    OVARY REMOVAL Right 12/05/2018    REMOVAL OF ANAL FISSURE  11/02/2018    REMOVAL OF KIDNEY STONE  12/28/2018       Social History:    Social History     Tobacco Use    Smoking status: Never    Smokeless tobacco: Never   Substance Use Topics    Alcohol use: No                                Counseling given: Not Answered      Vital Signs (Current):   Vitals:    02/14/23 1130 02/20/23 1015   BP:  (!) 122/59   Pulse:  52   Resp:  16   Temp:  97.9 °F (36.6 °C)   TempSrc:  Oral   SpO2:  96%   Weight: 197 lb (89.4 kg) 195 lb (88.5 kg)   Height: 5' 2\" (1.575 m)                                               BP Readings from Last 3 Encounters:   02/20/23 (!) 122/59   11/10/22 128/72   08/05/22 (!) 115/45       NPO Status: Time of last liquid consumption: 2300                        Time of last solid consumption: 2300                        Date of last liquid consumption: 02/19/23                        Date of last solid food consumption: 02/19/23    BMI:   Wt Readings from Last 3 Encounters:   02/20/23 195 lb (88.5 kg)   02/08/23 197 lb (89.4 kg)   11/10/22 196 lb (88.9 kg)     Body mass index is 35.67 kg/m².     CBC:   Lab Results   Component Value Date/Time    WBC 8.4 02/07/2023 04:37 PM    RBC 4.51 02/07/2023 04:37 PM    HGB 13.2 02/07/2023 04:37 PM    HCT 40.8 02/07/2023 04:37 PM    MCV 90.5 02/07/2023 04:37 PM    RDW 12.8 02/07/2023 04:37 PM     02/07/2023 04:37 PM       CMP:   Lab Results   Component Value Date/Time     02/07/2023 04:37 PM    K 4.3 02/07/2023 04:37 PM     02/07/2023 04:37 PM    CO2 27 02/07/2023 04:37 PM    BUN 15 02/07/2023 04:37 PM    CREATININE 0.92 02/07/2023 04:37 PM    GFRAA >60 07/25/2022 09:20 AM    AGRATIO 1.1 07/25/2022 09:20 AM    GLUCOSE 89 02/07/2023 04:37 PM    PROT 7.3 07/25/2022 09:20 AM    CALCIUM 9.3 02/07/2023 04:37 PM    BILITOT 0.6 07/25/2022 09:20 AM    ALKPHOS 96 07/25/2022 09:20 AM    AST 20 07/25/2022 09:20 AM    ALT 18 07/25/2022 09:20 AM       POC Tests: No results for input(s): POCGLU, POCNA, POCK, POCCL, POCBUN, POCHEMO, POCHCT in the last 72 hours. Coags: No results found for: PROTIME, INR, APTT    HCG (If Applicable): No results found for: PREGTESTUR, PREGSERUM, HCG, HCGQUANT     ABGs: No results found for: PHART, PO2ART, EEC2UZG, NYJ5EMB, BEART, Z1WOADGC     Type & Screen (If Applicable):  No results found for: LABABO, LABRH    Drug/Infectious Status (If Applicable):  Lab Results   Component Value Date/Time    HEPCAB 0.12 07/26/2018 07:57 AM    HEPCAB NEGATIVE 07/26/2018 07:57 AM       COVID-19 Screening (If Applicable):   Lab Results   Component Value Date/Time    COVID19 Not detected 08/24/2021 08:20 PM           Anesthesia Evaluation  Patient summary reviewed and Nursing notes reviewed   history of anesthetic complications: PONV. Airway: Mallampati: III  TM distance: <3 FB   Neck ROM: full  Mouth opening: > = 3 FB   Dental: normal exam         Pulmonary: breath sounds clear to auscultation  (+) shortness of breath: chronic and no interval change,            Patient did not smoke on day of surgery.                  Cardiovascular:  Exercise tolerance: good (>4 METS),   (+) hypertension:, CAD: obstructive, CABG/stent: no interval change, hyperlipidemia      ECG reviewed  Rhythm: regular  Rate: normal                    Neuro/Psych:   Negative Neuro/Psych ROS GI/Hepatic/Renal:   (+) GERD: well controlled,           Endo/Other:    (+) hyperthyroidism::., .          Pt had PAT visit. Abdominal:             Vascular: negative vascular ROS. Other Findings:           Anesthesia Plan      general     ASA 3       Induction: intravenous. MIPS: Postoperative opioids intended. Anesthetic plan and risks discussed with patient. Plan discussed with CRNA.     Attending anesthesiologist reviewed and agrees with Preprocedure content                Trish Castro MD   2/20/2023

## 2023-02-20 NOTE — ANESTHESIA POSTPROCEDURE EVALUATION
Department of Anesthesiology  Postprocedure Note    Patient: Claudia Pope  MRN: [id number]  YOB: 1950  Date of evaluation: 2/20/2023      Procedure Summary     Date: 02/20/23 Room / Location: [facility] MAIN 01 / [facility] MAIN OR    Anesthesia Start: 1623 Anesthesia Stop: 3092    Procedure: RIGHT THYROID LOBECTOMY,; 23HR (Right: Neck) Diagnosis:       Thyroid nodule      (Thyroid nodule [E04.1])    Surgeons: Rudy Hinojosa MD Responsible Provider: Enrique Graf MD    Anesthesia Type: General ASA Status: 3          Anesthesia Type: General    Ludin Phase I: Ludin Score: 10    Ludin Phase II:        Anesthesia Post Evaluation    Patient location during evaluation: PACU  Patient participation: complete - patient participated  Level of consciousness: awake  Airway patency: patent  Nausea & Vomiting: no nausea  Complications: no  Cardiovascular status: blood pressure returned to baseline  Respiratory status: acceptable  Hydration status: euvolemic

## 2023-02-20 NOTE — PROGRESS NOTES
conducted an initial consultation and Spiritual Assessment for Jose Knutson, who is a 74 y.o.,female. Patient's Primary Language is: [place]. According to the patient's EMR Episcopalian Affiliation is: [facility].     The reason the Patient came to the hospital is:   Patient Active Problem List    Diagnosis Date Noted    Thyroid nodule 02/20/2023    Irritable bowel syndrome 04/05/2021    Chronic rhinitis 04/02/2021    Chronic cough 04/02/2021    Non-rheumatic mitral regurgitation 05/02/2016    Fatty pancreas 04/05/2016    Age-related osteoporosis without current pathological fracture 04/05/2016    Nephrolithiasis 08/06/2015    CAD (coronary artery disease), native coronary artery 07/01/2014    Chest pain 02/07/2014    Abnormal nuclear stress test 02/07/2014    Dyslipidemia         The  provided the following Interventions:  Initiated a relationship of care and support. Listened empathically. Provided information about Spiritual Care Services. Chart reviewed. The following outcomes where achieved:   confirmed Patient's Episcopalian Affiliation. Patient expressed gratitude for 's visit. Assessment:  Patient does not have any Voodoo/cultural needs that will affect patient's preferences in health care. There are no spiritual or Voodoo issues which require intervention at this time. Plan:  Chaplains will continue to follow and will provide pastoral care on an as needed/requested basis.  recommends bedside caregivers page  on duty if patient shows signs of acute spiritual or emotional distress.     [address]   [phone]

## 2023-02-21 VITALS
TEMPERATURE: 97.2 F | RESPIRATION RATE: 18 BRPM | DIASTOLIC BLOOD PRESSURE: 69 MMHG | OXYGEN SATURATION: 94 % | HEIGHT: 62 IN | HEART RATE: 66 BPM | WEIGHT: 195 LBS | BODY MASS INDEX: 35.88 KG/M2 | SYSTOLIC BLOOD PRESSURE: 140 MMHG

## 2023-02-21 LAB — CALCIUM SERPL-MCNC: 8.9 MG/DL (ref 8.5–10.1)

## 2023-02-21 PROCEDURE — 2580000003 HC RX 258: Performed by: OTOLARYNGOLOGY

## 2023-02-21 PROCEDURE — 82310 ASSAY OF CALCIUM: CPT

## 2023-02-21 PROCEDURE — G0378 HOSPITAL OBSERVATION PER HR: HCPCS

## 2023-02-21 PROCEDURE — 6370000000 HC RX 637 (ALT 250 FOR IP): Performed by: OTOLARYNGOLOGY

## 2023-02-21 PROCEDURE — 36415 COLL VENOUS BLD VENIPUNCTURE: CPT

## 2023-02-21 RX ORDER — HYDROCODONE BITARTRATE AND ACETAMINOPHEN 5; 325 MG/1; MG/1
1 TABLET ORAL EVERY 6 HOURS PRN
Qty: 20 TABLET | Refills: 0 | Status: SHIPPED | OUTPATIENT
Start: 2023-02-21 | End: 2023-02-28

## 2023-02-21 RX ORDER — CEPHALEXIN 500 MG/1
500 CAPSULE ORAL EVERY 8 HOURS SCHEDULED
Qty: 19 CAPSULE | Refills: 0 | Status: SHIPPED | OUTPATIENT
Start: 2023-02-21 | End: 2023-02-28

## 2023-02-21 RX ADMIN — MONTELUKAST 10 MG: 10 TABLET, FILM COATED ORAL at 09:51

## 2023-02-21 RX ADMIN — CEPHALEXIN 500 MG: 250 CAPSULE ORAL at 05:28

## 2023-02-21 RX ADMIN — EZETIMIBE 10 MG: 10 TABLET ORAL at 09:50

## 2023-02-21 RX ADMIN — PANTOPRAZOLE SODIUM 40 MG: 40 TABLET, DELAYED RELEASE ORAL at 09:51

## 2023-02-21 RX ADMIN — SODIUM CHLORIDE: 450 INJECTION, SOLUTION INTRAVENOUS at 03:08

## 2023-02-21 RX ADMIN — BACITRACIN ZINC AND POLYMYXIN B SULFATE: 500; 10000 OINTMENT TOPICAL at 09:53

## 2023-02-21 RX ADMIN — HYDROCODONE BITARTRATE AND ACETAMINOPHEN 1 TABLET: 5; 325 TABLET ORAL at 01:23

## 2023-02-21 RX ADMIN — HYDROCODONE BITARTRATE AND ACETAMINOPHEN 1 TABLET: 5; 325 TABLET ORAL at 05:28

## 2023-02-21 RX ADMIN — HYDROCODONE BITARTRATE AND ACETAMINOPHEN 1 TABLET: 5; 325 TABLET ORAL at 10:01

## 2023-02-21 RX ADMIN — METOPROLOL SUCCINATE 50 MG: 50 TABLET, EXTENDED RELEASE ORAL at 09:51

## 2023-02-21 ASSESSMENT — PAIN DESCRIPTION - LOCATION
LOCATION: NECK
LOCATION: HEAD
LOCATION: NECK

## 2023-02-21 ASSESSMENT — PAIN DESCRIPTION - ORIENTATION
ORIENTATION: ANTERIOR
ORIENTATION: MID

## 2023-02-21 ASSESSMENT — PAIN - FUNCTIONAL ASSESSMENT
PAIN_FUNCTIONAL_ASSESSMENT: ACTIVITIES ARE NOT PREVENTED
PAIN_FUNCTIONAL_ASSESSMENT: ACTIVITIES ARE NOT PREVENTED

## 2023-02-21 ASSESSMENT — PAIN SCALES - GENERAL
PAINLEVEL_OUTOF10: 5
PAINLEVEL_OUTOF10: 6
PAINLEVEL_OUTOF10: 6

## 2023-02-21 ASSESSMENT — PAIN DESCRIPTION - PAIN TYPE
TYPE: ACUTE PAIN
TYPE: ACUTE PAIN

## 2023-02-21 ASSESSMENT — PAIN DESCRIPTION - DESCRIPTORS
DESCRIPTORS: ACHING

## 2023-02-21 ASSESSMENT — PAIN DESCRIPTION - FREQUENCY: FREQUENCY: CONTINUOUS

## 2023-02-21 NOTE — FLOWSHEET NOTE
Bedside shift change report given to Cher Wolf (oncoming nurse) by Laury Schrader (offgoing nurse). Report included the following information Nurse Handoff Report and MAR.

## 2023-02-21 NOTE — DISCHARGE INSTRUCTIONS
Discharge patient  ONE TIME        Comments: 1. Water precautions to wound x3 days   2. Bacitracin ointment to wound twice daily x3 days   3. Light activity   4. Soft diet   5.   Follow-up with Yvan Goodson 1 week   Ordering Provider: Kari Grossman MD

## 2023-02-22 ENCOUNTER — CARE COORDINATION (OUTPATIENT)
Facility: CLINIC | Age: 73
End: 2023-02-22

## 2023-02-22 RX ORDER — HYDROCODONE POLISTIREX AND CHLORPHENIRAMINE POLISTIREX 10; 8 MG/5ML; MG/5ML
SUSPENSION, EXTENDED RELEASE ORAL
COMMUNITY
Start: 2023-02-09

## 2023-02-22 NOTE — CARE COORDINATION
[facility] Care Transitions Initial Follow Up Call    Call within 2 business days of discharge: Yes    Patient Current Location:  [place] [address] Transition Nurse contacted the patient by telephone to perform post hospital discharge assessment. Verified name and  with patient as identifiers. Provided introduction to self, and explanation of the Care Transition Nurse role. Patient: Kaylie Vernon Patient : 1950   MRN: [id number]  Reason for Admission: Right Thyroid Lobectomy  Discharge Date: 23 RARS: No data recorded    Last Discharge  Street       Date Complaint Diagnosis Description Type Department Provider    23  Thyroid nodule Admission (Discharged) Aren Paige MD            Was this an external facility discharge? No Discharge Facility: [facility]    Challenges to be reviewed by the provider   Additional needs identified to be addressed with provider: No  none               Method of communication with provider: none. Patient reports doing okay today. Denies any pain and she has not taken any Norco. Reports R neck incision is open to air and denies any red flags. Care Transition Nurse reviewed discharge instructions, medical action plan, and red flags with patient who verbalized understanding. The patient was given an opportunity to ask questions and does not have any further questions or concerns at this time. Were discharge instructions available to patient? Yes. Reviewed appropriate site of care based on symptoms and resources available to patient including: PCP  Specialist  When to call 911. The patient agrees to contact the PCP office for questions related to their healthcare. Advance Care Planning:   Does patient have an Advance Directive: not on file. Medication reconciliation was performed with patient, who verbalizes understanding of administration of home medications.  Medications reviewed, 1111F entered: N/A    Was patient discharged with a pulse oximeter? N/A    Non-face-to-face services provided:  Scheduled appointment with PCP-CTN offered to schedule appt, but patient declined and states a plan to call the office to ask if her PCP wants to see her for follow up. Obtained and reviewed discharge summary and/or continuity of care documents  Education of patient/family/caregiver/guardian to support self-management-Reviewed post-op red flags and when to call the surgeon vs 911. Assessment and support for treatment adherence and medication management-Confirmed compliance with new Rxs. Confirmed that she scheduled appt with surgeon next week. Assistance in accessing community resources-Patient states she has not filled Myrbetriq due to wanting to wait until after her surgery and d/t co-pay of $90. CTN emailed her contact info for [employer] to see if she qualifies for assistance. Offered patient enrollment in the Remote Patient Monitoring (RPM) program for in-home monitoring: NA.    Care Transitions 24 Hour Call    Do you have a copy of your discharge instructions?: Yes  Do you have all of your prescriptions and are they filled?: Yes  Have you been contacted by a [facility] Pharmacist?: No  Have you scheduled your follow up appointment?: Yes  Do you feel like you have everything you need to keep you well at home?: Yes  Care Transitions Interventions   Medication Assistance Program: Completed              Discussed follow-up appointments. If no appointment was previously scheduled, appointment scheduling offered: Yes, but patient declined. Is follow up appointment scheduled within 7 days of discharge?  No.    Follow Up  Future Appointments   Date Time Provider Galina Myrick   5/18/2023  1:20 PM Hakan Chilel MD [facility] BS AMB   8/8/2023  1:40 PM Kofi Allen MD [facility] BS 1 Sudarshan Zaldivar MD   Otolaryngology  [phone]  [facility] [phone]    Next Steps:   Appointment: 2/28/2023 1:45 PM       Care Transition Nurse provided contact information. Plan for follow-up call in 5-7 days based on severity of symptoms and risk factors.   Plan for next call: symptom management-assess for post-op red flags  follow-up appointment-confirm attendance of surgeon appt and scheduling of PCP appt    Priya Rodríguez RN

## 2023-02-22 NOTE — DISCHARGE SUMMARY
[address] [place] [address]    Name:  Elvis Gomez  MR#:   [id number]  :  1950  ACCOUNT #:  [de-identified]  ADMIT DATE:  2023  DISCHARGE DATE:  2023      ADMISSION DIAGNOSIS:  Right thyroid mass. DISCHARGE DIAGNOSIS:  Right thyroid mass. OPERATION PERFORMED:  Right thyroid lobectomy. DISCHARGE DIET:  Soft diet. DISCHARGE ACTIVITY:  Light activity. DISCHARGE MEDICATIONS:  The patient to resume all preoperative meds. She was advised, however, to avoid any nonsteroidals and aspirin. She was also given a prescription for Keflex as well as Norco.  The patient was also advised to utilize bacitracin ointment to wound x3 days. HOSPITAL COURSE:  The patient admitted and underwent right thyroid lobectomy. 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 Postoperatively, the patient did well. The patient was placed on floor. Postop calcium on postop day 1 was noted to be 8.9. Drain was discontinued on postoperative day 1 as well. Wound was healing well with some minor ecchymosis on the superior aspect. Other than this, the patient seemed to be close to her baseline status. The patient was subsequently discharged to home, will follow up with me in 1 week's time. The patient to utilize bacitracin ointment to wound x3 days, will contact me if any difficulties arise.       Nanette Oppenheim, MD PM/PIERRE_T/[id number]  D:  2023 6:36  T:  2023 22:20  JOB #:  [id number]

## 2023-02-23 NOTE — OP NOTE
[address] [place] [address]   OPERATIVE REPORT    Name:  Ramona Dennis  MR#:   [id number]  :  1950  ACCOUNT #:  [de-identified]  DATE OF SERVICE:  2023    PREOPERATIVE DIAGNOSIS:  Right thyroid mass. POSTOPERATIVE DIAGNOSIS:  Right thyroid mass. PROCEDURE PERFORMED:  Right thyroid lobectomy. SURGEON:  James Lloyd MD    ASSISTANT:  None. ANESTHESIA:  General endotracheal anesthesia. COMPLICATIONS:  None. DRAINS:  One Meliton-Jacobo drain. SPECIMENS REMOVED:  Sent to Pathology. IMPLANTS:  None. ESTIMATED BLOOD LOSS:  25 mL. OPERATIVE FINDINGS:  1. Recurrent laryngeal nerve identified and preserved. 2.  Right thyroid lobectomy performed with frozen section determination revealed this to be a benign entity. OPERATIVE PROCEDURE:  The patient was brought to the operating room, placed supine on the operating room table. General endotracheal anesthesia was given per Anesthesiology Department. Once the patient was sufficiently anesthetized, the patient's neck was prepped and draped in the usual sterile fashion. A standard thyroidectomy incision was made approximately two fingerbreadths above the sternal notch on the anterior border of the sternocleidomastoid on the right to left side. The incision was carried down through the skin, subcutaneous tissue, and platysma. Once the platysma muscle was incised, skin muscle flaps were then created superiorly and inferiorly in the subplatysmal plane superior to the thyroid notch and inferior to the sternal notch. The skin muscle flaps were then sutured to the drapes. The strap muscles opened in the midline, taken down to level of the thyroid isthmus and the trachea itself. The right lobe of the thyroid gland was identified. This was then exposed with elevation of the strap muscles off the anterior surface of the thyroid gland itself. This was done from medial to lateral direction using both blunt and sharp dissection.   Once the lateral edge of the thyroid gland was identified, the middle thyroid vein was isolated and was doubly ligated. It should be noted that during the course of the procedure, hemostasis was accomplished with the use of small and medium Ligaclips as well as 2-0 silk suture, bipolar cautery, and Harmonic scalpel. Once the middle thyroid vein was ligated, the plane between the carotid sheath and the thyroid gland was then isolated. This was  to differentiate the carotid sheath from the superior pole. From thence the superior pole was further delineated by separation of the thyroid gland from the cricothyroid muscle. At this point, the superior pedicle was then suture ligated triply with 2-0 silk suture and hemoclips. This allowed for rotation of the superior pole medially. Inferiorly, the inferior pole was now isolated as well, some parathyroid tissue was identified and preserved. The inferior pole vasculature was ligated individually and doubly ligated. This allowed for a complete rotation of the thyroid gland medially. The recurrent laryngeal nerve was identified and was traced to its insertion in the cricothyroid muscle itself. The superior parathyroid gland was identified and preserved as well. Berry's ligament just medial to the recurrent laryngeal nerve was then ligated as well as the fascial attachments between the trachea and the thyroid isthmus. Thyroid isthmus were then crossclamped and suture ligated with 2-0 silk suture utilizing a running baseball type suture. The right lobe was sent to Pathology. The final pathology interpretation revealed this to be consistent with a benign nodule. At this point, Valsalva maneuver was performed. Close observation was noted for any potential bleeding. Any potential bleeding points were controlled. Copious amounts of irrigation was then used to irrigate the right neck.   A 10-South African fluted drain was then placed via separate stab wound, sutured in place, and the wound then closed in three layers closing the strap muscles and platysma with 3-0 Vicryl and the skin with a running 4-0 subcuticular suture. Bacitracin ointment was applied. The patient was subsequently taken from the operating room to the recovery room in stable condition. Family was notified of the patient's success of the procedure.       Nuvia Gustafson MD      [id number]  D:  02/23/2023 6:05  T:  02/23/2023 10:01  JOB #:  [id number]

## 2023-03-01 DIAGNOSIS — M19.91 PRIMARY OSTEOARTHRITIS, UNSPECIFIED SITE: Primary | ICD-10-CM

## 2023-03-01 RX ORDER — TRAMADOL HYDROCHLORIDE 50 MG/1
50 TABLET ORAL EVERY 6 HOURS PRN
Qty: 120 TABLET | Refills: 4 | Status: SHIPPED | OUTPATIENT
Start: 2023-03-01 | End: 2023-07-29

## 2023-03-02 ENCOUNTER — CARE COORDINATION (OUTPATIENT)
Facility: CLINIC | Age: 73
End: 2023-03-02

## 2023-03-02 NOTE — CARE COORDINATION
[address] Care Transitions Follow Up Call    Patient Current Location:  [facility] Transition Nurse contacted the patient by telephone to follow up after admission on 23. Verified name and  with patient as identifiers. Patient: Koko Casas  Patient : 1950   MRN: [id number]  Reason for Admission: Right thyroid lobectomy  Discharge Date: 23 RARS: No data recorded    Needs to be reviewed by the provider   Additional needs identified to be addressed with provider: No  none             Method of communication with provider: none. Patient reports she's been doing well. She saw the surgeon this past Tuesday and will follow up in 3 months. Neck incision looks good and does not need to apply antibiotic ointment any longer. Denies any pain. Addressed changes since last contact:  none  Is follow up appointment scheduled within 7 days of discharge? No.  Did patient attend follow up appointment: Yes, with otolaryngology surgeon on 23    Follow Up  Future Appointments   Date Time Provider Galina Myrick   2023  1:20 PM Francia Shelby MD [facility] BS AMB   2023  1:40 PM Mohinder Bradley MD [facility]     Non-[facility] follow up appointment(s): Otolaryngology Dr. Dami Kim in 3 months    Care Transition Nurse reviewed red flags with patient and discussed any barriers to care and/or understanding of plan of care after discharge. Discussed appropriate site of care based on symptoms and resources available to patient including: Specialist. The patient agrees to contact the PCP office for questions related to their healthcare. Advance Care Planning:   not on file. Patients top risk factors for readmission:  none identified  Interventions to address risk factors: Assessment and support for treatment adherence and medication management-Confirmed that patient remains free of red flags, attended surgical follow up appt this week, and has no further questions.  She did not receive CTN's email last week with [employer] PAP contact info, so CTN emailed this again, sent via [employer] message, and provided pt verbally with the phone number for Patient Reynaldo Kim. Offered patient enrollment in the Remote Patient Monitoring (RPM) program for in-home monitoring: NA.     Care Transitions Subsequent and Final Call    Subsequent and Final Calls  Do you have any ongoing symptoms?: No  Have your medications changed?: No  Do you have any questions related to your medications?: No  Do you currently have any active services?: No  Do you have any needs or concerns that I can assist you with?: No  Identified Barriers: None  Care Transitions Interventions   Medication Assistance Program: Completed     Other Interventions:             Care Transition Nurse provided contact information for future needs. Patient declined follow up calls from CTN since she has completely recovered from surgery, and states a plan to contact CTN if she has any further needs.     Kelley Marie RN

## 2023-05-18 ENCOUNTER — OFFICE VISIT (OUTPATIENT)
Age: 73
End: 2023-05-18
Payer: MEDICARE

## 2023-05-18 ENCOUNTER — HOSPITAL ENCOUNTER (OUTPATIENT)
Facility: HOSPITAL | Age: 73
Discharge: HOME | End: 2023-05-18
Payer: MEDICARE

## 2023-05-18 ENCOUNTER — OFFICE VISIT (OUTPATIENT)
Age: 73
End: 2023-05-18

## 2023-05-18 VITALS
DIASTOLIC BLOOD PRESSURE: 32 MMHG | BODY MASS INDEX: 36.25 KG/M2 | WEIGHT: 197 LBS | RESPIRATION RATE: 18 BRPM | HEART RATE: 58 BPM | TEMPERATURE: 95.6 F | OXYGEN SATURATION: 95 % | HEIGHT: 62 IN | SYSTOLIC BLOOD PRESSURE: 105 MMHG

## 2023-05-18 VITALS
SYSTOLIC BLOOD PRESSURE: 112 MMHG | HEART RATE: 51 BPM | HEIGHT: 62 IN | OXYGEN SATURATION: 94 % | WEIGHT: 198 LBS | DIASTOLIC BLOOD PRESSURE: 60 MMHG | BODY MASS INDEX: 36.44 KG/M2

## 2023-05-18 DIAGNOSIS — I34.0 NON-RHEUMATIC MITRAL REGURGITATION: Primary | ICD-10-CM

## 2023-05-18 DIAGNOSIS — I25.10 CORONARY ARTERY DISEASE INVOLVING NATIVE CORONARY ARTERY OF NATIVE HEART WITHOUT ANGINA PECTORIS: ICD-10-CM

## 2023-05-18 DIAGNOSIS — L03.011 PARONYCHIA OF FINGER OF RIGHT HAND: Primary | ICD-10-CM

## 2023-05-18 DIAGNOSIS — I34.0 MITRAL VALVE INSUFFICIENCY, UNSPECIFIED ETIOLOGY: ICD-10-CM

## 2023-05-18 DIAGNOSIS — E66.01 SEVERE OBESITY (BMI 35.0-39.9) WITH COMORBIDITY (HCC): ICD-10-CM

## 2023-05-18 DIAGNOSIS — E78.5 DYSLIPIDEMIA: ICD-10-CM

## 2023-05-18 DIAGNOSIS — L30.9 DERMATITIS: ICD-10-CM

## 2023-05-18 LAB
T4 FREE SERPL-MCNC: 1 NG/DL (ref 0.7–1.5)
TSH SERPL DL<=0.05 MIU/L-ACNC: 6.56 UIU/ML (ref 0.36–3.74)

## 2023-05-18 PROCEDURE — 1123F ACP DISCUSS/DSCN MKR DOCD: CPT | Performed by: INTERNAL MEDICINE

## 2023-05-18 PROCEDURE — 36415 COLL VENOUS BLD VENIPUNCTURE: CPT

## 2023-05-18 PROCEDURE — 3017F COLORECTAL CA SCREEN DOC REV: CPT | Performed by: INTERNAL MEDICINE

## 2023-05-18 PROCEDURE — 1036F TOBACCO NON-USER: CPT | Performed by: INTERNAL MEDICINE

## 2023-05-18 PROCEDURE — 99213 OFFICE O/P EST LOW 20 MIN: CPT | Performed by: INTERNAL MEDICINE

## 2023-05-18 PROCEDURE — 1090F PRES/ABSN URINE INCON ASSESS: CPT | Performed by: INTERNAL MEDICINE

## 2023-05-18 PROCEDURE — G8427 DOCREV CUR MEDS BY ELIG CLIN: HCPCS | Performed by: INTERNAL MEDICINE

## 2023-05-18 PROCEDURE — G8399 PT W/DXA RESULTS DOCUMENT: HCPCS | Performed by: INTERNAL MEDICINE

## 2023-05-18 PROCEDURE — 84439 ASSAY OF FREE THYROXINE: CPT

## 2023-05-18 PROCEDURE — 99211 OFF/OP EST MAY X REQ PHY/QHP: CPT | Performed by: INTERNAL MEDICINE

## 2023-05-18 PROCEDURE — G8417 CALC BMI ABV UP PARAM F/U: HCPCS | Performed by: INTERNAL MEDICINE

## 2023-05-18 PROCEDURE — 84443 ASSAY THYROID STIM HORMONE: CPT

## 2023-05-18 RX ORDER — CRANBERRY FRUIT EXTRACT 650 MG
CAPSULE ORAL
COMMUNITY

## 2023-05-18 RX ORDER — CLOTRIMAZOLE AND BETAMETHASONE DIPROPIONATE 10; .64 MG/G; MG/G
CREAM TOPICAL
Qty: 30 G | Refills: 3 | Status: SHIPPED | OUTPATIENT
Start: 2023-05-18

## 2023-05-18 RX ORDER — TRAMADOL HYDROCHLORIDE 50 MG/1
50 TABLET ORAL EVERY 6 HOURS PRN
COMMUNITY

## 2023-05-18 RX ORDER — CEPHALEXIN 500 MG/1
500 CAPSULE ORAL 3 TIMES DAILY
Qty: 21 CAPSULE | Refills: 0 | Status: SHIPPED | OUTPATIENT
Start: 2023-05-18 | End: 2023-05-25

## 2023-05-18 RX ORDER — ASPIRIN 81 MG/1
81 TABLET, CHEWABLE ORAL DAILY
COMMUNITY

## 2023-05-18 SDOH — ECONOMIC STABILITY: HOUSING INSECURITY
IN THE LAST 12 MONTHS, WAS THERE A TIME WHEN YOU DID NOT HAVE A STEADY PLACE TO SLEEP OR SLEPT IN A SHELTER (INCLUDING NOW)?: NO

## 2023-05-18 SDOH — ECONOMIC STABILITY: FOOD INSECURITY: WITHIN THE PAST 12 MONTHS, YOU WORRIED THAT YOUR FOOD WOULD RUN OUT BEFORE YOU GOT MONEY TO BUY MORE.: NEVER TRUE

## 2023-05-18 SDOH — ECONOMIC STABILITY: FOOD INSECURITY: WITHIN THE PAST 12 MONTHS, THE FOOD YOU BOUGHT JUST DIDN'T LAST AND YOU DIDN'T HAVE MONEY TO GET MORE.: NEVER TRUE

## 2023-05-18 SDOH — ECONOMIC STABILITY: INCOME INSECURITY: HOW HARD IS IT FOR YOU TO PAY FOR THE VERY BASICS LIKE FOOD, HOUSING, MEDICAL CARE, AND HEATING?: NOT VERY HARD

## 2023-05-18 ASSESSMENT — ANXIETY QUESTIONNAIRES
4. TROUBLE RELAXING: 0
5. BEING SO RESTLESS THAT IT IS HARD TO SIT STILL: 0
1. FEELING NERVOUS, ANXIOUS, OR ON EDGE: 0
2. NOT BEING ABLE TO STOP OR CONTROL WORRYING: 0
7. FEELING AFRAID AS IF SOMETHING AWFUL MIGHT HAPPEN: 0
GAD7 TOTAL SCORE: 0
6. BECOMING EASILY ANNOYED OR IRRITABLE: 0
3. WORRYING TOO MUCH ABOUT DIFFERENT THINGS: 0

## 2023-05-18 ASSESSMENT — ENCOUNTER SYMPTOMS
SORE THROAT: 0
COUGH: 0
ABDOMINAL PAIN: 0
NAUSEA: 0
SHORTNESS OF BREATH: 0
VOMITING: 0
ABDOMINAL DISTENTION: 0

## 2023-05-18 ASSESSMENT — PATIENT HEALTH QUESTIONNAIRE - PHQ9
SUM OF ALL RESPONSES TO PHQ QUESTIONS 1-9: 0
2. FEELING DOWN, DEPRESSED OR HOPELESS: 0
SUM OF ALL RESPONSES TO PHQ9 QUESTIONS 1 & 2: 0
SUM OF ALL RESPONSES TO PHQ QUESTIONS 1-9: 0
1. LITTLE INTEREST OR PLEASURE IN DOING THINGS: 0

## 2023-05-18 NOTE — PROGRESS NOTES
05/18/23     Maritza Schaeffer  is a 74 y.o. female     Chief Complaint   Patient presents with    Follow-up     6 month       HPI  Patient presents for a follow-up office visit. The patient had an episode of fairly significant back pain in January 2014 that awoke her from sleep, it radiated around to the front of her chest up into her neck and down both arms associated with tingling and numbness. She subsequently underwent a cardiac catheterization in February 2014, which showed a high-grade stenosis of her mid left circumflex extending into obtuse marginal branch 1. She underwent scheduled percutaneous coronary intervention over her left circumflex lesion with a drug-eluting stent In July 2014. She underwent a followup echocardiogram in April 2016 which showed overall improvement of her well ejection fraction of 55-60% without mention of any regional wall motion abnormalities. Her mitral regurgitation was in the moderate range, which may have been minimally increased compared to the prior study. Patient subsequently underwent another echocardiogram and pharmacologic nuclear stress test in November 2020 to evaluate increased dyspnea on exertion. Her echocardiogram was essentially unchanged showing preserved LV function, EF 60- 78%, diastolic dysfunction, mild left atrial enlargement, posterior prolapse of her mitral valve leaflet with moderate mitral regurgitation. Her nuclear stress test showed preserved LV function with a fixed inferolateral perfusion defect and hypokinesis of the inferolateral wall consistent with her known coronary anatomy. She just recently underwent a repeat echocardiogram in October 2022 which showed a normal LV function, EF 68% with lateral wall hypokinesis and a mildly dilated left ventricle. Her mitral regurgitation appeared to be in the moderate to severe range with an eccentric jet but no obvious mitral valve prolapse.   This was unchanged compared to her prior

## 2023-05-18 NOTE — PROGRESS NOTES
José Luis Fisher presents today for No chief complaint on file. 1. \"Have you been to the ER, urgent care clinic since your last visit? Hospitalized since your last visit? \" no    2. \"Have you seen or consulted any other health care providers outside of the [address] [place] [address] since your last visit? \" no     3. For patients aged 39-70: Has the patient had a colonoscopy / FIT/ Cologuard? Yes - no Care Gap present      If the patient is female:    4. For patients aged 41-77: Has the patient had a mammogram within the past 2 years? Yes - no Care Gap present      5. For patients aged 21-65: Has the patient had a pap smear?  No

## 2023-05-18 NOTE — PROGRESS NOTES
Pain  Pertinent negatives include no chills or fatigue. Genevieve Briggs is here due to pain in the index and third finger of the right hand after tearing to the clinic. This happened about a week ago and her fingers are erythematous and very tender to the touch. She would also like a refill of Lotrisone that she uses irritated dermatitis areas. Past Medical History:   Diagnosis Date    Abnormal myocardial perfusion study 02/05/2014    Mod-sized, mild-mod mid lateral infarction w/mild-mod ninfa-infarct ischemia. Mod lateral hypk. EF 56%. Normal EKG on pharm stress test.  Intermediate risk. Age-related osteoporosis without current pathological fracture 4/5/2016    Arthritis     Benign positional vertigo 4/2/2021    CAD (coronary artery disease), native coronary artery 7/2014    MINERVA to LCx    Cancer [facility])     skin ca    Chronic cough 4/2/2021    Chronic rhinitis 4/2/2021    Dyslipidemia     Fatty pancreas 4/5/2016    On imaging    Hernia     History of echocardiogram 04/05/2016    EF 55-60%. No WMA. Gr 1 DDfx. Mod MR. Nephrolithiasis     Thyroid disease         Past Surgical History:   Procedure Laterality Date    CARDIAC CATHETERIZATION      CHOLECYSTECTOMY      CORONARY ANGIOPLASTY WITH STENT PLACEMENT  07/07/2014    HERNIA REPAIR      LYSIS OF ADHESIONS      OTHER SURGICAL HISTORY  11/2018    repair of anal fissure    OVARY REMOVAL Right 12/05/2018    REMOVAL OF ANAL FISSURE  11/02/2018    REMOVAL OF KIDNEY STONE  12/28/2018    THYROID LOBECTOMY Right 2/20/2023    RIGHT THYROID LOBECTOMY,; 23HR performed by Madyson Tsai MD at [facility] MAIN OR        Current Outpatient Medications   Medication Sig Dispense Refill    traMADol (ULTRAM) 50 MG tablet Take 1 tablet by mouth every 6 hours as needed for Pain.       aspirin 81 MG chewable tablet Take 1 tablet by mouth daily      Nutritional Supplements (THERALITH XR) TABS Take by mouth      cephALEXin (KEFLEX) 500 MG capsule Take 1 capsule by

## 2023-05-18 NOTE — PROGRESS NOTES
Rhonda Dunham presents today for   Chief Complaint   Patient presents with    Follow-up     6 month       Rhonda Dunham preferred language for health care discussion is english/other. Is someone accompanying this pt? yes    Is the patient using any DME equipment during OV? no    Depression Screening:  Depression: Not at risk    PHQ-2 Score: 0        Learning Assessment:  Who is the primary learner? Patient    What is the preferred language for health care of the primary learner? ENGLISH    How does the primary learner prefer to learn new concepts? DEMONSTRATION    Answered By patient    Relationship to Learner SELF           Pt currently taking Anticoagulant therapy? no    Pt currently taking Antiplatelet therapy ? Aspirin 81 mg daily      Coordination of Care:  1. Have you been to the ER, urgent care clinic since your last visit? Hospitalized since your last visit? no    2. Have you seen or consulted any other health care providers outside of the [address] [place] [address] since your last visit? Include any pap smears or colon screening.  no

## 2023-07-07 ENCOUNTER — OFFICE VISIT (OUTPATIENT)
Age: 73
End: 2023-07-07
Payer: MEDICARE

## 2023-07-07 VITALS
OXYGEN SATURATION: 95 % | RESPIRATION RATE: 18 BRPM | DIASTOLIC BLOOD PRESSURE: 33 MMHG | TEMPERATURE: 96.1 F | HEIGHT: 62 IN | SYSTOLIC BLOOD PRESSURE: 121 MMHG | WEIGHT: 198 LBS | BODY MASS INDEX: 36.44 KG/M2 | HEART RATE: 58 BPM

## 2023-07-07 DIAGNOSIS — E03.8 SUBCLINICAL HYPOTHYROIDISM: ICD-10-CM

## 2023-07-07 DIAGNOSIS — R73.09 ELEVATED HEMOGLOBIN A1C: ICD-10-CM

## 2023-07-07 DIAGNOSIS — I25.10 ATHEROSCLEROSIS OF NATIVE CORONARY ARTERY OF NATIVE HEART WITHOUT ANGINA PECTORIS: ICD-10-CM

## 2023-07-07 DIAGNOSIS — E78.2 MIXED HYPERLIPIDEMIA: ICD-10-CM

## 2023-07-07 DIAGNOSIS — L03.011 PARONYCHIA OF FINGER OF RIGHT HAND: Primary | ICD-10-CM

## 2023-07-07 PROCEDURE — G8417 CALC BMI ABV UP PARAM F/U: HCPCS | Performed by: INTERNAL MEDICINE

## 2023-07-07 PROCEDURE — 99214 OFFICE O/P EST MOD 30 MIN: CPT | Performed by: INTERNAL MEDICINE

## 2023-07-07 PROCEDURE — G8427 DOCREV CUR MEDS BY ELIG CLIN: HCPCS | Performed by: INTERNAL MEDICINE

## 2023-07-07 PROCEDURE — 1090F PRES/ABSN URINE INCON ASSESS: CPT | Performed by: INTERNAL MEDICINE

## 2023-07-07 PROCEDURE — 1123F ACP DISCUSS/DSCN MKR DOCD: CPT | Performed by: INTERNAL MEDICINE

## 2023-07-07 PROCEDURE — G8399 PT W/DXA RESULTS DOCUMENT: HCPCS | Performed by: INTERNAL MEDICINE

## 2023-07-07 PROCEDURE — 3017F COLORECTAL CA SCREEN DOC REV: CPT | Performed by: INTERNAL MEDICINE

## 2023-07-07 PROCEDURE — 1036F TOBACCO NON-USER: CPT | Performed by: INTERNAL MEDICINE

## 2023-07-07 RX ORDER — CEPHALEXIN 500 MG/1
500 CAPSULE ORAL 3 TIMES DAILY
Qty: 42 CAPSULE | Refills: 0 | Status: SHIPPED | OUTPATIENT
Start: 2023-07-07 | End: 2023-07-21

## 2023-07-07 RX ORDER — LEVOTHYROXINE SODIUM 0.05 MG/1
50 TABLET ORAL DAILY
COMMUNITY
Start: 2023-05-30

## 2023-07-07 ASSESSMENT — PATIENT HEALTH QUESTIONNAIRE - PHQ9
SUM OF ALL RESPONSES TO PHQ QUESTIONS 1-9: 0
2. FEELING DOWN, DEPRESSED OR HOPELESS: 0
SUM OF ALL RESPONSES TO PHQ QUESTIONS 1-9: 0
SUM OF ALL RESPONSES TO PHQ QUESTIONS 1-9: 0
1. LITTLE INTEREST OR PLEASURE IN DOING THINGS: 0
SUM OF ALL RESPONSES TO PHQ QUESTIONS 1-9: 0
SUM OF ALL RESPONSES TO PHQ9 QUESTIONS 1 & 2: 0

## 2023-07-07 ASSESSMENT — ENCOUNTER SYMPTOMS
BLOOD IN STOOL: 0
COUGH: 1
SHORTNESS OF BREATH: 0

## 2023-07-07 NOTE — PROGRESS NOTES
Joselin Ocampo presents today for   Chief Complaint   Patient presents with    Wound Infection     Right index finger                 1. \"Have you been to the ER, urgent care clinic since your last visit? Hospitalized since your last visit? \" no    2. \"Have you seen or consulted any other health care providers outside of the [address] [place] [address] since your last visit? \" no     3. For patients aged 43-73: Has the patient had a colonoscopy / FIT/ Cologuard? Yes no care gap    If the patient is female:    4. For patients aged 43-66: Has the patient had a mammogram within the past 2 years? NA - based on age or sex      11. For patients aged 21-65: Has the patient had a pap smear?  NA - based on age or sex

## 2023-07-07 NOTE — PROGRESS NOTES
MARGUERITE Alvarez is here with recurrence of paronychia in her right index finger. She reports it was nearly resolved after 7 days of Keflex in May, but had recurrence of pain, erythema, and intense tenderness to palpation a few weeks later. She had her partial thyroidectomy, and her last TSH was elevated at 6.8. Her ENT surgeon put her on a low-dose of levothyroxine, and she said she had energy for a few weeks, but states it is back to baseline now. We reviewed that her TSH in the 6 range was probably not the cause of her fatigue, but we will check it with the next labs, including antithyroid antibodies. She denies palpitations or heat intolerance. Past Medical History:   Diagnosis Date    Abnormal myocardial perfusion study 02/05/2014    Mod-sized, mild-mod mid lateral infarction w/mild-mod ninfa-infarct ischemia. Mod lateral hypk. EF 56%. Normal EKG on pharm stress test.  Intermediate risk. Age-related osteoporosis without current pathological fracture 4/5/2016    Arthritis     Benign positional vertigo 4/2/2021    CAD (coronary artery disease), native coronary artery 7/2014    MINERVA to LCx    Cancer [facility])     skin ca    Chronic cough 4/2/2021    Chronic rhinitis 4/2/2021    Dyslipidemia     Fatty pancreas 4/5/2016    On imaging    Hernia     History of echocardiogram 04/05/2016    EF 55-60%. No WMA. Gr 1 DDfx. Mod MR.       Nephrolithiasis     Thyroid disease         Past Surgical History:   Procedure Laterality Date    CARDIAC CATHETERIZATION      CHOLECYSTECTOMY      CORONARY ANGIOPLASTY WITH STENT PLACEMENT  07/07/2014    HERNIA REPAIR      LYSIS OF ADHESIONS      OTHER SURGICAL HISTORY  11/2018    repair of anal fissure    OVARY REMOVAL Right 12/05/2018    REMOVAL OF ANAL FISSURE  11/02/2018    REMOVAL OF KIDNEY STONE  12/28/2018    THYROID LOBECTOMY Right 2/20/2023    RIGHT THYROID LOBECTOMY,; 23HR performed by Jossue Rodriguez MD at [facility] MAIN OR        Current Outpatient Medications

## 2023-07-12 ENCOUNTER — TELEPHONE (OUTPATIENT)
Age: 73
End: 2023-07-12

## 2023-07-21 ENCOUNTER — HOSPITAL ENCOUNTER (OUTPATIENT)
Facility: HOSPITAL | Age: 73
End: 2023-07-21
Payer: MEDICARE

## 2023-07-21 LAB
ALBUMIN SERPL-MCNC: 3.6 G/DL (ref 3.4–5)
ALBUMIN/GLOB SERPL: 1 (ref 0.8–1.7)
ALP SERPL-CCNC: 109 U/L (ref 45–117)
ALT SERPL-CCNC: 18 U/L (ref 13–56)
ANION GAP SERPL CALC-SCNC: 6 MMOL/L (ref 3–18)
APPEARANCE UR: ABNORMAL
AST SERPL-CCNC: 20 U/L (ref 10–38)
BACTERIA URNS QL MICRO: ABNORMAL /HPF
BILIRUB SERPL-MCNC: 0.6 MG/DL (ref 0.2–1)
BILIRUB UR QL: ABNORMAL
BUN SERPL-MCNC: 13 MG/DL (ref 7–18)
BUN/CREAT SERPL: 12 (ref 12–20)
CALCIUM SERPL-MCNC: 9.3 MG/DL (ref 8.5–10.1)
CAOX CRY URNS QL MICRO: ABNORMAL
CHLORIDE SERPL-SCNC: 112 MMOL/L (ref 100–111)
CHOLEST SERPL-MCNC: 124 MG/DL
CO2 SERPL-SCNC: 24 MMOL/L (ref 21–32)
COLOR UR: ABNORMAL
CREAT SERPL-MCNC: 1.08 MG/DL (ref 0.6–1.3)
EPITH CASTS URNS QL MICRO: ABNORMAL /LPF (ref 0–5)
EST. AVERAGE GLUCOSE BLD GHB EST-MCNC: 126 MG/DL
GLOBULIN SER CALC-MCNC: 3.6 G/DL (ref 2–4)
GLUCOSE SERPL-MCNC: 116 MG/DL (ref 74–99)
GLUCOSE UR STRIP.AUTO-MCNC: NEGATIVE MG/DL
HBA1C MFR BLD: 6 % (ref 4.2–5.6)
HDLC SERPL-MCNC: 60 MG/DL (ref 40–60)
HDLC SERPL: 2.1 (ref 0–5)
HGB UR QL STRIP: NEGATIVE
KETONES UR QL STRIP.AUTO: ABNORMAL MG/DL
LDLC SERPL CALC-MCNC: 41.6 MG/DL (ref 0–100)
LEUKOCYTE ESTERASE UR QL STRIP.AUTO: ABNORMAL
LIPID PANEL: NORMAL
NITRITE UR QL STRIP.AUTO: NEGATIVE
PH UR STRIP: 5 (ref 5–8)
POTASSIUM SERPL-SCNC: 4.7 MMOL/L (ref 3.5–5.5)
PROT SERPL-MCNC: 7.2 G/DL (ref 6.4–8.2)
PROT UR STRIP-MCNC: 30 MG/DL
RBC #/AREA URNS HPF: NEGATIVE /HPF (ref 0–5)
SODIUM SERPL-SCNC: 142 MMOL/L (ref 136–145)
SP GR UR REFRACTOMETRY: >1.03 (ref 1–1.03)
TRIGL SERPL-MCNC: 112 MG/DL
TSH SERPL DL<=0.05 MIU/L-ACNC: 1.91 UIU/ML (ref 0.36–3.74)
UROBILINOGEN UR QL STRIP.AUTO: 1 EU/DL (ref 0.2–1)
VLDLC SERPL CALC-MCNC: 22.4 MG/DL
WBC URNS QL MICRO: ABNORMAL /HPF (ref 0–4)

## 2023-07-21 PROCEDURE — 80061 LIPID PANEL: CPT

## 2023-07-21 PROCEDURE — 86800 THYROGLOBULIN ANTIBODY: CPT

## 2023-07-21 PROCEDURE — 83036 HEMOGLOBIN GLYCOSYLATED A1C: CPT

## 2023-07-21 PROCEDURE — 81001 URINALYSIS AUTO W/SCOPE: CPT

## 2023-07-21 PROCEDURE — 36415 COLL VENOUS BLD VENIPUNCTURE: CPT

## 2023-07-21 PROCEDURE — 86376 MICROSOMAL ANTIBODY EACH: CPT

## 2023-07-21 PROCEDURE — 84443 ASSAY THYROID STIM HORMONE: CPT

## 2023-07-21 PROCEDURE — 80053 COMPREHEN METABOLIC PANEL: CPT

## 2023-07-22 LAB
THYROGLOB AB SERPL-ACNC: NORMAL IU/ML
THYROPEROXIDASE AB SERPL-ACNC: 12 IU/ML (ref 0–34)

## 2023-07-25 ENCOUNTER — TELEPHONE (OUTPATIENT)
Age: 73
End: 2023-07-25

## 2023-07-25 LAB
THYROGLOB AB SERPL-ACNC: 133.5 IU/ML (ref 0–0.9)
THYROPEROXIDASE AB SERPL-ACNC: 12 IU/ML (ref 0–34)

## 2023-07-25 NOTE — TELEPHONE ENCOUNTER
Her labs show she does have thyroid antibodies, so should stay on thryoid med for life, but her TSH is now in normal range, so she is on the right dose of thyroid med, so her fatigue is not from her thyroid

## 2023-08-15 ENCOUNTER — OFFICE VISIT (OUTPATIENT)
Age: 73
End: 2023-08-15
Payer: MEDICARE

## 2023-08-15 VITALS
SYSTOLIC BLOOD PRESSURE: 120 MMHG | HEART RATE: 60 BPM | HEIGHT: 62 IN | RESPIRATION RATE: 18 BRPM | BODY MASS INDEX: 36.07 KG/M2 | DIASTOLIC BLOOD PRESSURE: 51 MMHG | WEIGHT: 196 LBS | OXYGEN SATURATION: 93 % | TEMPERATURE: 97.1 F

## 2023-08-15 DIAGNOSIS — R19.7 DIARRHEA, UNSPECIFIED TYPE: ICD-10-CM

## 2023-08-15 DIAGNOSIS — M19.91 PRIMARY OSTEOARTHRITIS, UNSPECIFIED SITE: ICD-10-CM

## 2023-08-15 DIAGNOSIS — J31.0 CHRONIC RHINITIS: ICD-10-CM

## 2023-08-15 DIAGNOSIS — M89.8X1 PAIN OF LEFT SCAPULA: Primary | ICD-10-CM

## 2023-08-15 DIAGNOSIS — E03.8 SUBCLINICAL HYPOTHYROIDISM: ICD-10-CM

## 2023-08-15 PROCEDURE — 1090F PRES/ABSN URINE INCON ASSESS: CPT | Performed by: INTERNAL MEDICINE

## 2023-08-15 PROCEDURE — 99214 OFFICE O/P EST MOD 30 MIN: CPT | Performed by: INTERNAL MEDICINE

## 2023-08-15 PROCEDURE — 1123F ACP DISCUSS/DSCN MKR DOCD: CPT | Performed by: INTERNAL MEDICINE

## 2023-08-15 PROCEDURE — G8417 CALC BMI ABV UP PARAM F/U: HCPCS | Performed by: INTERNAL MEDICINE

## 2023-08-15 PROCEDURE — G8428 CUR MEDS NOT DOCUMENT: HCPCS | Performed by: INTERNAL MEDICINE

## 2023-08-15 PROCEDURE — 99211 OFF/OP EST MAY X REQ PHY/QHP: CPT | Performed by: INTERNAL MEDICINE

## 2023-08-15 PROCEDURE — G8399 PT W/DXA RESULTS DOCUMENT: HCPCS | Performed by: INTERNAL MEDICINE

## 2023-08-15 PROCEDURE — 1036F TOBACCO NON-USER: CPT | Performed by: INTERNAL MEDICINE

## 2023-08-15 PROCEDURE — 3017F COLORECTAL CA SCREEN DOC REV: CPT | Performed by: INTERNAL MEDICINE

## 2023-08-15 RX ORDER — HYOSCYAMINE SULFATE 0.12 MG/1
TABLET SUBLINGUAL
Qty: 180 EACH | Refills: 2 | Status: SHIPPED | OUTPATIENT
Start: 2023-08-15

## 2023-08-15 RX ORDER — LEVOTHYROXINE SODIUM 0.05 MG/1
50 TABLET ORAL DAILY
Qty: 90 TABLET | Refills: 3 | Status: SHIPPED | OUTPATIENT
Start: 2023-08-15

## 2023-08-15 RX ORDER — METHOCARBAMOL 500 MG/1
TABLET, FILM COATED ORAL
Qty: 40 TABLET | Refills: 1 | Status: SHIPPED | OUTPATIENT
Start: 2023-08-15

## 2023-08-15 RX ORDER — MONTELUKAST SODIUM 10 MG/1
10 TABLET ORAL DAILY
Qty: 90 TABLET | Refills: 3 | Status: SHIPPED | OUTPATIENT
Start: 2023-08-15

## 2023-08-15 ASSESSMENT — PATIENT HEALTH QUESTIONNAIRE - PHQ9
SUM OF ALL RESPONSES TO PHQ QUESTIONS 1-9: 0
SUM OF ALL RESPONSES TO PHQ QUESTIONS 1-9: 0
1. LITTLE INTEREST OR PLEASURE IN DOING THINGS: 0
SUM OF ALL RESPONSES TO PHQ QUESTIONS 1-9: 0
SUM OF ALL RESPONSES TO PHQ QUESTIONS 1-9: 0
2. FEELING DOWN, DEPRESSED OR HOPELESS: 0
SUM OF ALL RESPONSES TO PHQ9 QUESTIONS 1 & 2: 0

## 2023-08-15 NOTE — PROGRESS NOTES
Natty Avila presents today for   Chief Complaint   Patient presents with    Follow-up Chronic Condition                 1. \"Have you been to the ER, urgent care clinic since your last visit? Hospitalized since your last visit? \" no    2. \"Have you seen or consulted any other health care providers outside of the [address] [place] [address] since your last visit? \" no     3. For patients aged 43-73: Has the patient had a colonoscopy / FIT/ Cologuard? Yes - no Care Gap present      If the patient is female:    4. For patients aged 43-66: Has the patient had a mammogram within the past 2 years? NA - based on age or sex      11. For patients aged 21-65: Has the patient had a pap smear?  NA - based on age or sex

## 2023-08-16 ASSESSMENT — ENCOUNTER SYMPTOMS
BLOOD IN STOOL: 0
SHORTNESS OF BREATH: 0
ABDOMINAL PAIN: 0
COUGH: 1
DIARRHEA: 1

## 2023-08-16 NOTE — PROGRESS NOTES
HPI     Clarice Hurt is here for pain in her left medial scapular area despite us going to a chiropractor, and using heat/ice and Tylenol. I asked her to try Voltaren gel to the area, continuing the other modalities as well. She thinks the dicyclomine is no longer helping for her chronic diarrhea. She does not think she consumes much dairy, but I asked her to be careful about hidden lactose such as butter and baked goods, etc.  We will try something else for her diarrhea. The stool is loose, but not watery, few cramps before she does not have nighttime diarrhea. Past Medical History:   Diagnosis Date    Abnormal myocardial perfusion study 02/05/2014    Mod-sized, mild-mod mid lateral infarction w/mild-mod ninfa-infarct ischemia. Mod lateral hypk. EF 56%. Normal EKG on pharm stress test.  Intermediate risk. Age-related osteoporosis without current pathological fracture 4/5/2016    Arthritis     Benign positional vertigo 4/2/2021    CAD (coronary artery disease), native coronary artery 7/2014    MINERVA to LCx    Cancer [facility])     skin ca    Chronic cough 4/2/2021    Chronic rhinitis 4/2/2021    Dyslipidemia     Fatty pancreas 4/5/2016    On imaging    Hernia     History of echocardiogram 04/05/2016    EF 55-60%. No WMA. Gr 1 DDfx. Mod MR.       Nephrolithiasis     Thyroid disease         Past Surgical History:   Procedure Laterality Date    CARDIAC CATHETERIZATION      CHOLECYSTECTOMY      CORONARY ANGIOPLASTY WITH STENT PLACEMENT  07/07/2014    HERNIA REPAIR      LYSIS OF ADHESIONS      OTHER SURGICAL HISTORY  11/2018    repair of anal fissure    OVARY REMOVAL Right 12/05/2018    REMOVAL OF ANAL FISSURE  11/02/2018    REMOVAL OF KIDNEY STONE  12/28/2018    THYROID LOBECTOMY Right 2/20/2023    RIGHT THYROID LOBECTOMY,; 23HR performed by Cynthia Moe MD at [facility] MAIN OR        Current Outpatient Medications   Medication Sig Dispense Refill    levothyroxine (SYNTHROID) 50 MCG tablet Take 1 tablet by mouth

## 2023-10-06 ENCOUNTER — TELEPHONE (OUTPATIENT)
Age: 73
End: 2023-10-06

## 2023-10-06 NOTE — TELEPHONE ENCOUNTER
Pt called and has concerns about her echo. She was made aware that her test has not been resulted yet and Dr. Tristen Baker will be out until Monday. I reassured her that we will have her results early next week   Pt verbalized understanding.

## 2023-10-09 ENCOUNTER — TELEPHONE (OUTPATIENT)
Age: 73
End: 2023-10-09

## 2023-10-09 NOTE — TELEPHONE ENCOUNTER
----- Message from Jonathan Puente MD sent at 10/9/2023  8:32 AM EDT -----  Please let the patient know that her heart function remains normal, however, her mitral regurg has increased in severity. We can discuss this further at her next follow-up visit. In the past she has not wanted anything done about this  ----- Message -----  From: Lloyd Kunz LPN  Sent: 17/3/4717  11:05 AM EDT  To: Jonathan Puente MD    Per your note-Mitral regurgitation. This was moderate to severe on her follow-up echocardiogram recently completed in October 2022. This was unchanged compared to her previous study. There was no clear-cut mitral valve prolapse on her most recent study. I previously recommended further evaluation of her mitral valve pathology with a transesophageal echocardiogram.  Patient does not wish to undergo a ROSSI unless her symptoms significantly worsen. She is willing to undergo serial transthoracic studies for surveillance of her regurgitation. I would like to order a test prior to next visit.

## 2023-10-19 ENCOUNTER — TELEPHONE (OUTPATIENT)
Age: 73
End: 2023-10-19

## 2023-10-19 DIAGNOSIS — M19.91 PRIMARY OSTEOARTHRITIS, UNSPECIFIED SITE: Primary | ICD-10-CM

## 2023-10-19 RX ORDER — TRAMADOL HYDROCHLORIDE 50 MG/1
50 TABLET ORAL EVERY 6 HOURS PRN
Qty: 180 TABLET | Refills: 1 | Status: SHIPPED | OUTPATIENT
Start: 2023-10-19 | End: 2024-04-16

## 2023-10-19 NOTE — TELEPHONE ENCOUNTER
Refill request received via fax:   - traMADol (ULTRAM) 50 MG tablet    Pharmacy: [id number] on [facility]

## 2023-11-20 ENCOUNTER — HOSPITAL ENCOUNTER (OUTPATIENT)
Facility: HOSPITAL | Age: 73
Setting detail: SPECIMEN
Discharge: HOME | End: 2023-11-23
Payer: MEDICARE

## 2023-11-20 ENCOUNTER — OFFICE VISIT (OUTPATIENT)
Age: 73
End: 2023-11-20
Payer: MEDICARE

## 2023-11-20 VITALS
WEIGHT: 196 LBS | TEMPERATURE: 96 F | RESPIRATION RATE: 18 BRPM | HEIGHT: 62 IN | OXYGEN SATURATION: 92 % | SYSTOLIC BLOOD PRESSURE: 123 MMHG | DIASTOLIC BLOOD PRESSURE: 43 MMHG | HEART RATE: 54 BPM | BODY MASS INDEX: 36.07 KG/M2

## 2023-11-20 DIAGNOSIS — R10.31 ABDOMINAL PAIN, ACUTE, BILATERAL LOWER QUADRANT: Primary | ICD-10-CM

## 2023-11-20 DIAGNOSIS — R82.90 MALODOROUS URINE: ICD-10-CM

## 2023-11-20 DIAGNOSIS — R10.32 ABDOMINAL PAIN, ACUTE, BILATERAL LOWER QUADRANT: Primary | ICD-10-CM

## 2023-11-20 LAB
BILIRUBIN, URINE, POC: NEGATIVE
BLOOD URINE, POC: NORMAL
GLUCOSE URINE, POC: NEGATIVE
KETONES, URINE, POC: NEGATIVE
LEUKOCYTE ESTERASE, URINE, POC: NEGATIVE
NITRITE, URINE, POC: NEGATIVE
PH, URINE, POC: 5.5 (ref 4.6–8)
PROTEIN,URINE, POC: NORMAL
SPECIFIC GRAVITY, URINE, POC: 1.03 (ref 1–1.03)
URINALYSIS CLARITY, POC: CLEAR
URINALYSIS COLOR, POC: YELLOW
UROBILINOGEN, POC: NORMAL

## 2023-11-20 PROCEDURE — 1123F ACP DISCUSS/DSCN MKR DOCD: CPT | Performed by: INTERNAL MEDICINE

## 2023-11-20 PROCEDURE — 81002 URINALYSIS NONAUTO W/O SCOPE: CPT | Performed by: INTERNAL MEDICINE

## 2023-11-20 PROCEDURE — G8417 CALC BMI ABV UP PARAM F/U: HCPCS | Performed by: INTERNAL MEDICINE

## 2023-11-20 PROCEDURE — G8427 DOCREV CUR MEDS BY ELIG CLIN: HCPCS | Performed by: INTERNAL MEDICINE

## 2023-11-20 PROCEDURE — G8399 PT W/DXA RESULTS DOCUMENT: HCPCS | Performed by: INTERNAL MEDICINE

## 2023-11-20 PROCEDURE — PBSHW AMB POC URINALYSIS DIP STICK MANUAL W/O MICRO: Performed by: INTERNAL MEDICINE

## 2023-11-20 PROCEDURE — 1036F TOBACCO NON-USER: CPT | Performed by: INTERNAL MEDICINE

## 2023-11-20 PROCEDURE — 87086 URINE CULTURE/COLONY COUNT: CPT

## 2023-11-20 PROCEDURE — 1090F PRES/ABSN URINE INCON ASSESS: CPT | Performed by: INTERNAL MEDICINE

## 2023-11-20 PROCEDURE — G8484 FLU IMMUNIZE NO ADMIN: HCPCS | Performed by: INTERNAL MEDICINE

## 2023-11-20 PROCEDURE — 99214 OFFICE O/P EST MOD 30 MIN: CPT | Performed by: INTERNAL MEDICINE

## 2023-11-20 PROCEDURE — 3017F COLORECTAL CA SCREEN DOC REV: CPT | Performed by: INTERNAL MEDICINE

## 2023-11-20 RX ORDER — METRONIDAZOLE 500 MG/1
500 TABLET ORAL 2 TIMES DAILY
Qty: 14 TABLET | Refills: 0 | Status: SHIPPED | OUTPATIENT
Start: 2023-11-20 | End: 2023-11-27

## 2023-11-20 RX ORDER — CIPROFLOXACIN 500 MG/1
500 TABLET, FILM COATED ORAL 2 TIMES DAILY
Qty: 14 TABLET | Refills: 0 | Status: SHIPPED | OUTPATIENT
Start: 2023-11-20 | End: 2023-11-27

## 2023-11-20 ASSESSMENT — PATIENT HEALTH QUESTIONNAIRE - PHQ9
SUM OF ALL RESPONSES TO PHQ9 QUESTIONS 1 & 2: 0
SUM OF ALL RESPONSES TO PHQ QUESTIONS 1-9: 0
1. LITTLE INTEREST OR PLEASURE IN DOING THINGS: 0
2. FEELING DOWN, DEPRESSED OR HOPELESS: 0

## 2023-11-20 ASSESSMENT — ENCOUNTER SYMPTOMS
ABDOMINAL PAIN: 1
SHORTNESS OF BREATH: 0
BLOOD IN STOOL: 0

## 2023-11-20 NOTE — PROGRESS NOTES
Abdominal Pain  Pertinent negatives include no dysuria, fever or hematuria. Ale Rodriguez is here with 1 week of bilateral lower quadrant abdominal pain. She also noticed malodorous urine. Has known diverticulosis, unaware of any bouts of diverticulitis. Has a history of nephrolithiasis. Pain comes and goes, but it is not colicky in nature. Past Medical History:   Diagnosis Date    Abnormal myocardial perfusion study 02/05/2014    Mod-sized, mild-mod mid lateral infarction w/mild-mod ninfa-infarct ischemia. Mod lateral hypk. EF 56%. Normal EKG on pharm stress test.  Intermediate risk. Age-related osteoporosis without current pathological fracture 4/5/2016    Arthritis     Benign positional vertigo 4/2/2021    CAD (coronary artery disease), native coronary artery 7/2014    MINERVA to LCx    Cancer [facility])     skin ca    Chronic cough 4/2/2021    Chronic rhinitis 4/2/2021    Dyslipidemia     Fatty pancreas 4/5/2016    On imaging    Hernia     History of echocardiogram 04/05/2016    EF 55-60%. No WMA. Gr 1 DDfx. Mod MR.       Nephrolithiasis     Thyroid disease         Past Surgical History:   Procedure Laterality Date    CARDIAC CATHETERIZATION      CHOLECYSTECTOMY      CORONARY ANGIOPLASTY WITH STENT PLACEMENT  07/07/2014    HERNIA REPAIR      LYSIS OF ADHESIONS      OTHER SURGICAL HISTORY  11/2018    repair of anal fissure    OVARY REMOVAL Right 12/05/2018    REMOVAL OF ANAL FISSURE  11/02/2018    REMOVAL OF KIDNEY STONE  12/28/2018    THYROID LOBECTOMY Right 2/20/2023    RIGHT THYROID LOBECTOMY,; 23HR performed by Sandra Yip MD at [facility] MAIN OR        Current Outpatient Medications   Medication Sig Dispense Refill    ciprofloxacin (CIPRO) 500 MG tablet Take 1 tablet by mouth 2 times daily for 7 days 14 tablet 0    metroNIDAZOLE (FLAGYL) 500 MG tablet Take 1 tablet by mouth 2 times daily for 7 days 14 tablet 0    levothyroxine (SYNTHROID) 50 MCG tablet Take 1 tablet by mouth daily 90 tablet 3

## 2023-11-21 ENCOUNTER — TELEPHONE (OUTPATIENT)
Age: 73
End: 2023-11-21

## 2023-11-21 ENCOUNTER — HOSPITAL ENCOUNTER (OUTPATIENT)
Facility: HOSPITAL | Age: 73
Discharge: HOME | End: 2023-11-24
Payer: MEDICARE

## 2023-11-21 LAB
BACTERIA SPEC CULT: NORMAL
BASOPHILS # BLD: 0.1 K/UL (ref 0–0.1)
BASOPHILS NFR BLD: 1 % (ref 0–2)
DIFFERENTIAL METHOD BLD: ABNORMAL
EOSINOPHIL # BLD: 0.4 K/UL (ref 0–0.4)
EOSINOPHIL NFR BLD: 4 % (ref 0–5)
ERYTHROCYTE [DISTWIDTH] IN BLOOD BY AUTOMATED COUNT: 13.2 % (ref 11.6–14.5)
HCT VFR BLD AUTO: 42.3 % (ref 35–45)
HGB BLD-MCNC: 13.7 G/DL (ref 12–16)
IMM GRANULOCYTES # BLD AUTO: 0 K/UL (ref 0–0.04)
IMM GRANULOCYTES NFR BLD AUTO: 0 % (ref 0–0.5)
LYMPHOCYTES # BLD: 1.3 K/UL (ref 0.9–3.6)
LYMPHOCYTES NFR BLD: 14 % (ref 21–52)
MCH RBC QN AUTO: 29.3 PG (ref 24–34)
MCHC RBC AUTO-ENTMCNC: 32.4 G/DL (ref 31–37)
MCV RBC AUTO: 90.4 FL (ref 78–100)
MONOCYTES # BLD: 0.6 K/UL (ref 0.05–1.2)
MONOCYTES NFR BLD: 7 % (ref 3–10)
NEUTS SEG # BLD: 6.6 K/UL (ref 1.8–8)
NEUTS SEG NFR BLD: 74 % (ref 40–73)
NRBC # BLD: 0 K/UL (ref 0–0.01)
NRBC BLD-RTO: 0 PER 100 WBC
PLATELET # BLD AUTO: 225 K/UL (ref 135–420)
PMV BLD AUTO: 11.5 FL (ref 9.2–11.8)
RBC # BLD AUTO: 4.68 M/UL (ref 4.2–5.3)
SERVICE CMNT-IMP: NORMAL
WBC # BLD AUTO: 8.8 K/UL (ref 4.6–13.2)

## 2023-11-21 PROCEDURE — 85025 COMPLETE CBC W/AUTO DIFF WBC: CPT

## 2023-11-21 PROCEDURE — 36415 COLL VENOUS BLD VENIPUNCTURE: CPT

## 2023-11-22 ENCOUNTER — HOSPITAL ENCOUNTER (EMERGENCY)
Facility: HOSPITAL | Age: 73
Discharge: HOME | End: 2023-11-22
Attending: STUDENT IN AN ORGANIZED HEALTH CARE EDUCATION/TRAINING PROGRAM
Payer: MEDICARE

## 2023-11-22 ENCOUNTER — TELEPHONE (OUTPATIENT)
Age: 73
End: 2023-11-22

## 2023-11-22 ENCOUNTER — APPOINTMENT (OUTPATIENT)
Facility: HOSPITAL | Age: 73
End: 2023-11-22
Payer: MEDICARE

## 2023-11-22 VITALS
HEIGHT: 62 IN | DIASTOLIC BLOOD PRESSURE: 59 MMHG | WEIGHT: 195.3 LBS | BODY MASS INDEX: 35.94 KG/M2 | TEMPERATURE: 97.3 F | RESPIRATION RATE: 18 BRPM | HEART RATE: 64 BPM | SYSTOLIC BLOOD PRESSURE: 128 MMHG | OXYGEN SATURATION: 95 %

## 2023-11-22 DIAGNOSIS — K57.92 ACUTE DIVERTICULITIS: Primary | ICD-10-CM

## 2023-11-22 LAB
ALBUMIN SERPL-MCNC: 3.8 G/DL (ref 3.4–5)
ALBUMIN/GLOB SERPL: 1 (ref 0.8–1.7)
ALP SERPL-CCNC: 112 U/L (ref 45–117)
ALT SERPL-CCNC: 21 U/L (ref 13–56)
ANION GAP SERPL CALC-SCNC: 7 MMOL/L (ref 3–18)
APPEARANCE UR: CLEAR
AST SERPL-CCNC: 28 U/L (ref 10–38)
BASOPHILS # BLD: 0 K/UL (ref 0–0.1)
BASOPHILS NFR BLD: 0 % (ref 0–2)
BILIRUB SERPL-MCNC: 0.8 MG/DL (ref 0.2–1)
BILIRUB UR QL: NEGATIVE
BUN SERPL-MCNC: 12 MG/DL (ref 7–18)
BUN/CREAT SERPL: 12 (ref 12–20)
CALCIUM SERPL-MCNC: 9.1 MG/DL (ref 8.5–10.1)
CHLORIDE SERPL-SCNC: 110 MMOL/L (ref 100–111)
CO2 SERPL-SCNC: 23 MMOL/L (ref 21–32)
COLOR UR: YELLOW
CREAT SERPL-MCNC: 0.98 MG/DL (ref 0.6–1.3)
DIFFERENTIAL METHOD BLD: ABNORMAL
EOSINOPHIL # BLD: 0.5 K/UL (ref 0–0.4)
EOSINOPHIL NFR BLD: 6 % (ref 0–5)
EPITH CASTS URNS QL MICRO: NORMAL /LPF (ref 0–5)
ERYTHROCYTE [DISTWIDTH] IN BLOOD BY AUTOMATED COUNT: 13.1 % (ref 11.6–14.5)
GLOBULIN SER CALC-MCNC: 3.7 G/DL (ref 2–4)
GLUCOSE SERPL-MCNC: 127 MG/DL (ref 74–99)
GLUCOSE UR STRIP.AUTO-MCNC: NEGATIVE MG/DL
HCT VFR BLD AUTO: 43.6 % (ref 35–45)
HGB BLD-MCNC: 14.2 G/DL (ref 12–16)
HGB UR QL STRIP: NEGATIVE
IMM GRANULOCYTES # BLD AUTO: 0.1 K/UL (ref 0–0.04)
IMM GRANULOCYTES NFR BLD AUTO: 1 % (ref 0–0.5)
KETONES UR QL STRIP.AUTO: NEGATIVE MG/DL
LEUKOCYTE ESTERASE UR QL STRIP.AUTO: NEGATIVE
LIPASE SERPL-CCNC: 17 U/L (ref 13–75)
LYMPHOCYTES # BLD: 1.6 K/UL (ref 0.9–3.6)
LYMPHOCYTES NFR BLD: 17 % (ref 21–52)
MCH RBC QN AUTO: 28.9 PG (ref 24–34)
MCHC RBC AUTO-ENTMCNC: 32.6 G/DL (ref 31–37)
MCV RBC AUTO: 88.6 FL (ref 78–100)
MONOCYTES # BLD: 0.6 K/UL (ref 0.05–1.2)
MONOCYTES NFR BLD: 7 % (ref 3–10)
NEUTS SEG # BLD: 6.3 K/UL (ref 1.8–8)
NEUTS SEG NFR BLD: 69 % (ref 40–73)
NITRITE UR QL STRIP.AUTO: NEGATIVE
NRBC # BLD: 0 K/UL (ref 0–0.01)
NRBC BLD-RTO: 0 PER 100 WBC
PH UR STRIP: 6 (ref 5–8)
PLATELET # BLD AUTO: 208 K/UL (ref 135–420)
PMV BLD AUTO: 10.8 FL (ref 9.2–11.8)
POTASSIUM SERPL-SCNC: 4.2 MMOL/L (ref 3.5–5.5)
PROT SERPL-MCNC: 7.5 G/DL (ref 6.4–8.2)
PROT UR STRIP-MCNC: 30 MG/DL
RBC # BLD AUTO: 4.92 M/UL (ref 4.2–5.3)
SODIUM SERPL-SCNC: 140 MMOL/L (ref 136–145)
SP GR UR REFRACTOMETRY: >1.03 (ref 1–1.03)
UROBILINOGEN UR QL STRIP.AUTO: 1 EU/DL (ref 0.2–1)
WBC # BLD AUTO: 9 K/UL (ref 4.6–13.2)
WBC URNS QL MICRO: NORMAL /HPF (ref 0–4)

## 2023-11-22 PROCEDURE — 6360000002 HC RX W HCPCS

## 2023-11-22 PROCEDURE — 83690 ASSAY OF LIPASE: CPT

## 2023-11-22 PROCEDURE — 99285 EMERGENCY DEPT VISIT HI MDM: CPT

## 2023-11-22 PROCEDURE — 96374 THER/PROPH/DIAG INJ IV PUSH: CPT

## 2023-11-22 PROCEDURE — 96375 TX/PRO/DX INJ NEW DRUG ADDON: CPT

## 2023-11-22 PROCEDURE — 80053 COMPREHEN METABOLIC PANEL: CPT

## 2023-11-22 PROCEDURE — 6360000004 HC RX CONTRAST MEDICATION

## 2023-11-22 PROCEDURE — 74177 CT ABD & PELVIS W/CONTRAST: CPT

## 2023-11-22 PROCEDURE — 6360000002 HC RX W HCPCS: Performed by: EMERGENCY MEDICINE

## 2023-11-22 PROCEDURE — 85025 COMPLETE CBC W/AUTO DIFF WBC: CPT

## 2023-11-22 PROCEDURE — 96376 TX/PRO/DX INJ SAME DRUG ADON: CPT

## 2023-11-22 PROCEDURE — 81001 URINALYSIS AUTO W/SCOPE: CPT

## 2023-11-22 RX ORDER — DICYCLOMINE HCL 20 MG
20 TABLET ORAL 3 TIMES DAILY PRN
Qty: 21 TABLET | Refills: 3 | Status: SHIPPED | OUTPATIENT
Start: 2023-11-22

## 2023-11-22 RX ORDER — MORPHINE SULFATE 2 MG/ML
2 INJECTION, SOLUTION INTRAMUSCULAR; INTRAVENOUS
Status: COMPLETED | OUTPATIENT
Start: 2023-11-22 | End: 2023-11-22

## 2023-11-22 RX ORDER — ONDANSETRON 4 MG/1
4 TABLET, ORALLY DISINTEGRATING ORAL EVERY 8 HOURS PRN
Qty: 21 TABLET | Refills: 0 | Status: SHIPPED | OUTPATIENT
Start: 2023-11-22

## 2023-11-22 RX ORDER — ONDANSETRON 2 MG/ML
4 INJECTION INTRAMUSCULAR; INTRAVENOUS
Status: COMPLETED | OUTPATIENT
Start: 2023-11-22 | End: 2023-11-22

## 2023-11-22 RX ADMIN — IOPAMIDOL 100 ML: 612 INJECTION, SOLUTION INTRAVENOUS at 17:14

## 2023-11-22 RX ADMIN — ONDANSETRON 4 MG: 2 INJECTION INTRAMUSCULAR; INTRAVENOUS at 16:29

## 2023-11-22 RX ADMIN — ONDANSETRON 4 MG: 2 INJECTION INTRAMUSCULAR; INTRAVENOUS at 20:09

## 2023-11-22 RX ADMIN — MORPHINE SULFATE 2 MG: 2 INJECTION, SOLUTION INTRAMUSCULAR; INTRAVENOUS at 16:50

## 2023-11-22 ASSESSMENT — LIFESTYLE VARIABLES
HOW MANY STANDARD DRINKS CONTAINING ALCOHOL DO YOU HAVE ON A TYPICAL DAY: PATIENT DOES NOT DRINK
HOW OFTEN DO YOU HAVE A DRINK CONTAINING ALCOHOL: NEVER

## 2023-11-22 ASSESSMENT — PAIN SCALES - GENERAL
PAINLEVEL_OUTOF10: 0
PAINLEVEL_OUTOF10: 4

## 2023-11-22 ASSESSMENT — ENCOUNTER SYMPTOMS
BACK PAIN: 0
DIARRHEA: 1
BLOOD IN STOOL: 0
CONSTIPATION: 0
CHEST TIGHTNESS: 0
SHORTNESS OF BREATH: 1
WHEEZING: 0
SORE THROAT: 0
NAUSEA: 1
COUGH: 1
ABDOMINAL DISTENTION: 0
VOMITING: 0
RHINORRHEA: 0
ABDOMINAL PAIN: 1

## 2023-11-22 ASSESSMENT — PAIN - FUNCTIONAL ASSESSMENT: PAIN_FUNCTIONAL_ASSESSMENT: 0-10

## 2023-11-22 ASSESSMENT — PAIN DESCRIPTION - ORIENTATION: ORIENTATION: LOWER

## 2023-11-22 ASSESSMENT — PAIN DESCRIPTION - LOCATION
LOCATION: ABDOMEN
LOCATION: ABDOMEN

## 2023-11-22 NOTE — TELEPHONE ENCOUNTER
----- Message from Kristan Cruz sent at 11/22/2023 11:11 AM EST -----  Subject: Message to Provider    QUESTIONS  Information for Provider? Patient said that one of the new antibiotics   that she is taking is making her nauseated. Can she take something else? Or what should she do? Please advise asap.  ---------------------------------------------------------------------------  --------------  Gabino Portillo INFO  [phone]; OK to leave message on voicemail  ---------------------------------------------------------------------------  --------------  SCRIPT ANSWERS  Relationship to Patient?  Self

## 2023-11-22 NOTE — ED NOTES
States feeling much better at this time. Pain free and nausea gone as wel. States feels ready to go home but asking if might have some nausea medication for home.       Shayan Aj RN  11/22/23 [id number]

## 2023-11-22 NOTE — ED NOTES
States nausea is relieved after medication given. Pain continues to lower abd at 4/10. Giving morphine as ordered for pain.       Maddie Amaya RN  11/22/23 [id number]

## 2023-11-22 NOTE — ED TRIAGE NOTES
Patient went to PCP on Monday with complaints of abdominal pain. MD ordered CIPRO and Flagyl for this patient. Today, patient is complaining of lower abdominal pain with nausea still.

## 2023-11-22 NOTE — ED PROVIDER NOTES
(H) 0.0 - 0.5 %    Neutrophils Absolute 6.3 1.8 - 8.0 K/UL    Lymphocytes Absolute 1.6 0.9 - 3.6 K/UL    Monocytes Absolute 0.6 0.05 - 1.2 K/UL    Eosinophils Absolute 0.5 (H) 0.0 - 0.4 K/UL    Basophils Absolute 0.0 0.0 - 0.1 K/UL    Absolute Immature Granulocyte 0.1 (H) 0.00 - 0.04 K/UL    Differential Type AUTOMATED     Comprehensive Metabolic Panel    Collection Time: 11/22/23  4:15 PM   Result Value Ref Range    Sodium 140 136 - 145 mmol/L    Potassium 4.2 3.5 - 5.5 mmol/L    Chloride 110 100 - 111 mmol/L    CO2 23 21 - 32 mmol/L    Anion Gap 7 3.0 - 18 mmol/L    Glucose 127 (H) 74 - 99 mg/dL    BUN 12 7.0 - 18 MG/DL    Creatinine 0.98 0.6 - 1.3 MG/DL    Bun/Cre Ratio 12 12 - 20      Est, Glom Filt Rate >60 >60 ml/min/1.73m2    Calcium 9.1 8.5 - 10.1 MG/DL    Total Bilirubin 0.8 0.2 - 1.0 MG/DL    ALT 21 13 - 56 U/L    AST 28 10 - 38 U/L    Alk Phosphatase 112 45 - 117 U/L    Total Protein 7.5 6.4 - 8.2 g/dL    Albumin 3.8 3.4 - 5.0 g/dL    Globulin 3.7 2.0 - 4.0 g/dL    Albumin/Globulin Ratio 1.0 0.8 - 1.7     Lipase    Collection Time: 11/22/23  4:15 PM   Result Value Ref Range    Lipase 17 13 - 75 U/L   Urinalysis    Collection Time: 11/22/23  6:35 PM   Result Value Ref Range    Color, UA YELLOW      Appearance CLEAR      Specific Gravity, UA >1.030 (H) 1.003 - 1.030    pH, Urine 6.0 5.0 - 8.0      Protein, UA 30 (A) NEG mg/dL    Glucose, UA Negative NEG mg/dL    Ketones, Urine Negative NEG mg/dL    Bilirubin Urine Negative NEG      Blood, Urine Negative NEG      Urobilinogen, Urine 1.0 0.2 - 1.0 EU/dL    Nitrite, Urine Negative NEG      Leukocyte Esterase, Urine Negative NEG     Urinalysis, Micro    Collection Time: 11/22/23  6:35 PM   Result Value Ref Range    WBC, UA 0 to 3 0 - 4 /hpf    Epithelial Cells UA 1+ 0 - 5 /lpf       Radiologic Studies -   CT ABDOMEN PELVIS W IV CONTRAST Additional Contrast? None   Final Result      Sigmoid diverticulosis and likely uncomplicated acute diverticulitis proximal

## 2023-11-22 NOTE — TELEPHONE ENCOUNTER
Patient c/o antibiotic causing some nausea , (cipro and flagyl.) Patient is asking if there is something that she can take to help with her nausea. Patient will be looking for a My Chart message.

## 2023-11-22 NOTE — TELEPHONE ENCOUNTER
----- Message from Dash Reyes sent at 11/22/2023 11:11 AM EST -----  Subject: Message to Provider    QUESTIONS  Information for Provider? Patient said that one of the new antibiotics   that she is taking is making her nauseated. Can she take something else? Or what should she do? Please advise asap.  ---------------------------------------------------------------------------  --------------  Saadia Sutherland INFO  [phone]; OK to leave message on voicemail  ---------------------------------------------------------------------------  --------------  SCRIPT ANSWERS  Relationship to Patient?  Self

## 2023-11-22 NOTE — TELEPHONE ENCOUNTER
Stop taking one of them for 24 hours and let me know if nausea improves so I can replace that 1. If nausea persist, restart the other antibiotic and stop the 1 that is probably causing her nausea.  Call and let me know what happens  on Friday

## 2023-11-23 NOTE — DISCHARGE INSTRUCTIONS
Complete to oral antibiotic course as prescribed. Also prescribe Zofran for nausea. Can take extra strength Tylenol, ibuprofen for pain relief.   Also prescribed Bentyl for abdominal pain

## 2023-11-23 NOTE — ED NOTES
Discharge instructions reviewed with patient. Patient verbalized understanding. Patient advised to follow up as directed on discharge instructions. Patient denies questions, needs or concerns at this time. Patient verbalized understanding. No s/sx of distress noted.        Dustin Mark RN  11/22/23 2013

## 2023-11-23 NOTE — PROGRESS NOTES
7:10 PM : Pt care transferred to me from Dr. Isaak Andrade  ,ED provider. History of patient complaint(s), available diagnostic reports and current treatment plan has been discussed thoroughly. Bedside rounding on patient occured : Yes . Intended disposition of patient : TBD  Pending diagnostics reports and/or labs (please list):   CT A/P result    CT shows uncomplicated diverticulitis and separate rectosigmoid fluid collection developing. Discussed case with general surgeon, Dr. Zulma Weir. Felt that she could likely continue the oral antibiotics and follow-up with him on Monday, November 27 at his clinic as patient has no SIRS criteria, pain and nausea controlled. Discussed return precautions with the patient. Almeda Felty, DO  Emergency Physician  [facility]

## 2023-11-27 ENCOUNTER — TELEPHONE (OUTPATIENT)
Age: 73
End: 2023-11-27

## 2023-11-27 NOTE — TELEPHONE ENCOUNTER
Spoke to Ms. Barrientos to schedule an appointment with Dr. Mari Phipps per [facility] ER referral for an evaluation of acute diverticulitis. Ms. Suzette Rosas declined to schedule an appointment stating she's planning to go see her PCP, Dr. Joaquin Bailey, first to see what she tells her after reviewing ER report.

## 2023-11-28 ENCOUNTER — OFFICE VISIT (OUTPATIENT)
Age: 73
End: 2023-11-28
Payer: MEDICARE

## 2023-11-28 VITALS
SYSTOLIC BLOOD PRESSURE: 102 MMHG | HEART RATE: 50 BPM | WEIGHT: 194 LBS | HEIGHT: 62 IN | DIASTOLIC BLOOD PRESSURE: 64 MMHG | OXYGEN SATURATION: 96 % | BODY MASS INDEX: 35.7 KG/M2

## 2023-11-28 DIAGNOSIS — E66.01 SEVERE OBESITY (BMI 35.0-39.9) WITH COMORBIDITY (HCC): ICD-10-CM

## 2023-11-28 DIAGNOSIS — I34.0 MITRAL VALVE INSUFFICIENCY, UNSPECIFIED ETIOLOGY: ICD-10-CM

## 2023-11-28 DIAGNOSIS — I25.10 CORONARY ARTERY DISEASE INVOLVING NATIVE CORONARY ARTERY OF NATIVE HEART WITHOUT ANGINA PECTORIS: ICD-10-CM

## 2023-11-28 DIAGNOSIS — E78.5 DYSLIPIDEMIA: ICD-10-CM

## 2023-11-28 DIAGNOSIS — I34.0 NON-RHEUMATIC MITRAL REGURGITATION: Primary | ICD-10-CM

## 2023-11-28 PROCEDURE — G8417 CALC BMI ABV UP PARAM F/U: HCPCS | Performed by: INTERNAL MEDICINE

## 2023-11-28 PROCEDURE — 93000 ELECTROCARDIOGRAM COMPLETE: CPT | Performed by: INTERNAL MEDICINE

## 2023-11-28 PROCEDURE — 1090F PRES/ABSN URINE INCON ASSESS: CPT | Performed by: INTERNAL MEDICINE

## 2023-11-28 PROCEDURE — G8427 DOCREV CUR MEDS BY ELIG CLIN: HCPCS | Performed by: INTERNAL MEDICINE

## 2023-11-28 PROCEDURE — 3017F COLORECTAL CA SCREEN DOC REV: CPT | Performed by: INTERNAL MEDICINE

## 2023-11-28 PROCEDURE — 1036F TOBACCO NON-USER: CPT | Performed by: INTERNAL MEDICINE

## 2023-11-28 PROCEDURE — 99214 OFFICE O/P EST MOD 30 MIN: CPT | Performed by: INTERNAL MEDICINE

## 2023-11-28 PROCEDURE — G8399 PT W/DXA RESULTS DOCUMENT: HCPCS | Performed by: INTERNAL MEDICINE

## 2023-11-28 PROCEDURE — G8484 FLU IMMUNIZE NO ADMIN: HCPCS | Performed by: INTERNAL MEDICINE

## 2023-11-28 PROCEDURE — 1123F ACP DISCUSS/DSCN MKR DOCD: CPT | Performed by: INTERNAL MEDICINE

## 2023-11-28 ASSESSMENT — ENCOUNTER SYMPTOMS
VOMITING: 0
SORE THROAT: 0
ABDOMINAL DISTENTION: 0
NAUSEA: 0
SHORTNESS OF BREATH: 1
COUGH: 0
ABDOMINAL PAIN: 0

## 2023-11-28 ASSESSMENT — PATIENT HEALTH QUESTIONNAIRE - PHQ9
SUM OF ALL RESPONSES TO PHQ QUESTIONS 1-9: 0
SUM OF ALL RESPONSES TO PHQ9 QUESTIONS 1 & 2: 0
SUM OF ALL RESPONSES TO PHQ QUESTIONS 1-9: 0
1. LITTLE INTEREST OR PLEASURE IN DOING THINGS: 0
SUM OF ALL RESPONSES TO PHQ QUESTIONS 1-9: 0
SUM OF ALL RESPONSES TO PHQ QUESTIONS 1-9: 0
2. FEELING DOWN, DEPRESSED OR HOPELESS: 0

## 2023-11-28 NOTE — PROGRESS NOTES
11/28/23     Ciara Logan  is a 74 y.o. female     Chief Complaint   Patient presents with    Follow-up     6 month f/u     Shortness of Breath     SOB with exertion        Shortness of Breath  Pertinent negatives include no abdominal pain, chest pain, fever, headaches, leg swelling, rash, sore throat or vomiting. Patient presents for a follow-up office visit. The patient had an episode of fairly significant back pain in January 2014 that awoke her from sleep, it radiated around to the front of her chest up into her neck and down both arms associated with tingling and numbness. She subsequently underwent a cardiac catheterization in February 2014, which showed a high-grade stenosis of her mid left circumflex extending into obtuse marginal branch 1. She underwent scheduled percutaneous coronary intervention over her left circumflex lesion with a drug-eluting stent In July 2014. She underwent a followup echocardiogram in April 2016 which showed overall improvement of her well ejection fraction of 55-60% without mention of any regional wall motion abnormalities. Her mitral regurgitation was in the moderate range, which may have been minimally increased compared to the prior study. Patient subsequently underwent another echocardiogram and pharmacologic nuclear stress test in November 2020 to evaluate increased dyspnea on exertion. Her echocardiogram was essentially unchanged showing preserved LV function, EF 60- 99%, diastolic dysfunction, mild left atrial enlargement, posterior prolapse of her mitral valve leaflet with moderate mitral regurgitation. Her nuclear stress test showed preserved LV function with a fixed inferolateral perfusion defect and hypokinesis of the inferolateral wall consistent with her known coronary anatomy.     She underwent a repeat echocardiogram in October 2023 which showed a normal LV function, EF 61% with lateral wall hypokinesis and a mildly dilated left

## 2023-11-28 NOTE — PROGRESS NOTES
Tanya Omalley presents today for   Chief Complaint   Patient presents with    Follow-up     6 month f/u     Shortness of Breath     SOB with exertion        Tanya Omalley preferred language for health care discussion is english/other. Is someone accompanying this pt? yes    Is the patient using any DME equipment during OV? no    Depression Screening:  Depression: Not at risk (11/28/2023)    PHQ-2     PHQ-2 Score: 0        Learning Assessment:  Who is the primary learner? Patient    What is the preferred language for health care of the primary learner? ENGLISH    How does the primary learner prefer to learn new concepts? DEMONSTRATION    Answered By patient    Relationship to Learner SELF           Pt currently taking Anticoagulant therapy? no    Pt currently taking Antiplatelet therapy ? ASA 81 mg 1x daily       Coordination of Care:  1. Have you been to the ER, urgent care clinic since your last visit? Hospitalized since your last visit? no    2. Have you seen or consulted any other health care providers outside of the [address] [place] [address] since your last visit? Include any pap smears or colon screening.  no

## 2023-12-04 ENCOUNTER — TELEPHONE (OUTPATIENT)
Age: 73
End: 2023-12-04

## 2023-12-04 ENCOUNTER — TELEMEDICINE (OUTPATIENT)
Age: 73
End: 2023-12-04
Payer: MEDICARE

## 2023-12-04 DIAGNOSIS — J06.9 ACUTE UPPER RESPIRATORY INFECTION: Primary | ICD-10-CM

## 2023-12-04 PROCEDURE — G8417 CALC BMI ABV UP PARAM F/U: HCPCS | Performed by: INTERNAL MEDICINE

## 2023-12-04 PROCEDURE — 99213 OFFICE O/P EST LOW 20 MIN: CPT | Performed by: INTERNAL MEDICINE

## 2023-12-04 PROCEDURE — G8427 DOCREV CUR MEDS BY ELIG CLIN: HCPCS | Performed by: INTERNAL MEDICINE

## 2023-12-04 PROCEDURE — 1036F TOBACCO NON-USER: CPT | Performed by: INTERNAL MEDICINE

## 2023-12-04 PROCEDURE — G8484 FLU IMMUNIZE NO ADMIN: HCPCS | Performed by: INTERNAL MEDICINE

## 2023-12-04 PROCEDURE — 1123F ACP DISCUSS/DSCN MKR DOCD: CPT | Performed by: INTERNAL MEDICINE

## 2023-12-04 PROCEDURE — G8399 PT W/DXA RESULTS DOCUMENT: HCPCS | Performed by: INTERNAL MEDICINE

## 2023-12-04 PROCEDURE — 3017F COLORECTAL CA SCREEN DOC REV: CPT | Performed by: INTERNAL MEDICINE

## 2023-12-04 PROCEDURE — 1090F PRES/ABSN URINE INCON ASSESS: CPT | Performed by: INTERNAL MEDICINE

## 2023-12-04 RX ORDER — HYDROCODONE POLISTIREX AND CHLORPHENIRAMINE POLISTIREX 10; 8 MG/5ML; MG/5ML
5 SUSPENSION, EXTENDED RELEASE ORAL EVERY 12 HOURS PRN
Qty: 240 ML | Refills: 0 | Status: ON HOLD | OUTPATIENT
Start: 2023-12-04 | End: 2023-12-28

## 2023-12-04 RX ORDER — AZITHROMYCIN 250 MG/1
250 TABLET, FILM COATED ORAL SEE ADMIN INSTRUCTIONS
Qty: 6 TABLET | Refills: 0 | Status: ON HOLD | OUTPATIENT
Start: 2023-12-04 | End: 2023-12-09

## 2023-12-04 ASSESSMENT — ENCOUNTER SYMPTOMS
NAUSEA: 0
SHORTNESS OF BREATH: 0
WHEEZING: 0
VOMITING: 0
SINUS PRESSURE: 1
DIARRHEA: 0

## 2023-12-04 NOTE — PROGRESS NOTES
Latasha Jackson, was evaluated through a synchronous (real-time) audio-video encounter. The patient (or guardian if applicable) is aware that this is a billable service, which includes applicable co-pays. This Virtual Visit was conducted with patient's (and/or legal guardian's) consent. Patient identification was verified, and a caregiver was present when appropriate. The patient was located at Home: [address] [address]  Provider was located at [facility] ([address]): [facility], [address],  [address]       Latasha Jackson (:  1950) is a Established patient, presenting virtually for evaluation of the following:    Assessment & Plan   Below is the assessment and plan developed based on review of pertinent history, physical exam, labs, studies, and medications. 1. Acute upper respiratory infection  Comments: With cough, facial pressure. Check for COVID, treat if positive. Tussionex sent, Advil eyes 97 of sinus infections are viral, no ABX at this , push fluids  Orders:  -     HYDROcodone-chlorpheniramine (TUSSIONEX) 10-8 MG/5ML SUER; Take 5 mLs by mouth every 12 hours as needed (cough, pain, congestion) for up to 24 days. Max Daily Amount: 10 mLs, Disp-240 mL, R-0Normal    No follow-ups on file. Subjective       HPI    Praveen Costello is seen virtually for 48 hours of cough and facial pressure. She is almost out of her Tussionex, which she is taking for cough and congestion. She thinks she has a sinus infection. There is no purulent discharge from her nose, no fever, and no purulent sputum. Discussed that at least 90% of sinus infections are viral in nature, and what is needed is symptom relief, rest, plenty of fluids. She did not check for COVID, and I asked her to do so and call us back with the results, since she would be a candidate for treatment. Review of Systems   Constitutional:  Negative for fever.    HENT:  Positive for sinus

## 2023-12-04 NOTE — TELEPHONE ENCOUNTER
Patient called in this afternoon with complaint of temperature up to 103. 6. Patient complaining of significant cough even after starting the Tussionex that she was prescribed today from Dr. Zonia Wall. Cough is still nonproductive and patient had initial negative COVID test today as documented. Patient asking for an antibiotic and is aware that this is most likely viral infection. She is aware to go to the emergency room if she has any issues with shortness of breath or progressive wheezing. Will send in [address] to be used if cough becomes productive.

## 2023-12-08 ENCOUNTER — APPOINTMENT (OUTPATIENT)
Facility: HOSPITAL | Age: 73
DRG: 870 | End: 2023-12-08
Payer: MEDICARE

## 2023-12-08 ENCOUNTER — HOSPITAL ENCOUNTER (INPATIENT)
Facility: HOSPITAL | Age: 73
LOS: 13 days | DRG: 870 | End: 2023-12-21
Attending: EMERGENCY MEDICINE | Admitting: INTERNAL MEDICINE
Payer: MEDICARE

## 2023-12-08 DIAGNOSIS — J18.9 PNEUMONIA OF RIGHT LUNG DUE TO INFECTIOUS ORGANISM, UNSPECIFIED PART OF LUNG: ICD-10-CM

## 2023-12-08 DIAGNOSIS — J96.01 ACUTE RESPIRATORY FAILURE WITH HYPOXIA (HCC): Primary | ICD-10-CM

## 2023-12-08 DIAGNOSIS — I48.0 PAF (PAROXYSMAL ATRIAL FIBRILLATION) (HCC): ICD-10-CM

## 2023-12-08 DIAGNOSIS — J81.0 ACUTE PULMONARY EDEMA (HCC): ICD-10-CM

## 2023-12-08 DIAGNOSIS — I48.91 ATRIAL FIBRILLATION, NEW ONSET (HCC): ICD-10-CM

## 2023-12-08 DIAGNOSIS — I48.91 ATRIAL FIBRILLATION WITH RVR (HCC): ICD-10-CM

## 2023-12-08 PROBLEM — H81.10 BPPV (BENIGN PAROXYSMAL POSITIONAL VERTIGO): Status: ACTIVE | Noted: 2023-12-08

## 2023-12-08 PROBLEM — E66.9 CLASS 2 OBESITY IN ADULT: Status: ACTIVE | Noted: 2018-08-21

## 2023-12-08 PROBLEM — E03.9 HYPOTHYROIDISM: Chronic | Status: ACTIVE | Noted: 2023-12-08

## 2023-12-08 PROBLEM — E66.9 CLASS 2 OBESITY IN ADULT: Chronic | Status: ACTIVE | Noted: 2018-08-21

## 2023-12-08 PROBLEM — H81.10 BPPV (BENIGN PAROXYSMAL POSITIONAL VERTIGO): Chronic | Status: ACTIVE | Noted: 2023-12-08

## 2023-12-08 PROBLEM — E03.9 HYPOTHYROIDISM: Status: ACTIVE | Noted: 2023-12-08

## 2023-12-08 PROBLEM — J96.00 ACUTE RESPIRATORY FAILURE (HCC): Status: ACTIVE | Noted: 2023-12-08

## 2023-12-08 LAB
ANION GAP SERPL CALC-SCNC: 6 MMOL/L (ref 3–18)
BASOPHILS # BLD: 0 K/UL (ref 0–0.1)
BASOPHILS NFR BLD: 0 % (ref 0–2)
BUN SERPL-MCNC: 20 MG/DL (ref 7–18)
BUN/CREAT SERPL: 13 (ref 12–20)
CALCIUM SERPL-MCNC: 8.5 MG/DL (ref 8.5–10.1)
CHLORIDE SERPL-SCNC: 102 MMOL/L (ref 100–111)
CO2 SERPL-SCNC: 27 MMOL/L (ref 21–32)
CREAT SERPL-MCNC: 1.52 MG/DL (ref 0.6–1.3)
DIFFERENTIAL METHOD BLD: ABNORMAL
EOSINOPHIL # BLD: 0 K/UL (ref 0–0.4)
EOSINOPHIL NFR BLD: 0 % (ref 0–5)
ERYTHROCYTE [DISTWIDTH] IN BLOOD BY AUTOMATED COUNT: 13.4 % (ref 11.6–14.5)
GLUCOSE SERPL-MCNC: 147 MG/DL (ref 74–99)
HCT VFR BLD AUTO: 43.6 % (ref 35–45)
HGB BLD-MCNC: 14.4 G/DL (ref 12–16)
IMM GRANULOCYTES # BLD AUTO: 0 K/UL (ref 0–0.04)
IMM GRANULOCYTES NFR BLD AUTO: 0 % (ref 0–0.5)
LYMPHOCYTES # BLD: 1 K/UL (ref 0.9–3.6)
LYMPHOCYTES NFR BLD: 21 % (ref 21–52)
MAGNESIUM SERPL-MCNC: 1.9 MG/DL (ref 1.6–2.6)
MCH RBC QN AUTO: 28.9 PG (ref 24–34)
MCHC RBC AUTO-ENTMCNC: 33 G/DL (ref 31–37)
MCV RBC AUTO: 87.4 FL (ref 78–100)
MONOCYTES # BLD: 0.2 K/UL (ref 0.05–1.2)
MONOCYTES NFR BLD: 5 % (ref 3–10)
NEUTS SEG # BLD: 3.5 K/UL (ref 1.8–8)
NEUTS SEG NFR BLD: 74 % (ref 40–73)
NRBC # BLD: 0 K/UL (ref 0–0.01)
NRBC BLD-RTO: 0 PER 100 WBC
PLATELET # BLD AUTO: 125 K/UL (ref 135–420)
PMV BLD AUTO: 11.3 FL (ref 9.2–11.8)
POTASSIUM SERPL-SCNC: 3.3 MMOL/L (ref 3.5–5.5)
RBC # BLD AUTO: 4.99 M/UL (ref 4.2–5.3)
SODIUM SERPL-SCNC: 135 MMOL/L (ref 136–145)
TROPONIN I SERPL HS-MCNC: 101 NG/L (ref 0–54)
TROPONIN I SERPL HS-MCNC: 113 NG/L (ref 0–54)
WBC # BLD AUTO: 4.7 K/UL (ref 4.6–13.2)

## 2023-12-08 PROCEDURE — 96374 THER/PROPH/DIAG INJ IV PUSH: CPT

## 2023-12-08 PROCEDURE — 6370000000 HC RX 637 (ALT 250 FOR IP): Performed by: EMERGENCY MEDICINE

## 2023-12-08 PROCEDURE — 83735 ASSAY OF MAGNESIUM: CPT

## 2023-12-08 PROCEDURE — 71046 X-RAY EXAM CHEST 2 VIEWS: CPT

## 2023-12-08 PROCEDURE — 93005 ELECTROCARDIOGRAM TRACING: CPT | Performed by: EMERGENCY MEDICINE

## 2023-12-08 PROCEDURE — 6360000002 HC RX W HCPCS: Performed by: EMERGENCY MEDICINE

## 2023-12-08 PROCEDURE — 6370000000 HC RX 637 (ALT 250 FOR IP): Performed by: INTERNAL MEDICINE

## 2023-12-08 PROCEDURE — 84484 ASSAY OF TROPONIN QUANT: CPT

## 2023-12-08 PROCEDURE — 80048 BASIC METABOLIC PNL TOTAL CA: CPT

## 2023-12-08 PROCEDURE — 99223 1ST HOSP IP/OBS HIGH 75: CPT | Performed by: INTERNAL MEDICINE

## 2023-12-08 PROCEDURE — 2580000003 HC RX 258: Performed by: INTERNAL MEDICINE

## 2023-12-08 PROCEDURE — 99285 EMERGENCY DEPT VISIT HI MDM: CPT

## 2023-12-08 PROCEDURE — 2580000003 HC RX 258: Performed by: EMERGENCY MEDICINE

## 2023-12-08 PROCEDURE — 85025 COMPLETE CBC W/AUTO DIFF WBC: CPT

## 2023-12-08 PROCEDURE — 1100000003 HC PRIVATE W/ TELEMETRY

## 2023-12-08 RX ORDER — MONTELUKAST SODIUM 10 MG/1
10 TABLET ORAL DAILY
Status: DISCONTINUED | OUTPATIENT
Start: 2023-12-09 | End: 2023-12-21

## 2023-12-08 RX ORDER — ENOXAPARIN SODIUM 100 MG/ML
40 INJECTION SUBCUTANEOUS DAILY
Status: DISCONTINUED | OUTPATIENT
Start: 2023-12-09 | End: 2023-12-09

## 2023-12-08 RX ORDER — CHOLECALCIFEROL (VITAMIN D3) 125 MCG
5 CAPSULE ORAL NIGHTLY PRN
Status: DISCONTINUED | OUTPATIENT
Start: 2023-12-08 | End: 2023-12-11

## 2023-12-08 RX ORDER — VITAMIN B COMPLEX
2000 TABLET ORAL DAILY
Status: DISCONTINUED | OUTPATIENT
Start: 2023-12-09 | End: 2023-12-21

## 2023-12-08 RX ORDER — EZETIMIBE 10 MG/1
10 TABLET ORAL DAILY
Status: DISCONTINUED | OUTPATIENT
Start: 2023-12-09 | End: 2023-12-21

## 2023-12-08 RX ORDER — LEVOTHYROXINE SODIUM 0.05 MG/1
50 TABLET ORAL DAILY
Status: DISCONTINUED | OUTPATIENT
Start: 2023-12-09 | End: 2023-12-21

## 2023-12-08 RX ORDER — PANTOPRAZOLE SODIUM 40 MG/1
40 TABLET, DELAYED RELEASE ORAL DAILY
Status: DISCONTINUED | OUTPATIENT
Start: 2023-12-09 | End: 2023-12-11

## 2023-12-08 RX ORDER — ACETAMINOPHEN 650 MG/1
650 SUPPOSITORY RECTAL EVERY 6 HOURS PRN
Status: DISCONTINUED | OUTPATIENT
Start: 2023-12-08 | End: 2023-12-21

## 2023-12-08 RX ORDER — MAGNESIUM SULFATE IN WATER 40 MG/ML
2000 INJECTION, SOLUTION INTRAVENOUS PRN
Status: DISCONTINUED | OUTPATIENT
Start: 2023-12-08 | End: 2023-12-11

## 2023-12-08 RX ORDER — GUAIFENESIN 600 MG/1
600 TABLET, EXTENDED RELEASE ORAL 2 TIMES DAILY
Status: DISCONTINUED | OUTPATIENT
Start: 2023-12-08 | End: 2023-12-21

## 2023-12-08 RX ORDER — ROSUVASTATIN CALCIUM 20 MG/1
40 TABLET, COATED ORAL DAILY
Status: DISCONTINUED | OUTPATIENT
Start: 2023-12-09 | End: 2023-12-10

## 2023-12-08 RX ORDER — LEVOFLOXACIN 5 MG/ML
750 INJECTION, SOLUTION INTRAVENOUS
Status: DISCONTINUED | OUTPATIENT
Start: 2023-12-08 | End: 2023-12-11

## 2023-12-08 RX ORDER — TRAMADOL HYDROCHLORIDE 50 MG/1
50 TABLET ORAL EVERY 6 HOURS PRN
Status: DISCONTINUED | OUTPATIENT
Start: 2023-12-08 | End: 2023-12-11

## 2023-12-08 RX ORDER — ACETAMINOPHEN 325 MG/1
650 TABLET ORAL EVERY 6 HOURS PRN
Status: DISCONTINUED | OUTPATIENT
Start: 2023-12-08 | End: 2023-12-21

## 2023-12-08 RX ORDER — SODIUM CHLORIDE 0.9 % (FLUSH) 0.9 %
5-40 SYRINGE (ML) INJECTION EVERY 12 HOURS SCHEDULED
Status: DISCONTINUED | OUTPATIENT
Start: 2023-12-08 | End: 2023-12-21

## 2023-12-08 RX ORDER — ASPIRIN 81 MG/1
162 TABLET, CHEWABLE ORAL DAILY
Status: DISCONTINUED | OUTPATIENT
Start: 2023-12-09 | End: 2023-12-10

## 2023-12-08 RX ORDER — VANCOMYCIN 1.75 G/350ML
1250 INJECTION, SOLUTION INTRAVENOUS
Status: COMPLETED | OUTPATIENT
Start: 2023-12-08 | End: 2023-12-08

## 2023-12-08 RX ORDER — METOPROLOL SUCCINATE 50 MG/1
50 TABLET, EXTENDED RELEASE ORAL DAILY
Status: DISCONTINUED | OUTPATIENT
Start: 2023-12-09 | End: 2023-12-21

## 2023-12-08 RX ORDER — 0.9 % SODIUM CHLORIDE 0.9 %
1000 INTRAVENOUS SOLUTION INTRAVENOUS ONCE
Status: COMPLETED | OUTPATIENT
Start: 2023-12-08 | End: 2023-12-08

## 2023-12-08 RX ORDER — ONDANSETRON 2 MG/ML
4 INJECTION INTRAMUSCULAR; INTRAVENOUS
Status: COMPLETED | OUTPATIENT
Start: 2023-12-08 | End: 2023-12-08

## 2023-12-08 RX ORDER — SODIUM CHLORIDE 9 MG/ML
INJECTION, SOLUTION INTRAVENOUS PRN
Status: DISCONTINUED | OUTPATIENT
Start: 2023-12-08 | End: 2023-12-21

## 2023-12-08 RX ORDER — ONDANSETRON 2 MG/ML
4 INJECTION INTRAMUSCULAR; INTRAVENOUS EVERY 6 HOURS PRN
Status: DISCONTINUED | OUTPATIENT
Start: 2023-12-08 | End: 2023-12-21

## 2023-12-08 RX ORDER — METHYLPREDNISOLONE 4 MG/1
4 TABLET ORAL SEE ADMIN INSTRUCTIONS
COMMUNITY
Start: 2023-12-05

## 2023-12-08 RX ORDER — POTASSIUM CHLORIDE 20 MEQ/1
40 TABLET, EXTENDED RELEASE ORAL PRN
Status: DISCONTINUED | OUTPATIENT
Start: 2023-12-08 | End: 2023-12-11

## 2023-12-08 RX ORDER — ONDANSETRON 4 MG/1
4 TABLET, ORALLY DISINTEGRATING ORAL EVERY 8 HOURS PRN
Status: DISCONTINUED | OUTPATIENT
Start: 2023-12-08 | End: 2023-12-21

## 2023-12-08 RX ORDER — IPRATROPIUM BROMIDE AND ALBUTEROL SULFATE 2.5; .5 MG/3ML; MG/3ML
1 SOLUTION RESPIRATORY (INHALATION) EVERY 4 HOURS PRN
Status: DISCONTINUED | OUTPATIENT
Start: 2023-12-08 | End: 2023-12-21

## 2023-12-08 RX ORDER — POTASSIUM CHLORIDE 7.45 MG/ML
10 INJECTION INTRAVENOUS PRN
Status: DISCONTINUED | OUTPATIENT
Start: 2023-12-08 | End: 2023-12-11

## 2023-12-08 RX ORDER — SODIUM CHLORIDE 0.9 % (FLUSH) 0.9 %
5-40 SYRINGE (ML) INJECTION PRN
Status: DISCONTINUED | OUTPATIENT
Start: 2023-12-08 | End: 2023-12-21

## 2023-12-08 RX ORDER — POLYETHYLENE GLYCOL 3350 17 G/17G
17 POWDER, FOR SOLUTION ORAL DAILY PRN
Status: DISCONTINUED | OUTPATIENT
Start: 2023-12-08 | End: 2023-12-21

## 2023-12-08 RX ADMIN — SODIUM CHLORIDE, PRESERVATIVE FREE 10 ML: 5 INJECTION INTRAVENOUS at 22:49

## 2023-12-08 RX ADMIN — SODIUM CHLORIDE 1000 ML: 9 INJECTION, SOLUTION INTRAVENOUS at 16:42

## 2023-12-08 RX ADMIN — ACETAMINOPHEN 325MG 650 MG: 325 TABLET ORAL at 23:47

## 2023-12-08 RX ADMIN — ONDANSETRON 4 MG: 2 INJECTION INTRAMUSCULAR; INTRAVENOUS at 19:00

## 2023-12-08 RX ADMIN — GUAIFENESIN 600 MG: 600 TABLET, EXTENDED RELEASE ORAL at 22:49

## 2023-12-08 RX ADMIN — PIPERACILLIN AND TAZOBACTAM 3375 MG: 3; .375 INJECTION, POWDER, FOR SOLUTION INTRAVENOUS at 19:52

## 2023-12-08 RX ADMIN — POTASSIUM BICARBONATE 25 MEQ: 977.5 TABLET, EFFERVESCENT ORAL at 19:01

## 2023-12-08 RX ADMIN — VANCOMYCIN 1250 MG: 1.75 INJECTION, SOLUTION INTRAVENOUS at 20:30

## 2023-12-08 ASSESSMENT — PAIN SCALES - GENERAL: PAINLEVEL_OUTOF10: 0

## 2023-12-08 NOTE — ED PROVIDER NOTES
[facility] EMERGENCY DEPT  EMERGENCY DEPARTMENT ENCOUNTER    Patient Name: Radha Stewart  MRN: [id number]  YOB: 1950  Provider: Jonathan Harrington MD  PCP: Vesta Villa MD   Time/Date of evaluation: 3:42 PM EST on 12/8/23    History of Presenting Illness     Chief Complaint   Patient presents with    Nausea    Diarrhea    Influenza       History Provided By: Patient     History Kin Christie):   Radha Stewart is a 74 y.o. female who presents to the emergency department by EMS alert and oriented in no acute distress. States she was in the hospital last week diagnosed with the flu on Tuesday. Since then she has not been able to eat or drink normally due to nausea. This morning she woke up and started experiencing diarrhea as well does not feel good her SpO2 on room air for EMS was in the 80s but she denied having any shortness of breath at the time. She responded to 2 or 3 L by nasal cannula and came up to 96%. She feels generally weak and is concerned she is going to fall. Nursing Notes were all reviewed and agreed with or any disagreements were addressed in the HPI. Past History     Past Medical History:  Past Medical History:   Diagnosis Date    Abnormal myocardial perfusion study 02/05/2014    Mod-sized, mild-mod mid lateral infarction w/mild-mod ninfa-infarct ischemia. Mod lateral hypk. EF 56%. Normal EKG on pharm stress test.  Intermediate risk. Age-related osteoporosis without current pathological fracture 4/5/2016    Arthritis     Benign positional vertigo 4/2/2021    CAD (coronary artery disease), native coronary artery 7/2014    MINERVA to LCx    Cancer [facility])     skin ca    Chronic cough 4/2/2021    Chronic rhinitis 4/2/2021    Dyslipidemia     Fatty pancreas 4/5/2016    On imaging    Hernia     History of echocardiogram 04/05/2016    EF 55-60%. No WMA. Gr 1 DDfx. Mod MR.       Nephrolithiasis     Thyroid disease        Past Surgical History:  Past Surgical History:

## 2023-12-08 NOTE — ED TRIAGE NOTES
74 yrs old female. Patient arrived by EMS AxO4 in no distress. She stated that she was at the hospital last week and was diagnosed with the flu. Since then she has not been drinking or eating normally due to nausea. This morning she stated when she awoke she started experiencing diarrhea and doesn't feel well. EMS stated he room air SPO2 was in the mid 80's however she denied any SoB. They put her on 2LPM NC and it came up to 96 percent. No other medical complaints were noted at this time.

## 2023-12-09 PROBLEM — I48.91 ATRIAL FIBRILLATION, NEW ONSET (HCC): Status: ACTIVE | Noted: 2023-12-09

## 2023-12-09 PROBLEM — R79.89 ELEVATED TROPONIN: Status: ACTIVE | Noted: 2023-12-09

## 2023-12-09 PROBLEM — I48.91 ATRIAL FIBRILLATION WITH RVR (HCC): Status: ACTIVE | Noted: 2023-12-09

## 2023-12-09 LAB
ALBUMIN SERPL-MCNC: 2.6 G/DL (ref 3.4–5)
ALBUMIN/GLOB SERPL: 0.7 (ref 0.8–1.7)
ALP SERPL-CCNC: 117 U/L (ref 45–117)
ALT SERPL-CCNC: 34 U/L (ref 13–56)
ANION GAP SERPL CALC-SCNC: 7 MMOL/L (ref 3–18)
ANION GAP SERPL CALC-SCNC: 7 MMOL/L (ref 3–18)
AST SERPL-CCNC: 88 U/L (ref 10–38)
BASOPHILS # BLD: 0 K/UL (ref 0–0.1)
BASOPHILS NFR BLD: 0 % (ref 0–2)
BILIRUB SERPL-MCNC: 0.7 MG/DL (ref 0.2–1)
BUN SERPL-MCNC: 19 MG/DL (ref 7–18)
BUN SERPL-MCNC: 21 MG/DL (ref 7–18)
BUN/CREAT SERPL: 14 (ref 12–20)
BUN/CREAT SERPL: 15 (ref 12–20)
CALCIUM SERPL-MCNC: 8.4 MG/DL (ref 8.5–10.1)
CALCIUM SERPL-MCNC: 8.6 MG/DL (ref 8.5–10.1)
CHLORIDE SERPL-SCNC: 106 MMOL/L (ref 100–111)
CHLORIDE SERPL-SCNC: 107 MMOL/L (ref 100–111)
CO2 SERPL-SCNC: 24 MMOL/L (ref 21–32)
CO2 SERPL-SCNC: 24 MMOL/L (ref 21–32)
CREAT SERPL-MCNC: 1.35 MG/DL (ref 0.6–1.3)
CREAT SERPL-MCNC: 1.39 MG/DL (ref 0.6–1.3)
DIFFERENTIAL METHOD BLD: ABNORMAL
EKG ATRIAL RATE: 394 BPM
EKG ATRIAL RATE: 76 BPM
EKG DIAGNOSIS: NORMAL
EKG DIAGNOSIS: NORMAL
EKG P-R INTERVAL: 144 MS
EKG Q-T INTERVAL: 312 MS
EKG Q-T INTERVAL: 374 MS
EKG QRS DURATION: 86 MS
EKG QRS DURATION: 88 MS
EKG QTC CALCULATION (BAZETT): 420 MS
EKG QTC CALCULATION (BAZETT): 462 MS
EKG R AXIS: -13 DEGREES
EKG R AXIS: 196 DEGREES
EKG T AXIS: 123 DEGREES
EKG T AXIS: 34 DEGREES
EKG VENTRICULAR RATE: 132 BPM
EKG VENTRICULAR RATE: 76 BPM
EOSINOPHIL # BLD: 0 K/UL (ref 0–0.4)
EOSINOPHIL NFR BLD: 0 % (ref 0–5)
ERYTHROCYTE [DISTWIDTH] IN BLOOD BY AUTOMATED COUNT: 13.2 % (ref 11.6–14.5)
GLOBULIN SER CALC-MCNC: 3.5 G/DL (ref 2–4)
GLUCOSE BLD STRIP.AUTO-MCNC: 118 MG/DL (ref 70–110)
GLUCOSE BLD STRIP.AUTO-MCNC: 125 MG/DL (ref 70–110)
GLUCOSE BLD STRIP.AUTO-MCNC: 219 MG/DL (ref 70–110)
GLUCOSE SERPL-MCNC: 104 MG/DL (ref 74–99)
GLUCOSE SERPL-MCNC: 105 MG/DL (ref 74–99)
HCT VFR BLD AUTO: 38.8 % (ref 35–45)
HGB BLD-MCNC: 12.9 G/DL (ref 12–16)
IMM GRANULOCYTES # BLD AUTO: 0 K/UL (ref 0–0.04)
IMM GRANULOCYTES NFR BLD AUTO: 1 % (ref 0–0.5)
LYMPHOCYTES # BLD: 1.2 K/UL (ref 0.9–3.6)
LYMPHOCYTES NFR BLD: 28 % (ref 21–52)
MAGNESIUM SERPL-MCNC: 1.8 MG/DL (ref 1.6–2.6)
MCH RBC QN AUTO: 29.1 PG (ref 24–34)
MCHC RBC AUTO-ENTMCNC: 33.2 G/DL (ref 31–37)
MCV RBC AUTO: 87.4 FL (ref 78–100)
MONOCYTES # BLD: 0.2 K/UL (ref 0.05–1.2)
MONOCYTES NFR BLD: 4 % (ref 3–10)
NEUTS SEG # BLD: 3 K/UL (ref 1.8–8)
NEUTS SEG NFR BLD: 68 % (ref 40–73)
NRBC # BLD: 0 K/UL (ref 0–0.01)
NRBC BLD-RTO: 0 PER 100 WBC
PLATELET # BLD AUTO: 107 K/UL (ref 135–420)
PMV BLD AUTO: 11.1 FL (ref 9.2–11.8)
POTASSIUM SERPL-SCNC: 3.4 MMOL/L (ref 3.5–5.5)
POTASSIUM SERPL-SCNC: 3.4 MMOL/L (ref 3.5–5.5)
PROT SERPL-MCNC: 6.1 G/DL (ref 6.4–8.2)
RBC # BLD AUTO: 4.44 M/UL (ref 4.2–5.3)
SODIUM SERPL-SCNC: 137 MMOL/L (ref 136–145)
SODIUM SERPL-SCNC: 138 MMOL/L (ref 136–145)
T4 FREE SERPL-MCNC: 1.4 NG/DL (ref 0.7–1.5)
TROPONIN I SERPL HS-MCNC: 127 NG/L (ref 0–54)
TROPONIN I SERPL HS-MCNC: 94 NG/L (ref 0–54)
TSH SERPL DL<=0.05 MIU/L-ACNC: 0.45 UIU/ML (ref 0.36–3.74)
WBC # BLD AUTO: 4.4 K/UL (ref 4.6–13.2)

## 2023-12-09 PROCEDURE — 2500000003 HC RX 250 WO HCPCS: Performed by: INTERNAL MEDICINE

## 2023-12-09 PROCEDURE — 83735 ASSAY OF MAGNESIUM: CPT

## 2023-12-09 PROCEDURE — 6370000000 HC RX 637 (ALT 250 FOR IP): Performed by: INTERNAL MEDICINE

## 2023-12-09 PROCEDURE — 93005 ELECTROCARDIOGRAM TRACING: CPT | Performed by: INTERNAL MEDICINE

## 2023-12-09 PROCEDURE — 36415 COLL VENOUS BLD VENIPUNCTURE: CPT

## 2023-12-09 PROCEDURE — 99222 1ST HOSP IP/OBS MODERATE 55: CPT | Performed by: INTERNAL MEDICINE

## 2023-12-09 PROCEDURE — 1100000003 HC PRIVATE W/ TELEMETRY

## 2023-12-09 PROCEDURE — 85025 COMPLETE CBC W/AUTO DIFF WBC: CPT

## 2023-12-09 PROCEDURE — 92610 EVALUATE SWALLOWING FUNCTION: CPT

## 2023-12-09 PROCEDURE — 94761 N-INVAS EAR/PLS OXIMETRY MLT: CPT

## 2023-12-09 PROCEDURE — 84443 ASSAY THYROID STIM HORMONE: CPT

## 2023-12-09 PROCEDURE — 6360000002 HC RX W HCPCS: Performed by: HOSPITALIST

## 2023-12-09 PROCEDURE — 2580000003 HC RX 258: Performed by: HOSPITALIST

## 2023-12-09 PROCEDURE — 2580000003 HC RX 258: Performed by: INTERNAL MEDICINE

## 2023-12-09 PROCEDURE — 84484 ASSAY OF TROPONIN QUANT: CPT

## 2023-12-09 PROCEDURE — 84439 ASSAY OF FREE THYROXINE: CPT

## 2023-12-09 PROCEDURE — 93010 ELECTROCARDIOGRAM REPORT: CPT | Performed by: INTERNAL MEDICINE

## 2023-12-09 PROCEDURE — 6370000000 HC RX 637 (ALT 250 FOR IP): Performed by: HOSPITALIST

## 2023-12-09 PROCEDURE — 97165 OT EVAL LOW COMPLEX 30 MIN: CPT

## 2023-12-09 PROCEDURE — 82962 GLUCOSE BLOOD TEST: CPT

## 2023-12-09 PROCEDURE — 6360000002 HC RX W HCPCS: Performed by: INTERNAL MEDICINE

## 2023-12-09 PROCEDURE — 99232 SBSQ HOSP IP/OBS MODERATE 35: CPT | Performed by: HOSPITALIST

## 2023-12-09 PROCEDURE — 80053 COMPREHEN METABOLIC PANEL: CPT

## 2023-12-09 RX ORDER — INSULIN LISPRO 100 [IU]/ML
0-8 INJECTION, SOLUTION INTRAVENOUS; SUBCUTANEOUS
Status: DISCONTINUED | OUTPATIENT
Start: 2023-12-09 | End: 2023-12-13

## 2023-12-09 RX ORDER — POTASSIUM CHLORIDE 20 MEQ/1
40 TABLET, EXTENDED RELEASE ORAL ONCE
Status: COMPLETED | OUTPATIENT
Start: 2023-12-09 | End: 2023-12-09

## 2023-12-09 RX ORDER — LANOLIN ALCOHOL/MO/W.PET/CERES
400 CREAM (GRAM) TOPICAL DAILY
Status: DISCONTINUED | OUTPATIENT
Start: 2023-12-09 | End: 2023-12-11

## 2023-12-09 RX ORDER — INSULIN LISPRO 100 [IU]/ML
0-4 INJECTION, SOLUTION INTRAVENOUS; SUBCUTANEOUS NIGHTLY
Status: DISCONTINUED | OUTPATIENT
Start: 2023-12-09 | End: 2023-12-13

## 2023-12-09 RX ADMIN — Medication 2000 UNITS: at 10:06

## 2023-12-09 RX ADMIN — ROSUVASTATIN CALCIUM 40 MG: 20 TABLET, FILM COATED ORAL at 10:00

## 2023-12-09 RX ADMIN — LEVOTHYROXINE SODIUM 50 MCG: 0.05 TABLET ORAL at 06:13

## 2023-12-09 RX ADMIN — APIXABAN 5 MG: 5 TABLET, FILM COATED ORAL at 20:42

## 2023-12-09 RX ADMIN — ENOXAPARIN SODIUM 40 MG: 100 INJECTION SUBCUTANEOUS at 10:01

## 2023-12-09 RX ADMIN — SODIUM CHLORIDE 5 MG/HR: 900 INJECTION, SOLUTION INTRAVENOUS at 23:30

## 2023-12-09 RX ADMIN — SODIUM CHLORIDE, PRESERVATIVE FREE 10 ML: 5 INJECTION INTRAVENOUS at 20:43

## 2023-12-09 RX ADMIN — ACETAMINOPHEN 325MG 650 MG: 325 TABLET ORAL at 14:53

## 2023-12-09 RX ADMIN — GUAIFENESIN 600 MG: 600 TABLET, EXTENDED RELEASE ORAL at 20:42

## 2023-12-09 RX ADMIN — WATER 80 MG: 1 INJECTION INTRAMUSCULAR; INTRAVENOUS; SUBCUTANEOUS at 22:22

## 2023-12-09 RX ADMIN — GUAIFENESIN 600 MG: 600 TABLET, EXTENDED RELEASE ORAL at 10:00

## 2023-12-09 RX ADMIN — PANTOPRAZOLE SODIUM 40 MG: 40 TABLET, DELAYED RELEASE ORAL at 10:01

## 2023-12-09 RX ADMIN — WATER 80 MG: 1 INJECTION INTRAMUSCULAR; INTRAVENOUS; SUBCUTANEOUS at 14:54

## 2023-12-09 RX ADMIN — POTASSIUM CHLORIDE 40 MEQ: 1500 TABLET, EXTENDED RELEASE ORAL at 14:54

## 2023-12-09 RX ADMIN — Medication 400 MG: at 10:06

## 2023-12-09 RX ADMIN — SODIUM CHLORIDE, PRESERVATIVE FREE 10 ML: 5 INJECTION INTRAVENOUS at 10:11

## 2023-12-09 RX ADMIN — SODIUM CHLORIDE 5 MG/HR: 900 INJECTION, SOLUTION INTRAVENOUS at 06:12

## 2023-12-09 RX ADMIN — ASPIRIN 162 MG: 81 TABLET, CHEWABLE ORAL at 10:06

## 2023-12-09 RX ADMIN — EZETIMIBE 10 MG: 10 TABLET ORAL at 10:00

## 2023-12-09 RX ADMIN — MONTELUKAST 10 MG: 10 TABLET, FILM COATED ORAL at 10:00

## 2023-12-09 RX ADMIN — METOPROLOL SUCCINATE 50 MG: 50 TABLET, EXTENDED RELEASE ORAL at 10:01

## 2023-12-09 ASSESSMENT — PAIN SCALES - GENERAL
PAINLEVEL_OUTOF10: 9
PAINLEVEL_OUTOF10: 0

## 2023-12-09 ASSESSMENT — PAIN DESCRIPTION - LOCATION: LOCATION: BACK

## 2023-12-09 ASSESSMENT — PAIN DESCRIPTION - DESCRIPTORS: DESCRIPTORS: ACHING

## 2023-12-09 ASSESSMENT — PAIN DESCRIPTION - ORIENTATION: ORIENTATION: POSTERIOR

## 2023-12-09 NOTE — PROGRESS NOTES
In chart for pending admission    2146 report received    2230 patient arrived to floor    0330 received call from tele, patient with svt, becoming more frequent. Started as one run and pvcs. Informed provider: stated to do EKG at 140BPM and otherwise, monitor     0500 notified by tele that patient is in afib. EKG completed and results read to dr. Darrold Holstein at [id number].  Provider requests new bp

## 2023-12-09 NOTE — PLAN OF CARE
Occupational Therapy Goals:  Initiated 12/9/2023 to be met within 7-10 days. 1.  Patient will demonstrate 4/5 UB strength in preparation for her efficient completion of her self care routine  2. Patient will perform bathing routine with min assist and no increased SOB  3. Patient will maneuver on and off BSC with SBA  4. Patient will perform LB clothes management (including standing during LB (including LB clothes management)      PLOF: this patient reports she was independent with her ADL's and IADL's. She lives in a two story home and was was driving. She reports her daughter can assist her with her IADL's    OCCUPATIONAL THERAPY EVALUATION    Patient: Lorie Hernandez (26 y.o. female)  Date: 12/9/2023  Primary Diagnosis: Acute respiratory failure ([address]) [J96.00]  Acute respiratory failure with hypoxia ([address]) [J96.01]  Pneumonia of right lung due to infectious organism, unspecified part of lung [J18.9]       Precautions: Fall Risk, Other (comment) (droplet precautions)    ASSESSMENT : this patient presents with decreased UB strength, decreased standing tolerance, generalized fatigue and SOB with all tasks. In addition, she requires SBA for standing during LB clothes management      DEFICITS/IMPAIRMENTS:  Performance deficits / Impairments: Decreased functional mobility ; Decreased ADL status; Decreased endurance;Decreased strength    Patient will benefit from skilled intervention to address the above impairments. Patient's rehabilitation potential/Prognosis: Good.   Factors which may influence rehabilitation potential include:   []             None noted  []             Mental ability/status  [x]             Medical condition  []             Home/family situation and support systems  []             Safety awareness  []             Pain tolerance/management  []             Other:      PLAN :  Recommendations and Planned Interventions:   [x]               Self Care Training                  [x]      Therapeutic

## 2023-12-09 NOTE — ED NOTES
TRANSFER - OUT REPORT:    Verbal report given to MOHINDER Melendrez on Radha Green  being transferred to [facility] for routine progression of patient care       Report consisted of patient's Situation, Background, Assessment and   Recommendations(SBAR). Information from the following report(s) Nurse Handoff Report, ED Encounter Summary, ED SBAR, Intake/Output, MAR, Recent Results, and Med Rec Status was reviewed with the receiving nurse. Kinder Fall Assessment:                           Lines:   Peripheral IV 12/08/23 Left Antecubital (Active)   Site Assessment Clean, dry & intact 12/08/23 1559   Line Status Blood return noted 12/08/23 1559   Phlebitis Assessment No symptoms 12/08/23 1559   Infiltration Assessment 0 12/08/23 1559   Alcohol Cap Used No 12/08/23 1559   Dressing Status New dressing applied;Clean, dry & intact; Clean 12/08/23 1559   Dressing Type Transparent 12/08/23 1559   Dressing Intervention New 12/08/23 1559        Opportunity for questions and clarification was provided.       Patient transported with:  [facility], [address] [place] [address]  12/08/23 3939

## 2023-12-09 NOTE — PLAN OF CARE
Problem: SLP Adult - Impaired Swallowing  Goal: By Discharge: Advance to least restrictive diet without signs or symptoms of aspiration for planned discharge setting.  See evaluation for individualized goals.  Description: Patient will:  1. Tolerate PO trials with 0 s/s overt distress in 4/5 trials-met    Recommend:   Clear liquids per pt request-safe for diet advancement to regular/thin liquids   Regular diet with thin liquids  Meds per pt preference     Outcome: Completed     SPEECH LANGUAGE PATHOLOGY BEDSIDE SWALLOW EVALUATION AND DISCHARGE    Patient: Bhavna Barrientos (73 y.o. female)  Date: 12/9/2023  Primary Diagnosis: Acute respiratory failure ([facility]) [J96.00]  Acute respiratory failure with hypoxia ([facility]) [J96.01]  Pneumonia of right lung due to infectious organism, unspecified part of lung [J18.9]  Precautions: Aspiration   PLOF: As per H&P    ASSESSMENT:  Based on the objective data described below, the patient presents with oropharyngeal swallow function essentially WFL. Pt requesting clear liquids at this time due fatigue/nausea.  Reports consuming regular diet at baseline with no dysphagia.  Strength, ROM, and coordination of the orofacial musculature were all found to be WFL. Pt tolerated reg solid, puree, and thin liquids +/- straw consecutive swallows without any overt signs/symptoms of aspiration. Mastication was appropriate with timely a-p transit. Positive oral clearance observed across all trials. Pt safe for initiation of reg solid diet with thin liquids when ready, please advance as tolerated.  Educated on  aspiration precautions, oral care TID, and meds as tolerated. No further skilled SLP services are indicated at this time. Discussed with RN. Please re-consult if needed.      PLAN:  Recommendations and Planned Interventions:  No formal ST needs identified for dysphagia. Eval only.   Discharge Recommendations: D/C Recommendations: No follow up therapy recommended post discharge  impairment  Pharyngeal Phase: WNL     P.O. Trials:  See above     PAIN:  Start of Eval: not reported  End of Eval: not reported      After evaluation:   []            Patient left in no apparent distress sitting up in chair  [x]            Patient left in no apparent distress in bed  [x]            Call bell left within reach  [x]            Nursing notified  []            Family present  []            Caregiver present  []            Bed alarm activated      COMMUNICATION/EDUCATION:   [x]            Aspiration precautions; swallow safety; compensatory techniques provided via demonstration, verbalization and teach back of comprehension  []         Patient/family have participated as able in goal setting and plan of care. [x]            Patient/family agree to work toward stated goals and plan of care. []            Patient understands intent and goals of therapy, neutral about participation. []            Patient unable to participate in goal setting/plan of care secondary to cognition, hearing/vision deficits; education ongoing with interdisciplinary staff   []            Handout regarding diet recommendations and thickener instructions provided. [x]         Posted safety precautions in patient's room.     Thank you for this referral.  Mariano Ivey M.S., CCC-SLP

## 2023-12-09 NOTE — PROGRESS NOTES
[facility] Pharmacy Renal Dosing Services    Pharmacist Renal Dosing Note for Levofloxacin   Physician/Prescriber:  Dr. Kevin Cole    Previous Regimen 750 mg daily    Serum Creatinine No results found for: \"DENIA\", \"CREA\", \"CREAPOC\"   Creatinine Clearance Estimated Creatinine Clearance: 33 mL/min (A) (based on SCr of 1.52 mg/dL (H)). BUN Lab Results   Component Value Date/Time    BUN 20 12/08/2023 03:55 PM           The following medication: Levofloxacin 750 mg daily was automatically dose-adjusted per [facility] P&T Committee Protocol, with respect to renal function. Dosage changed to:  750 mg every 48 hours    Additional notes:    Pharmacy to continue to monitor patient daily. Will make dosage adjustments based upon changing renal function. Signed REJI VAZQUEZ RPH.

## 2023-12-10 ENCOUNTER — APPOINTMENT (OUTPATIENT)
Facility: HOSPITAL | Age: 73
DRG: 870 | End: 2023-12-10
Payer: MEDICARE

## 2023-12-10 PROBLEM — N17.9 AKI (ACUTE KIDNEY INJURY) (HCC): Status: ACTIVE | Noted: 2023-12-10

## 2023-12-10 PROBLEM — J81.0 ACUTE PULMONARY EDEMA (HCC): Status: ACTIVE | Noted: 2023-12-10

## 2023-12-10 LAB
AMORPH CRY URNS QL MICRO: ABNORMAL
ANION GAP SERPL CALC-SCNC: 7 MMOL/L (ref 3–18)
APPEARANCE UR: ABNORMAL
ARTERIAL PATENCY WRIST A: POSITIVE
BACTERIA URNS QL MICRO: ABNORMAL /HPF
BASE DEFICIT BLD-SCNC: 7.2 MMOL/L
BASOPHILS # BLD: 0 K/UL (ref 0–0.1)
BASOPHILS NFR BLD: 0 % (ref 0–2)
BDY SITE: ABNORMAL
BILIRUB UR QL: NEGATIVE
BUN SERPL-MCNC: 38 MG/DL (ref 7–18)
BUN/CREAT SERPL: 15 (ref 12–20)
CALCIUM SERPL-MCNC: 8.8 MG/DL (ref 8.5–10.1)
CHLORIDE SERPL-SCNC: 106 MMOL/L (ref 100–111)
CO2 SERPL-SCNC: 21 MMOL/L (ref 21–32)
COLOR UR: ABNORMAL
CREAT SERPL-MCNC: 2.56 MG/DL (ref 0.6–1.3)
DIFFERENTIAL METHOD BLD: ABNORMAL
EOSINOPHIL # BLD: 0 K/UL (ref 0–0.4)
EOSINOPHIL NFR BLD: 0 % (ref 0–5)
EPITH CASTS URNS QL MICRO: ABNORMAL /LPF (ref 0–5)
ERYTHROCYTE [DISTWIDTH] IN BLOOD BY AUTOMATED COUNT: 13.4 % (ref 11.6–14.5)
GAS FLOW.O2 O2 DELIVERY SYS: ABNORMAL
GLUCOSE BLD STRIP.AUTO-MCNC: 164 MG/DL (ref 70–110)
GLUCOSE BLD STRIP.AUTO-MCNC: 170 MG/DL (ref 70–110)
GLUCOSE BLD STRIP.AUTO-MCNC: 188 MG/DL (ref 70–110)
GLUCOSE BLD STRIP.AUTO-MCNC: 196 MG/DL (ref 70–110)
GLUCOSE SERPL-MCNC: 164 MG/DL (ref 74–99)
GLUCOSE UR STRIP.AUTO-MCNC: NEGATIVE MG/DL
GRAN CASTS URNS QL MICRO: ABNORMAL /LPF
HCO3 BLD-SCNC: 16.1 MMOL/L (ref 22–26)
HCT VFR BLD AUTO: 40.9 % (ref 35–45)
HGB BLD-MCNC: 13.3 G/DL (ref 12–16)
HGB UR QL STRIP: ABNORMAL
IMM GRANULOCYTES # BLD AUTO: 0 K/UL (ref 0–0.04)
IMM GRANULOCYTES NFR BLD AUTO: 0 % (ref 0–0.5)
KETONES UR QL STRIP.AUTO: NEGATIVE MG/DL
LEUKOCYTE ESTERASE UR QL STRIP.AUTO: NEGATIVE
LYMPHOCYTES # BLD: 0.3 K/UL (ref 0.9–3.6)
LYMPHOCYTES NFR BLD: 8 % (ref 21–52)
MAGNESIUM SERPL-MCNC: 2.1 MG/DL (ref 1.6–2.6)
MCH RBC QN AUTO: 28.5 PG (ref 24–34)
MCHC RBC AUTO-ENTMCNC: 32.5 G/DL (ref 31–37)
MCV RBC AUTO: 87.8 FL (ref 78–100)
MONOCYTES # BLD: 0 K/UL (ref 0.05–1.2)
MONOCYTES NFR BLD: 1 % (ref 3–10)
NEUTS SEG # BLD: 3.3 K/UL (ref 1.8–8)
NEUTS SEG NFR BLD: 91 % (ref 40–73)
NITRITE UR QL STRIP.AUTO: NEGATIVE
NRBC # BLD: 0 K/UL (ref 0–0.01)
NRBC BLD-RTO: 0 PER 100 WBC
NT PRO BNP: 3387 PG/ML (ref 0–1800)
O2/TOTAL GAS SETTING VFR VENT: 100 %
PCO2 BLD: 25.8 MMHG (ref 35–45)
PH BLD: 7.4 (ref 7.35–7.45)
PH UR STRIP: 5.5 (ref 5–8)
PHOSPHATE SERPL-MCNC: 3 MG/DL (ref 2.5–4.9)
PLATELET # BLD AUTO: 107 K/UL (ref 135–420)
PLATELET COMMENT: ABNORMAL
PMV BLD AUTO: 12.1 FL (ref 9.2–11.8)
PO2 BLD: 78 MMHG (ref 80–100)
POTASSIUM SERPL-SCNC: 4.4 MMOL/L (ref 3.5–5.5)
PROCALCITONIN SERPL-MCNC: 0.27 NG/ML
PROT UR STRIP-MCNC: 300 MG/DL
RBC # BLD AUTO: 4.66 M/UL (ref 4.2–5.3)
RBC #/AREA URNS HPF: NEGATIVE /HPF (ref 0–5)
RBC MORPH BLD: ABNORMAL
SAO2 % BLD: 95.8 % (ref 92–97)
SERVICE CMNT-IMP: ABNORMAL
SODIUM SERPL-SCNC: 134 MMOL/L (ref 136–145)
SP GR UR REFRACTOMETRY: 1.02 (ref 1–1.03)
SPECIMEN TYPE: ABNORMAL
UROBILINOGEN UR QL STRIP.AUTO: 1 EU/DL (ref 0.2–1)
WBC # BLD AUTO: 3.6 K/UL (ref 4.6–13.2)
WBC URNS QL MICRO: ABNORMAL /HPF (ref 0–4)
YEAST URNS QL MICRO: ABNORMAL

## 2023-12-10 PROCEDURE — 87205 SMEAR GRAM STAIN: CPT

## 2023-12-10 PROCEDURE — 80048 BASIC METABOLIC PNL TOTAL CA: CPT

## 2023-12-10 PROCEDURE — 84145 PROCALCITONIN (PCT): CPT

## 2023-12-10 PROCEDURE — 94660 CPAP INITIATION&MGMT: CPT

## 2023-12-10 PROCEDURE — 6370000000 HC RX 637 (ALT 250 FOR IP): Performed by: INTERNAL MEDICINE

## 2023-12-10 PROCEDURE — 6360000002 HC RX W HCPCS: Performed by: HOSPITALIST

## 2023-12-10 PROCEDURE — 99232 SBSQ HOSP IP/OBS MODERATE 35: CPT | Performed by: HOSPITALIST

## 2023-12-10 PROCEDURE — 2580000003 HC RX 258: Performed by: HOSPITALIST

## 2023-12-10 PROCEDURE — 83735 ASSAY OF MAGNESIUM: CPT

## 2023-12-10 PROCEDURE — 85025 COMPLETE CBC W/AUTO DIFF WBC: CPT

## 2023-12-10 PROCEDURE — 6370000000 HC RX 637 (ALT 250 FOR IP): Performed by: HOSPITALIST

## 2023-12-10 PROCEDURE — 2580000003 HC RX 258: Performed by: INTERNAL MEDICINE

## 2023-12-10 PROCEDURE — 83880 ASSAY OF NATRIURETIC PEPTIDE: CPT

## 2023-12-10 PROCEDURE — 97112 NEUROMUSCULAR REEDUCATION: CPT

## 2023-12-10 PROCEDURE — 36415 COLL VENOUS BLD VENIPUNCTURE: CPT

## 2023-12-10 PROCEDURE — 81001 URINALYSIS AUTO W/SCOPE: CPT

## 2023-12-10 PROCEDURE — 0202U NFCT DS 22 TRGT SARS-COV-2: CPT

## 2023-12-10 PROCEDURE — 99233 SBSQ HOSP IP/OBS HIGH 50: CPT | Performed by: HOSPITALIST

## 2023-12-10 PROCEDURE — 2700000000 HC OXYGEN THERAPY PER DAY

## 2023-12-10 PROCEDURE — 97162 PT EVAL MOD COMPLEX 30 MIN: CPT

## 2023-12-10 PROCEDURE — 6360000002 HC RX W HCPCS: Performed by: INTERNAL MEDICINE

## 2023-12-10 PROCEDURE — 51798 US URINE CAPACITY MEASURE: CPT

## 2023-12-10 PROCEDURE — 82803 BLOOD GASES ANY COMBINATION: CPT

## 2023-12-10 PROCEDURE — 36600 WITHDRAWAL OF ARTERIAL BLOOD: CPT

## 2023-12-10 PROCEDURE — 99232 SBSQ HOSP IP/OBS MODERATE 35: CPT | Performed by: INTERNAL MEDICINE

## 2023-12-10 PROCEDURE — 71045 X-RAY EXAM CHEST 1 VIEW: CPT

## 2023-12-10 PROCEDURE — 84100 ASSAY OF PHOSPHORUS: CPT

## 2023-12-10 PROCEDURE — 82962 GLUCOSE BLOOD TEST: CPT

## 2023-12-10 PROCEDURE — 2140000001 HC CVICU INTERMEDIATE R&B

## 2023-12-10 PROCEDURE — 5A09457 ASSISTANCE WITH RESPIRATORY VENTILATION, 24-96 CONSECUTIVE HOURS, CONTINUOUS POSITIVE AIRWAY PRESSURE: ICD-10-PCS | Performed by: HOSPITALIST

## 2023-12-10 PROCEDURE — 87070 CULTURE OTHR SPECIMN AEROBIC: CPT

## 2023-12-10 RX ORDER — ROSUVASTATIN CALCIUM 5 MG/1
10 TABLET, COATED ORAL DAILY
Status: DISCONTINUED | OUTPATIENT
Start: 2023-12-11 | End: 2023-12-21

## 2023-12-10 RX ORDER — DEXTROSE MONOHYDRATE 100 MG/ML
INJECTION, SOLUTION INTRAVENOUS CONTINUOUS PRN
Status: DISCONTINUED | OUTPATIENT
Start: 2023-12-10 | End: 2023-12-21

## 2023-12-10 RX ORDER — FUROSEMIDE 10 MG/ML
40 INJECTION INTRAMUSCULAR; INTRAVENOUS ONCE
Status: COMPLETED | OUTPATIENT
Start: 2023-12-10 | End: 2023-12-10

## 2023-12-10 RX ORDER — ASPIRIN 81 MG/1
81 TABLET, CHEWABLE ORAL DAILY
Status: DISCONTINUED | OUTPATIENT
Start: 2023-12-11 | End: 2023-12-21

## 2023-12-10 RX ORDER — SODIUM CHLORIDE 9 MG/ML
INJECTION, SOLUTION INTRAVENOUS CONTINUOUS
Status: DISCONTINUED | OUTPATIENT
Start: 2023-12-10 | End: 2023-12-11

## 2023-12-10 RX ORDER — GLUCAGON 1 MG
1 KIT INJECTION PRN
Status: DISCONTINUED | OUTPATIENT
Start: 2023-12-10 | End: 2023-12-21

## 2023-12-10 RX ADMIN — TRAMADOL HYDROCHLORIDE 50 MG: 50 TABLET ORAL at 22:07

## 2023-12-10 RX ADMIN — APIXABAN 5 MG: 5 TABLET, FILM COATED ORAL at 20:55

## 2023-12-10 RX ADMIN — MONTELUKAST 10 MG: 10 TABLET, FILM COATED ORAL at 10:01

## 2023-12-10 RX ADMIN — IPRATROPIUM BROMIDE AND ALBUTEROL SULFATE 1 DOSE: .5; 3 SOLUTION RESPIRATORY (INHALATION) at 19:49

## 2023-12-10 RX ADMIN — ACETAMINOPHEN 325MG 650 MG: 325 TABLET ORAL at 18:19

## 2023-12-10 RX ADMIN — GUAIFENESIN 600 MG: 600 TABLET, EXTENDED RELEASE ORAL at 10:01

## 2023-12-10 RX ADMIN — LEVOFLOXACIN 750 MG: 5 INJECTION, SOLUTION INTRAVENOUS at 19:49

## 2023-12-10 RX ADMIN — PANTOPRAZOLE SODIUM 40 MG: 40 TABLET, DELAYED RELEASE ORAL at 10:01

## 2023-12-10 RX ADMIN — SODIUM CHLORIDE, PRESERVATIVE FREE 10 ML: 5 INJECTION INTRAVENOUS at 10:02

## 2023-12-10 RX ADMIN — WATER 80 MG: 1 INJECTION INTRAMUSCULAR; INTRAVENOUS; SUBCUTANEOUS at 10:02

## 2023-12-10 RX ADMIN — FUROSEMIDE 40 MG: 10 INJECTION, SOLUTION INTRAMUSCULAR; INTRAVENOUS at 22:00

## 2023-12-10 RX ADMIN — ACETAMINOPHEN 325MG 650 MG: 325 TABLET ORAL at 11:22

## 2023-12-10 RX ADMIN — IPRATROPIUM BROMIDE AND ALBUTEROL SULFATE 1 DOSE: .5; 3 SOLUTION RESPIRATORY (INHALATION) at 11:22

## 2023-12-10 RX ADMIN — METOPROLOL SUCCINATE 50 MG: 50 TABLET, EXTENDED RELEASE ORAL at 10:01

## 2023-12-10 RX ADMIN — ROSUVASTATIN CALCIUM 40 MG: 20 TABLET, FILM COATED ORAL at 10:01

## 2023-12-10 RX ADMIN — GUAIFENESIN 600 MG: 600 TABLET, EXTENDED RELEASE ORAL at 19:57

## 2023-12-10 RX ADMIN — Medication 2000 UNITS: at 10:01

## 2023-12-10 RX ADMIN — SODIUM CHLORIDE, PRESERVATIVE FREE 10 ML: 5 INJECTION INTRAVENOUS at 19:58

## 2023-12-10 RX ADMIN — SODIUM CHLORIDE: 9 INJECTION, SOLUTION INTRAVENOUS at 11:23

## 2023-12-10 RX ADMIN — ASPIRIN 162 MG: 81 TABLET, CHEWABLE ORAL at 10:01

## 2023-12-10 RX ADMIN — EZETIMIBE 10 MG: 10 TABLET ORAL at 10:01

## 2023-12-10 RX ADMIN — Medication 400 MG: at 10:01

## 2023-12-10 RX ADMIN — VANCOMYCIN HYDROCHLORIDE 1000 MG: 1 INJECTION, POWDER, LYOPHILIZED, FOR SOLUTION INTRAVENOUS at 22:25

## 2023-12-10 RX ADMIN — LEVOTHYROXINE SODIUM 50 MCG: 0.05 TABLET ORAL at 06:08

## 2023-12-10 RX ADMIN — APIXABAN 5 MG: 5 TABLET, FILM COATED ORAL at 10:01

## 2023-12-10 ASSESSMENT — PAIN DESCRIPTION - ORIENTATION
ORIENTATION: MID
ORIENTATION: MID

## 2023-12-10 ASSESSMENT — PAIN SCALES - GENERAL
PAINLEVEL_OUTOF10: 0
PAINLEVEL_OUTOF10: 6
PAINLEVEL_OUTOF10: 0
PAINLEVEL_OUTOF10: 6
PAINLEVEL_OUTOF10: 7
PAINLEVEL_OUTOF10: 0

## 2023-12-10 ASSESSMENT — PAIN DESCRIPTION - LOCATION
LOCATION: GENERALIZED
LOCATION: HEAD

## 2023-12-10 ASSESSMENT — PAIN DESCRIPTION - DESCRIPTORS
DESCRIPTORS: ACHING
DESCRIPTORS: ACHING

## 2023-12-10 NOTE — PROGRESS NOTES
Pt was assisted to the chair. Tolerated it well. Administered duo neb for SOB   PT'S SATs are sitting at or around 86%-90%  Pt is asymptomatic   Will continue to monitor  Call bell within reach an chair is in the lock position.

## 2023-12-10 NOTE — PLAN OF CARE
Problem: Physical Therapy - Adult  Goal: By Discharge: Performs mobility at highest level of function for planned discharge setting. See evaluation for individualized goals. Description: Physical Therapy Goals  Initiated 12/10/2023 and to be accomplished within 7 day(s)  1. Patient will move from supine to sit and sit to supine  in bed with modified independence. 2.  Patient will transfer from bed to chair and chair to bed with modified independence using the least restrictive device. 3.  Patient will perform sit to stand with modified independence. 4.  Patient will ambulate with modified independence for 150 feet with the least restrictive device. 5.  Patient will ascend/descend 8 stairs with handrail(s) with modified independence. PLOF: Lives alone. Two story home. Independent. Outcome: Progressing   PHYSICAL THERAPY EVALUATION    Patient: Delvis Tesfaye (33 y.o. female)  Date: 12/10/2023  Primary Diagnosis: Acute respiratory failure ([facility]) [J96.00]  Acute respiratory failure with hypoxia ([facility]) [J96.01]  Pneumonia of right lung due to infectious organism, unspecified part of lung [J18.9]  Precautions: Isolation (droplet), Fall Risk  ASSESSMENT :  Droplet isolation. On 5L O2 during session. O2 saturation 82% at EOB; increased coughing. Returned to supine with HOB at 30 degrees. O2 saturation remained in low 80s (82-85%) RN Matthieu Nguyen present. O2 decreased to 4L O2; O2 saturation eventually increased to 87%. Supine in bed with RN at bedside. Daughter present; assisted in encouragement and motor planning. Required min A for all mobility. Educated on need for RN assistance with mobility; verbalized understanding. Call bell in reach. DEFICITS/IMPAIRMENTS:   Body Structures, Functions, Activity Limitations Requiring Skilled Therapeutic Intervention: Decreased functional mobility ; Decreased safe awareness;Decreased endurance;Decreased cognition;Decreased balance;Decreased strength    Patient will

## 2023-12-10 NOTE — PROGRESS NOTES
0720 -- Bedside and Verbal shift change report given to [facility] (oncoming nurse) by Nathan Rooney (offgoing nurse). Report included the following information Nurse Handoff Report. 0830-Assessment completed, call bell within reach, no distress noted. 1000- AM meds given. 1200 -- no change in patient condition, no distress noted  1600 -- PM meds given, no distress noted, patient resting in bed with call bell in reach  1920-Bedside and Verbal shift change report given to Parvin (oncoming nurse) by [facility]  (offgoing nurse). Report included the following information Nurse Handoff Report.

## 2023-12-10 NOTE — PLAN OF CARE
Problem: Discharge Planning  Goal: Discharge to home or other facility with appropriate resources  Outcome: Progressing     Problem: Skin/Tissue Integrity  Goal: Absence of new skin breakdown  Description: 1. Monitor for areas of redness and/or skin breakdown  2. Assess vascular access sites hourly  3. Every 4-6 hours minimum:  Change oxygen saturation probe site  4. Every 4-6 hours:  If on nasal continuous positive airway pressure, respiratory therapy assess nares and determine need for appliance change or resting period. Outcome: Progressing     Problem: Safety - Adult  Goal: Free from fall injury  Outcome: Progressing     Problem: Physical Therapy - Adult  Goal: By Discharge: Performs mobility at highest level of function for planned discharge setting. See evaluation for individualized goals. Description: Physical Therapy Goals  Initiated 12/10/2023 and to be accomplished within 7 day(s)  1. Patient will move from supine to sit and sit to supine  in bed with modified independence. 2.  Patient will transfer from bed to chair and chair to bed with modified independence using the least restrictive device. 3.  Patient will perform sit to stand with modified independence. 4.  Patient will ambulate with modified independence for 150 feet with the least restrictive device. 5.  Patient will ascend/descend 8 stairs with handrail(s) with modified independence. PLOF: Lives alone. Two story home. Independent.      12/10/2023 1119 by Sri Puckett PT  Outcome: Progressing

## 2023-12-11 ENCOUNTER — APPOINTMENT (OUTPATIENT)
Facility: HOSPITAL | Age: 73
DRG: 870 | End: 2023-12-11
Payer: MEDICARE

## 2023-12-11 PROBLEM — J80 ARDS (ADULT RESPIRATORY DISTRESS SYNDROME) (HCC): Status: ACTIVE | Noted: 2023-12-11

## 2023-12-11 PROBLEM — J11.00 INFLUENZAL PNEUMONIA: Status: ACTIVE | Noted: 2023-12-11

## 2023-12-11 LAB
AMORPH CRY URNS QL MICRO: ABNORMAL
ANION GAP SERPL CALC-SCNC: 9 MMOL/L (ref 3–18)
ANION GAP SERPL CALC-SCNC: 9 MMOL/L (ref 3–18)
APPEARANCE UR: CLEAR
APTT PPP: 104.6 SEC (ref 23–36.4)
APTT PPP: 30.4 SEC (ref 23–36.4)
APTT PPP: 97.7 SEC (ref 23–36.4)
ARTERIAL PATENCY WRIST A: POSITIVE
ARTERIAL PATENCY WRIST A: POSITIVE
B PERT DNA SPEC QL NAA+PROBE: NOT DETECTED
BACTERIA SPEC CULT: NORMAL
BACTERIA SPEC CULT: NORMAL
BACTERIA URNS QL MICRO: ABNORMAL /HPF
BASE DEFICIT BLD-SCNC: 4.3 MMOL/L
BASE DEFICIT BLD-SCNC: 6.1 MMOL/L
BASOPHILS # BLD: 0 K/UL (ref 0–0.1)
BASOPHILS NFR BLD: 0 % (ref 0–2)
BDY SITE: ABNORMAL
BDY SITE: ABNORMAL
BILIRUB UR QL: NEGATIVE
BODY TEMPERATURE: 98
BORDETELLA PARAPERTUSSIS BY PCR: NOT DETECTED
BUN SERPL-MCNC: 50 MG/DL (ref 7–18)
BUN SERPL-MCNC: 52 MG/DL (ref 7–18)
BUN/CREAT SERPL: 16 (ref 12–20)
BUN/CREAT SERPL: 16 (ref 12–20)
C PNEUM DNA SPEC QL NAA+PROBE: NOT DETECTED
CALCIUM SERPL-MCNC: 8 MG/DL (ref 8.5–10.1)
CALCIUM SERPL-MCNC: 8.4 MG/DL (ref 8.5–10.1)
CHLORIDE SERPL-SCNC: 105 MMOL/L (ref 100–111)
CHLORIDE SERPL-SCNC: 105 MMOL/L (ref 100–111)
CK SERPL-CCNC: 550 U/L (ref 26–192)
CO2 SERPL-SCNC: 19 MMOL/L (ref 21–32)
CO2 SERPL-SCNC: 20 MMOL/L (ref 21–32)
COLOR UR: YELLOW
CREAT SERPL-MCNC: 3.14 MG/DL (ref 0.6–1.3)
CREAT SERPL-MCNC: 3.18 MG/DL (ref 0.6–1.3)
CREAT UR-MCNC: 23 MG/DL (ref 30–125)
DIFFERENTIAL METHOD BLD: ABNORMAL
ECHO AO ROOT DIAM: 2 CM
ECHO AO ROOT INDEX: 1.06 CM/M2
ECHO AV AREA PEAK VELOCITY: 2.7 CM2
ECHO AV AREA VTI: 3 CM2
ECHO AV AREA/BSA PEAK VELOCITY: 1.4 CM2/M2
ECHO AV AREA/BSA VTI: 1.6 CM2/M2
ECHO AV MEAN GRADIENT: 5 MMHG
ECHO AV MEAN VELOCITY: 1 M/S
ECHO AV PEAK GRADIENT: 11 MMHG
ECHO AV PEAK VELOCITY: 1.6 M/S
ECHO AV VELOCITY RATIO: 0.88
ECHO AV VTI: 36.8 CM
ECHO BSA: 1.97 M2
ECHO LA VOL A-L A2C: 92 ML (ref 22–52)
ECHO LA VOL A-L A4C: 76 ML (ref 22–52)
ECHO LA VOL MOD A2C: 90 ML (ref 22–52)
ECHO LA VOL MOD A4C: 69 ML (ref 22–52)
ECHO LA VOLUME AREA LENGTH: 84 ML
ECHO LA VOLUME INDEX A-L A2C: 49 ML/M2 (ref 16–34)
ECHO LA VOLUME INDEX A-L A4C: 40 ML/M2 (ref 16–34)
ECHO LA VOLUME INDEX AREA LENGTH: 44 ML/M2 (ref 16–34)
ECHO LA VOLUME INDEX MOD A2C: 48 ML/M2 (ref 16–34)
ECHO LA VOLUME INDEX MOD A4C: 37 ML/M2 (ref 16–34)
ECHO LV E' LATERAL VELOCITY: 7 CM/S
ECHO LV E' SEPTAL VELOCITY: 6 CM/S
ECHO LV FRACTIONAL SHORTENING: 31 % (ref 28–44)
ECHO LV INTERNAL DIMENSION DIASTOLE INDEX: 3.07 CM/M2
ECHO LV INTERNAL DIMENSION DIASTOLIC: 5.8 CM (ref 3.9–5.3)
ECHO LV INTERNAL DIMENSION SYSTOLIC INDEX: 2.12 CM/M2
ECHO LV INTERNAL DIMENSION SYSTOLIC: 4 CM
ECHO LV IVSD: 0.8 CM (ref 0.6–0.9)
ECHO LV MASS 2D: 175.4 G (ref 67–162)
ECHO LV MASS INDEX 2D: 92.8 G/M2 (ref 43–95)
ECHO LV POSTERIOR WALL DIASTOLIC: 0.8 CM (ref 0.6–0.9)
ECHO LV RELATIVE WALL THICKNESS RATIO: 0.28
ECHO LVOT AREA: 3.1 CM2
ECHO LVOT AV VTI INDEX: 0.92
ECHO LVOT DIAM: 2 CM
ECHO LVOT MEAN GRADIENT: 4 MMHG
ECHO LVOT PEAK GRADIENT: 8 MMHG
ECHO LVOT PEAK VELOCITY: 1.4 M/S
ECHO LVOT STROKE VOLUME INDEX: 56.5 ML/M2
ECHO LVOT SV: 106.8 ML
ECHO LVOT VTI: 34 CM
ECHO MV A VELOCITY: 0.72 M/S
ECHO MV E DECELERATION TIME (DT): 304.4 MS
ECHO MV E VELOCITY: 1 M/S
ECHO MV E/A RATIO: 1.39
ECHO MV E/E' LATERAL: 14.29
ECHO MV E/E' RATIO (AVERAGED): 15.48
ECHO PULMONARY ARTERY END DIASTOLIC PRESSURE: 6 MMHG
ECHO PV MAX VELOCITY: 1 M/S
ECHO PV PEAK GRADIENT: 4 MMHG
ECHO PV REGURGITANT MAX VELOCITY: 1.3 M/S
ECHO RV TAPSE: 3.1 CM (ref 1.7–?)
EOSINOPHIL # BLD: 0 K/UL (ref 0–0.4)
EOSINOPHIL NFR BLD: 0 % (ref 0–5)
EPITH CASTS URNS QL MICRO: NEGATIVE /LPF (ref 0–5)
ERYTHROCYTE [DISTWIDTH] IN BLOOD BY AUTOMATED COUNT: 13.3 % (ref 11.6–14.5)
FLUAV H1 2009 PAND RNA SPEC QL NAA+PROBE: DETECTED
FLUBV RNA SPEC QL NAA+PROBE: NOT DETECTED
GAS FLOW.O2 O2 DELIVERY SYS: ABNORMAL
GAS FLOW.O2 O2 DELIVERY SYS: ABNORMAL
GAS FLOW.O2 SETTING OXYMISER: 6 BPM
GLUCOSE BLD STRIP.AUTO-MCNC: 168 MG/DL (ref 70–110)
GLUCOSE BLD STRIP.AUTO-MCNC: 179 MG/DL (ref 70–110)
GLUCOSE BLD STRIP.AUTO-MCNC: 240 MG/DL (ref 70–110)
GLUCOSE SERPL-MCNC: 162 MG/DL (ref 74–99)
GLUCOSE SERPL-MCNC: 186 MG/DL (ref 74–99)
GLUCOSE UR STRIP.AUTO-MCNC: NEGATIVE MG/DL
HADV DNA SPEC QL NAA+PROBE: NOT DETECTED
HCO3 BLD-SCNC: 17.6 MMOL/L (ref 22–26)
HCO3 BLD-SCNC: 19.2 MMOL/L (ref 22–26)
HCOV 229E RNA SPEC QL NAA+PROBE: NOT DETECTED
HCOV HKU1 RNA SPEC QL NAA+PROBE: NOT DETECTED
HCOV NL63 RNA SPEC QL NAA+PROBE: NOT DETECTED
HCOV OC43 RNA SPEC QL NAA+PROBE: NOT DETECTED
HCT VFR BLD AUTO: 38.2 % (ref 35–45)
HGB BLD-MCNC: 12.7 G/DL (ref 12–16)
HGB UR QL STRIP: ABNORMAL
HMPV RNA SPEC QL NAA+PROBE: NOT DETECTED
HPIV1 RNA SPEC QL NAA+PROBE: NOT DETECTED
HPIV2 RNA SPEC QL NAA+PROBE: NOT DETECTED
HPIV3 RNA SPEC QL NAA+PROBE: NOT DETECTED
HPIV4 RNA SPEC QL NAA+PROBE: NOT DETECTED
IMM GRANULOCYTES # BLD AUTO: 0 K/UL (ref 0–0.04)
IMM GRANULOCYTES NFR BLD AUTO: 0 % (ref 0–0.5)
KETONES UR QL STRIP.AUTO: NEGATIVE MG/DL
LACTATE BLD-SCNC: 1.67 MMOL/L (ref 0.4–2)
LACTATE SERPL-SCNC: 1.3 MMOL/L (ref 0.4–2)
LEUKOCYTE ESTERASE UR QL STRIP.AUTO: NEGATIVE
LYMPHOCYTES # BLD: 0.6 K/UL (ref 0.9–3.6)
LYMPHOCYTES NFR BLD: 8 % (ref 21–52)
M PNEUMO DNA SPEC QL NAA+PROBE: NOT DETECTED
MAGNESIUM SERPL-MCNC: 1.8 MG/DL (ref 1.6–2.6)
MCH RBC QN AUTO: 28.8 PG (ref 24–34)
MCHC RBC AUTO-ENTMCNC: 33.2 G/DL (ref 31–37)
MCV RBC AUTO: 86.6 FL (ref 78–100)
MONOCYTES # BLD: 0.5 K/UL (ref 0.05–1.2)
MONOCYTES NFR BLD: 6 % (ref 3–10)
NEUTS SEG # BLD: 7 K/UL (ref 1.8–8)
NEUTS SEG NFR BLD: 86 % (ref 40–73)
NITRITE UR QL STRIP.AUTO: NEGATIVE
NRBC # BLD: 0 K/UL (ref 0–0.01)
NRBC BLD-RTO: 0 PER 100 WBC
NT PRO BNP: 3590 PG/ML (ref 0–1800)
O2/TOTAL GAS SETTING VFR VENT: 100 %
O2/TOTAL GAS SETTING VFR VENT: 100 %
PCO2 BLD: 29.1 MMHG (ref 35–45)
PCO2 BLD: 29.8 MMHG (ref 35–45)
PH BLD: 7.39 (ref 7.35–7.45)
PH BLD: 7.42 (ref 7.35–7.45)
PH UR STRIP: 5 (ref 5–8)
PHOSPHATE SERPL-MCNC: 2.9 MG/DL (ref 2.5–4.9)
PLATELET # BLD AUTO: 120 K/UL (ref 135–420)
PLATELET COMMENT: ABNORMAL
PMV BLD AUTO: 11.9 FL (ref 9.2–11.8)
PO2 BLD: 60 MMHG (ref 80–100)
PO2 BLD: 75 MMHG (ref 80–100)
POTASSIUM SERPL-SCNC: 3.5 MMOL/L (ref 3.5–5.5)
POTASSIUM SERPL-SCNC: 3.8 MMOL/L (ref 3.5–5.5)
PROCALCITONIN SERPL-MCNC: 0.26 NG/ML
PROT UR STRIP-MCNC: 30 MG/DL
PROT UR-MCNC: 53 MG/DL
PROT/CREAT UR-RTO: 2.3
RBC # BLD AUTO: 4.41 M/UL (ref 4.2–5.3)
RBC #/AREA URNS HPF: ABNORMAL /HPF (ref 0–5)
RBC CASTS URNS QL MICRO: ABNORMAL /LPF
RBC MORPH BLD: ABNORMAL
RBC MORPH BLD: ABNORMAL
RESPIRATORY RATE, POC: 28 (ref 5–40)
RSV RNA SPEC QL NAA+PROBE: NOT DETECTED
RV+EV RNA SPEC QL NAA+PROBE: NOT DETECTED
SAO2 % BLD: 90.9 % (ref 92–97)
SAO2 % BLD: 95.5 % (ref 92–97)
SARS-COV-2 RNA RESP QL NAA+PROBE: NOT DETECTED
SERVICE CMNT-IMP: ABNORMAL
SERVICE CMNT-IMP: ABNORMAL
SERVICE CMNT-IMP: NORMAL
SODIUM SERPL-SCNC: 133 MMOL/L (ref 136–145)
SODIUM SERPL-SCNC: 134 MMOL/L (ref 136–145)
SP GR UR REFRACTOMETRY: 1.01 (ref 1–1.03)
SPECIMEN TYPE: ABNORMAL
SPECIMEN TYPE: ABNORMAL
TROPONIN I SERPL HS-MCNC: 63 NG/L (ref 0–54)
UROBILINOGEN UR QL STRIP.AUTO: 0.2 EU/DL (ref 0.2–1)
VENTILATION MODE VENT: ABNORMAL
WBC # BLD AUTO: 8.1 K/UL (ref 4.6–13.2)
WBC URNS QL MICRO: NEGATIVE /HPF (ref 0–4)
YEAST URNS QL MICRO: ABNORMAL

## 2023-12-11 PROCEDURE — 6360000002 HC RX W HCPCS: Performed by: STUDENT IN AN ORGANIZED HEALTH CARE EDUCATION/TRAINING PROGRAM

## 2023-12-11 PROCEDURE — 2580000003 HC RX 258: Performed by: HOSPITALIST

## 2023-12-11 PROCEDURE — 87107 FUNGI IDENTIFICATION MOLD: CPT

## 2023-12-11 PROCEDURE — 2000000000 HC ICU R&B

## 2023-12-11 PROCEDURE — 82803 BLOOD GASES ANY COMBINATION: CPT

## 2023-12-11 PROCEDURE — 71045 X-RAY EXAM CHEST 1 VIEW: CPT

## 2023-12-11 PROCEDURE — 83880 ASSAY OF NATRIURETIC PEPTIDE: CPT

## 2023-12-11 PROCEDURE — 85730 THROMBOPLASTIN TIME PARTIAL: CPT

## 2023-12-11 PROCEDURE — 87086 URINE CULTURE/COLONY COUNT: CPT

## 2023-12-11 PROCEDURE — 6360000002 HC RX W HCPCS: Performed by: INTERNAL MEDICINE

## 2023-12-11 PROCEDURE — 87040 BLOOD CULTURE FOR BACTERIA: CPT

## 2023-12-11 PROCEDURE — 83605 ASSAY OF LACTIC ACID: CPT

## 2023-12-11 PROCEDURE — 6360000002 HC RX W HCPCS: Performed by: REGISTERED NURSE

## 2023-12-11 PROCEDURE — 84100 ASSAY OF PHOSPHORUS: CPT

## 2023-12-11 PROCEDURE — 6360000002 HC RX W HCPCS

## 2023-12-11 PROCEDURE — 82550 ASSAY OF CK (CPK): CPT

## 2023-12-11 PROCEDURE — 2700000000 HC OXYGEN THERAPY PER DAY

## 2023-12-11 PROCEDURE — 99292 CRITICAL CARE ADDL 30 MIN: CPT | Performed by: INTERNAL MEDICINE

## 2023-12-11 PROCEDURE — 93306 TTE W/DOPPLER COMPLETE: CPT

## 2023-12-11 PROCEDURE — 84484 ASSAY OF TROPONIN QUANT: CPT

## 2023-12-11 PROCEDURE — 85025 COMPLETE CBC W/AUTO DIFF WBC: CPT

## 2023-12-11 PROCEDURE — 94761 N-INVAS EAR/PLS OXIMETRY MLT: CPT

## 2023-12-11 PROCEDURE — 99232 SBSQ HOSP IP/OBS MODERATE 35: CPT | Performed by: HOSPITALIST

## 2023-12-11 PROCEDURE — C9113 INJ PANTOPRAZOLE SODIUM, VIA: HCPCS | Performed by: REGISTERED NURSE

## 2023-12-11 PROCEDURE — 76770 US EXAM ABDO BACK WALL COMP: CPT

## 2023-12-11 PROCEDURE — 80048 BASIC METABOLIC PNL TOTAL CA: CPT

## 2023-12-11 PROCEDURE — 99291 CRITICAL CARE FIRST HOUR: CPT | Performed by: INTERNAL MEDICINE

## 2023-12-11 PROCEDURE — 82570 ASSAY OF URINE CREATININE: CPT

## 2023-12-11 PROCEDURE — APPSS180 APP SPLIT SHARED TIME > 60 MINUTES: Performed by: REGISTERED NURSE

## 2023-12-11 PROCEDURE — 82962 GLUCOSE BLOOD TEST: CPT

## 2023-12-11 PROCEDURE — A4216 STERILE WATER/SALINE, 10 ML: HCPCS | Performed by: REGISTERED NURSE

## 2023-12-11 PROCEDURE — 93306 TTE W/DOPPLER COMPLETE: CPT | Performed by: INTERNAL MEDICINE

## 2023-12-11 PROCEDURE — 36600 WITHDRAWAL OF ARTERIAL BLOOD: CPT

## 2023-12-11 PROCEDURE — 99232 SBSQ HOSP IP/OBS MODERATE 35: CPT | Performed by: INTERNAL MEDICINE

## 2023-12-11 PROCEDURE — 6360000002 HC RX W HCPCS: Performed by: HOSPITALIST

## 2023-12-11 PROCEDURE — 84156 ASSAY OF PROTEIN URINE: CPT

## 2023-12-11 PROCEDURE — 94660 CPAP INITIATION&MGMT: CPT

## 2023-12-11 PROCEDURE — 2500000003 HC RX 250 WO HCPCS: Performed by: PHYSICIAN ASSISTANT

## 2023-12-11 PROCEDURE — 6360000002 HC RX W HCPCS: Performed by: PHYSICIAN ASSISTANT

## 2023-12-11 PROCEDURE — 2580000003 HC RX 258: Performed by: PHYSICIAN ASSISTANT

## 2023-12-11 PROCEDURE — 2580000003 HC RX 258: Performed by: REGISTERED NURSE

## 2023-12-11 PROCEDURE — 83735 ASSAY OF MAGNESIUM: CPT

## 2023-12-11 PROCEDURE — 84145 PROCALCITONIN (PCT): CPT

## 2023-12-11 PROCEDURE — 36415 COLL VENOUS BLD VENIPUNCTURE: CPT

## 2023-12-11 PROCEDURE — 81001 URINALYSIS AUTO W/SCOPE: CPT

## 2023-12-11 PROCEDURE — 6370000000 HC RX 637 (ALT 250 FOR IP): Performed by: HOSPITALIST

## 2023-12-11 PROCEDURE — 2580000003 HC RX 258: Performed by: INTERNAL MEDICINE

## 2023-12-11 RX ORDER — NOREPINEPHRINE BITARTRATE 0.06 MG/ML
1-100 INJECTION, SOLUTION INTRAVENOUS CONTINUOUS
Status: DISCONTINUED | OUTPATIENT
Start: 2023-12-11 | End: 2023-12-13 | Stop reason: SDUPTHER

## 2023-12-11 RX ORDER — HEPARIN SODIUM 1000 [USP'U]/ML
2000 INJECTION, SOLUTION INTRAVENOUS; SUBCUTANEOUS PRN
Status: DISCONTINUED | OUTPATIENT
Start: 2023-12-11 | End: 2023-12-21

## 2023-12-11 RX ORDER — DEXMEDETOMIDINE HYDROCHLORIDE 4 UG/ML
.1-.5 INJECTION, SOLUTION INTRAVENOUS CONTINUOUS
Status: DISCONTINUED | OUTPATIENT
Start: 2023-12-11 | End: 2023-12-13

## 2023-12-11 RX ORDER — HEPARIN SODIUM 1000 [USP'U]/ML
4000 INJECTION, SOLUTION INTRAVENOUS; SUBCUTANEOUS ONCE
Status: COMPLETED | OUTPATIENT
Start: 2023-12-11 | End: 2023-12-11

## 2023-12-11 RX ORDER — HEPARIN SODIUM 10000 [USP'U]/100ML
5-30 INJECTION, SOLUTION INTRAVENOUS CONTINUOUS
Status: DISCONTINUED | OUTPATIENT
Start: 2023-12-11 | End: 2023-12-21

## 2023-12-11 RX ORDER — MAGNESIUM SULFATE IN WATER 40 MG/ML
2000 INJECTION, SOLUTION INTRAVENOUS ONCE
Status: COMPLETED | OUTPATIENT
Start: 2023-12-11 | End: 2023-12-11

## 2023-12-11 RX ORDER — HEPARIN SODIUM 1000 [USP'U]/ML
4000 INJECTION, SOLUTION INTRAVENOUS; SUBCUTANEOUS PRN
Status: DISCONTINUED | OUTPATIENT
Start: 2023-12-11 | End: 2023-12-21

## 2023-12-11 RX ORDER — DOBUTAMINE HYDROCHLORIDE 200 MG/100ML
5 INJECTION INTRAVENOUS CONTINUOUS
Status: DISCONTINUED | OUTPATIENT
Start: 2023-12-11 | End: 2023-12-12

## 2023-12-11 RX ORDER — LORAZEPAM 2 MG/ML
INJECTION INTRAMUSCULAR
Status: COMPLETED
Start: 2023-12-11 | End: 2023-12-11

## 2023-12-11 RX ORDER — METOPROLOL TARTRATE 1 MG/ML
5 INJECTION, SOLUTION INTRAVENOUS EVERY 6 HOURS PRN
Status: DISCONTINUED | OUTPATIENT
Start: 2023-12-11 | End: 2023-12-21

## 2023-12-11 RX ORDER — DOBUTAMINE HYDROCHLORIDE 200 MG/100ML
2.5-1 INJECTION INTRAVENOUS CONTINUOUS
Status: DISCONTINUED | OUTPATIENT
Start: 2023-12-11 | End: 2023-12-11

## 2023-12-11 RX ORDER — FUROSEMIDE 10 MG/ML
80 INJECTION INTRAMUSCULAR; INTRAVENOUS ONCE
Status: COMPLETED | OUTPATIENT
Start: 2023-12-11 | End: 2023-12-11

## 2023-12-11 RX ADMIN — SODIUM CHLORIDE, PRESERVATIVE FREE 10 ML: 5 INJECTION INTRAVENOUS at 08:00

## 2023-12-11 RX ADMIN — METHYLPREDNISOLONE SODIUM SUCCINATE 60 MG: 125 INJECTION, POWDER, FOR SOLUTION INTRAMUSCULAR; INTRAVENOUS at 18:00

## 2023-12-11 RX ADMIN — METHYLPREDNISOLONE SODIUM SUCCINATE 60 MG: 125 INJECTION, POWDER, FOR SOLUTION INTRAMUSCULAR; INTRAVENOUS at 13:05

## 2023-12-11 RX ADMIN — SODIUM CHLORIDE, PRESERVATIVE FREE 10 ML: 5 INJECTION INTRAVENOUS at 21:01

## 2023-12-11 RX ADMIN — HEPARIN SODIUM 11 UNITS/KG/HR: 10000 INJECTION, SOLUTION INTRAVENOUS at 03:47

## 2023-12-11 RX ADMIN — INSULIN LISPRO 2 UNITS: 100 INJECTION, SOLUTION INTRAVENOUS; SUBCUTANEOUS at 13:05

## 2023-12-11 RX ADMIN — PIPERACILLIN AND TAZOBACTAM 3375 MG: 3; .375 INJECTION, POWDER, FOR SOLUTION INTRAVENOUS; PARENTERAL at 21:05

## 2023-12-11 RX ADMIN — PANTOPRAZOLE SODIUM 40 MG: 40 INJECTION, POWDER, FOR SOLUTION INTRAVENOUS at 08:00

## 2023-12-11 RX ADMIN — LORAZEPAM 2 MG: 2 INJECTION, SOLUTION INTRAMUSCULAR; INTRAVENOUS at 07:55

## 2023-12-11 RX ADMIN — DEXMEDETOMIDINE HYDROCHLORIDE 0.5 MCG/KG/HR: 4 INJECTION, SOLUTION INTRAVENOUS at 22:08

## 2023-12-11 RX ADMIN — DOBUTAMINE HYDROCHLORIDE 5 MCG/KG/MIN: 200 INJECTION INTRAVENOUS at 16:56

## 2023-12-11 RX ADMIN — MAGNESIUM SULFATE HEPTAHYDRATE 2000 MG: 40 INJECTION, SOLUTION INTRAVENOUS at 04:56

## 2023-12-11 RX ADMIN — PIPERACILLIN AND TAZOBACTAM 3375 MG: 3; .375 INJECTION, POWDER, FOR SOLUTION INTRAVENOUS; PARENTERAL at 08:30

## 2023-12-11 RX ADMIN — HEPARIN SODIUM 4000 UNITS: 1000 INJECTION INTRAVENOUS; SUBCUTANEOUS at 03:45

## 2023-12-11 RX ADMIN — DEXMEDETOMIDINE HYDROCHLORIDE 0.2 MCG/KG/HR: 4 INJECTION, SOLUTION INTRAVENOUS at 10:14

## 2023-12-11 RX ADMIN — AZITHROMYCIN MONOHYDRATE 500 MG: 500 INJECTION, POWDER, LYOPHILIZED, FOR SOLUTION INTRAVENOUS at 15:46

## 2023-12-11 RX ADMIN — FUROSEMIDE 80 MG: 10 INJECTION, SOLUTION INTRAMUSCULAR; INTRAVENOUS at 10:13

## 2023-12-11 RX ADMIN — PIPERACILLIN AND TAZOBACTAM 3375 MG: 3; .375 INJECTION, POWDER, FOR SOLUTION INTRAVENOUS; PARENTERAL at 03:58

## 2023-12-11 RX ADMIN — DOBUTAMINE HYDROCHLORIDE 2.5 MCG/KG/MIN: 200 INJECTION INTRAVENOUS at 15:48

## 2023-12-11 ASSESSMENT — PAIN SCALES - GENERAL
PAINLEVEL_OUTOF10: 0

## 2023-12-11 NOTE — CONSULTS
patient remained on continuous BiPAP. I reevaluated the patient multiple times today, patient in ARDS, remains on BiPAP therapy, patient taking large tidal volumes, attempted CPAP given the pCO2/pH, patient unable to tolerate. Patient tolerating AVAPS-ST mode, with SpO2 in the upper 80s, low 90s. Unfortunate, patient becoming increasingly dyspneic.  2 conversations today with the patient and her daughter and then the patient, they are declining elective intubation at this time, they are aware that if the patient continues to worsen, patient may have a poor outcome with emergent intubation, especially in the setting of severe mitral regurgitation. With regards to severe mitral regurg, TTE performed on BiPAP with diuretics, underestimates level of mitral regurgitation, also will not administer nebulized Veletri given risk for precipitating/pulm edema and heart failure. With regards to ALIREZA, patient was receiving fluids, however likely worsening ARDS, discussed with nephrology, will attempt diuretic challenge, Lasix 80 mg given--on reexam, patient had fair urine output. With regards to her infection, patient remains on vancomycin and Zosyn although no evidence of bacterial pneumonia--patient outside window for Tamiflu. Cardiology consulted for A-fib, patient was on Cardizem, however given negative inotropic effects, will only use as needed metoprolol or consider amiodarone. Patient unable to take her Eliquis, will continue heparin gtt.   Continue supportive care      Acute prognosis is poor        Mac Araujo MD/MPH     Pulmonary, Critical Care Medicine  [address] [facility]

## 2023-12-11 NOTE — PROGRESS NOTES
edside and Verbal shift change report given to [address] (oncoming nurse) by Naida Ennis RN (offgoing nurse). Report included the following information SBAR, Kardex, MAR and Recent Results. SITUATION:   Code Status: Full Code  Reason for Admission: Acute respiratory failure ([address]) [J96.00]  Acute respiratory failure with hypoxia ([address]) [J96.01]  Pneumonia of right lung due to infectious organism, unspecified part of lung [J18.9]    Hospital day: 3  Problem List:         BACKGROUND:    Past Medical History:   Past Medical History:   Diagnosis Date    Abnormal myocardial perfusion study 02/05/2014    Mod-sized, mild-mod mid lateral infarction w/mild-mod ninfa-infarct ischemia. Mod lateral hypk. EF 56%. Normal EKG on pharm stress test.  Intermediate risk. Age-related osteoporosis without current pathological fracture 4/5/2016    Arthritis     Benign positional vertigo 4/2/2021    CAD (coronary artery disease), native coronary artery 7/2014    MINERVA to LCx    Cancer [facility])     skin ca    Chronic cough 4/2/2021    Chronic rhinitis 4/2/2021    Dyslipidemia     Fatty pancreas 4/5/2016    On imaging    Hernia     History of echocardiogram 04/05/2016    EF 55-60%. No WMA. Gr 1 DDfx. Mod MR. Nephrolithiasis     Thyroid disease          Patient taking anticoagulants Yes     ASSESSMENT:   Changes in Assessment Throughout Shift: No    Patient has Central Line: No Reasons if yes: PIV x 1  Patient has Rubi Cath: Yes Reasons if yes: Strict I&O     Last Vitals:   [unfilled]    IV and DRAINS (will only show if present)        WOUND (if present)   Wound Type:  none   Dressing present     Wound Concerns/Notes:  none    PAIN    Pain Assessment                      Interventions for Pain:  none  Intervention effective: N/A  Time of last intervention: N/A   Reassessment Completed: N/A     Last 3 Weights:  [unfilled]  Weight change:     INTAKE/OUPUT    Current Shift: No intake/output data recorded.     Last three

## 2023-12-11 NOTE — PROGRESS NOTES
12/11/23 0328   NIV Type   Equipment Type v60   Mode CPAP   Mask Type Full face mask   Mask Size Small   Bonnet size Small   Assessment   Pulse 61   Respirations 20   SpO2 95 %   Level of Consciousness 0   Comfort Level Good   Using Accessory Muscles No   Mask Compliance Good   Settings/Measurements   PIP Observed 14 cm H20   CPAP/EPAP 12 cmH2O   Vt (Measured) 721 mL   Rate Ordered 8   Insp Rise Time (%) 3 %   FiO2  100 %   Minute Volume (L/min) 16 Liters   Mask Leak (lpm) 57 lpm   Patient's Home Machine No   Alarm Settings   Alarms On Y   Low Pressure (cmH2O) 8 cmH2O   High Pressure (cmH2O) 40 cmH2O   Apnea (secs) 20 secs   RR Low (bpm) 8   RR High (bpm) 40 br/min     Switched pt to CPAP for oxygenation. Spo2 improved to 95%.

## 2023-12-11 NOTE — CARE COORDINATION
Case Management Assessment  Initial Evaluation    Date/Time of Evaluation: 12/11/2023 3:00 PM  Assessment Completed by: Gloria Lujan    If patient is discharged prior to next notation, then this note serves as note for discharge by case management.    Patient Name: Bhavna Barrientos                   YOB: 1950  Diagnosis: Acute respiratory failure (HCC) [J96.00]  Acute respiratory failure with hypoxia (HCC) [J96.01]  Pneumonia of right lung due to infectious organism, unspecified part of lung [J18.9]                   Date / Time: 12/8/2023  3:37 PM    Patient Admission Status: Inpatient   Readmission Risk (Low < 19, Mod (19-27), High > 27): Readmission Risk Score: 17    Current PCP: Yina Zambrano MD  PCP verified by CM? Yes    Chart Reviewed: Yes      History Provided by: Child/Family  Patient Orientation: Unable to Assess    Patient Cognition:      Hospitalization in the last 30 days (Readmission):  No    If yes, Readmission Assessment in  Navigator will be completed.    Advance Directives:      Code Status: Full Code   Patient's Primary Decision Maker is: Legal Next of Kin    Primary Decision Maker: Liana Fuentes - Parent - [phone]    Secondary Decision Maker: Jeannette Hoffman - Child - [phone]    Discharge Planning:    Patient lives with: (P) Alone Type of Home: (P) House  Primary Care Giver: Self  Patient Support Systems include: Children   Current Financial resources: Medicare    Current services prior to admission: (P) None            Current DME:  None needed            Type of Home Care services:  (P) None    ADLS  Prior functional level: Independent in ADLs/IADLs  Current functional level: Assistance with the following: (TBD)    PT AM-PAC: 15 /24  OT AM-PAC: 15 /24    Family can provide assistance at DC: Yes  Would you like Case Management to discuss the discharge plan with any other family members/significant others, and if so, who? Yes (Jeannette Hoffman)  Plans to  Return to Present Housing: Yes  Potential Assistance needed at discharge: (P)  (TBD)            Potential DME:  TBD  Patient expects to discharge to: (P) [address] [facility] for transportation at discharge: (P) Family    Financial    Payor: Chani Edna / Plan: MEDICARE PART A AND B / Product Type: *No Product type* /     Does insurance require precert for SNF: No    Potential assistance Purchasing Medications: (P) No  Meds-to-Beds request:        [address], [address] [place] [address] [phone] Lisa Cartwright [phone]  [address] [place] [address],Building [facility] [address]  Phone: [phone] Fax: [phone]      Notes:    Factors facilitating achievement of predicted outcomes: Family support    Barriers to discharge: Medical complications    Additional Case Management Notes: Patient resides alone, was functioning independently and had no DME needs at time of admission. The Plan for Transition of Care is related to the following treatment goals of Acute respiratory failure (HCC) [J96.00]  Acute respiratory failure with hypoxia ([address]) [J96.01]  Pneumonia of right lung due to infectious organism, unspecified part of lung [P15.5]    IF APPLICABLE: The Patient and/or patient representative Dilma Montes and her family were provided with a choice of provider and agrees with the discharge plan. FOC for [facility] if HH is recommended, per daughter. The Patient and/or Patient Representative Agree with the Discharge Plan?  (P) Yes (TBD)       12/11/23 1410   Service Assessment   Patient Orientation Unable to Assess   History Provided By Child/Family   Primary Caregiver Self   Patient's Healthcare Decision Maker is: Legal Next of Kin   PCP Verified by CM Yes   Last Visit to PCP Within last 3 months   Prior Functional Level Independent in ADLs/IADLs   Current Functional Level Assistance with the following:  (TBD)   Can patient return to prior living arrangement Yes   Ability to make needs known: [facility] able

## 2023-12-11 NOTE — PROGRESS NOTES
[facility]ist [facility]  Progress Note    Patient: Bhavna Barrientos Age: 73 y.o. : 1950 MR#: [id number] SSN: [id number]  Date/Time: 2023    Subjective:     Patient was transferred to ICU overnight for worsening respiratory status.  Seen and examined at bedside, she is on BiPAP.  Brief eye contact, minimal verbalization.  No family at bedside.      Assessment/Plan:     1. Acute Respiratory Failure 2°/2 Influenza Multifocal Pneumonia   Droplet Isolation.  Telemetry.  Continue vancomycin, Zosyn MRSA Screen negative.  Follow-up radiographs 6-8 weeks after treatment to ensure resolution.  2. Concern for aspiration on imaging.  Did well with SLP.   3.  Elevated Troponin with CAD.  Single-vessel PCI to left circumflex/OM1 in .  - Troponin 101 ng/L to 113 ng/L.  On aspirin.  4. Hypothyroidism. Continue home Levothyroxine.  5. Dyslipidemia on home Rosuvastatin and Ezetimibe.  6. BPPV. No current home medications for this issue.  7. Atrial fib w/ RVR, appears to be new onset.  TFTs WNL.  Telemetry monitoring.  Cardiology input appreciated.  8.  Echo 2023 EF 61%.  Wall thickness.  Severe hypokinesis of mid anterior, mid anterolateral, mid inferolateral segments.  Grade 2 diastolic dysfunction.  Moderate leaflet prolapse posterior leaflet.  Severe regurgitation.  Normal RVSP.  9.  Obesity  Body mass index is 35.67 kg/m².  10.  Hypokalemia replete as needed  11.  Diarrhea PTA, improved.  Very low suspicion for C. difficile.  12.  ARDS.  Continue ICU monitoring.  Pulmonology input appreciated.  13.  DVT Prophylaxis  14.  Full code.  I discussed the case with Dr. MANUEL Lieberman.      Additional Notes:      Case discussed with:  [x]patient  []family  [x]nursing  []case management   [] discussed on IDT.   DVT Prophylaxis:  [x]Lovenox  []Hep SQ  []SCDs  []Coumadin   []On Heparin gtt   [] noac    Objective:   VS: BP (!) 127/44   Pulse 60   Temp 97.6 °F (36.4 °C) (Axillary)   Resp  NEG mg/dL    Glucose, UA Negative NEG mg/dL    Ketones, Urine Negative NEG mg/dL    Bilirubin Urine Negative NEG      Blood, Urine MODERATE (A) NEG      Urobilinogen, Urine 0.2 0.2 - 1.0 EU/dL    Nitrite, Urine Negative NEG      Leukocyte Esterase, Urine Negative NEG     Urinalysis, Micro    Collection Time: 12/11/23  3:30 AM   Result Value Ref Range    WBC, UA Negative 0 - 4 /hpf    RBC, UA 0 to 3 0 - 5 /hpf    Epithelial Cells UA Negative 0 - 5 /lpf    BACTERIA, URINE FEW (A) NEG /hpf    Amorphous Crystal 1+ (A) NEG    Red Cell Cast 0 to 3 NEG /LPF    Yeast, UA 1+ (A) NEG   POC G3: BLOOD GASES INCLUDES CALC.  TCO2, HCO3, BASE EXCESS, SO2    Collection Time: 12/11/23  5:44 AM   Result Value Ref Range    DEVICE CPAP      FIO2 100 %    POC pH 7.42 7.35 - 7.45      POC pCO2 29.8 (L) 35.0 - 45.0 MMHG    POC PO2 75 (L) 80 - 100 MMHG    POC HCO3 19.2 (L) 22 - 26 MMOL/L    POC O2 SAT 95.5 92 - 97 %    BASE DEFICIT (POC) 4.3 mmol/L    POC Steve's Test Positive      Site RIGHT RADIAL      Pt Temp 98      Specimen type: ARTERIAL      Performed by: Guru Foss    APTT    Collection Time: 12/11/23  8:38 AM   Result Value Ref Range    .6 (H) 23.0 - 36.4 SEC     Additional Data Reviewed:      Signed By: Abundio Castillo MD     December 11, 2023 11:07 AM

## 2023-12-11 NOTE — PLAN OF CARE
Problem: Discharge Planning  Goal: Discharge to home or other facility with appropriate resources  Outcome: Progressing  Flowsheets (Taken 12/11/2023 0800)  Discharge to home or other facility with appropriate resources:   Identify barriers to discharge with patient and caregiver   Arrange for needed discharge resources and transportation as appropriate   Identify discharge learning needs (meds, wound care, etc)   Arrange for interpreters to assist at discharge as needed   Refer to discharge planning if patient needs post-hospital services based on physician order or complex needs related to functional status, cognitive ability or social support system     Problem: Skin/Tissue Integrity  Goal: Absence of new skin breakdown  Description: 1. Monitor for areas of redness and/or skin breakdown  2. Assess vascular access sites hourly  3. Every 4-6 hours minimum:  Change oxygen saturation probe site  4. Every 4-6 hours:  If on nasal continuous positive airway pressure, respiratory therapy assess nares and determine need for appliance change or resting period.   Outcome: Progressing     Problem: Safety - Adult  Goal: Free from fall injury  Outcome: Progressing     Problem: Pain  Goal: Verbalizes/displays adequate comfort level or baseline comfort level  Outcome: Progressing  Flowsheets (Taken 12/11/2023 0800)  Verbalizes/displays adequate comfort level or baseline comfort level:   Encourage patient to monitor pain and request assistance   Assess pain using appropriate pain scale   Administer analgesics based on type and severity of pain and evaluate response   Implement non-pharmacological measures as appropriate and evaluate response   Consider cultural and social influences on pain and pain management   Notify Licensed Independent Practitioner if interventions unsuccessful or patient reports new pain

## 2023-12-11 NOTE — PROGRESS NOTES
attended the interdisciplinary rounds for Loli Gore, who is a 74 y.o.,female. Patient's Primary Language is: [place]. According to the patient's EMR [employer] Affiliation is: [facility].     The reason the Patient came to the hospital is:   Patient Active Problem List    Diagnosis Date Noted    Thyroid nodule 02/20/2023    ALIREZA (acute kidney injury) ([address]) 12/10/2023    Acute pulmonary edema ([address]) 12/10/2023    Elevated troponin 12/09/2023    Atrial fibrillation, new onset ([address]) 12/09/2023    Atrial fibrillation with RVR ([address]) 12/09/2023    Acute respiratory failure with hypoxia (HCC) 12/08/2023    BPPV (benign paroxysmal positional vertigo) 12/08/2023    Pneumonia of right lung due to infectious organism 12/08/2023    Hypothyroidism 12/08/2023    Irritable bowel syndrome 04/05/2021    Chronic rhinitis 04/02/2021    Chronic cough 04/02/2021    Class 2 obesity in adult 08/21/2018    Non-rheumatic mitral regurgitation 05/02/2016    Fatty pancreas 04/05/2016    Age-related osteoporosis without current pathological fracture 04/05/2016    Nephrolithiasis 08/06/2015    CAD (coronary artery disease), native coronary artery 07/01/2014    Chest pain 02/07/2014    Abnormal nuclear stress test 02/07/2014    Dyslipidemia           Plan:   participated and listen to the recommendations of the IDR team. Patient is a new admission. Patient on bi-pap machine. Chaplains will continue to follow and will provide pastoral care on an as needed/requested basis.  recommends bedside caregivers page  on duty if patient shows signs of acute spiritual or emotional distress.     [address] [facility] [place]  Staff [address] [address]   [phone]

## 2023-12-11 NOTE — PROGRESS NOTES
[facility] - Progress Note    Admit Date: 12/8/2023  Attending Cardiologist: Dr. Jin Mckeon:     Patient Active Problem List    Diagnosis Date Noted    Thyroid nodule 02/20/2023    ALIREZA (acute kidney injury) ([address]) 12/10/2023    Acute pulmonary edema ([address]) 12/10/2023    Elevated troponin 12/09/2023    Atrial fibrillation, new onset ([address]) 12/09/2023    Atrial fibrillation with RVR ([address]) 12/09/2023    Acute respiratory failure with hypoxia ([address]) 12/08/2023    BPPV (benign paroxysmal positional vertigo) 12/08/2023    Pneumonia of right lung due to infectious organism 12/08/2023    Hypothyroidism 12/08/2023    Irritable bowel syndrome 04/05/2021    Chronic rhinitis 04/02/2021    Chronic cough 04/02/2021    Class 2 obesity in adult 08/21/2018    Non-rheumatic mitral regurgitation 05/02/2016    Fatty pancreas 04/05/2016    Age-related osteoporosis without current pathological fracture 04/05/2016    Nephrolithiasis 08/06/2015    CAD (coronary artery disease), native coronary artery 07/01/2014    Chest pain 02/07/2014    Abnormal nuclear stress test 02/07/2014    Dyslipidemia      --New onset atrial fibrillation. Initially with rapid rates, started on IV diltiazem, converted to sinus rhythm 12/9/2023 and has been maintaining sinus rhythm. -- Hypoxic respiratory failure: Volume overload vs PNA  --  Influenza pneumonia. Progressive shortness of breath. On BiPap   -- ALIREZA in setting of hypoxic respiratory failure  -- Coronary artery disease. History of single-vessel PCI to left circumflex/OM1 in 2014. No recurrent angina since that time. Normal LVEF of 60% on last echocardiogram in October 2023  -- Severe mitral valve regurgitation. Patient has declined workup for mitral valve surgery in the past.  -- Dyslipidemia. On potent statin and Zetia  -- Acute kidney injury. Likely due to severe infectious process and volume depletion. -- Hypokalemia. Repleted  -- Mildly elevated troponin.   This is

## 2023-12-11 NOTE — PROGRESS NOTES
12/10/23 1748   Oxygen Therapy/Pulse Ox   O2 Device (S)  Heated high flow cannula  (optiflow)   O2 Flow Rate (L/min) (S)  50 L/min   FiO2  (S)  100 %   Humidification Source Heated wire   Humidification Temp 31     Called to patient's room for O2 desaturation to 80% on 4lpm O2. Patient placed on NRB mask and O2 sats increased to 91%. ABG obtained on NRB: pH 7.40 pCO2 26 pO2 78 HCO3 16 SpO2 96%. Hiflow O2, CXR and lasix ordered per MD. Initially I tried salter 15 lpm, but O2 sats remained in low 80s, so I switched it out for an optiflow 55lpm 100%. O2 sats remained unchanged. MD put in transfer orders for patient to stepdown unit and BIPAP. RT on 3N was notified and V60 set up in the room while I assisted transporting patient to [facility]. Patient transferred on monitor while on NRB. Ambu on bed. Patient initiated on BIPAP 18/10 100%. O2 sats 91%. Repeat ABG to be obtained in 30 minutes.

## 2023-12-11 NOTE — PROGRESS NOTES
2345hrs:  Contacted by Dr. Cory Dietrich to confirm if pt was on unit and if she was placed on BiPAP. Jo Rehman confirmed pt's arrival and advised MD of pt's O2 status. MD stated that STAT labs were ordered. RN called Phlebotomist to inquire. 2347hrs: Phlebotomy advised no new STAT orders received. MD paged. 65hrs:  Spoke with MD regarding lab work. MD advised RN that labs would be placed into pt's chart.

## 2023-12-11 NOTE — INTERDISCIPLINARY ROUNDS
[address] [facility]  Pulmonary, Critical Care, and Sleep Medicine  Interdisciplinary and Ventilator Weaning Rounds    Patient discussed in morning walking rounds and interdisciplinary rounds. ICU admission day: admitted 12/11    Overnight events:   Admitted overnight    Assessments and best practice:  Ventilator   Bipap 16/8  Vent Settings  FiO2 : 100 %  Insp Rise Time (%): 3 %   Glycemic control  Diet  Diet NPO  Stress ulcer prophylaxis. Protonix  DVT prophylaxis. Heparin gtt  Need for Lines, pratt assessed.   Yes  Restraint Reevaluation     N/a  Disposition regarding transferring out of the ICU  RED      Family contact/MPOA: Nery Aguilar  Family updated: will call today      Palliative consult within 3 days of admission to ICU-  Ethics Guidance: 21 days      Daily Plans:  High risk for needing intubation   Nephro consulted  Repeat CXR    VELASQUEZ time 15 minutes        Amita Willson PA-C  12/11/23  Pulmonary, [address] [facility]

## 2023-12-11 NOTE — PROGRESS NOTES
12/10/23 2516   NIV Type   $NIV $Daily Charge   Ventilator ID [id number]   Suction Setup and Functional Yes   NIV Started/Stopped On   Equipment Type V60   Mode AVAPS   Mask Type Full face mask   Mask Size Small   Bonnet size Small   Settings/Measurements   PIP Observed 18 cm H20   IPAP 18 cmH20   CPAP/EPAP 10 cmH2O   Vt (Measured) 576 mL   FiO2  100 %   Minute Volume (L/min) 15.7 Liters   Mask Leak (lpm) 67 lpm   Patient's Home Machine No   Alarm Settings   Alarms On Y   Low Pressure (cmH2O) 8 cmH2O   High Pressure (cmH2O) 40 cmH2O   Apnea (secs) 20 secs   RR Low (bpm) 8   RR High (bpm) 40 br/min

## 2023-12-11 NOTE — PLAN OF CARE
Problem: Safety - Adult  Goal: Free from fall injury  12/10/2023 2212 by Solomon Pope RN  Outcome: Progressing  12/10/2023 1304 by Severiano Quam, RN  Outcome: Progressing     Problem: Discharge Planning  Goal: Discharge to home or other facility with appropriate resources  12/10/2023 1304 by Severiano Quam, RN  Outcome: Progressing

## 2023-12-12 PROBLEM — I48.0 PAROXYSMAL ATRIAL FIBRILLATION (HCC): Status: ACTIVE | Noted: 2023-12-09

## 2023-12-12 LAB
ANION GAP SERPL CALC-SCNC: 11 MMOL/L (ref 3–18)
APTT PPP: 114.2 SEC (ref 23–36.4)
APTT PPP: 27.8 SEC (ref 23–36.4)
APTT PPP: 53.1 SEC (ref 23–36.4)
BASOPHILS # BLD: 0 K/UL (ref 0–0.1)
BASOPHILS NFR BLD: 0 % (ref 0–2)
BUN SERPL-MCNC: 61 MG/DL (ref 7–18)
BUN/CREAT SERPL: 17 (ref 12–20)
CALCIUM SERPL-MCNC: 8.7 MG/DL (ref 8.5–10.1)
CHLORIDE SERPL-SCNC: 105 MMOL/L (ref 100–111)
CO2 SERPL-SCNC: 20 MMOL/L (ref 21–32)
CREAT SERPL-MCNC: 3.58 MG/DL (ref 0.6–1.3)
DIFFERENTIAL METHOD BLD: ABNORMAL
EOSINOPHIL # BLD: 0 K/UL (ref 0–0.4)
EOSINOPHIL NFR BLD: 0 % (ref 0–5)
ERYTHROCYTE [DISTWIDTH] IN BLOOD BY AUTOMATED COUNT: 13.3 % (ref 11.6–14.5)
GLUCOSE BLD STRIP.AUTO-MCNC: 173 MG/DL (ref 70–110)
GLUCOSE BLD STRIP.AUTO-MCNC: 182 MG/DL (ref 70–110)
GLUCOSE BLD STRIP.AUTO-MCNC: 182 MG/DL (ref 70–110)
GLUCOSE BLD STRIP.AUTO-MCNC: 199 MG/DL (ref 70–110)
GLUCOSE BLD STRIP.AUTO-MCNC: 200 MG/DL (ref 70–110)
GLUCOSE SERPL-MCNC: 205 MG/DL (ref 74–99)
HCT VFR BLD AUTO: 38.9 % (ref 35–45)
HGB BLD-MCNC: 13.1 G/DL (ref 12–16)
IMM GRANULOCYTES # BLD AUTO: 0 K/UL (ref 0–0.04)
IMM GRANULOCYTES NFR BLD AUTO: 0 % (ref 0–0.5)
LYMPHOCYTES # BLD: 0.7 K/UL (ref 0.9–3.6)
LYMPHOCYTES NFR BLD: 9 % (ref 21–52)
MAGNESIUM SERPL-MCNC: 1.9 MG/DL (ref 1.6–2.6)
MCH RBC QN AUTO: 28.7 PG (ref 24–34)
MCHC RBC AUTO-ENTMCNC: 33.7 G/DL (ref 31–37)
MCV RBC AUTO: 85.3 FL (ref 78–100)
MONOCYTES # BLD: 0.2 K/UL (ref 0.05–1.2)
MONOCYTES NFR BLD: 3 % (ref 3–10)
NEUTS SEG # BLD: 6.7 K/UL (ref 1.8–8)
NEUTS SEG NFR BLD: 88 % (ref 40–73)
NRBC # BLD: 0 K/UL (ref 0–0.01)
NRBC BLD-RTO: 0 PER 100 WBC
PHOSPHATE SERPL-MCNC: 4.1 MG/DL (ref 2.5–4.9)
PLATELET # BLD AUTO: 124 K/UL (ref 135–420)
PLATELET COMMENT: ABNORMAL
PMV BLD AUTO: 12 FL (ref 9.2–11.8)
POTASSIUM SERPL-SCNC: 3.7 MMOL/L (ref 3.5–5.5)
RBC # BLD AUTO: 4.56 M/UL (ref 4.2–5.3)
RBC MORPH BLD: ABNORMAL
SODIUM SERPL-SCNC: 136 MMOL/L (ref 136–145)
VANCOMYCIN SERPL-MCNC: 9.9 UG/ML (ref 5–40)
WBC # BLD AUTO: 7.6 K/UL (ref 4.6–13.2)

## 2023-12-12 PROCEDURE — 2700000000 HC OXYGEN THERAPY PER DAY

## 2023-12-12 PROCEDURE — 94761 N-INVAS EAR/PLS OXIMETRY MLT: CPT

## 2023-12-12 PROCEDURE — A4216 STERILE WATER/SALINE, 10 ML: HCPCS | Performed by: REGISTERED NURSE

## 2023-12-12 PROCEDURE — 6360000002 HC RX W HCPCS: Performed by: STUDENT IN AN ORGANIZED HEALTH CARE EDUCATION/TRAINING PROGRAM

## 2023-12-12 PROCEDURE — 85025 COMPLETE CBC W/AUTO DIFF WBC: CPT

## 2023-12-12 PROCEDURE — 2580000003 HC RX 258: Performed by: REGISTERED NURSE

## 2023-12-12 PROCEDURE — 6360000002 HC RX W HCPCS: Performed by: INTERNAL MEDICINE

## 2023-12-12 PROCEDURE — 2580000003 HC RX 258: Performed by: INTERNAL MEDICINE

## 2023-12-12 PROCEDURE — 36415 COLL VENOUS BLD VENIPUNCTURE: CPT

## 2023-12-12 PROCEDURE — 2500000003 HC RX 250 WO HCPCS: Performed by: STUDENT IN AN ORGANIZED HEALTH CARE EDUCATION/TRAINING PROGRAM

## 2023-12-12 PROCEDURE — 85730 THROMBOPLASTIN TIME PARTIAL: CPT

## 2023-12-12 PROCEDURE — 80202 ASSAY OF VANCOMYCIN: CPT

## 2023-12-12 PROCEDURE — 2580000003 HC RX 258: Performed by: PHYSICIAN ASSISTANT

## 2023-12-12 PROCEDURE — 82962 GLUCOSE BLOOD TEST: CPT

## 2023-12-12 PROCEDURE — 83735 ASSAY OF MAGNESIUM: CPT

## 2023-12-12 PROCEDURE — 2000000000 HC ICU R&B

## 2023-12-12 PROCEDURE — 6360000002 HC RX W HCPCS: Performed by: HOSPITALIST

## 2023-12-12 PROCEDURE — 99291 CRITICAL CARE FIRST HOUR: CPT | Performed by: INTERNAL MEDICINE

## 2023-12-12 PROCEDURE — 94660 CPAP INITIATION&MGMT: CPT

## 2023-12-12 PROCEDURE — 84100 ASSAY OF PHOSPHORUS: CPT

## 2023-12-12 PROCEDURE — 6360000002 HC RX W HCPCS: Performed by: REGISTERED NURSE

## 2023-12-12 PROCEDURE — C9113 INJ PANTOPRAZOLE SODIUM, VIA: HCPCS | Performed by: REGISTERED NURSE

## 2023-12-12 PROCEDURE — 99232 SBSQ HOSP IP/OBS MODERATE 35: CPT | Performed by: HOSPITALIST

## 2023-12-12 PROCEDURE — 2500000003 HC RX 250 WO HCPCS: Performed by: PHYSICIAN ASSISTANT

## 2023-12-12 PROCEDURE — 6370000000 HC RX 637 (ALT 250 FOR IP): Performed by: HOSPITALIST

## 2023-12-12 PROCEDURE — 80048 BASIC METABOLIC PNL TOTAL CA: CPT

## 2023-12-12 PROCEDURE — 2580000003 HC RX 258: Performed by: HOSPITALIST

## 2023-12-12 PROCEDURE — 99233 SBSQ HOSP IP/OBS HIGH 50: CPT | Performed by: INTERNAL MEDICINE

## 2023-12-12 PROCEDURE — 6360000002 HC RX W HCPCS: Performed by: PHYSICIAN ASSISTANT

## 2023-12-12 RX ORDER — BUMETANIDE 0.25 MG/ML
1 INJECTION INTRAMUSCULAR; INTRAVENOUS ONCE
Status: COMPLETED | OUTPATIENT
Start: 2023-12-12 | End: 2023-12-12

## 2023-12-12 RX ORDER — VANCOMYCIN 1.75 G/350ML
1250 INJECTION, SOLUTION INTRAVENOUS ONCE
Status: COMPLETED | OUTPATIENT
Start: 2023-12-12 | End: 2023-12-12

## 2023-12-12 RX ADMIN — SODIUM CHLORIDE, PRESERVATIVE FREE 10 ML: 5 INJECTION INTRAVENOUS at 08:25

## 2023-12-12 RX ADMIN — HEPARIN SODIUM 4000 UNITS: 1000 INJECTION INTRAVENOUS; SUBCUTANEOUS at 19:29

## 2023-12-12 RX ADMIN — PIPERACILLIN AND TAZOBACTAM 3375 MG: 3; .375 INJECTION, POWDER, FOR SOLUTION INTRAVENOUS; PARENTERAL at 21:07

## 2023-12-12 RX ADMIN — DEXMEDETOMIDINE HYDROCHLORIDE 0.4 MCG/KG/HR: 4 INJECTION, SOLUTION INTRAVENOUS at 18:00

## 2023-12-12 RX ADMIN — DEXMEDETOMIDINE HYDROCHLORIDE 0.5 MCG/KG/HR: 4 INJECTION, SOLUTION INTRAVENOUS at 06:13

## 2023-12-12 RX ADMIN — SODIUM CHLORIDE, PRESERVATIVE FREE 10 ML: 5 INJECTION INTRAVENOUS at 21:08

## 2023-12-12 RX ADMIN — PIPERACILLIN AND TAZOBACTAM 3375 MG: 3; .375 INJECTION, POWDER, FOR SOLUTION INTRAVENOUS; PARENTERAL at 09:00

## 2023-12-12 RX ADMIN — INSULIN LISPRO 2 UNITS: 100 INJECTION, SOLUTION INTRAVENOUS; SUBCUTANEOUS at 12:07

## 2023-12-12 RX ADMIN — PANTOPRAZOLE SODIUM 40 MG: 40 INJECTION, POWDER, FOR SOLUTION INTRAVENOUS at 08:15

## 2023-12-12 RX ADMIN — AZITHROMYCIN MONOHYDRATE 500 MG: 500 INJECTION, POWDER, LYOPHILIZED, FOR SOLUTION INTRAVENOUS at 14:15

## 2023-12-12 RX ADMIN — DEXMEDETOMIDINE HYDROCHLORIDE 0.3 MCG/KG/HR: 4 INJECTION, SOLUTION INTRAVENOUS at 19:00

## 2023-12-12 RX ADMIN — METHYLPREDNISOLONE SODIUM SUCCINATE 60 MG: 125 INJECTION, POWDER, FOR SOLUTION INTRAMUSCULAR; INTRAVENOUS at 00:18

## 2023-12-12 RX ADMIN — HEPARIN SODIUM 11 UNITS/KG/HR: 10000 INJECTION, SOLUTION INTRAVENOUS at 06:13

## 2023-12-12 RX ADMIN — METHYLPREDNISOLONE SODIUM SUCCINATE 60 MG: 125 INJECTION, POWDER, FOR SOLUTION INTRAMUSCULAR; INTRAVENOUS at 06:19

## 2023-12-12 RX ADMIN — METHYLPREDNISOLONE SODIUM SUCCINATE 60 MG: 125 INJECTION, POWDER, FOR SOLUTION INTRAMUSCULAR; INTRAVENOUS at 11:20

## 2023-12-12 RX ADMIN — METHYLPREDNISOLONE SODIUM SUCCINATE 60 MG: 125 INJECTION, POWDER, FOR SOLUTION INTRAMUSCULAR; INTRAVENOUS at 18:58

## 2023-12-12 RX ADMIN — VANCOMYCIN 1250 MG: 1.75 INJECTION, SOLUTION INTRAVENOUS at 11:20

## 2023-12-12 RX ADMIN — BUMETANIDE 1 MG: 0.25 INJECTION INTRAMUSCULAR; INTRAVENOUS at 08:15

## 2023-12-12 ASSESSMENT — PAIN SCALES - GENERAL
PAINLEVEL_OUTOF10: 0

## 2023-12-12 NOTE — PROGRESS NOTES
Daughter and patient updated at bedside re critical nature, high risk for intubation. D/c of dobutamine. Echo results.     Reminded patient re warning signs for impending resp arrest.    Cleve Cornejo PA-C  12/12/23  Pulmonary, Critical Care Medicine  [facility]

## 2023-12-12 NOTE — PROGRESS NOTES
attended the interdisciplinary rounds for Cruz Kinsey, who is a 74 y.o.,female. Patient's Primary Language is: [place]. According to the patient's EMR Jain Affiliation is: [facility].     The reason the Patient came to the hospital is:   Patient Active Problem List    Diagnosis Date Noted    Thyroid nodule 02/20/2023    ARDS (adult respiratory distress syndrome) ([address]) 12/11/2023    Influenzal pneumonia 12/11/2023    ALIREZA (acute kidney injury) ([address]) 12/10/2023    Acute pulmonary edema (HCC) 12/10/2023    Elevated troponin 12/09/2023    Atrial fibrillation, new onset ([address]) 12/09/2023    Atrial fibrillation with RVR ([address]) 12/09/2023    Acute respiratory failure with hypoxia (HCC) 12/08/2023    BPPV (benign paroxysmal positional vertigo) 12/08/2023    Multifocal pneumonia 12/08/2023    Hypothyroidism 12/08/2023    Irritable bowel syndrome 04/05/2021    Chronic rhinitis 04/02/2021    Chronic cough 04/02/2021    Class 2 obesity in adult 08/21/2018    Non-rheumatic mitral regurgitation 05/02/2016    Fatty pancreas 04/05/2016    Age-related osteoporosis without current pathological fracture 04/05/2016    Nephrolithiasis 08/06/2015    CAD (coronary artery disease), native coronary artery 07/01/2014    Chest pain 02/07/2014    Abnormal nuclear stress test 02/07/2014    Dyslipidemia           Plan:   participated and listen to the recommendations of the IDR team. Patient intubated. Non-communicative. Chaplains will continue to follow and will provide pastoral care on an as needed/requested basis.  recommends bedside caregivers page  on duty if patient shows signs of acute spiritual or emotional distress.     [address] [facility]  [facility] [address] [facility]   [phone]

## 2023-12-12 NOTE — PROGRESS NOTES
RENAL DAILY PROGRESS NOTE    Subjective:       Complaint:   Overnight events noted  On bipap  Echo reviewed    IMPRESSION:   ALIREZA in setting of hypoxic respiratory failure. Due to ATN ? Hypoxic res failure on bipap ? pneumonia vs developing ARDS  Hx CAD  Recent flu   PLAN:   Echo reviewed. No signs of fluid overload on this. MR is improved and now is only mild. La dilatation is less. Suspect she is going to ARDS  I and O  Daily labs  Avoid Bp fluctuations  Will follow closely.              Current Facility-Administered Medications   Medication Dose Route Frequency    vancomycin (VANCOCIN) 1250 mg in 250 mL IVPB  1,250 mg IntraVENous Once    pantoprazole (PROTONIX) 40 mg in sodium chloride (PF) 0.9 % 10 mL injection  40 mg IntraVENous Daily    heparin (porcine) injection 4,000 Units  4,000 Units IntraVENous PRN    heparin (porcine) injection 2,000 Units  2,000 Units IntraVENous PRN    heparin 25,000 units in dextrose 5% 250 mL (premix) infusion  5-30 Units/kg/hr IntraVENous Continuous    piperacillin-tazobactam (ZOSYN) 3,375 mg in sodium chloride 0.9 % 50 mL IVPB (mini-bag)  3,375 mg IntraVENous Q12H    dexmedetomidine (PRECEDEX) 400 mcg in sodium chloride 0.9 % 100 mL infusion  0.1-0.5 mcg/kg/hr IntraVENous Continuous    methylPREDNISolone sodium (PF) (SOLU-MEDROL PF) injection 60 mg  60 mg IntraVENous Q6H    metoprolol (LOPRESSOR) injection 5 mg  5 mg IntraVENous Q6H PRN    azithromycin (ZITHROMAX) 500 mg in sodium chloride 0.9% 250 mL (vial-mate/adapter) IVPB  500 mg IntraVENous Q24H    norepinephrine (LEVOPHED) 16 mg in sodium chloride 0.9 % 250 mL infusion  1-100 mcg/min IntraVENous Continuous    DOBUTamine (DOBUTREX) 500 mg in dextrose 5 % 250 mL infusion  5 mcg/kg/min IntraVENous Continuous    [Held by provider] aspirin chewable tablet 81 mg  81 mg Oral Daily    glucose chewable tablet 16 g  4 tablet Oral PRN    dextrose bolus 10% 125 mL  125 mL IntraVENous PRN    Or    dextrose bolus 10% 250 mL  250 mL

## 2023-12-12 NOTE — PLAN OF CARE
Problem: Discharge Planning  Goal: Discharge to home or other facility with appropriate resources  12/11/2023 2117 by Jeannette Fuentes RN  Outcome: Progressing  12/11/2023 1846 by Renae Jasso RN  Outcome: Progressing  Flowsheets (Taken 12/11/2023 0800)  Discharge to home or other facility with appropriate resources:   Identify barriers to discharge with patient and caregiver   Arrange for needed discharge resources and transportation as appropriate   Identify discharge learning needs (meds, wound care, etc)   Arrange for interpreters to assist at discharge as needed   Refer to discharge planning if patient needs post-hospital services based on physician order or complex needs related to functional status, cognitive ability or social support system     Problem: Skin/Tissue Integrity  Goal: Absence of new skin breakdown  Description: 1.  Monitor for areas of redness and/or skin breakdown  2.  Assess vascular access sites hourly  3.  Every 4-6 hours minimum:  Change oxygen saturation probe site  4.  Every 4-6 hours:  If on nasal continuous positive airway pressure, respiratory therapy assess nares and determine need for appliance change or resting period.  12/11/2023 2117 by Jeannette Fuentes RN  Outcome: Progressing  12/11/2023 1846 by Renae Jasso RN  Outcome: Progressing     Problem: Safety - Adult  Goal: Free from fall injury  12/11/2023 2117 by Jeannette Fuentes RN  Outcome: Progressing  12/11/2023 1846 by Renae Jasso RN  Outcome: Progressing     Problem: Pain  Goal: Verbalizes/displays adequate comfort level or baseline comfort level  12/11/2023 2117 by Jeannette Fuentes RN  Outcome: Progressing  12/11/2023 1846 by Renae Jasso RN  Outcome: Progressing  Flowsheets (Taken 12/11/2023 0800)  Verbalizes/displays adequate comfort level or baseline comfort level:   Encourage patient to monitor pain and request assistance   Assess pain using appropriate pain scale   Administer analgesics  based on type and severity of pain and evaluate response   Implement non-pharmacological measures as appropriate and evaluate response   Consider cultural and social influences on pain and pain management   Notify Licensed Independent Practitioner if interventions unsuccessful or patient reports new pain

## 2023-12-12 NOTE — PROGRESS NOTES
injection 2,000 Units  2,000 Units IntraVENous PRN    heparin 25,000 units in dextrose 5% 250 mL (premix) infusion  5-30 Units/kg/hr IntraVENous Continuous    piperacillin-tazobactam (ZOSYN) 3,375 mg in sodium chloride 0.9 % 50 mL IVPB (mini-bag)  3,375 mg IntraVENous Q12H    dexmedetomidine (PRECEDEX) 400 mcg in sodium chloride 0.9 % 100 mL infusion  0.1-0.5 mcg/kg/hr IntraVENous Continuous    methylPREDNISolone sodium (PF) (SOLU-MEDROL PF) injection 60 mg  60 mg IntraVENous Q6H    metoprolol (LOPRESSOR) injection 5 mg  5 mg IntraVENous Q6H PRN    azithromycin (ZITHROMAX) 500 mg in sodium chloride 0.9% 250 mL (vial-mate/adapter) IVPB  500 mg IntraVENous Q24H    norepinephrine (LEVOPHED) 16 mg in sodium chloride 0.9 % 250 mL infusion  1-100 mcg/min IntraVENous Continuous    [Held by provider] aspirin chewable tablet 81 mg  81 mg Oral Daily    glucose chewable tablet 16 g  4 tablet Oral PRN    dextrose bolus 10% 125 mL  125 mL IntraVENous PRN    Or    dextrose bolus 10% 250 mL  250 mL IntraVENous PRN    Glucagon Emergency SOLR 1 mg  1 mg SubCUTAneous PRN    dextrose 10 % infusion   IntraVENous Continuous PRN    [Held by provider] rosuvastatin (CRESTOR) tablet 10 mg  10 mg Oral Daily    vancomycin (VANCOCIN) intermittent dosing (placeholder)   Other RX Placeholder    insulin lispro (HUMALOG) injection vial 0-8 Units  0-8 Units SubCUTAneous TID WC    insulin lispro (HUMALOG) injection vial 0-4 Units  0-4 Units SubCUTAneous Nightly    [Held by provider] apixaban (ELIQUIS) tablet 5 mg  5 mg Oral BID    sodium chloride flush 0.9 % injection 5-40 mL  5-40 mL IntraVENous 2 times per day    sodium chloride flush 0.9 % injection 5-40 mL  5-40 mL IntraVENous PRN    0.9 % sodium chloride infusion   IntraVENous PRN    ondansetron (ZOFRAN-ODT) disintegrating tablet 4 mg  4 mg Oral Q8H PRN    Or    ondansetron (ZOFRAN) injection 4 mg  4 mg IntraVENous Q6H PRN    polyethylene glycol (GLYCOLAX) packet 17 g  17 g Oral Daily PRN

## 2023-12-12 NOTE — INTERDISCIPLINARY ROUNDS
[facility]  Pulmonary, Critical Care, and Sleep Medicine  Interdisciplinary and Ventilator Weaning Rounds    Patient discussed in morning walking rounds and interdisciplinary rounds. ICU admission day: admitted 12/11    Overnight events:   Admitted overnight    Assessments and best practice:  Ventilator   Bipap AVAPS   Vent Settings  FiO2 : 100 %  Vt (Set, mL): 370 mL  Insp Rise Time (%): 3 %   Glycemic control  Diet  Diet NPO  Stress ulcer prophylaxis. Protonix  DVT prophylaxis. Heparin gtt  Need for Lines, pratt assessed.   Yes  Restraint Reevaluation     N/a  Disposition regarding transferring out of the ICU  RED      Family contact/MPOA: Nii Sol  Family updated: will call today      Palliative consult within 3 days of admission to ICU-  Ethics Guidance: 21 days      Daily Plans:  High risk for needing intubation   Nephro following  Cont' best supportive care    VELASQUEZ time 15 minutes        Ángela Baker PA-C  12/12/23  [facility], [facility]

## 2023-12-13 ENCOUNTER — ANESTHESIA (OUTPATIENT)
Dept: ICU | Facility: HOSPITAL | Age: 73
End: 2023-12-13
Payer: MEDICARE

## 2023-12-13 ENCOUNTER — ANESTHESIA EVENT (OUTPATIENT)
Dept: ICU | Facility: HOSPITAL | Age: 73
End: 2023-12-13
Payer: MEDICARE

## 2023-12-13 ENCOUNTER — APPOINTMENT (OUTPATIENT)
Facility: HOSPITAL | Age: 73
DRG: 870 | End: 2023-12-13
Payer: MEDICARE

## 2023-12-13 PROBLEM — I48.91 ATRIAL FIBRILLATION WITH RVR (HCC): Status: ACTIVE | Noted: 2023-12-13

## 2023-12-13 LAB
ANION GAP SERPL CALC-SCNC: 9 MMOL/L (ref 3–18)
APTT PPP: 119.5 SEC (ref 23–36.4)
APTT PPP: 57.4 SEC (ref 23–36.4)
APTT PPP: >180 SEC (ref 23–36.4)
ARTERIAL PATENCY WRIST A: ABNORMAL
ARTERIAL PATENCY WRIST A: POSITIVE
ARTERIAL PATENCY WRIST A: POSITIVE
BACTERIA SPEC CULT: ABNORMAL
BACTERIA SPEC CULT: NORMAL
BASE DEFICIT BLD-SCNC: 3.2 MMOL/L
BASE DEFICIT BLD-SCNC: 3.5 MMOL/L
BASE DEFICIT BLD-SCNC: 7.1 MMOL/L
BASOPHILS # BLD: 0 K/UL (ref 0–0.1)
BASOPHILS NFR BLD: 0 % (ref 0–2)
BDY SITE: ABNORMAL
BODY TEMPERATURE: 96.8
BODY TEMPERATURE: 96.8
BODY TEMPERATURE: 97.9
BUN SERPL-MCNC: 73 MG/DL (ref 7–18)
BUN/CREAT SERPL: 19 (ref 12–20)
CALCIUM SERPL-MCNC: 8.3 MG/DL (ref 8.5–10.1)
CC UR VC: ABNORMAL
CHLORIDE SERPL-SCNC: 108 MMOL/L (ref 100–111)
CO2 SERPL-SCNC: 21 MMOL/L (ref 21–32)
CREAT SERPL-MCNC: 3.78 MG/DL (ref 0.6–1.3)
DIFFERENTIAL METHOD BLD: ABNORMAL
EOSINOPHIL # BLD: 0 K/UL (ref 0–0.4)
EOSINOPHIL NFR BLD: 0 % (ref 0–5)
ERYTHROCYTE [DISTWIDTH] IN BLOOD BY AUTOMATED COUNT: 12.9 % (ref 11.6–14.5)
GAS FLOW.O2 O2 DELIVERY SYS: ABNORMAL
GAS FLOW.O2 SETTING OXYMISER: 16 BPM
GAS FLOW.O2 SETTING OXYMISER: 16 BPM
GAS FLOW.O2 SETTING OXYMISER: 24 BPM
GLUCOSE BLD STRIP.AUTO-MCNC: 192 MG/DL (ref 70–110)
GLUCOSE BLD STRIP.AUTO-MCNC: 195 MG/DL (ref 70–110)
GLUCOSE BLD STRIP.AUTO-MCNC: 209 MG/DL (ref 70–110)
GLUCOSE SERPL-MCNC: 199 MG/DL (ref 74–99)
GRAM STN SPEC: NORMAL
HCO3 BLD-SCNC: 19 MMOL/L (ref 22–26)
HCO3 BLD-SCNC: 19 MMOL/L (ref 22–26)
HCO3 BLD-SCNC: 19.7 MMOL/L (ref 22–26)
HCT VFR BLD AUTO: 36.5 % (ref 35–45)
HGB BLD-MCNC: 11.9 G/DL (ref 12–16)
IMM GRANULOCYTES # BLD AUTO: 0 K/UL (ref 0–0.04)
IMM GRANULOCYTES NFR BLD AUTO: 0 % (ref 0–0.5)
IPAP/PIP/HIGH PEEP: 15
IPAP/PIP/HIGH PEEP: 16
IPAP/PIP/HIGH PEEP: 31
LACTATE BLD-SCNC: 1.14 MMOL/L (ref 0.4–2)
LACTATE BLD-SCNC: 1.26 MMOL/L (ref 0.4–2)
LACTATE BLD-SCNC: 1.49 MMOL/L (ref 0.4–2)
LYMPHOCYTES # BLD: 0.6 K/UL (ref 0.9–3.6)
LYMPHOCYTES NFR BLD: 7 % (ref 21–52)
MAGNESIUM SERPL-MCNC: 2.1 MG/DL (ref 1.6–2.6)
MCH RBC QN AUTO: 28.5 PG (ref 24–34)
MCHC RBC AUTO-ENTMCNC: 32.6 G/DL (ref 31–37)
MCV RBC AUTO: 87.3 FL (ref 78–100)
MONOCYTES # BLD: 0.3 K/UL (ref 0.05–1.2)
MONOCYTES NFR BLD: 3 % (ref 3–10)
NEUTS SEG # BLD: 7.9 K/UL (ref 1.8–8)
NEUTS SEG NFR BLD: 90 % (ref 40–73)
NRBC # BLD: 0 K/UL (ref 0–0.01)
NRBC BLD-RTO: 0 PER 100 WBC
O2/TOTAL GAS SETTING VFR VENT: 100 %
O2/TOTAL GAS SETTING VFR VENT: 100 %
O2/TOTAL GAS SETTING VFR VENT: 90 %
PAW @ MEAN EXP FLOW ON VENT: 21 CMH2O
PAW @ MEAN EXP FLOW ON VENT: 96 CMH2O
PCO2 BLD: 24.5 MMHG (ref 35–45)
PCO2 BLD: 25.3 MMHG (ref 35–45)
PCO2 BLD: 42.9 MMHG (ref 35–45)
PEEP RESPIRATORY: 10 CMH2O
PEEP RESPIRATORY: 12 CMH2O
PEEP RESPIRATORY: 16 CMH2O
PH BLD: 7.27 (ref 7.35–7.45)
PH BLD: 7.48 (ref 7.35–7.45)
PH BLD: 7.49 (ref 7.35–7.45)
PHOSPHATE SERPL-MCNC: 4.6 MG/DL (ref 2.5–4.9)
PLATELET # BLD AUTO: 141 K/UL (ref 135–420)
PMV BLD AUTO: 12 FL (ref 9.2–11.8)
PO2 BLD: 106 MMHG (ref 80–100)
PO2 BLD: 48 MMHG (ref 80–100)
PO2 BLD: 52 MMHG (ref 80–100)
POTASSIUM SERPL-SCNC: 4.1 MMOL/L (ref 3.5–5.5)
PRESSURE SUPPORT SETTING VENT: 13 CMH2O
RBC # BLD AUTO: 4.18 M/UL (ref 4.2–5.3)
RESPIRATORY RATE, POC: 24 (ref 5–40)
RESPIRATORY RATE, POC: 33 (ref 5–40)
RESPIRATORY RATE, POC: 33 (ref 5–40)
SAO2 % BLD: 88.9 % (ref 92–97)
SAO2 % BLD: 91.5 % (ref 92–97)
SAO2 % BLD: 97.3 % (ref 92–97)
SERVICE CMNT-IMP: ABNORMAL
SERVICE CMNT-IMP: NORMAL
SODIUM SERPL-SCNC: 138 MMOL/L (ref 136–145)
SPECIMEN TYPE: ABNORMAL
VANCOMYCIN SERPL-MCNC: 29.2 UG/ML (ref 5–40)
VENTILATION MODE VENT: ABNORMAL
VT SETTING VENT: 330 ML
VT SETTING VENT: 370 ML
VT SETTING VENT: 370 ML
WBC # BLD AUTO: 8.8 K/UL (ref 4.6–13.2)

## 2023-12-13 PROCEDURE — 83735 ASSAY OF MAGNESIUM: CPT

## 2023-12-13 PROCEDURE — 2580000003 HC RX 258: Performed by: INTERNAL MEDICINE

## 2023-12-13 PROCEDURE — 2500000003 HC RX 250 WO HCPCS: Performed by: INTERNAL MEDICINE

## 2023-12-13 PROCEDURE — 2580000003 HC RX 258: Performed by: REGISTERED NURSE

## 2023-12-13 PROCEDURE — 31500 INSERT EMERGENCY AIRWAY: CPT

## 2023-12-13 PROCEDURE — 36620 INSERTION CATHETER ARTERY: CPT

## 2023-12-13 PROCEDURE — 0BH17EZ INSERTION OF ENDOTRACHEAL AIRWAY INTO TRACHEA, VIA NATURAL OR ARTIFICIAL OPENING: ICD-10-PCS | Performed by: HOSPITALIST

## 2023-12-13 PROCEDURE — 36620 INSERTION CATHETER ARTERY: CPT | Performed by: INTERNAL MEDICINE

## 2023-12-13 PROCEDURE — 36600 WITHDRAWAL OF ARTERIAL BLOOD: CPT

## 2023-12-13 PROCEDURE — 6360000002 HC RX W HCPCS: Performed by: STUDENT IN AN ORGANIZED HEALTH CARE EDUCATION/TRAINING PROGRAM

## 2023-12-13 PROCEDURE — 85730 THROMBOPLASTIN TIME PARTIAL: CPT

## 2023-12-13 PROCEDURE — 2500000003 HC RX 250 WO HCPCS: Performed by: STUDENT IN AN ORGANIZED HEALTH CARE EDUCATION/TRAINING PROGRAM

## 2023-12-13 PROCEDURE — A4216 STERILE WATER/SALINE, 10 ML: HCPCS | Performed by: PHYSICIAN ASSISTANT

## 2023-12-13 PROCEDURE — 37799 UNLISTED PX VASCULAR SURGERY: CPT

## 2023-12-13 PROCEDURE — 76937 US GUIDE VASCULAR ACCESS: CPT | Performed by: INTERNAL MEDICINE

## 2023-12-13 PROCEDURE — 31500 INSERT EMERGENCY AIRWAY: CPT | Performed by: NURSE ANESTHETIST, CERTIFIED REGISTERED

## 2023-12-13 PROCEDURE — A4216 STERILE WATER/SALINE, 10 ML: HCPCS | Performed by: REGISTERED NURSE

## 2023-12-13 PROCEDURE — C9113 INJ PANTOPRAZOLE SODIUM, VIA: HCPCS | Performed by: REGISTERED NURSE

## 2023-12-13 PROCEDURE — 82962 GLUCOSE BLOOD TEST: CPT

## 2023-12-13 PROCEDURE — 85025 COMPLETE CBC W/AUTO DIFF WBC: CPT

## 2023-12-13 PROCEDURE — 97110 THERAPEUTIC EXERCISES: CPT

## 2023-12-13 PROCEDURE — 94660 CPAP INITIATION&MGMT: CPT

## 2023-12-13 PROCEDURE — 71045 X-RAY EXAM CHEST 1 VIEW: CPT

## 2023-12-13 PROCEDURE — 2000000000 HC ICU R&B

## 2023-12-13 PROCEDURE — 99291 CRITICAL CARE FIRST HOUR: CPT | Performed by: INTERNAL MEDICINE

## 2023-12-13 PROCEDURE — 6360000002 HC RX W HCPCS: Performed by: INTERNAL MEDICINE

## 2023-12-13 PROCEDURE — 94761 N-INVAS EAR/PLS OXIMETRY MLT: CPT

## 2023-12-13 PROCEDURE — 2700000000 HC OXYGEN THERAPY PER DAY

## 2023-12-13 PROCEDURE — 2500000003 HC RX 250 WO HCPCS: Performed by: PHYSICIAN ASSISTANT

## 2023-12-13 PROCEDURE — 80048 BASIC METABOLIC PNL TOTAL CA: CPT

## 2023-12-13 PROCEDURE — 2580000003 HC RX 258: Performed by: HOSPITALIST

## 2023-12-13 PROCEDURE — 99233 SBSQ HOSP IP/OBS HIGH 50: CPT | Performed by: INTERNAL MEDICINE

## 2023-12-13 PROCEDURE — 94002 VENT MGMT INPAT INIT DAY: CPT

## 2023-12-13 PROCEDURE — 83605 ASSAY OF LACTIC ACID: CPT

## 2023-12-13 PROCEDURE — 5A1955Z RESPIRATORY VENTILATION, GREATER THAN 96 CONSECUTIVE HOURS: ICD-10-PCS | Performed by: HOSPITALIST

## 2023-12-13 PROCEDURE — 36415 COLL VENOUS BLD VENIPUNCTURE: CPT

## 2023-12-13 PROCEDURE — 6360000002 HC RX W HCPCS: Performed by: HOSPITALIST

## 2023-12-13 PROCEDURE — 6370000000 HC RX 637 (ALT 250 FOR IP): Performed by: HOSPITALIST

## 2023-12-13 PROCEDURE — 6360000002 HC RX W HCPCS: Performed by: REGISTERED NURSE

## 2023-12-13 PROCEDURE — 36569 INSJ PICC 5 YR+ W/O IMAGING: CPT

## 2023-12-13 PROCEDURE — 2500000003 HC RX 250 WO HCPCS: Performed by: NURSE ANESTHETIST, CERTIFIED REGISTERED

## 2023-12-13 PROCEDURE — 02HV33Z INSERTION OF INFUSION DEVICE INTO SUPERIOR VENA CAVA, PERCUTANEOUS APPROACH: ICD-10-PCS | Performed by: HOSPITALIST

## 2023-12-13 PROCEDURE — 6360000002 HC RX W HCPCS

## 2023-12-13 PROCEDURE — 2580000003 HC RX 258: Performed by: PHYSICIAN ASSISTANT

## 2023-12-13 PROCEDURE — 6370000000 HC RX 637 (ALT 250 FOR IP): Performed by: PHYSICIAN ASSISTANT

## 2023-12-13 PROCEDURE — 80202 ASSAY OF VANCOMYCIN: CPT

## 2023-12-13 PROCEDURE — 84100 ASSAY OF PHOSPHORUS: CPT

## 2023-12-13 PROCEDURE — 82803 BLOOD GASES ANY COMBINATION: CPT

## 2023-12-13 PROCEDURE — 2500000003 HC RX 250 WO HCPCS

## 2023-12-13 PROCEDURE — 6360000002 HC RX W HCPCS: Performed by: PHYSICIAN ASSISTANT

## 2023-12-13 RX ORDER — BUMETANIDE 0.25 MG/ML
1 INJECTION INTRAMUSCULAR; INTRAVENOUS ONCE
Status: COMPLETED | OUTPATIENT
Start: 2023-12-13 | End: 2023-12-13

## 2023-12-13 RX ORDER — FENTANYL CITRATE 50 UG/ML
50 INJECTION, SOLUTION INTRAMUSCULAR; INTRAVENOUS
Status: DISCONTINUED | OUTPATIENT
Start: 2023-12-13 | End: 2023-12-21

## 2023-12-13 RX ORDER — BUMETANIDE 0.25 MG/ML
2 INJECTION INTRAMUSCULAR; INTRAVENOUS EVERY 8 HOURS
Status: DISCONTINUED | OUTPATIENT
Start: 2023-12-13 | End: 2023-12-14

## 2023-12-13 RX ORDER — MINERAL OIL AND WHITE PETROLATUM 150; 830 MG/G; MG/G
OINTMENT OPHTHALMIC EVERY 4 HOURS
Status: DISCONTINUED | OUTPATIENT
Start: 2023-12-13 | End: 2023-12-13 | Stop reason: RX

## 2023-12-13 RX ORDER — MIDAZOLAM IN NACL,ISO-OSMOT/PF 50 MG/50ML
1-10 INFUSION BOTTLE (ML) INTRAVENOUS
Status: DISCONTINUED | OUTPATIENT
Start: 2023-12-13 | End: 2023-12-13

## 2023-12-13 RX ORDER — ETOMIDATE 2 MG/ML
INJECTION INTRAVENOUS PRN
Status: DISCONTINUED | OUTPATIENT
Start: 2023-12-13 | End: 2023-12-14 | Stop reason: HOSPADM

## 2023-12-13 RX ORDER — THIAMINE HYDROCHLORIDE 100 MG/ML
400 INJECTION, SOLUTION INTRAMUSCULAR; INTRAVENOUS ONCE
Status: COMPLETED | OUTPATIENT
Start: 2023-12-13 | End: 2023-12-13

## 2023-12-13 RX ORDER — FENTANYL CITRATE-0.9 % NACL/PF 10 MCG/ML
25-300 PLASTIC BAG, INJECTION (ML) INTRAVENOUS
Status: DISCONTINUED | OUTPATIENT
Start: 2023-12-13 | End: 2023-12-15

## 2023-12-13 RX ORDER — NOREPINEPHRINE BITARTRATE 0.06 MG/ML
1-100 INJECTION, SOLUTION INTRAVENOUS CONTINUOUS
Status: DISCONTINUED | OUTPATIENT
Start: 2023-12-13 | End: 2023-12-19

## 2023-12-13 RX ORDER — MIDAZOLAM IN NACL,ISO-OSMOT/PF 50 MG/50ML
1-20 INFUSION BOTTLE (ML) INTRAVENOUS
Status: DISCONTINUED | OUTPATIENT
Start: 2023-12-13 | End: 2023-12-19

## 2023-12-13 RX ORDER — DIAZEPAM 5 MG/ML
10 INJECTION, SOLUTION INTRAMUSCULAR; INTRAVENOUS ONCE
Status: COMPLETED | OUTPATIENT
Start: 2023-12-13 | End: 2023-12-13

## 2023-12-13 RX ORDER — MIDAZOLAM HYDROCHLORIDE 2 MG/2ML
2 INJECTION, SOLUTION INTRAMUSCULAR; INTRAVENOUS
Status: DISCONTINUED | OUTPATIENT
Start: 2023-12-13 | End: 2023-12-21

## 2023-12-13 RX ORDER — THIAMINE HYDROCHLORIDE 100 MG/ML
200 INJECTION, SOLUTION INTRAMUSCULAR; INTRAVENOUS DAILY
Status: COMPLETED | OUTPATIENT
Start: 2023-12-14 | End: 2023-12-18

## 2023-12-13 RX ORDER — ROCURONIUM BROMIDE 10 MG/ML
INJECTION, SOLUTION INTRAVENOUS PRN
Status: DISCONTINUED | OUTPATIENT
Start: 2023-12-13 | End: 2023-12-14 | Stop reason: HOSPADM

## 2023-12-13 RX ORDER — CHLORHEXIDINE GLUCONATE ORAL RINSE 1.2 MG/ML
15 SOLUTION DENTAL 2 TIMES DAILY
Status: DISCONTINUED | OUTPATIENT
Start: 2023-12-13 | End: 2023-12-21

## 2023-12-13 RX ORDER — FENTANYL CITRATE-0.9 % NACL/PF 10 MCG/ML
25-300 PLASTIC BAG, INJECTION (ML) INTRAVENOUS
Status: DISCONTINUED | OUTPATIENT
Start: 2023-12-13 | End: 2023-12-13

## 2023-12-13 RX ORDER — KETAMINE HCL 50MG/ML(1)
100 SYRINGE (ML) INTRAVENOUS ONCE
Status: DISCONTINUED | OUTPATIENT
Start: 2023-12-13 | End: 2023-12-19

## 2023-12-13 RX ORDER — DEXMEDETOMIDINE HYDROCHLORIDE 4 UG/ML
.1-.5 INJECTION, SOLUTION INTRAVENOUS CONTINUOUS
Status: DISCONTINUED | OUTPATIENT
Start: 2023-12-13 | End: 2023-12-19

## 2023-12-13 RX ORDER — BUMETANIDE 0.25 MG/ML
1 INJECTION INTRAMUSCULAR; INTRAVENOUS EVERY 8 HOURS
Status: DISCONTINUED | OUTPATIENT
Start: 2023-12-13 | End: 2023-12-13

## 2023-12-13 RX ORDER — INSULIN LISPRO 100 [IU]/ML
0-8 INJECTION, SOLUTION INTRAVENOUS; SUBCUTANEOUS EVERY 6 HOURS
Status: DISCONTINUED | OUTPATIENT
Start: 2023-12-13 | End: 2023-12-14

## 2023-12-13 RX ADMIN — HEPARIN SODIUM 7 UNITS/KG/HR: 10000 INJECTION, SOLUTION INTRAVENOUS at 09:39

## 2023-12-13 RX ADMIN — ROCURONIUM BROMIDE 50 MG: 10 INJECTION, SOLUTION INTRAVENOUS at 17:42

## 2023-12-13 RX ADMIN — BUMETANIDE 1 MG: 0.25 INJECTION INTRAMUSCULAR; INTRAVENOUS at 09:31

## 2023-12-13 RX ADMIN — Medication 2 MG/HR: at 17:58

## 2023-12-13 RX ADMIN — DIAZEPAM 10 MG: 5 INJECTION, SOLUTION INTRAMUSCULAR; INTRAVENOUS at 15:34

## 2023-12-13 RX ADMIN — HEPARIN SODIUM 4000 UNITS: 1000 INJECTION INTRAVENOUS; SUBCUTANEOUS at 14:54

## 2023-12-13 RX ADMIN — PIPERACILLIN AND TAZOBACTAM 3375 MG: 3; .375 INJECTION, POWDER, FOR SOLUTION INTRAVENOUS; PARENTERAL at 20:55

## 2023-12-13 RX ADMIN — Medication 50 MCG/HR: at 17:54

## 2023-12-13 RX ADMIN — DEXMEDETOMIDINE HYDROCHLORIDE 0.3 MCG/KG/HR: 4 INJECTION, SOLUTION INTRAVENOUS at 08:15

## 2023-12-13 RX ADMIN — BUMETANIDE 2 MG: 0.25 INJECTION INTRAMUSCULAR; INTRAVENOUS at 15:34

## 2023-12-13 RX ADMIN — FAMOTIDINE 20 MG: 10 INJECTION INTRAVENOUS at 19:02

## 2023-12-13 RX ADMIN — Medication 150 MCG/HR: at 23:01

## 2023-12-13 RX ADMIN — WATER 60 MG: 1 INJECTION INTRAMUSCULAR; INTRAVENOUS; SUBCUTANEOUS at 18:59

## 2023-12-13 RX ADMIN — METOPROLOL TARTRATE 5 MG: 5 INJECTION INTRAVENOUS at 20:20

## 2023-12-13 RX ADMIN — THIAMINE HYDROCHLORIDE 400 MG: 100 INJECTION, SOLUTION INTRAMUSCULAR; INTRAVENOUS at 14:50

## 2023-12-13 RX ADMIN — METHYLPREDNISOLONE SODIUM SUCCINATE 60 MG: 125 INJECTION, POWDER, FOR SOLUTION INTRAMUSCULAR; INTRAVENOUS at 00:15

## 2023-12-13 RX ADMIN — METHYLPREDNISOLONE SODIUM SUCCINATE 60 MG: 125 INJECTION, POWDER, FOR SOLUTION INTRAMUSCULAR; INTRAVENOUS at 12:26

## 2023-12-13 RX ADMIN — LEUCINE, PHENYLALANINE, LYSINE, METHIONINE, ISOLEUCINE, VALINE, HISTIDINE, THREONINE, TRYPTOPHAN, ALANINE, GLYCINE, ARGININE, PROLINE, SERINE, TYROSINE, DEXTROSE: 365; 280; 290; 200; 300; 290; 240; 210; 90; 1035; 515; 575; 340; 250; 20; 20 INJECTION INTRAVENOUS at 20:49

## 2023-12-13 RX ADMIN — METHYLPREDNISOLONE SODIUM SUCCINATE 60 MG: 125 INJECTION, POWDER, FOR SOLUTION INTRAMUSCULAR; INTRAVENOUS at 06:03

## 2023-12-13 RX ADMIN — SODIUM CHLORIDE, PRESERVATIVE FREE 10 ML: 5 INJECTION INTRAVENOUS at 20:55

## 2023-12-13 RX ADMIN — 0.12% CHLORHEXIDINE GLUCONATE 15 ML: 1.2 RINSE ORAL at 20:55

## 2023-12-13 RX ADMIN — SODIUM CHLORIDE, PRESERVATIVE FREE 10 ML: 5 INJECTION INTRAVENOUS at 08:53

## 2023-12-13 RX ADMIN — INSULIN LISPRO 2 UNITS: 100 INJECTION, SOLUTION INTRAVENOUS; SUBCUTANEOUS at 09:38

## 2023-12-13 RX ADMIN — BUMETANIDE 1 MG: 0.25 INJECTION INTRAMUSCULAR; INTRAVENOUS at 10:30

## 2023-12-13 RX ADMIN — AZITHROMYCIN MONOHYDRATE 500 MG: 500 INJECTION, POWDER, LYOPHILIZED, FOR SOLUTION INTRAVENOUS at 13:45

## 2023-12-13 RX ADMIN — PIPERACILLIN AND TAZOBACTAM 3375 MG: 3; .375 INJECTION, POWDER, FOR SOLUTION INTRAVENOUS; PARENTERAL at 08:52

## 2023-12-13 RX ADMIN — PANTOPRAZOLE SODIUM 40 MG: 40 INJECTION, POWDER, FOR SOLUTION INTRAVENOUS at 08:52

## 2023-12-13 RX ADMIN — DEXMEDETOMIDINE HYDROCHLORIDE 0.2 MCG/KG/HR: 4 INJECTION, SOLUTION INTRAVENOUS at 20:08

## 2023-12-13 RX ADMIN — ETOMIDATE 20 MG: 2 INJECTION, SOLUTION INTRAVENOUS at 17:42

## 2023-12-13 ASSESSMENT — PAIN SCALES - GENERAL
PAINLEVEL_OUTOF10: 0

## 2023-12-13 ASSESSMENT — PULMONARY FUNCTION TESTS: PIF_VALUE: 31

## 2023-12-13 NOTE — PROGRESS NOTES
[facility].  Pulmonary, Critical Care, and Sleep Medicine    Name: Bhavna Barrientos MRN: [id number]   : 1950 Hospital: [facility]   Date: 2023  Admission Date: 2023     Chart and notes reviewed. Data reviewed. I have evaluated all findings.    [x]I have reviewed the flowsheet and previous day’s notes.    [x]The patient is unable to give any meaningful history or review of systems because the patient is:  []Intubated []Sedated   []Unresponsive      [x]The patient is critically ill on      []Mechanical ventilation []Pressors   [x]BiPAP []         Interval HPI:  Patient is a 73 y.o. female with PMHX of CAD s/p PCI , severe mitral valve regurgitation, declining surgical work-up, hypothyroidism, dyslipidemia,  presented to the ED on  with cc of nausea, vomiting, fevers, chills, non-productive cough, shortness of breath. Pt tested positive for Influenza on . Work-up significant for hypokalemia, ALIREZA, CXR showed basilar opacities. Pt, received IVF, Broad spectrum and admitted to the telemetry floor. Pt also had new onset atrial fibrillation RVR, controlled with Diltiazem ggt and converted to sinus rhythm. Cardiology was consulted who recommended beta blockers and Eliquis.     12/10: Pt was noted to be hypoxic refractory to HFNC so pt was put on BIPAP with FIO2 at 100%. 18/10 and transferred to the stepdown unit. Lasix was given with 700 ml urine output. Pt's repeat ABG 2 hours post BIPAP still showed hypoxia with PO2 in the 60s. Pt transferred to our service due to high risk for intubation.  : Overnight pt said she had some episodes of dyspnea but feels comfortable at time of exam.   : remains on bipap on AVAPS, down to 90% FiO2 from 100% with the increase in EPAP to 10. Anxious but no complaints otherwise.     Subjective 23  Hospital Day: 3  Vent Day: n/a  Overnight events: none  Mentation/Activity: AAOx4  Respiratory/ Secretions:  Dyslipidemia    Chest pain    Nephrolithiasis    Abnormal nuclear stress test    Age-related osteoporosis without current pathological fracture    Non-rheumatic mitral regurgitation    Chronic rhinitis    Chronic cough    Thyroid nodule    Class 2 obesity in adult    Acute respiratory failure with hypoxia (HCC)    BPPV (benign paroxysmal positional vertigo)    Multifocal pneumonia    Hypothyroidism    Elevated troponin    Paroxysmal atrial fibrillation (HCC)    Atrial fibrillation, new onset (HCC)    ALIREZA (acute kidney injury) ([address])    Acute pulmonary edema (HCC)    ARDS (adult respiratory distress syndrome) ([address])    Influenzal pneumonia    Atrial fibrillation with RVR ([facility])        RECOMMENDATIONS:   Neuro: Neuro checks per routine, avoid sedatives. AAOx4. Pulm: Acute hypoxic resp failure in setting of influenza, severe MR- on AVAPS at 90% FiO2. On solumedrol 60mg q6hrs for inflammation/influenza pneumonia. On vanc and zosyn for possible superimposed bacterial pneumonia, resp cx with heavy growth normal resp micky and negative RVP. Will try trial of HF O2 once she can tolerate but she is high risk for intubation. Wean FiO2 as tolerated. ABG this am.   [facility] : Echo with severe MR. Appreciate cards assistance. Continue heparin ggt for atrial fib, holding Eliquis for NPO status. Continue diuresis, still net + 1.6L, will start bumex 1mg TID. GI: SUP, Trend LFTs, Zofran PRN for N/V, NPO while on bipap so will start TPN today, will stop and start TF if intubated. Renal:  Trend Renal indices, Strict Is/Os, added bumex 2mg TID as she is 1.6L net +. Continues to have a mild metabolic acidosis with serum bicarb of 20, likely due to worsening renal function with Cr 3.78. will obtain ABG to better assess acid base status. With worsening renal fxn and volume overload, high risk for CRRT. Hem/Onc: Monitor for s/o active bleeding. Hb 11.9, WBC 8.8. I/D: Positive for Influenza A.  Antibiotics:Vanc, Zosyn and azithromycin for

## 2023-12-13 NOTE — PROGRESS NOTES
Pt having worsening tachypnea, increased resp distress, in severe ARDS, pt received valium and remains on Precedex gtt, somnolent. I called and discussed pt's condition with pt's daughter Olivia Aguillon, I advised intubation for respiratory failure in the setting of severe ARDS (PaO2 48 on 90% with P/F ratio 53). I explained risks of worsening resp failure leading to impending decompensation/cardiac arrest --- daughter notes that she would like to hold off on intubation at this time, but would be amenable if pt continues to decline.       Kamilla Mason MD/MPH     Pulmonary, Critical Care Medicine  [facility]

## 2023-12-13 NOTE — ANESTHESIA PROCEDURE NOTES
Airway  Date/Time: 12/13/2023 5:45 PM  Urgency: urgent    Airway not difficult    General Information and Staff    Patient location during procedure: ICU  Resident/CRNA: SUSANA Winston CRNA  Performed: resident/CRNA   Performed by: SUSANA Winston CRNA  Authorized by: SUSANA Winston CRNA      Consent for Airway (if performed for an anesthetic, see related documentation for consents)  Patient identity confirmed: per hospital policy  Consent: The procedure was performed in an emergent situation. Verbal consent not obtained. Written consent not obtained.   Risks and benefits: risks, benefits and alternatives were not discussed  Consent given by: patient      Code status verified:yes  Indications and Patient Condition  Indications for airway management: respiratory distress  Spontaneous ventilation: present  Sedation level: deep  Preoxygenated: yes  Patient position: sniffing  MILS not maintained throughout  Mask difficulty assessment: vent by bag mask    Final Airway Details  Final airway type: endotracheal airway      Successful airway: ETT  Cuffed: no   Successful intubation technique: video laryngoscopy  Endotracheal tube insertion site: oral  Blade: Mk  Blade size: #3  ETT size (mm): 7.5  Cormack-Lehane Classification: grade I - full view of glottis  Placement verified by: chest auscultation   Measured from: lips  ETT to lips (cm): 22  Number of attempts at approach: 1  Ventilation between attempts: bag mask  Number of other approaches attempted: 0    no

## 2023-12-13 NOTE — PLAN OF CARE
Problem: Discharge Planning  Goal: Discharge to home or other facility with appropriate resources  Outcome: Progressing  Discharge to home or other facility with appropriate resources:   Identify barriers to discharge with patient and caregiver   Arrange for needed discharge resources and transportation as appropriate   Identify discharge learning needs (meds, wound care, etc)   Arrange for interpreters to assist at discharge as needed   Refer to discharge planning if patient needs post-hospital services based on physician order or complex needs related to functional status, cognitive ability or social support system     Problem: Skin/Tissue Integrity  Goal: Absence of new skin breakdown      Outcome: Progressing  Description: 1. Monitor for areas of redness and/or skin breakdown  2. Assess vascular access sites hourly  3. Every 4-6 hours minimum:  Change oxygen saturation probe site  4. Every 4-6 hours:  If on nasal continuous positive airway pressure, respiratory therapy assess nares and determine need for appliance change or resting period.        Problem: Safety - Adult  Goal: Free from fall injury  Outcome: Progressing  Free From Fall Injury: Instruct family/caregiver on patient safety     Problem: Pain  Goal: Verbalizes/displays adequate comfort level or baseline comfort level  Outcome: Progressing  Verbalizes/displays adequate comfort level or baseline comfort level:   Encourage patient to monitor pain and request assistance   Assess pain using appropriate pain scale   Administer analgesics based on type and severity of pain and evaluate response   Consider cultural and social influences on pain and pain management   Notify Licensed Independent Practitioner if interventions unsuccessful or patient reports new pain   Implement non-pharmacological measures as appropriate and evaluate response

## 2023-12-13 NOTE — PROGRESS NOTES
PCCM update:    Per PICC team request, confirmed with Dr. Iveth Mars that it is OK for patient to have a PICC line placed.            Demond Avila PA-C  12/13/23  Pulmonary, Critical Care Medicine  [facility]

## 2023-12-13 NOTE — PROGRESS NOTES
Rounded on patient who was awake and lucid. She stated she \"wasn't doing well'. Offered intubation, patient able to ask questions re length of time on ventilator and process of intubation. This was discussed with patient. DNR offered as well which she stated \"not at this time\". Stated \"I don't know how much longer I can go on like this. \" Decision made together for intubation. RT Andre present for this conversation. Anesthesia paged. Daughter updated via phone conversation re this information.      Cher Jackson PA-C  12/13/23  Pulmonary, Critical Care Medicine  [facility]

## 2023-12-13 NOTE — CONSULTS
Comprehensive Nutrition Assessment    Type and Reason for Visit:  Initial, Consult, NPO/Clear Liquid    Nutrition Recommendations/Plan:   Provide parenteral nutrition using standard premixed product of Clinimix AA5/D20 until patient is able to tolerate adequate intake of oral diet or enteral feeding. Start PN at 20:00 tonight. See below for order contents/details. Lipids provided Monday, Wednesday, and Friday only  MVI/TE provided Tuesday, Thursday, and Saturday due to shortage  Order CMP, ionized calcium, triglyceride and prealbumin for tomorrow, BMP, phos, Mg daily thereafter. POCT Glucose checks. Order thiamine 400 mg prior to nutrition support initiation, 200 mg x 5 days thereafter due to risk for refeeding. Continue to monitor tolerance of PN, weight, labs, and plan of care during admission. Parenteral Nutrition Order:   Current PN:        Malnutrition Assessment:  Malnutrition Status:  Insufficient data (at risk, NPO, BIPAP, TPN) (12/13/23 1250)    Context:  Acute Illness       Nutrition History and Allergies: PMHx: CAD s/p PCI 2014, severe mitral valve regurgitation, declining surgical work-up, hypothyroidism, dyslipidemia . Wt hx: 195 lb (2/20/23), 196 lb (8/15/23), 195 lb (11/22/23), 195 lb (12/11/23) - stated. Wt appears overall stable per EMR hx. NKFA per chart. Nutrition Assessment:    Admitted with c/o n/v, fevers, cough, SOB. +influenza 12/5. Pt placed on BIPAP 12/10 and transferred to ICU due to high risk for intubation. Pt initially on PO diet then made NPO while on BIPAP. NPO since ICU admit x 3 days. Discussed care during interdisciplinary rounds. Pt is at high risk for intubation, unable to take break from BIPAP. Plan for PICC placement for TPN. TPN consult placed. Plan to increase bumex. Nephrology following. Will hold on lipids. Plan to order TPN Clinimix AA5/D20 @ 42 ml/hr without lytes. PN only provides 55% of estimated kcal, 66% of estimated protein needs.     Nutrition

## 2023-12-13 NOTE — INTERDISCIPLINARY ROUNDS
[address] [facility]  Pulmonary, Critical Care, and Sleep Medicine  Interdisciplinary and Ventilator Weaning Rounds    Patient discussed in morning walking rounds and interdisciplinary rounds. ICU admission day: admitted 12/11    Overnight events:   No significant events overnight     Assessments and best practice:  Ventilator   Bipap AVAPS   Vent Settings  FiO2 : 90 %  Vt (Set, mL): 831 mL  Insp Rise Time (%): 3 %   Glycemic control  Diet  Diet NPO  Stress ulcer prophylaxis. Protonix  DVT prophylaxis. Heparin gtt  Need for Lines, pratt assessed.   Yes  Restraint Reevaluation     N/a  Disposition regarding transferring out of the ICU  RED      Family contact/MPOA: Eluterio Bolus  Family updated: will call today      Palliative consult within 3 days of admission to ICU-  Ethics Guidance: 21 days      Daily Plans:  High risk for needing intubation   Cont' best supportive care  PICC  TPN  Increase Bumex to 2mg  Q8H  ABG    VELASQUEZ time 11 minutes        MARCELO OSBORNE PA-C  12/13/23  Pulmonary, [address] [facility]

## 2023-12-13 NOTE — PLAN OF CARE
Problem: Occupational Therapy - Adult  Goal: By Discharge: Performs self-care activities at highest level of function for planned discharge setting. See evaluation for individualized goals. Description: Occupational Therapy Goals:  Initiated 12/9/2023 to be met within 7-10 days. 1.  Patient will demonstrate 4/5 UB strength in preparation for her efficient completion of her self care routine  2. Patient will perform bathing routine with min assist and no increased SOB  3. Patient will maneuver on and off BSC with SBA  4. Patient will perform LB clothes management (including standing during LB (including LB clothes management)    Outcome: Progressing   OCCUPATIONAL THERAPY TREATMENT    Patient: Silvia Cobos (22 y.o. female)  Date: 12/13/2023  Diagnosis: Acute respiratory failure (HCC) [J96.00]  Acute respiratory failure with hypoxia (HCC) [J96.01]  Pneumonia of right lung due to infectious organism, unspecified part of lung [J18.9] Acute respiratory failure with hypoxia (HCC)      Precautions: Isolation, Fall Risk    Chart, occupational therapy assessment, plan of care, and goals were reviewed. ASSESSMENT:  Pt on Bi-pap. Seen at bed level for UE Therex. Daughter at bedside. Pt edematous throughout. RUE > LUE. Educated on importance of UE Therex and encouraged to perform throughout the day. HR 40-120s throughout session. Progression toward goals:  []          Improving appropriately and progressing toward goals  [x]          Improving slowly and progressing toward goals  []          Not making progress toward goals and plan of care will be adjusted     PLAN:  Patient continues to benefit from skilled intervention to address the above impairments. Continue treatment per established plan of care.     Further Equipment Recommendations for Discharge:  TBD by next level of care    [facility]: AM-PAC Inpatient Daily Activity Raw Score: 13    Current research shows that an AM-PAC score of 17 or less is not

## 2023-12-13 NOTE — PROGRESS NOTES
[facility] DAILY PROGRESS NOTE    Subjective:       Complaint:   Overnight events noted  On bipap      IMPRESSION:   ALIREZA in setting of hypoxic respiratory failure. Due to ATN ?  Hypoxic res failure on bipap ?pneumonia vs developing ARDS  Severe MR  Hx CAD  Recent flu   PLAN:   Diuretics as needed for fluid control. Might need dialysis if volume control is an issue and she continues to worsen from resp standpoint.Discussed with ICU  I and O  Daily labs  Avoid Bp fluctuations  Will follow closely.             Current Facility-Administered Medications   Medication Dose Route Frequency    bumetanide (BUMEX) injection 2 mg  2 mg IntraVENous q8h    bumetanide (BUMEX) injection 1 mg  1 mg IntraVENous Once    pantoprazole (PROTONIX) 40 mg in sodium chloride (PF) 0.9 % 10 mL injection  40 mg IntraVENous Daily    heparin (porcine) injection 4,000 Units  4,000 Units IntraVENous PRN    heparin (porcine) injection 2,000 Units  2,000 Units IntraVENous PRN    heparin 25,000 units in dextrose 5% 250 mL (premix) infusion  5-30 Units/kg/hr IntraVENous Continuous    piperacillin-tazobactam (ZOSYN) 3,375 mg in sodium chloride 0.9 % 50 mL IVPB (mini-bag)  3,375 mg IntraVENous Q12H    dexmedetomidine (PRECEDEX) 400 mcg in sodium chloride 0.9 % 100 mL infusion  0.1-0.5 mcg/kg/hr IntraVENous Continuous    methylPREDNISolone sodium (PF) (SOLU-MEDROL PF) injection 60 mg  60 mg IntraVENous Q6H    metoprolol (LOPRESSOR) injection 5 mg  5 mg IntraVENous Q6H PRN    azithromycin (ZITHROMAX) 500 mg in sodium chloride 0.9% 250 mL (vial-mate/adapter) IVPB  500 mg IntraVENous Q24H    norepinephrine (LEVOPHED) 16 mg in sodium chloride 0.9 % 250 mL infusion  1-100 mcg/min IntraVENous Continuous    [Held by provider] aspirin chewable tablet 81 mg  81 mg Oral Daily    glucose chewable tablet 16 g  4 tablet Oral PRN    dextrose bolus 10% 125 mL  125 mL IntraVENous PRN    Or    dextrose bolus 10% 250 mL  250 mL IntraVENous PRN    Glucagon Emergency SOLR 1  Soft, Non tender  Extrm: no cyanosis, clubbing   Skin: no visible  Rash  Musculoskeletal: No gross joints or bone deformities         Data Review:     LABS:   Hematology:   Recent Labs     12/11/23  0024 12/12/23 0442 12/13/23  0130   WBC 8.1 7.6 8.8   HGB 12.7 13.1 11.9*   HCT 38.2 38.9 36.5       Chemistry:   Recent Labs     12/11/23  0024 12/11/23  0300 12/12/23 0442 12/13/23  0130   BUN 52* 50* 61* 73*   K 3.5 3.8 3.7 4.1   * 134* 136 138    105 105 108   CO2 19* 20* 20* 21   PHOS  --  2.9 4.1 4.6              Procedures/imaging: see electronic medical records for all procedures, Xrays and details which were not copied into this note but were reviewed prior to creation of Plan          Assessment & Plan:     See above        Kiko Morris MD  12/13/2023  10:03 AM

## 2023-12-13 NOTE — PLAN OF CARE
Problem: Physical Therapy - Adult  Goal: By Discharge: Performs mobility at highest level of function for planned discharge setting. See evaluation for individualized goals. Description: Physical Therapy Goals  Initiated 12/10/2023 and to be accomplished within 7 day(s)  1. Patient will move from supine to sit and sit to supine  in bed with modified independence. 2.  Patient will transfer from bed to chair and chair to bed with modified independence using the least restrictive device. 3.  Patient will perform sit to stand with modified independence. 4.  Patient will ambulate with modified independence for 150 feet with the least restrictive device. 5.  Patient will ascend/descend 8 stairs with handrail(s) with modified independence. PLOF: Lives alone. Two story home. Independent. Outcome: Progressing   PHYSICAL THERAPY TREATMENT    Patient: Batsheva Goodson (60 y.o. female)  Date: 12/13/2023  Diagnosis: Acute respiratory failure (HCC) [J96.00]  Acute respiratory failure with hypoxia (HCC) [J96.01]  Pneumonia of right lung due to infectious organism, unspecified part of lung [J18.9] Acute respiratory failure with hypoxia (HCC)  Precautions: Isolation (droplet), Fall Risk  ASSESSMENT:  Transferred to ICU; now on BiPAP. O2 saturation 87-90%; RN and RT awareness. Daughter at bedside. Educated both on bed level exercises to tolerance. Demonstrates exercises. Educated rest breaks. Educated on need for RN assistance with mobility; verbalized understanding. Call bell in reach. Progression toward goals:   []      Improving appropriately and progressing toward goals  [x]      Improving slowly and progressing toward goals  []      Not making progress toward goals and plan of care will be adjusted     PLAN:  Patient continues to benefit from skilled intervention to address the above impairments. Continue treatment per established plan of care.     Further Equipment Recommendations for Discharge: rolling

## 2023-12-14 ENCOUNTER — APPOINTMENT (OUTPATIENT)
Facility: HOSPITAL | Age: 73
DRG: 870 | End: 2023-12-14
Payer: MEDICARE

## 2023-12-14 LAB
ALBUMIN SERPL-MCNC: 2.2 G/DL (ref 3.4–5)
ALBUMIN SERPL-MCNC: 2.3 G/DL (ref 3.4–5)
ALBUMIN/GLOB SERPL: 0.6 (ref 0.8–1.7)
ALP SERPL-CCNC: 109 U/L (ref 45–117)
ALT SERPL-CCNC: 50 U/L (ref 13–56)
ANION GAP SERPL CALC-SCNC: 3 MMOL/L (ref 3–18)
ANION GAP SERPL CALC-SCNC: 7 MMOL/L (ref 3–18)
ANION GAP SERPL CALC-SCNC: 8 MMOL/L (ref 3–18)
APTT PPP: 103 SEC (ref 23–36.4)
APTT PPP: 60.5 SEC (ref 23–36.4)
APTT PPP: 91.6 SEC (ref 23–36.4)
ARTERIAL PATENCY WRIST A: ABNORMAL
AST SERPL-CCNC: 36 U/L (ref 10–38)
BASE DEFICIT BLD-SCNC: 5.4 MMOL/L
BDY SITE: ABNORMAL
BILIRUB SERPL-MCNC: 0.7 MG/DL (ref 0.2–1)
BUN SERPL-MCNC: 101 MG/DL (ref 7–18)
BUN SERPL-MCNC: 51 MG/DL (ref 7–18)
BUN SERPL-MCNC: 62 MG/DL (ref 7–18)
BUN SERPL-MCNC: 91 MG/DL (ref 7–18)
BUN SERPL-MCNC: 94 MG/DL (ref 7–18)
BUN/CREAT SERPL: 20 (ref 12–20)
BUN/CREAT SERPL: 21 (ref 12–20)
BUN/CREAT SERPL: 22 (ref 12–20)
BUN/CREAT SERPL: 22 (ref 12–20)
BUN/CREAT SERPL: 23 (ref 12–20)
CA-I SERPL-SCNC: 1.06 MMOL/L (ref 1.15–1.33)
CALCIUM SERPL-MCNC: 7.5 MG/DL (ref 8.5–10.1)
CALCIUM SERPL-MCNC: 7.8 MG/DL (ref 8.5–10.1)
CALCIUM SERPL-MCNC: 8.1 MG/DL (ref 8.5–10.1)
CALCIUM SERPL-MCNC: 8.6 MG/DL (ref 8.5–10.1)
CALCIUM SERPL-MCNC: 8.9 MG/DL (ref 8.5–10.1)
CHLORIDE SERPL-SCNC: 106 MMOL/L (ref 100–111)
CHLORIDE SERPL-SCNC: 108 MMOL/L (ref 100–111)
CHLORIDE SERPL-SCNC: 109 MMOL/L (ref 100–111)
CHLORIDE SERPL-SCNC: 110 MMOL/L (ref 100–111)
CHLORIDE SERPL-SCNC: 112 MMOL/L (ref 100–111)
CO2 SERPL-SCNC: 22 MMOL/L (ref 21–32)
CO2 SERPL-SCNC: 22 MMOL/L (ref 21–32)
CO2 SERPL-SCNC: 23 MMOL/L (ref 21–32)
CO2 SERPL-SCNC: 24 MMOL/L (ref 21–32)
CO2 SERPL-SCNC: 26 MMOL/L (ref 21–32)
CREAT SERPL-MCNC: 2.54 MG/DL (ref 0.6–1.3)
CREAT SERPL-MCNC: 2.93 MG/DL (ref 0.6–1.3)
CREAT SERPL-MCNC: 4.17 MG/DL (ref 0.6–1.3)
CREAT SERPL-MCNC: 4.19 MG/DL (ref 0.6–1.3)
CREAT SERPL-MCNC: 4.47 MG/DL (ref 0.6–1.3)
GAS FLOW.O2 O2 DELIVERY SYS: ABNORMAL
GAS FLOW.O2 SETTING OXYMISER: 24 BPM
GLOBULIN SER CALC-MCNC: 3.7 G/DL (ref 2–4)
GLUCOSE BLD STRIP.AUTO-MCNC: 157 MG/DL (ref 70–110)
GLUCOSE BLD STRIP.AUTO-MCNC: 164 MG/DL (ref 70–110)
GLUCOSE BLD STRIP.AUTO-MCNC: 168 MG/DL (ref 70–110)
GLUCOSE BLD STRIP.AUTO-MCNC: 173 MG/DL (ref 70–110)
GLUCOSE BLD STRIP.AUTO-MCNC: 183 MG/DL (ref 70–110)
GLUCOSE BLD STRIP.AUTO-MCNC: 183 MG/DL (ref 70–110)
GLUCOSE BLD STRIP.AUTO-MCNC: 184 MG/DL (ref 70–110)
GLUCOSE BLD STRIP.AUTO-MCNC: 186 MG/DL (ref 70–110)
GLUCOSE BLD STRIP.AUTO-MCNC: 199 MG/DL (ref 70–110)
GLUCOSE BLD STRIP.AUTO-MCNC: 203 MG/DL (ref 70–110)
GLUCOSE BLD STRIP.AUTO-MCNC: 241 MG/DL (ref 70–110)
GLUCOSE BLD STRIP.AUTO-MCNC: 331 MG/DL (ref 70–110)
GLUCOSE BLD STRIP.AUTO-MCNC: 348 MG/DL (ref 70–110)
GLUCOSE BLD STRIP.AUTO-MCNC: 371 MG/DL (ref 70–110)
GLUCOSE BLD STRIP.AUTO-MCNC: 376 MG/DL (ref 70–110)
GLUCOSE BLD STRIP.AUTO-MCNC: 381 MG/DL (ref 70–110)
GLUCOSE BLD STRIP.AUTO-MCNC: 394 MG/DL (ref 70–110)
GLUCOSE BLD STRIP.AUTO-MCNC: 396 MG/DL (ref 70–110)
GLUCOSE BLD STRIP.AUTO-MCNC: 397 MG/DL (ref 70–110)
GLUCOSE BLD STRIP.AUTO-MCNC: 403 MG/DL (ref 70–110)
GLUCOSE SERPL-MCNC: 181 MG/DL (ref 74–99)
GLUCOSE SERPL-MCNC: 185 MG/DL (ref 74–99)
GLUCOSE SERPL-MCNC: 193 MG/DL (ref 74–99)
GLUCOSE SERPL-MCNC: 208 MG/DL (ref 74–99)
GLUCOSE SERPL-MCNC: 370 MG/DL (ref 74–99)
HBV SURFACE AG SER QL: <0.1 INDEX
HBV SURFACE AG SER QL: NEGATIVE
HCO3 BLD-SCNC: 20.7 MMOL/L (ref 22–26)
IPAP/PIP/HIGH PEEP: 31
LACTATE SERPL-SCNC: 1.5 MMOL/L (ref 0.4–2)
MAGNESIUM SERPL-MCNC: 1.7 MG/DL (ref 1.6–2.6)
MAGNESIUM SERPL-MCNC: 1.9 MG/DL (ref 1.6–2.6)
MAGNESIUM SERPL-MCNC: 2 MG/DL (ref 1.6–2.6)
MAGNESIUM SERPL-MCNC: 2.1 MG/DL (ref 1.6–2.6)
MAGNESIUM SERPL-MCNC: 2.3 MG/DL (ref 1.6–2.6)
NT PRO BNP: ABNORMAL PG/ML (ref 0–1800)
O2/TOTAL GAS SETTING VFR VENT: 100 %
PCO2 BLD: 41.8 MMHG (ref 35–45)
PEEP RESPIRATORY: 16 CMH2O
PH BLD: 7.3 (ref 7.35–7.45)
PHOSPHATE SERPL-MCNC: 4 MG/DL (ref 2.5–4.9)
PHOSPHATE SERPL-MCNC: 4.1 MG/DL (ref 2.5–4.9)
PHOSPHATE SERPL-MCNC: 4.7 MG/DL (ref 2.5–4.9)
PHOSPHATE SERPL-MCNC: 5.7 MG/DL (ref 2.5–4.9)
PHOSPHATE SERPL-MCNC: 6.3 MG/DL (ref 2.5–4.9)
PO2 BLD: 54 MMHG (ref 80–100)
POTASSIUM SERPL-SCNC: 3.3 MMOL/L (ref 3.5–5.5)
POTASSIUM SERPL-SCNC: 3.5 MMOL/L (ref 3.5–5.5)
POTASSIUM SERPL-SCNC: 3.8 MMOL/L (ref 3.5–5.5)
POTASSIUM SERPL-SCNC: 3.8 MMOL/L (ref 3.5–5.5)
POTASSIUM SERPL-SCNC: 4 MMOL/L (ref 3.5–5.5)
PREALB SERPL-MCNC: 23.2 MG/DL (ref 20–40)
PROT SERPL-MCNC: 5.9 G/DL (ref 6.4–8.2)
RESPIRATORY RATE, POC: 30 (ref 5–40)
SAO2 % BLD: 84.3 % (ref 92–97)
SERVICE CMNT-IMP: ABNORMAL
SODIUM SERPL-SCNC: 135 MMOL/L (ref 136–145)
SODIUM SERPL-SCNC: 139 MMOL/L (ref 136–145)
SODIUM SERPL-SCNC: 139 MMOL/L (ref 136–145)
SODIUM SERPL-SCNC: 141 MMOL/L (ref 136–145)
SODIUM SERPL-SCNC: 142 MMOL/L (ref 136–145)
SPECIMEN TYPE: ABNORMAL
TRIGL SERPL-MCNC: 128 MG/DL
VENTILATION MODE VENT: ABNORMAL
VT SETTING VENT: 350 ML

## 2023-12-14 PROCEDURE — 6360000002 HC RX W HCPCS: Performed by: HOSPITALIST

## 2023-12-14 PROCEDURE — 2500000003 HC RX 250 WO HCPCS: Performed by: PHYSICIAN ASSISTANT

## 2023-12-14 PROCEDURE — 90945 DIALYSIS ONE EVALUATION: CPT

## 2023-12-14 PROCEDURE — 2580000003 HC RX 258: Performed by: HOSPITALIST

## 2023-12-14 PROCEDURE — A4216 STERILE WATER/SALINE, 10 ML: HCPCS | Performed by: PHYSICIAN ASSISTANT

## 2023-12-14 PROCEDURE — 85730 THROMBOPLASTIN TIME PARTIAL: CPT

## 2023-12-14 PROCEDURE — 2580000003 HC RX 258: Performed by: PHYSICIAN ASSISTANT

## 2023-12-14 PROCEDURE — 6370000000 HC RX 637 (ALT 250 FOR IP): Performed by: PHYSICIAN ASSISTANT

## 2023-12-14 PROCEDURE — 3E1M39Z IRRIGATION OF PERITONEAL CAVITY USING DIALYSATE, PERCUTANEOUS APPROACH: ICD-10-PCS | Performed by: INTERNAL MEDICINE

## 2023-12-14 PROCEDURE — 2700000000 HC OXYGEN THERAPY PER DAY

## 2023-12-14 PROCEDURE — 6360000002 HC RX W HCPCS: Performed by: PHYSICIAN ASSISTANT

## 2023-12-14 PROCEDURE — 82330 ASSAY OF CALCIUM: CPT

## 2023-12-14 PROCEDURE — 84100 ASSAY OF PHOSPHORUS: CPT

## 2023-12-14 PROCEDURE — 83605 ASSAY OF LACTIC ACID: CPT

## 2023-12-14 PROCEDURE — 87340 HEPATITIS B SURFACE AG IA: CPT

## 2023-12-14 PROCEDURE — 6360000002 HC RX W HCPCS: Performed by: REGISTERED NURSE

## 2023-12-14 PROCEDURE — 94761 N-INVAS EAR/PLS OXIMETRY MLT: CPT

## 2023-12-14 PROCEDURE — A4216 STERILE WATER/SALINE, 10 ML: HCPCS | Performed by: REGISTERED NURSE

## 2023-12-14 PROCEDURE — 5A1D70Z PERFORMANCE OF URINARY FILTRATION, INTERMITTENT, LESS THAN 6 HOURS PER DAY: ICD-10-PCS | Performed by: INTERNAL MEDICINE

## 2023-12-14 PROCEDURE — 99291 CRITICAL CARE FIRST HOUR: CPT | Performed by: INTERNAL MEDICINE

## 2023-12-14 PROCEDURE — 6360000002 HC RX W HCPCS: Performed by: INTERNAL MEDICINE

## 2023-12-14 PROCEDURE — 2000000000 HC ICU R&B

## 2023-12-14 PROCEDURE — 84478 ASSAY OF TRIGLYCERIDES: CPT

## 2023-12-14 PROCEDURE — 99292 CRITICAL CARE ADDL 30 MIN: CPT | Performed by: INTERNAL MEDICINE

## 2023-12-14 PROCEDURE — 2500000003 HC RX 250 WO HCPCS: Performed by: INTERNAL MEDICINE

## 2023-12-14 PROCEDURE — 94003 VENT MGMT INPAT SUBQ DAY: CPT

## 2023-12-14 PROCEDURE — 90935 HEMODIALYSIS ONE EVALUATION: CPT

## 2023-12-14 PROCEDURE — 83735 ASSAY OF MAGNESIUM: CPT

## 2023-12-14 PROCEDURE — 37799 UNLISTED PX VASCULAR SURGERY: CPT

## 2023-12-14 PROCEDURE — 80048 BASIC METABOLIC PNL TOTAL CA: CPT

## 2023-12-14 PROCEDURE — 6360000002 HC RX W HCPCS: Performed by: NURSE PRACTITIONER

## 2023-12-14 PROCEDURE — 82803 BLOOD GASES ANY COMBINATION: CPT

## 2023-12-14 PROCEDURE — 80053 COMPREHEN METABOLIC PANEL: CPT

## 2023-12-14 PROCEDURE — 02HV33Z INSERTION OF INFUSION DEVICE INTO SUPERIOR VENA CAVA, PERCUTANEOUS APPROACH: ICD-10-PCS | Performed by: INTERNAL MEDICINE

## 2023-12-14 PROCEDURE — 2580000003 HC RX 258: Performed by: INTERNAL MEDICINE

## 2023-12-14 PROCEDURE — 2500000003 HC RX 250 WO HCPCS: Performed by: STUDENT IN AN ORGANIZED HEALTH CARE EDUCATION/TRAINING PROGRAM

## 2023-12-14 PROCEDURE — C9113 INJ PANTOPRAZOLE SODIUM, VIA: HCPCS | Performed by: REGISTERED NURSE

## 2023-12-14 PROCEDURE — 99232 SBSQ HOSP IP/OBS MODERATE 35: CPT | Performed by: INTERNAL MEDICINE

## 2023-12-14 PROCEDURE — 36556 INSERT NON-TUNNEL CV CATH: CPT | Performed by: INTERNAL MEDICINE

## 2023-12-14 PROCEDURE — 2580000003 HC RX 258: Performed by: REGISTERED NURSE

## 2023-12-14 PROCEDURE — 80069 RENAL FUNCTION PANEL: CPT

## 2023-12-14 PROCEDURE — 82962 GLUCOSE BLOOD TEST: CPT

## 2023-12-14 PROCEDURE — 71045 X-RAY EXAM CHEST 1 VIEW: CPT

## 2023-12-14 PROCEDURE — 84134 ASSAY OF PREALBUMIN: CPT

## 2023-12-14 PROCEDURE — 76937 US GUIDE VASCULAR ACCESS: CPT | Performed by: INTERNAL MEDICINE

## 2023-12-14 PROCEDURE — 83880 ASSAY OF NATRIURETIC PEPTIDE: CPT

## 2023-12-14 PROCEDURE — 86706 HEP B SURFACE ANTIBODY: CPT

## 2023-12-14 RX ORDER — CALCIUM CHLORIDE, MAGNESIUM CHLORIDE, DEXTROSE MONOHYDRATE, LACTIC ACID, SODIUM CHLORIDE, SODIUM BICARBONATE AND POTASSIUM CHLORIDE 5.15; 2.03; 22; 5.4; 6.46; 3.09; .157 G/L; G/L; G/L; G/L; G/L; G/L; G/L
INJECTION INTRAVENOUS CONTINUOUS
Status: DISCONTINUED | OUTPATIENT
Start: 2023-12-14 | End: 2023-12-21

## 2023-12-14 RX ORDER — CALCIUM GLUCONATE 20 MG/ML
1000 INJECTION, SOLUTION INTRAVENOUS
Status: COMPLETED | OUTPATIENT
Start: 2023-12-14 | End: 2023-12-14

## 2023-12-14 RX ORDER — POTASSIUM CHLORIDE 29.8 MG/ML
20 INJECTION INTRAVENOUS
Status: COMPLETED | OUTPATIENT
Start: 2023-12-14 | End: 2023-12-14

## 2023-12-14 RX ORDER — DEXTROSE MONOHYDRATE 100 MG/ML
INJECTION, SOLUTION INTRAVENOUS CONTINUOUS PRN
Status: DISCONTINUED | OUTPATIENT
Start: 2023-12-14 | End: 2023-12-21

## 2023-12-14 RX ORDER — DIAZEPAM 5 MG/ML
20 INJECTION, SOLUTION INTRAMUSCULAR; INTRAVENOUS ONCE
Status: COMPLETED | OUTPATIENT
Start: 2023-12-14 | End: 2023-12-14

## 2023-12-14 RX ORDER — HEPARIN SODIUM 1000 [USP'U]/ML
1000 INJECTION, SOLUTION INTRAVENOUS; SUBCUTANEOUS PRN
Status: DISCONTINUED | OUTPATIENT
Start: 2023-12-14 | End: 2023-12-14

## 2023-12-14 RX ORDER — MIDAZOLAM HYDROCHLORIDE 2 MG/2ML
2 INJECTION, SOLUTION INTRAMUSCULAR; INTRAVENOUS
Status: DISCONTINUED | OUTPATIENT
Start: 2023-12-14 | End: 2023-12-14

## 2023-12-14 RX ORDER — MIDAZOLAM IN NACL,ISO-OSMOT/PF 50 MG/50ML
1-10 INFUSION BOTTLE (ML) INTRAVENOUS
Status: DISCONTINUED | OUTPATIENT
Start: 2023-12-14 | End: 2023-12-14

## 2023-12-14 RX ORDER — HEPARIN SODIUM 1000 [USP'U]/ML
2100 INJECTION, SOLUTION INTRAVENOUS; SUBCUTANEOUS PRN
Status: DISCONTINUED | OUTPATIENT
Start: 2023-12-14 | End: 2023-12-21

## 2023-12-14 RX ORDER — INSULIN LISPRO 100 [IU]/ML
0-16 INJECTION, SOLUTION INTRAVENOUS; SUBCUTANEOUS EVERY 6 HOURS
Status: DISCONTINUED | OUTPATIENT
Start: 2023-12-14 | End: 2023-12-14

## 2023-12-14 RX ORDER — POTASSIUM CHLORIDE 29.8 MG/ML
20 INJECTION INTRAVENOUS ONCE
Status: COMPLETED | OUTPATIENT
Start: 2023-12-15 | End: 2023-12-14

## 2023-12-14 RX ORDER — INSULIN GLARGINE 100 [IU]/ML
5 INJECTION, SOLUTION SUBCUTANEOUS DAILY
Status: DISCONTINUED | OUTPATIENT
Start: 2023-12-14 | End: 2023-12-14

## 2023-12-14 RX ORDER — BUMETANIDE 0.25 MG/ML
2 INJECTION INTRAMUSCULAR; INTRAVENOUS 2 TIMES DAILY
Status: DISCONTINUED | OUTPATIENT
Start: 2023-12-14 | End: 2023-12-21

## 2023-12-14 RX ADMIN — POLYVINYL ALCOHOL, POVIDONE 1 DROP: 14; 6 SOLUTION/ DROPS OPHTHALMIC at 20:30

## 2023-12-14 RX ADMIN — HEPARIN SODIUM 2100 UNITS: 1000 INJECTION INTRAVENOUS; SUBCUTANEOUS at 12:44

## 2023-12-14 RX ADMIN — HEPARIN SODIUM 9 UNITS/KG/HR: 10000 INJECTION, SOLUTION INTRAVENOUS at 21:37

## 2023-12-14 RX ADMIN — DEXMEDETOMIDINE HYDROCHLORIDE 0.3 MCG/KG/HR: 4 INJECTION, SOLUTION INTRAVENOUS at 09:03

## 2023-12-14 RX ADMIN — POTASSIUM CHLORIDE 20 MEQ: 29.8 INJECTION, SOLUTION INTRAVENOUS at 23:43

## 2023-12-14 RX ADMIN — POLYVINYL ALCOHOL, POVIDONE 1 DROP: 14; 6 SOLUTION/ DROPS OPHTHALMIC at 17:10

## 2023-12-14 RX ADMIN — FAMOTIDINE 20 MG: 10 INJECTION INTRAVENOUS at 09:09

## 2023-12-14 RX ADMIN — POLYVINYL ALCOHOL, POVIDONE 1 DROP: 14; 6 SOLUTION/ DROPS OPHTHALMIC at 11:41

## 2023-12-14 RX ADMIN — PIPERACILLIN AND TAZOBACTAM 3375 MG: 3; .375 INJECTION, POWDER, FOR SOLUTION INTRAVENOUS; PARENTERAL at 21:04

## 2023-12-14 RX ADMIN — SODIUM CHLORIDE, PRESERVATIVE FREE 10 ML: 5 INJECTION INTRAVENOUS at 21:00

## 2023-12-14 RX ADMIN — Medication 3 MG/HR: at 11:14

## 2023-12-14 RX ADMIN — HEPARIN SODIUM 4200 UNITS: 1000 INJECTION INTRAVENOUS; SUBCUTANEOUS at 16:15

## 2023-12-14 RX ADMIN — INSULIN HUMAN 6.22 UNITS/HR: 1 INJECTION, SOLUTION INTRAVENOUS at 04:37

## 2023-12-14 RX ADMIN — INSULIN HUMAN 10 UNITS: 100 INJECTION, SOLUTION PARENTERAL at 01:23

## 2023-12-14 RX ADMIN — POLYVINYL ALCOHOL, POVIDONE 1 DROP: 14; 6 SOLUTION/ DROPS OPHTHALMIC at 03:33

## 2023-12-14 RX ADMIN — 0.12% CHLORHEXIDINE GLUCONATE 15 ML: 1.2 RINSE ORAL at 09:11

## 2023-12-14 RX ADMIN — Medication 300 MCG/HR: at 22:57

## 2023-12-14 RX ADMIN — POLYVINYL ALCOHOL, POVIDONE 1 DROP: 14; 6 SOLUTION/ DROPS OPHTHALMIC at 09:35

## 2023-12-14 RX ADMIN — BUMETANIDE 2 MG: 0.25 INJECTION INTRAMUSCULAR; INTRAVENOUS at 08:59

## 2023-12-14 RX ADMIN — CALCIUM CHLORIDE, MAGNESIUM CHLORIDE, DEXTROSE MONOHYDRATE, LACTIC ACID, SODIUM CHLORIDE, SODIUM BICARBONATE AND POTASSIUM CHLORIDE: 5.15; 2.03; 22; 5.4; 6.46; 3.09; .157 INJECTION INTRAVENOUS at 22:34

## 2023-12-14 RX ADMIN — Medication 300 MCG/HR: at 19:13

## 2023-12-14 RX ADMIN — PIPERACILLIN AND TAZOBACTAM 3375 MG: 3; .375 INJECTION, POWDER, FOR SOLUTION INTRAVENOUS; PARENTERAL at 09:08

## 2023-12-14 RX ADMIN — INSULIN LISPRO 16 UNITS: 100 INJECTION, SOLUTION INTRAVENOUS; SUBCUTANEOUS at 01:05

## 2023-12-14 RX ADMIN — THIAMINE HYDROCHLORIDE 200 MG: 100 INJECTION, SOLUTION INTRAMUSCULAR; INTRAVENOUS at 09:09

## 2023-12-14 RX ADMIN — Medication 20 MG/HR: at 20:25

## 2023-12-14 RX ADMIN — WATER 60 MG: 1 INJECTION INTRAMUSCULAR; INTRAVENOUS; SUBCUTANEOUS at 11:24

## 2023-12-14 RX ADMIN — CALCIUM GLUCONATE 1000 MG: 20 INJECTION, SOLUTION INTRAVENOUS at 08:59

## 2023-12-14 RX ADMIN — DEXMEDETOMIDINE HYDROCHLORIDE 0.3 MCG/KG/HR: 4 INJECTION, SOLUTION INTRAVENOUS at 07:13

## 2023-12-14 RX ADMIN — POTASSIUM CHLORIDE 20 MEQ: 29.8 INJECTION, SOLUTION INTRAVENOUS at 21:42

## 2023-12-14 RX ADMIN — PANTOPRAZOLE SODIUM 40 MG: 40 INJECTION, POWDER, FOR SOLUTION INTRAVENOUS at 09:09

## 2023-12-14 RX ADMIN — BUMETANIDE 2 MG: 0.25 INJECTION INTRAMUSCULAR; INTRAVENOUS at 00:08

## 2023-12-14 RX ADMIN — Medication 150 MCG/HR: at 03:51

## 2023-12-14 RX ADMIN — LEUCINE, PHENYLALANINE, LYSINE, METHIONINE, ISOLEUCINE, VALINE, HISTIDINE, THREONINE, TRYPTOPHAN, ALANINE, GLYCINE, ARGININE, PROLINE, SERINE, TYROSINE, DEXTROSE: 584; 448; 464; 320; 480; 464; 384; 336; 144; 1656; 824; 920; 544; 400; 32; 10 INJECTION INTRAVENOUS at 21:53

## 2023-12-14 RX ADMIN — HEPARIN SODIUM 2100 UNITS: 1000 INJECTION INTRAVENOUS; SUBCUTANEOUS at 12:43

## 2023-12-14 RX ADMIN — Medication 7 MCG/MIN: at 17:00

## 2023-12-14 RX ADMIN — WATER 60 MG: 1 INJECTION INTRAMUSCULAR; INTRAVENOUS; SUBCUTANEOUS at 23:45

## 2023-12-14 RX ADMIN — 0.12% CHLORHEXIDINE GLUCONATE 15 ML: 1.2 RINSE ORAL at 20:59

## 2023-12-14 RX ADMIN — WATER 60 MG: 1 INJECTION INTRAMUSCULAR; INTRAVENOUS; SUBCUTANEOUS at 18:57

## 2023-12-14 RX ADMIN — BUMETANIDE 2 MG: 0.25 INJECTION INTRAMUSCULAR; INTRAVENOUS at 20:56

## 2023-12-14 RX ADMIN — WATER 60 MG: 1 INJECTION INTRAMUSCULAR; INTRAVENOUS; SUBCUTANEOUS at 00:12

## 2023-12-14 RX ADMIN — DIAZEPAM 20 MG: 5 INJECTION, SOLUTION INTRAMUSCULAR; INTRAVENOUS at 18:54

## 2023-12-14 RX ADMIN — INSULIN HUMAN 3.72 UNITS/HR: 1 INJECTION, SOLUTION INTRAVENOUS at 09:20

## 2023-12-14 RX ADMIN — CALCIUM GLUCONATE 1000 MG: 20 INJECTION, SOLUTION INTRAVENOUS at 06:46

## 2023-12-14 RX ADMIN — POLYVINYL ALCOHOL, POVIDONE 1 DROP: 14; 6 SOLUTION/ DROPS OPHTHALMIC at 23:43

## 2023-12-14 RX ADMIN — Medication 150 MCG/HR: at 11:15

## 2023-12-14 RX ADMIN — POLYVINYL ALCOHOL, POVIDONE 1 DROP: 14; 6 SOLUTION/ DROPS OPHTHALMIC at 00:07

## 2023-12-14 RX ADMIN — POTASSIUM CHLORIDE 20 MEQ: 29.8 INJECTION, SOLUTION INTRAVENOUS at 22:42

## 2023-12-14 RX ADMIN — Medication 200 MCG/HR: at 16:12

## 2023-12-14 RX ADMIN — SODIUM CHLORIDE, PRESERVATIVE FREE 10 ML: 5 INJECTION INTRAVENOUS at 09:11

## 2023-12-14 RX ADMIN — SODIUM CHLORIDE, PRESERVATIVE FREE 10 ML: 5 INJECTION INTRAVENOUS at 09:10

## 2023-12-14 RX ADMIN — CALCIUM CHLORIDE, MAGNESIUM CHLORIDE, DEXTROSE MONOHYDRATE, LACTIC ACID, SODIUM CHLORIDE, SODIUM BICARBONATE AND POTASSIUM CHLORIDE: 5.15; 2.03; 22; 5.4; 6.46; 3.09; .157 INJECTION INTRAVENOUS at 22:35

## 2023-12-14 RX ADMIN — WATER 60 MG: 1 INJECTION INTRAMUSCULAR; INTRAVENOUS; SUBCUTANEOUS at 05:51

## 2023-12-14 RX ADMIN — CALCIUM CHLORIDE, MAGNESIUM CHLORIDE, DEXTROSE MONOHYDRATE, LACTIC ACID, SODIUM CHLORIDE, SODIUM BICARBONATE AND POTASSIUM CHLORIDE: 5.15; 2.03; 22; 5.4; 6.46; 3.09; .157 INJECTION INTRAVENOUS at 17:00

## 2023-12-14 ASSESSMENT — PULMONARY FUNCTION TESTS
PIF_VALUE: 31
PIF_VALUE: 30
PIF_VALUE: 30
PIF_VALUE: 28
PIF_VALUE: 28

## 2023-12-14 ASSESSMENT — PAIN SCALES - GENERAL
PAINLEVEL_OUTOF10: 0

## 2023-12-14 NOTE — CARE COORDINATION
12/14/23  SW reviewed patient's chart. PT/OT recommended patient ambulate with DME walker at discharge and ARU. Patient was functioning independently without DME prior to hospitalization. Patient was observed with tachypnea and respiratory distress which led to intubation on 12/13/23. Temporary HD catheter placed 12/14. Patient continues to require ICU level of care at this time. Not medically ready or stepdown. SW is following clinical status for disposition planning.      Jaimie Reynaga LMSW   Case Management

## 2023-12-14 NOTE — CONSULTS
Comprehensive Nutrition Assessment    Type and Reason for Visit:  Reassess, Consult, NPO/Clear Liquid    Nutrition Recommendations/Plan:   Provide parenteral nutrition using standard premixed product of Clinimix AA8/D10 until patient is able to tolerate adequate enteral nutrition. Decrease PN to start at 20:00 tonight. See below for order contents/details. Lipids provided Monday, Wednesday, and Friday only  MVI/TE provided Tuesday, Thursday, and Saturday due to shortage    Modify tube feeding regimen:   Formula: Vital HP  Goal Rate: 15 ml/hr x 20 hours (for anticipation of holding tube feeds for standard nursing care)  Water Flushes: 30 mL q 6 hours (120 mL total)  Goal Regimen Provides (with modulars): 300 kcal, 26g protein, 372 ml free water, 20% RDIs    Continue to monitor tolerance of PN, weight, labs, and plan of care during admission. Parenteral Nutrition Order:   Current PN:  Next PN:       Malnutrition Assessment:  Malnutrition Status: At risk for malnutrition (Comment) (NPO, vent, TPN, TF) (12/14/23 1239)    Context:  Acute Illness       Nutrition Assessment:    Admitted with c/o n/v, fevers, cough, SOB. +influenza 12/5. Pt placed on BIPAP 12/10 and transferred to ICU due to high risk for intubation. Pt initially on PO diet then made NPO while on BIPAP. NPO since ICU admit x 3 days. PICC placed 12/13, initiated on TPN. Noted s/p intubation 12/13 and ordered for TF per MD (Russell); no NG/OGT documented and not initiated per flowsheet. Consult noted for TF ordering and management. Discussed care during interdisciplinary rounds. Observed feeds (Russell) hanging. Per MD, to attempt to prone pt today. Discussed keeping TF at trickle rate and continue TPN. Modify to Vital HP for today. Continues on insulin drip. Per Nephrology, pt in ARDS, daughter agreeable to dialysis, will attempt iHD and if unable to tolerate, switch to CRRT.  Modify to TPN 8/10, less dextrose and rate of 30 ml/hr, less volume - Feeding, Modify Parenteral Nutrition  Nutrition Education/Counseling: No recommendation at this time  Coordination of Nutrition Care: Interdisciplinary Rounds  Plan of Care discussed with: interdisciplinary team    Goals:  Previous Goal Met: No Progress toward Goal(s)  Goals: Meet at least 75% of estimated needs, by next RD assessment       Nutrition Monitoring and Evaluation:   Behavioral-Environmental Outcomes: None Identified  Food/Nutrient Intake Outcomes: Enteral Nutrition Intake/Tolerance, Parenteral Nutrition Intake/Tolerance  Physical Signs/Symptoms Outcomes: Biochemical Data, GI Status, Nausea or Vomiting, Fluid Status or Edema, Hemodynamic Status, Skin, Weight    Discharge Planning:    Too soon to determine     Megan Dockweiler, MS, RD, CNSC  Contact: [phone]

## 2023-12-14 NOTE — CONSULTS
Vascular access assessment completed. Order placed by CAIO Quiñonez due to needing IV nutrition. Patient GFR is 12, Nephrology is currently following this patient and has provided clearance to place PICC. 5 Fr. Triple lumen PICC line placed in right basilic vein and confirmed with 3CG. External length is 1 cm, internal length is 40 cm and arm circumference is 37 cm. Lot # is [id number], REF# is [id number] and expiration is 06-. Local lidocaine utilized during procedure and patient did not present with any negative reactions to injection. Patient tolerated procedure well and CHG dressing applied to site. Initial dressing should be changed no later than 20 DEC. 2023.

## 2023-12-14 NOTE — INTERDISCIPLINARY ROUNDS
[facility]  Pulmonary, Critical Care, and Sleep Medicine  Interdisciplinary and Ventilator Weaning Rounds    Patient discussed in morning walking rounds and interdisciplinary rounds. ICU admission day: admitted 12/11    Overnight events:   No significant events overnight     Assessments and best practice:  Ventilator  Ventilator Day(s): 2  Vent Settings  FiO2 : (S) 85 %  Resp Rate (Set): 24 bpm  Vt (Set, mL): (S) 350 mL (post AM ABG)  PEEP/CPAP (cmH2O): 16  Peak Inspiratory Flow (lpm): 41 L/sec  Insp Rise Time (%): 3 %  Flow Sensitivity: 3 L/min  Disconnect Sensitivity (%): 75 %   Glycemic control  Diet  Diet NPO  PN-Adult Premix 5/20 - Central  ADULT TUBE FEEDING; Orogastric; Standard with Fiber; Continuous; 10; Yes; 10; Q 8 hours; 45; 100; Q 4 hours  Stress ulcer prophylaxis. Protonix  DVT prophylaxis. Heparin gtt  Need for Lines, pratt assessed. Yes  Restraint Reevaluation     N/a  Disposition regarding transferring out of the ICU  RED      [facility] updated: will call today      Palliative consult within 3 days of admission to ICU-  Ethics Guidance: 21 days      Daily Plans:   Will attempt to prone  Will start trickle feeds today    VELASQUEZ time 10 minutes        Chris Coyle PA-C  12/14/23  Pulmonary, Critical Care Medicine  [facility]

## 2023-12-14 NOTE — PLAN OF CARE
Problem: Discharge Planning  Goal: Discharge to home or other facility with appropriate resources  Outcome: Progressing     Problem: Skin/Tissue Integrity  Goal: Absence of new skin breakdown  Description: 1. Monitor for areas of redness and/or skin breakdown  2. Assess vascular access sites hourly  3. Every 4-6 hours minimum:  Change oxygen saturation probe site  4. Every 4-6 hours:  If on nasal continuous positive airway pressure, respiratory therapy assess nares and determine need for appliance change or resting period. Outcome: Progressing     Problem: Safety - Adult  Goal: Free from fall injury  Outcome: Progressing     Problem: Occupational Therapy - Adult  Goal: By Discharge: Performs self-care activities at highest level of function for planned discharge setting. See evaluation for individualized goals. Description: Occupational Therapy Goals:  Initiated 12/9/2023 to be met within 7-10 days. 1.  Patient will demonstrate 4/5 UB strength in preparation for her efficient completion of her self care routine  2. Patient will perform bathing routine with min assist and no increased SOB  3. Patient will maneuver on and off BSC with SBA  4. Patient will perform LB clothes management (including standing during LB (including LB clothes management)      [facility]13/2023 [facility]1 [facility]  Outcome: Progressing     Problem: Physical Therapy - Adult  Goal: By Discharge: Performs mobility at highest level of function for planned discharge setting. See evaluation for individualized goals. Description: Physical Therapy Goals  Initiated 12/10/2023 and to be accomplished within 7 day(s)  1. Patient will move from supine to sit and sit to supine  in bed with modified independence. 2.  Patient will transfer from bed to chair and chair to bed with modified independence using the least restrictive device. 3.  Patient will perform sit to stand with modified independence.   4.  Patient will ambulate with

## 2023-12-14 NOTE — PROGRESS NOTES
CRRT   Orders   Mode:  CVVHDF   Blood Flow Rate:  200   Prismasol Dose:  2K   Prismasol Concentrate:  3.5Ca   Blood Warmer Temp:     Net Fluid Removal:  50 mL/hr     Metrics   BP:  119/55   HR:  65   Access Pressure:  -65   Filter Pressure:  184   Return Pressure:  122   TMP:  72   Pressure Drop:  58     Access   Type & Location:  Right Femoral Trialysis   Comments:                                        Labs   HBsAg (Antigen) / date:   12/14/23 Neg                                            HBsAb (Antibody) / date:  [facility] 12/14/23   Source:  EMR     Safety:   Time Out Done:   (Time)  1720   Consent obtained/signed:  yes   Education:  yes   Primary Nurse Rpt Pre:  BRANNON Peters RN   Primary Nurse Rpt Post:  BRANNON Peters RN     Comments / Plan:    CRRT initated w/o any complications

## 2023-12-14 NOTE — PROGRESS NOTES
Patient BP is not tolerating IHD  Switch to CRRT  Net fluid removal is 50 cc per hr for now  Labs every 6 hrly

## 2023-12-14 NOTE — PROGRESS NOTES
%   12/13/23 1815 -- 98 24 (!) 177/100 94 %   12/13/23 1800 -- 83 24 (!) 154/76 95 %   12/13/23 1754 -- 79 24 -- 94 %   12/13/23 1745 -- 87 27 (!) 162/81 95 %   12/13/23 1730 -- 70 30 (!) 142/76 91 %   12/13/23 1715 -- 66 28 -- 91 %   12/13/23 1700 -- 62 26 (!) 145/54 94 %   12/13/23 1645 -- 58 28 -- 93 %   12/13/23 1630 -- 58 27 (!) 152/76 90 %   12/13/23 1615 -- 62 (!) 31 -- 92 %   12/13/23 1600 -- 65 (!) 34 112/64 (!) 89 %   12/13/23 1545 -- 63 (!) 31 -- (!) 88 %   12/13/23 1534 -- -- -- (!) 140/75 --   12/13/23 1530 -- 59 26 (!) 140/75 94 %   12/13/23 1515 -- 70 (!) 31 -- (!) 89 %   12/13/23 1508 -- 86 16 -- 95 %   12/13/23 1500 -- 69 21 (!) 116/57 91 %   12/13/23 1445 -- 63 30 -- 94 %   12/13/23 1430 -- 60 25 (!) 142/68 90 %   12/13/23 1415 -- 62 23 -- 91 %   12/13/23 1400 -- 58 21 133/71 91 %   12/13/23 1343 -- 58 23 -- 91 %   12/13/23 1330 -- 59 24 135/69 91 %   12/13/23 1300 -- 59 26 121/67 93 %   12/13/23 1230 -- 68 21 133/76 92 %   12/13/23 1200 96.8 °F (36 °C) 55 23 122/79 94 %   12/13/23 1130 -- 56 (!) 33 (!) 142/68 91 %   12/13/23 1100 -- 56 23 131/65 93 %   12/13/23 1030 -- 57 20 133/79 93 %   12/13/23 1000 -- 58 30 130/77 93 %          Weight change:      12/12 1901 - 12/14 0700  In: 1118.8 [I.V.:646.5]  Out: 2875 [Urine:2875]    Intake/Output Summary (Last 24 hours) at 12/14/2023 0946  Last data filed at 12/14/2023 0600  Gross per 24 hour   Intake 819.28 ml   Output 2225 ml   Net -1405.72 ml       Physical Exam:     CVS: S1S2 heard,  no rub  RS: + air entry b/l,   Abd: Soft, Non tender  Extrm: no cyanosis, clubbing   Skin: no visible  Rash  Musculoskeletal: No gross joints or bone deformities         Data Review:     LABS:   Hematology:   Recent Labs     12/12/23  0442 12/13/23  0130   WBC 7.6 8.8   HGB 13.1 11.9*   HCT 38.9 36.5       Chemistry:   Recent Labs     12/12/23  0442 12/13/23  0130 12/14/23  0508   BUN 61* 73* 91*   K 3.7 4.1 3.8    138 139    108 109   CO2 20* 21 22   PHOS  4.1 4.6 6.3*              Procedures/imaging: see electronic medical records for all procedures, Xrays and details which were not copied into this note but were reviewed prior to creation of Plan          Assessment & Plan:     See above        Maxi Salguero MD  12/14/2023  9:46 AM

## 2023-12-14 NOTE — DIALYSIS
Received pre HD report from B. Lavona Schilder, RN. Pt in prone position in bed and intubated with O2 saturation ranging from 83 to 93. Accessed right femoral TDC w/o complications. Tx initiated at 1445. TDC  arterial pressure collapsing due to pt being in prone position. Treatment was interrupted multiple times due to obstructed TDC flow. Max BFR was 275. Plan for hemodynamic stability UF goal 2000ml as tolerated. Line connections intact at all times. Tx terminated at 1605 and pt was unable to reach prescribed UF goal due to hypotension. Dr Rubin Perea was notified. New orders for CRRT. Net removed was 248ml. De-accessed per protocol. Heparin indwell 2.1 ml in arterial, and 2.1 ml in venous catheter. Unit nurse B. Lavona Schilder, RN given report.

## 2023-12-14 NOTE — PROCEDURES
[facility]  Temporary Hemodialysis Catheter Procedure Note With Ultrasound Guidance    Indication: Need for dialysis access    Risks, benefits, alternatives explained and consent obtained. Time out performed. Patient positioned in reverse Trendelenburg. Central line Bundle:  Full sterile barrier precautions used. 7-Step Sterility Protocol followed. (cap, mask sterile gown, sterile gloves, large sterile sheet, hand hygiene, 2% chlorhexidine for cutaneous antisepsis)  5 mL 1% Lidocaine placed at insertion site. Using ultrasound guidance,   RIGHT/LEFT: right femoral vein cannulated x 1 attempt(s) utilizing the modified Seldinger technique. Position of guidewire confirmed in vein using ultrasound prior to dilating. Dilation completed twice successfully. The central line wire was exchanged for a Maradiaga wire via the first dilator. The wire did kink so there was exchange of the Maradiaga wire to a different wire without further issues. A peelaway sheath was placed over the [facility] wire. The guidewire was removed. The dialysis catheter (Palindrome 29cm, NON-tunneled) was then placed through the peelaway sheath and the sheath was removed  No immediate complications encountered. Good blood return from both ports. Catheter secured & Biopatch applied. Sterile Tegaderm placed. Sandra Garcia (Peri) Alton Ridley DO, PGY-V  [facility] Pulmonary/Critical Care Fellow       Wire in R femoral vein              Attending Attestation;  I was present for and supervised the entire procedure and gowned and assisted during entire procedure. See fellow note for details.         Shemar Jarrett MD/MPH     Pulmonary, Critical Care Medicine  [facility]

## 2023-12-14 NOTE — PROGRESS NOTES
OT orders d/c 2/2 pt now intubated. Please re-consult when pt in medically appropriate. Thank you for allowing us to participate in the care of this pt.

## 2023-12-14 NOTE — PROGRESS NOTES
[facility] - Progress Note    Admit Date: 12/8/2023  Attending Cardiologist: Dr. Jase Parker:     Patient Active Problem List    Diagnosis Date Noted    Thyroid nodule 02/20/2023    Atrial fibrillation with RVR ([address]) 12/13/2023    ARDS (adult respiratory distress syndrome) ([address]) 12/11/2023    Influenzal pneumonia 12/11/2023    ALIREZA (acute kidney injury) ([address]) 12/10/2023    Acute pulmonary edema (HCC) 12/10/2023    Elevated troponin 12/09/2023    Paroxysmal atrial fibrillation ([address]) 12/09/2023    Atrial fibrillation, new onset ([address]) 12/09/2023    Acute respiratory failure with hypoxia (HCC) 12/08/2023    BPPV (benign paroxysmal positional vertigo) 12/08/2023    Multifocal pneumonia 12/08/2023    Hypothyroidism 12/08/2023    Irritable bowel syndrome 04/05/2021    Chronic rhinitis 04/02/2021    Chronic cough 04/02/2021    Class 2 obesity in adult 08/21/2018    Non-rheumatic mitral regurgitation 05/02/2016    Fatty pancreas 04/05/2016    Age-related osteoporosis without current pathological fracture 04/05/2016    Nephrolithiasis 08/06/2015    CAD (coronary artery disease), native coronary artery 07/01/2014    Chest pain 02/07/2014    Abnormal nuclear stress test 02/07/2014    Dyslipidemia        --New onset atrial fibrillation. Initially with rapid rates, started on IV diltiazem, converted to sinus rhythm 12/9/2023 and has been maintaining sinus rhythm. -- Hypoxic respiratory failure: Volume overload vs PNA . Intubated 12/13/2023, BNP 13,000  --  ARDS secondary to Influenza pneumonia. Progressive shortness of breath. Intubated 12/13/2023. -- ALIREAZ in setting of hypoxic respiratory failure  -- Coronary artery disease. History of single-vessel PCI to left circumflex/OM1 in 2014. No recurrent angina since that time. - Echocardiogram 12/11/2023 shows EF 55-60% with MR. No significant tricuspid regurgitation to assess pulmonary pressures. Previous echo with Normal LVEF of 60%.   -- Severe chewable tablet 81 mg  81 mg Oral Daily    glucose chewable tablet 16 g  4 tablet Oral PRN    dextrose bolus 10% 125 mL  125 mL IntraVENous PRN    Or    dextrose bolus 10% 250 mL  250 mL IntraVENous PRN    Glucagon Emergency SOLR 1 mg  1 mg SubCUTAneous PRN    dextrose 10 % infusion   IntraVENous Continuous PRN    [Held by provider] rosuvastatin (CRESTOR) tablet 10 mg  10 mg Oral Daily    [Held by provider] apixaban (ELIQUIS) tablet 5 mg  5 mg Oral BID    sodium chloride flush 0.9 % injection 5-40 mL  5-40 mL IntraVENous 2 times per day    sodium chloride flush 0.9 % injection 5-40 mL  5-40 mL IntraVENous PRN    0.9 % sodium chloride infusion   IntraVENous PRN    ondansetron (ZOFRAN-ODT) disintegrating tablet 4 mg  4 mg Oral Q8H PRN    Or    ondansetron (ZOFRAN) injection 4 mg  4 mg IntraVENous Q6H PRN    polyethylene glycol (GLYCOLAX) packet 17 g  17 g Oral Daily PRN    acetaminophen (TYLENOL) tablet 650 mg  650 mg Oral Q6H PRN    Or    acetaminophen (TYLENOL) suppository 650 mg  650 mg Rectal Q6H PRN    ipratropium 0.5 mg-albuterol 2.5 mg (DUONEB) nebulizer solution 1 Dose  1 Dose Inhalation Q4H PRN    [Held by provider] guaiFENesin (MUCINEX) extended release tablet 600 mg  600 mg Oral BID    [Held by provider] Vitamin D (CHOLECALCIFEROL) tablet 2,000 Units  2,000 Units Oral Daily    [Held by provider] ezetimibe (ZETIA) tablet 10 mg  10 mg Oral Daily    [Held by provider] levothyroxine (SYNTHROID) tablet 50 mcg  50 mcg Oral Daily    [Held by provider] metoprolol succinate (TOPROL XL) extended release tablet 50 mg  50 mg Oral Daily    [Held by provider] montelukast (SINGULAIR) tablet 10 mg  10 mg Oral Daily         Intake/Output Summary (Last 24 hours) at 12/14/2023 1017  Last data filed at 12/14/2023 0600  Gross per 24 hour   Intake 819.28 ml   Output 2225 ml   Net -1405.72 ml       Physical Exam:  General:  Pt intubated and sedated  Neck:  no JVD  Lungs:  clear breath sounds in anterior lung fields  Heart:  RRR,

## 2023-12-14 NOTE — PLAN OF CARE
INTERVENTION:  HEMODYNAMIC STABILIZATION  MAINTAIN BP WNL WHILE ON HD. INTERVENTION:  FLUID MANAGEMENT  WILL ATTEMPT 2500 ML TOTAL FLUID REMOVAL AS TOLERATED. INTERVENTION:  METABOLIC/ELECTROLYTE MANAGEMENT  3.0 POTASSIUM 3.0 CALCIUM DIALYSATE USED WITH HD TODAY. INTERVENTION:  HEMODIALYSIS ACCESS SITE MANAGEMENT  RIGHT FEMORAL CATHETER ACCESSED USING ASEPTIC TECHNIQUE. GOAL:  SIGNS AND SYMPTOMS OF LISTED POTENTIAL PROBLEMS WILL BE ABSENT OR MANAGEABLE. OUTCOME:  PROGRESSING. HD PLANNED FOR 3 HOURS TODAY.

## 2023-12-14 NOTE — PROGRESS NOTES
Physician Progress Note      PATIENT:               SCOOTER DIGGS  CSN #:                  [id number]  :                       1950  ADMIT DATE:       2023 3:37 PM  DISCH DATE:  RESPONDING  PROVIDER #:        Avtar Lieberman MD          QUERY TEXT:    Pt admitted with ARDS 2?/2 Influenza Multifocal Pneumonia and new afib  . Pt   noted to have cc of nausea, vomiting, fevers, chills, with  1st 24 hrs VS and   Labs  23 1537-VS  HR 92 , RR 22 ,bp 117/51 , 97/47 ,95/54 , MAPs 68 ,    Temp oral 101.1 -Labs-WBC 4.4 ,. If possible, please document in the progress   notes and discharge summary if you are evaluating and /or treating any of the   following:    The medical record reflects the following:    Risk Factors:73 y.o. female with PMHX of CAD s/p PCI , severe mitral valve   regurgitation, declining surgical work-up, hypothyroidism, dyslipidemia,    presented to the ED on  with cc of nausea, vomiting, fevers, chills,   non-productive cough, shortness of breath. Pt tested positive for Influenza on   .    Clinical Indicators: Admitted -1st 24 hrs VS and Labs  23 1537-VS  HR 92   , RR 22 ,bp 117/51 , 97/47 ,95/54 , MAPs 68 ,  Temp oral 101.1 -Labs-WBC 4.4   ,Troponin, High Sensitivity(!): 101    -PN-? Acute hypoxic respiratory failure requiring continuous NIV- likely   secondary to multifocal pneumonia also with underlying mod to severe mitral   valve regurgitation  ? Multifocal PNA- CXR showed bilateral multifocal opacities.?Positive for   Influenza A 12/10  ? ALIREZA on CKD      Treatment:IVNS 1000cc bolus ,IV Vanco and zosyn  SpO2 on room air for EMS was   in the 80s    Thank you  Suellen Rodriguez RN CRCR [facility]  , [facility] /[facility]  [email or web address]  Options provided:  -- Sepsis, present on admission  -- Influenza pneumonia without Sepsis  -- Other - I will add my own diagnosis  -- Disagree - Not applicable / Not valid  -- Disagree - Clinically unable to determine / Unknown  -- Refer to  Clinical Documentation Reviewer    PROVIDER RESPONSE TEXT:    This patient has sepsis which was present on admission. Query created by:  Rakan Clark on 12/13/2023 4:18 PM      Electronically signed by:  Magdalena Devlin MD 12/14/2023 12:13 AM

## 2023-12-14 NOTE — PROCEDURES
[address] [facility] Pul[facility] Specialist  Arterial  Line Procedure Note with ultrasound    Indication: Acute hypoxic respiratory failure/ARDS need for frequent ABGs    Line Bundle:   Full sterile barrier precautions used. 7-Step Sterility Protocol followed. (cap, mask sterile gown, sterile gloves, large sterile sheet, hand hygiene, 2% chlorhexidine for cutaneous antisepsis)  5 mL 1% Lidocaine placed at insertion site. Under ultrasound guidance  RIGHT/LEFT: leftradial artery cannulated x 1 attempt(s) utilizing the modified Seldinger technique. Good blood return. Catheter secured & Biopatch applied. Sterile Tegaderm placed.         Due to technical limitations, US imaging unable to be uploaded into EMR      Jack Chris MD/MPH     Pulmonary, [facility]

## 2023-12-14 NOTE — PROGRESS NOTES
Patient asked to speak with Provider when RT was in the room to do an ABG. This RT remained present for the conversation. Patient told the PA that she was not sure how much more she could take. PA explained that patient could be placed on the ventilator if she wanted to be. Patient asked how ling she would be on the vent? PA replied that she cold not say but that she soul be in hopes that her lungs could heal and enable her to come off the vent but that was not guaranteed. Patient asked to be placed on the vent at that time. Anesthesia intubated the patient with clear sight of the cords on the glide scope. Placing the tube at 22 cm at the teeth. Cuff pressure was checked at first vent check and adjusted to 30 cmH2O. Post intubation ABG drawn at first vent check from left sided [place]. No adverse effects noted.         12/13/23 1912   Adult IBW   Height 1.575 m (5' 2\")   Weight - Scale 88.5 kg (195 lb)   IBW/kg (Calculated) 50.1   Patient Observation   Pulse 93   Respirations 24   SpO2 97 %   Breath Sounds   Breath Sounds Bilateral Diminished   Right Upper Lobe Diminished   Right Middle Lobe Diminished   Right Lower Lobe Diminished   Left Upper Lobe Diminished   Left Lower Lobe Diminished   Vent Information   Ventilator Day(s) 1   Ventilator ID [id number]   Ventilator Safety Check Performed Pre-Use Yes   Ventilator Initiate Yes   Vent Mode AC/VC   $Ventilation $Initial Day   Ventilator Settings   FiO2  100 %   Vt (Set, mL) 330 mL   Resp Rate (Set) 24 bpm   PEEP/CPAP (cmH2O) 16   Vent Patient Data (Readings)   Vt Mandatory Exp (mL) 319 mL   Vt (Measured) 319 mL   Peak Inspiratory Pressure (cmH2O) 31 cmH2O   Rate Measured 24 br/min   Minute Volume (L/min) 7.64 Liters   Peak Inspiratory Flow (lpm) 41 L/sec   Mean Airway Pressure (cmH2O) 21 cmH20   Plateau Pressure (cm H2O) 29 cm H2O   Driving Pressure 13   I:E Ratio 1:1.8   Flow Sensitivity 3 L/min   Disconnect Sensitivity (%) 75 %   PEEP Intrinsic (cm H2O) 0.4 cm

## 2023-12-15 ENCOUNTER — APPOINTMENT (OUTPATIENT)
Facility: HOSPITAL | Age: 73
DRG: 870 | End: 2023-12-15
Payer: MEDICARE

## 2023-12-15 LAB
ALBUMIN SERPL-MCNC: 2.2 G/DL (ref 3.4–5)
ALBUMIN SERPL-MCNC: 2.3 G/DL (ref 3.4–5)
ALBUMIN SERPL-MCNC: 2.4 G/DL (ref 3.4–5)
ALBUMIN SERPL-MCNC: 2.4 G/DL (ref 3.4–5)
ALBUMIN SERPL-MCNC: 2.5 G/DL (ref 3.4–5)
ALBUMIN SERPL-MCNC: 2.5 G/DL (ref 3.4–5)
ANION GAP SERPL CALC-SCNC: 10 MMOL/L (ref 3–18)
ANION GAP SERPL CALC-SCNC: 4 MMOL/L (ref 3–18)
ANION GAP SERPL CALC-SCNC: 6 MMOL/L (ref 3–18)
ANION GAP SERPL CALC-SCNC: 7 MMOL/L (ref 3–18)
ANION GAP SERPL CALC-SCNC: 8 MMOL/L (ref 3–18)
ANION GAP SERPL CALC-SCNC: 9 MMOL/L (ref 3–18)
APTT PPP: 127.7 SEC (ref 23–36.4)
APTT PPP: 70.1 SEC (ref 23–36.4)
APTT PPP: 94.8 SEC (ref 23–36.4)
ARTERIAL PATENCY WRIST A: ABNORMAL
BASE DEFICIT BLD-SCNC: 5.8 MMOL/L
BASOPHILS # BLD: 0 K/UL (ref 0–0.1)
BASOPHILS NFR BLD: 0 % (ref 0–2)
BDY SITE: ABNORMAL
BUN SERPL-MCNC: 32 MG/DL (ref 7–18)
BUN SERPL-MCNC: 34 MG/DL (ref 7–18)
BUN SERPL-MCNC: 37 MG/DL (ref 7–18)
BUN SERPL-MCNC: 37 MG/DL (ref 7–18)
BUN SERPL-MCNC: 38 MG/DL (ref 7–18)
BUN SERPL-MCNC: 42 MG/DL (ref 7–18)
BUN/CREAT SERPL: 17 (ref 12–20)
BUN/CREAT SERPL: 18 (ref 12–20)
BUN/CREAT SERPL: 19 (ref 12–20)
CA-I SERPL-SCNC: 1.2 MMOL/L (ref 1.15–1.33)
CA-I SERPL-SCNC: 1.21 MMOL/L (ref 1.15–1.33)
CA-I SERPL-SCNC: 1.22 MMOL/L (ref 1.15–1.33)
CA-I SERPL-SCNC: 1.24 MMOL/L (ref 1.15–1.33)
CA-I SERPL-SCNC: 1.24 MMOL/L (ref 1.15–1.33)
CA-I SERPL-SCNC: 1.28 MMOL/L (ref 1.15–1.33)
CA-I SERPL-SCNC: 1.29 MMOL/L (ref 1.15–1.33)
CALCIUM SERPL-MCNC: 8.6 MG/DL (ref 8.5–10.1)
CALCIUM SERPL-MCNC: 8.7 MG/DL (ref 8.5–10.1)
CALCIUM SERPL-MCNC: 9 MG/DL (ref 8.5–10.1)
CALCIUM SERPL-MCNC: 9 MG/DL (ref 8.5–10.1)
CALCIUM SERPL-MCNC: 9.1 MG/DL (ref 8.5–10.1)
CALCIUM SERPL-MCNC: 9.2 MG/DL (ref 8.5–10.1)
CHLORIDE SERPL-SCNC: 104 MMOL/L (ref 100–111)
CHLORIDE SERPL-SCNC: 107 MMOL/L (ref 100–111)
CO2 SERPL-SCNC: 22 MMOL/L (ref 21–32)
CO2 SERPL-SCNC: 23 MMOL/L (ref 21–32)
CO2 SERPL-SCNC: 23 MMOL/L (ref 21–32)
CO2 SERPL-SCNC: 24 MMOL/L (ref 21–32)
CO2 SERPL-SCNC: 24 MMOL/L (ref 21–32)
CO2 SERPL-SCNC: 25 MMOL/L (ref 21–32)
CREAT SERPL-MCNC: 1.78 MG/DL (ref 0.6–1.3)
CREAT SERPL-MCNC: 1.85 MG/DL (ref 0.6–1.3)
CREAT SERPL-MCNC: 2.04 MG/DL (ref 0.6–1.3)
CREAT SERPL-MCNC: 2.12 MG/DL (ref 0.6–1.3)
CREAT SERPL-MCNC: 2.19 MG/DL (ref 0.6–1.3)
CREAT SERPL-MCNC: 2.26 MG/DL (ref 0.6–1.3)
DIFFERENTIAL METHOD BLD: ABNORMAL
EOSINOPHIL # BLD: 0 K/UL (ref 0–0.4)
EOSINOPHIL NFR BLD: 0 % (ref 0–5)
ERYTHROCYTE [DISTWIDTH] IN BLOOD BY AUTOMATED COUNT: 13.9 % (ref 11.6–14.5)
GAS FLOW.O2 O2 DELIVERY SYS: ABNORMAL
GAS FLOW.O2 SETTING OXYMISER: 24 BPM
GLUCOSE BLD STRIP.AUTO-MCNC: 126 MG/DL (ref 70–110)
GLUCOSE BLD STRIP.AUTO-MCNC: 126 MG/DL (ref 70–110)
GLUCOSE BLD STRIP.AUTO-MCNC: 132 MG/DL (ref 70–110)
GLUCOSE BLD STRIP.AUTO-MCNC: 133 MG/DL (ref 70–110)
GLUCOSE BLD STRIP.AUTO-MCNC: 137 MG/DL (ref 70–110)
GLUCOSE BLD STRIP.AUTO-MCNC: 138 MG/DL (ref 70–110)
GLUCOSE BLD STRIP.AUTO-MCNC: 144 MG/DL (ref 70–110)
GLUCOSE BLD STRIP.AUTO-MCNC: 150 MG/DL (ref 70–110)
GLUCOSE BLD STRIP.AUTO-MCNC: 151 MG/DL (ref 70–110)
GLUCOSE BLD STRIP.AUTO-MCNC: 155 MG/DL (ref 70–110)
GLUCOSE BLD STRIP.AUTO-MCNC: 160 MG/DL (ref 70–110)
GLUCOSE BLD STRIP.AUTO-MCNC: 161 MG/DL (ref 70–110)
GLUCOSE BLD STRIP.AUTO-MCNC: 161 MG/DL (ref 70–110)
GLUCOSE BLD STRIP.AUTO-MCNC: 164 MG/DL (ref 70–110)
GLUCOSE BLD STRIP.AUTO-MCNC: 165 MG/DL (ref 70–110)
GLUCOSE BLD STRIP.AUTO-MCNC: 166 MG/DL (ref 70–110)
GLUCOSE BLD STRIP.AUTO-MCNC: 167 MG/DL (ref 70–110)
GLUCOSE BLD STRIP.AUTO-MCNC: 168 MG/DL (ref 70–110)
GLUCOSE BLD STRIP.AUTO-MCNC: 169 MG/DL (ref 70–110)
GLUCOSE BLD STRIP.AUTO-MCNC: 170 MG/DL (ref 70–110)
GLUCOSE BLD STRIP.AUTO-MCNC: 171 MG/DL (ref 70–110)
GLUCOSE BLD STRIP.AUTO-MCNC: 176 MG/DL (ref 70–110)
GLUCOSE BLD STRIP.AUTO-MCNC: 177 MG/DL (ref 70–110)
GLUCOSE BLD STRIP.AUTO-MCNC: 183 MG/DL (ref 70–110)
GLUCOSE SERPL-MCNC: 131 MG/DL (ref 74–99)
GLUCOSE SERPL-MCNC: 155 MG/DL (ref 74–99)
GLUCOSE SERPL-MCNC: 174 MG/DL (ref 74–99)
GLUCOSE SERPL-MCNC: 189 MG/DL (ref 74–99)
GLUCOSE SERPL-MCNC: 196 MG/DL (ref 74–99)
GLUCOSE SERPL-MCNC: 198 MG/DL (ref 74–99)
HBV SURFACE AB SER QL IA: NEGATIVE
HBV SURFACE AB SERPL IA-ACNC: <3.1 MIU/ML
HCO3 BLD-SCNC: 22.4 MMOL/L (ref 22–26)
HCT VFR BLD AUTO: 37.6 % (ref 35–45)
HEP BS AB COMMENT: ABNORMAL
HGB BLD-MCNC: 12.1 G/DL (ref 12–16)
IMM GRANULOCYTES # BLD AUTO: 0 K/UL (ref 0–0.04)
IMM GRANULOCYTES NFR BLD AUTO: 0 % (ref 0–0.5)
LYMPHOCYTES # BLD: 0.3 K/UL (ref 0.9–3.6)
LYMPHOCYTES NFR BLD: 1 % (ref 21–52)
MAGNESIUM SERPL-MCNC: 1.9 MG/DL (ref 1.6–2.6)
MAGNESIUM SERPL-MCNC: 1.9 MG/DL (ref 1.6–2.6)
MAGNESIUM SERPL-MCNC: 2.1 MG/DL (ref 1.6–2.6)
MAGNESIUM SERPL-MCNC: 2.2 MG/DL (ref 1.6–2.6)
MAGNESIUM SERPL-MCNC: 2.5 MG/DL (ref 1.6–2.6)
MAGNESIUM SERPL-MCNC: 2.7 MG/DL (ref 1.6–2.6)
MCH RBC QN AUTO: 28.5 PG (ref 24–34)
MCHC RBC AUTO-ENTMCNC: 32.2 G/DL (ref 31–37)
MCV RBC AUTO: 88.7 FL (ref 78–100)
MONOCYTES # BLD: 1 K/UL (ref 0.05–1.2)
MONOCYTES NFR BLD: 4 % (ref 3–10)
NEUTS SEG # BLD: 24.7 K/UL (ref 1.8–8)
NEUTS SEG NFR BLD: 95 % (ref 40–73)
NRBC # BLD: 0.04 K/UL (ref 0–0.01)
NRBC BLD-RTO: 0.2 PER 100 WBC
O2/TOTAL GAS SETTING VFR VENT: 100 %
PCO2 BLD: 54.4 MMHG (ref 35–45)
PEEP RESPIRATORY: 18 CMH2O
PH BLD: 7.22 (ref 7.35–7.45)
PHOSPHATE SERPL-MCNC: 2 MG/DL (ref 2.5–4.9)
PHOSPHATE SERPL-MCNC: 2.8 MG/DL (ref 2.5–4.9)
PHOSPHATE SERPL-MCNC: 2.9 MG/DL (ref 2.5–4.9)
PHOSPHATE SERPL-MCNC: 3.4 MG/DL (ref 2.5–4.9)
PHOSPHATE SERPL-MCNC: 3.5 MG/DL (ref 2.5–4.9)
PHOSPHATE SERPL-MCNC: 3.6 MG/DL (ref 2.5–4.9)
PLATELET # BLD AUTO: 238 K/UL (ref 135–420)
PLATELET COMMENT: ABNORMAL
PMV BLD AUTO: 12.3 FL (ref 9.2–11.8)
PO2 BLD: 176 MMHG (ref 80–100)
POTASSIUM SERPL-SCNC: 3.6 MMOL/L (ref 3.5–5.5)
POTASSIUM SERPL-SCNC: 3.9 MMOL/L (ref 3.5–5.5)
POTASSIUM SERPL-SCNC: 3.9 MMOL/L (ref 3.5–5.5)
POTASSIUM SERPL-SCNC: 4 MMOL/L (ref 3.5–5.5)
POTASSIUM SERPL-SCNC: 4.1 MMOL/L (ref 3.5–5.5)
POTASSIUM SERPL-SCNC: 4.3 MMOL/L (ref 3.5–5.5)
RBC # BLD AUTO: 4.24 M/UL (ref 4.2–5.3)
RBC MORPH BLD: ABNORMAL
SAO2 % BLD: 99.3 % (ref 92–97)
SERVICE CMNT-IMP: ABNORMAL
SERVICE CMNT-IMP: ABNORMAL
SODIUM SERPL-SCNC: 134 MMOL/L (ref 136–145)
SODIUM SERPL-SCNC: 135 MMOL/L (ref 136–145)
SODIUM SERPL-SCNC: 135 MMOL/L (ref 136–145)
SODIUM SERPL-SCNC: 136 MMOL/L (ref 136–145)
SPECIMEN TYPE: ABNORMAL
VENTILATION MODE VENT: ABNORMAL
VT SETTING VENT: 350 ML
WBC # BLD AUTO: 26 K/UL (ref 4.6–13.2)

## 2023-12-15 PROCEDURE — A4216 STERILE WATER/SALINE, 10 ML: HCPCS | Performed by: INTERNAL MEDICINE

## 2023-12-15 PROCEDURE — 94003 VENT MGMT INPAT SUBQ DAY: CPT

## 2023-12-15 PROCEDURE — 37799 UNLISTED PX VASCULAR SURGERY: CPT

## 2023-12-15 PROCEDURE — 2580000003 HC RX 258: Performed by: PHYSICIAN ASSISTANT

## 2023-12-15 PROCEDURE — 6370000000 HC RX 637 (ALT 250 FOR IP): Performed by: PHYSICIAN ASSISTANT

## 2023-12-15 PROCEDURE — 94761 N-INVAS EAR/PLS OXIMETRY MLT: CPT

## 2023-12-15 PROCEDURE — 2500000003 HC RX 250 WO HCPCS: Performed by: PHYSICIAN ASSISTANT

## 2023-12-15 PROCEDURE — 80069 RENAL FUNCTION PANEL: CPT

## 2023-12-15 PROCEDURE — 71045 X-RAY EXAM CHEST 1 VIEW: CPT

## 2023-12-15 PROCEDURE — 2700000000 HC OXYGEN THERAPY PER DAY

## 2023-12-15 PROCEDURE — 6360000002 HC RX W HCPCS: Performed by: INTERNAL MEDICINE

## 2023-12-15 PROCEDURE — 85025 COMPLETE CBC W/AUTO DIFF WBC: CPT

## 2023-12-15 PROCEDURE — 2580000003 HC RX 258: Performed by: REGISTERED NURSE

## 2023-12-15 PROCEDURE — 6360000002 HC RX W HCPCS: Performed by: NURSE PRACTITIONER

## 2023-12-15 PROCEDURE — 2580000003 HC RX 258: Performed by: INTERNAL MEDICINE

## 2023-12-15 PROCEDURE — 82962 GLUCOSE BLOOD TEST: CPT

## 2023-12-15 PROCEDURE — 6360000002 HC RX W HCPCS: Performed by: REGISTERED NURSE

## 2023-12-15 PROCEDURE — 2500000003 HC RX 250 WO HCPCS: Performed by: INTERNAL MEDICINE

## 2023-12-15 PROCEDURE — A4216 STERILE WATER/SALINE, 10 ML: HCPCS | Performed by: REGISTERED NURSE

## 2023-12-15 PROCEDURE — 6360000002 HC RX W HCPCS: Performed by: PHYSICIAN ASSISTANT

## 2023-12-15 PROCEDURE — 83605 ASSAY OF LACTIC ACID: CPT

## 2023-12-15 PROCEDURE — 82330 ASSAY OF CALCIUM: CPT

## 2023-12-15 PROCEDURE — 2580000003 HC RX 258: Performed by: HOSPITALIST

## 2023-12-15 PROCEDURE — 90945 DIALYSIS ONE EVALUATION: CPT

## 2023-12-15 PROCEDURE — 85730 THROMBOPLASTIN TIME PARTIAL: CPT

## 2023-12-15 PROCEDURE — A4216 STERILE WATER/SALINE, 10 ML: HCPCS | Performed by: PHYSICIAN ASSISTANT

## 2023-12-15 PROCEDURE — C9113 INJ PANTOPRAZOLE SODIUM, VIA: HCPCS | Performed by: REGISTERED NURSE

## 2023-12-15 PROCEDURE — 83735 ASSAY OF MAGNESIUM: CPT

## 2023-12-15 PROCEDURE — 82803 BLOOD GASES ANY COMBINATION: CPT

## 2023-12-15 PROCEDURE — 6360000002 HC RX W HCPCS: Performed by: HOSPITALIST

## 2023-12-15 PROCEDURE — 99291 CRITICAL CARE FIRST HOUR: CPT | Performed by: INTERNAL MEDICINE

## 2023-12-15 PROCEDURE — 2000000000 HC ICU R&B

## 2023-12-15 PROCEDURE — 2500000003 HC RX 250 WO HCPCS: Performed by: STUDENT IN AN ORGANIZED HEALTH CARE EDUCATION/TRAINING PROGRAM

## 2023-12-15 RX ORDER — MAGNESIUM SULFATE IN WATER 40 MG/ML
2000 INJECTION, SOLUTION INTRAVENOUS ONCE
Status: COMPLETED | OUTPATIENT
Start: 2023-12-15 | End: 2023-12-15

## 2023-12-15 RX ORDER — FENTANYL CITRATE-0.9 % NACL/PF 10 MCG/ML
25-300 PLASTIC BAG, INJECTION (ML) INTRAVENOUS CONTINUOUS
Status: DISCONTINUED | OUTPATIENT
Start: 2023-12-15 | End: 2023-12-19

## 2023-12-15 RX ORDER — DIAZEPAM 5 MG/ML
20 INJECTION, SOLUTION INTRAMUSCULAR; INTRAVENOUS ONCE
Status: COMPLETED | OUTPATIENT
Start: 2023-12-15 | End: 2023-12-15

## 2023-12-15 RX ADMIN — THIAMINE HYDROCHLORIDE 200 MG: 100 INJECTION, SOLUTION INTRAMUSCULAR; INTRAVENOUS at 09:20

## 2023-12-15 RX ADMIN — WATER 60 MG: 1 INJECTION INTRAMUSCULAR; INTRAVENOUS; SUBCUTANEOUS at 06:54

## 2023-12-15 RX ADMIN — Medication 20 MG/HR: at 06:30

## 2023-12-15 RX ADMIN — Medication 300 MCG/HR: at 09:19

## 2023-12-15 RX ADMIN — PIPERACILLIN AND TAZOBACTAM 3375 MG: 3; .375 INJECTION, POWDER, FOR SOLUTION INTRAVENOUS; PARENTERAL at 21:15

## 2023-12-15 RX ADMIN — MAGNESIUM SULFATE HEPTAHYDRATE 2000 MG: 40 INJECTION, SOLUTION INTRAVENOUS at 00:52

## 2023-12-15 RX ADMIN — CALCIUM CHLORIDE, MAGNESIUM CHLORIDE, DEXTROSE MONOHYDRATE, LACTIC ACID, SODIUM CHLORIDE, SODIUM BICARBONATE AND POTASSIUM CHLORIDE: 5.15; 2.03; 22; 5.4; 6.46; 3.09; .157 INJECTION INTRAVENOUS at 03:39

## 2023-12-15 RX ADMIN — PIPERACILLIN AND TAZOBACTAM 3375 MG: 3; .375 INJECTION, POWDER, FOR SOLUTION INTRAVENOUS; PARENTERAL at 09:20

## 2023-12-15 RX ADMIN — Medication 20 MG/HR: at 10:40

## 2023-12-15 RX ADMIN — POLYVINYL ALCOHOL, POVIDONE 1 DROP: 14; 6 SOLUTION/ DROPS OPHTHALMIC at 07:53

## 2023-12-15 RX ADMIN — MAGNESIUM SULFATE HEPTAHYDRATE 2000 MG: 40 INJECTION, SOLUTION INTRAVENOUS at 18:39

## 2023-12-15 RX ADMIN — POLYVINYL ALCOHOL, POVIDONE 1 DROP: 14; 6 SOLUTION/ DROPS OPHTHALMIC at 14:01

## 2023-12-15 RX ADMIN — BUMETANIDE 2 MG: 0.25 INJECTION INTRAMUSCULAR; INTRAVENOUS at 09:20

## 2023-12-15 RX ADMIN — HYDROCORTISONE SODIUM SUCCINATE 50 MG: 100 INJECTION, POWDER, FOR SOLUTION INTRAMUSCULAR; INTRAVENOUS at 22:02

## 2023-12-15 RX ADMIN — PANTOPRAZOLE SODIUM 40 MG: 40 INJECTION, POWDER, FOR SOLUTION INTRAVENOUS at 09:20

## 2023-12-15 RX ADMIN — FENTANYL CITRATE 300 MCG/HR: at 15:06

## 2023-12-15 RX ADMIN — Medication 300 MCG/HR: at 12:19

## 2023-12-15 RX ADMIN — 0.12% CHLORHEXIDINE GLUCONATE 15 ML: 1.2 RINSE ORAL at 09:20

## 2023-12-15 RX ADMIN — HEPARIN SODIUM 2000 UNITS: 1000 INJECTION INTRAVENOUS; SUBCUTANEOUS at 23:16

## 2023-12-15 RX ADMIN — CALCIUM CHLORIDE, MAGNESIUM CHLORIDE, DEXTROSE MONOHYDRATE, LACTIC ACID, SODIUM CHLORIDE, SODIUM BICARBONATE AND POTASSIUM CHLORIDE: 5.15; 2.03; 22; 5.4; 6.46; 3.09; .157 INJECTION INTRAVENOUS at 23:58

## 2023-12-15 RX ADMIN — INSULIN HUMAN 3.13 UNITS/HR: 1 INJECTION, SOLUTION INTRAVENOUS at 10:24

## 2023-12-15 RX ADMIN — Medication 20 MG/HR: at 15:16

## 2023-12-15 RX ADMIN — Medication 10 MG/HR: at 23:47

## 2023-12-15 RX ADMIN — CALCIUM CHLORIDE, MAGNESIUM CHLORIDE, DEXTROSE MONOHYDRATE, LACTIC ACID, SODIUM CHLORIDE, SODIUM BICARBONATE AND POTASSIUM CHLORIDE: 5.15; 2.03; 22; 5.4; 6.46; 3.09; .157 INJECTION INTRAVENOUS at 03:33

## 2023-12-15 RX ADMIN — SODIUM CHLORIDE, PRESERVATIVE FREE 10 ML: 5 INJECTION INTRAVENOUS at 21:20

## 2023-12-15 RX ADMIN — Medication 300 MCG/HR: at 05:16

## 2023-12-15 RX ADMIN — DIAZEPAM 20 MG: 5 INJECTION, SOLUTION INTRAMUSCULAR; INTRAVENOUS at 02:25

## 2023-12-15 RX ADMIN — POLYVINYL ALCOHOL, POVIDONE 1 DROP: 14; 6 SOLUTION/ DROPS OPHTHALMIC at 03:58

## 2023-12-15 RX ADMIN — SODIUM CHLORIDE, PRESERVATIVE FREE 10 ML: 5 INJECTION INTRAVENOUS at 09:21

## 2023-12-15 RX ADMIN — INSULIN HUMAN 4.42 UNITS/HR: 1 INJECTION, SOLUTION INTRAVENOUS at 13:55

## 2023-12-15 RX ADMIN — HYDROCORTISONE SODIUM SUCCINATE 50 MG: 100 INJECTION, POWDER, FOR SOLUTION INTRAMUSCULAR; INTRAVENOUS at 14:00

## 2023-12-15 RX ADMIN — Medication 20 MG/HR: at 01:27

## 2023-12-15 RX ADMIN — VASOPRESSIN 0.03 UNITS/MIN: 20 INJECTION INTRAVENOUS at 11:45

## 2023-12-15 RX ADMIN — Medication 20 MG/HR: at 19:20

## 2023-12-15 RX ADMIN — Medication 16 MCG/MIN: at 13:11

## 2023-12-15 RX ADMIN — FAMOTIDINE 20 MG: 10 INJECTION INTRAVENOUS at 09:27

## 2023-12-15 RX ADMIN — 0.12% CHLORHEXIDINE GLUCONATE 15 ML: 1.2 RINSE ORAL at 21:15

## 2023-12-15 RX ADMIN — POLYVINYL ALCOHOL, POVIDONE 1 DROP: 14; 6 SOLUTION/ DROPS OPHTHALMIC at 10:40

## 2023-12-15 RX ADMIN — Medication 300 MCG/HR: at 02:08

## 2023-12-15 RX ADMIN — LEUCINE, PHENYLALANINE, LYSINE, METHIONINE, ISOLEUCINE, VALINE, HISTIDINE, THREONINE, TRYPTOPHAN, ALANINE, GLYCINE, ARGININE, PROLINE, SERINE, TYROSINE, DEXTROSE: 584; 448; 464; 320; 480; 464; 384; 336; 144; 1656; 824; 920; 544; 400; 32; 10 INJECTION INTRAVENOUS at 20:07

## 2023-12-15 RX ADMIN — CALCIUM CHLORIDE, MAGNESIUM CHLORIDE, DEXTROSE MONOHYDRATE, LACTIC ACID, SODIUM CHLORIDE, SODIUM BICARBONATE AND POTASSIUM CHLORIDE: 5.15; 2.03; 22; 5.4; 6.46; 3.09; .157 INJECTION INTRAVENOUS at 23:59

## 2023-12-15 RX ADMIN — CALCIUM CHLORIDE, MAGNESIUM CHLORIDE, DEXTROSE MONOHYDRATE, LACTIC ACID, SODIUM CHLORIDE, SODIUM BICARBONATE AND POTASSIUM CHLORIDE: 5.15; 2.03; 22; 5.4; 6.46; 3.09; .157 INJECTION INTRAVENOUS at 03:40

## 2023-12-15 ASSESSMENT — PULMONARY FUNCTION TESTS
PIF_VALUE: 30
PIF_VALUE: 29
PIF_VALUE: 29
PIF_VALUE: 33
PIF_VALUE: 28
PIF_VALUE: 29
PIF_VALUE: 27
PIF_VALUE: 37

## 2023-12-15 ASSESSMENT — PAIN SCALES - GENERAL: PAINLEVEL_OUTOF10: 0

## 2023-12-15 NOTE — PROGRESS NOTES
Comprehensive Nutrition Assessment    Type and Reason for Visit:  Reassess, Consult, NPO/Clear Liquid    Nutrition Recommendations/Plan:   Provide parenteral nutrition using standard premixed product of Clinimix AA8/D10 until patient is able to tolerate adequate enteral nutrition. Continue current PN order. See below for order contents/details. Lipids provided Monday, Wednesday, and Friday only  MVI/TE provided Tuesday, Thursday, and Saturday due to shortage    Resume tube feeding regimen: - when medically feasible  Formula: Vital HP  Goal Rate: 15 ml/hr x 20 hours (for anticipation of holding tube feeds for standard nursing care)  Water Flushes: 30 mL q 6 hours (120 mL total)  Goal Regimen Provides (with modulars): 300 kcal, 26g protein, 372 ml free water, 20% RDIs    Continue thiamine supplementation. Continue to monitor tolerance of PN, weight, labs, and plan of care during admission. Parenteral Nutrition Order:   Current PN:  Next PN:       Malnutrition Assessment:  Malnutrition Status: At risk for malnutrition (Comment) (NPO, vent, TPN, TF) (12/14/23 1239)    Context:  Acute Illness       Nutrition Assessment:    Admitted with c/o n/v, fevers, cough, SOB. +influenza 12/5. Pt placed on BIPAP 12/10 and transferred to ICU due to high risk for intubation. Pt initially on PO diet then made NPO while on BIPAP. NPO since ICU admit x 3 days. PICC placed 12/13, initiated on TPN. Noted s/p intubation 12/13 and ordered for TF per MD (Russell); no NG/OGT documented and not initiated per flowsheet. Discussed care during interdisciplinary rounds. Started on CRRT. Pt proned yesterday, plan to attempt to prone today. Per RN, pt has OGT to suction ~550 ml output last night, minimal this shift, required levo, plan to add vaso to try to wean down levo. Continues on insulin drip. Discussed keeping trickle TF for now while due to pressors, plan for proning. Monitor TF tolerance.  Discussed keeping TPN @ 30 ml/hr, minimal

## 2023-12-15 NOTE — DIALYSIS
CRRT rounding completed. Patient tolerating treatment. BP- 119/50, p 62, , pressure drop 99. No extra supplies needed at this time. Primary nurse, Daily Mcfadden RN at bedside.

## 2023-12-15 NOTE — PROGRESS NOTES
Urine [[id number]]  (Abnormal) Collected: 12/11/23 0330    Order Status: Completed Specimen: Urine, clean catch Updated: 12/13/23 1010     Special Requests NO SPECIAL REQUESTS        [place] count --        >100,000  COLONIES/mL       Culture Candida albicans       Culture, Blood 1 [[id number]] Collected: 12/11/23 0300    Order Status: Completed Specimen: Blood Updated: 12/15/23 0642     Special Requests NO SPECIAL REQUESTS        Culture NO GROWTH 4 DAYS       Culture, Blood 2 [[id number]] Collected: 12/11/23 0300    Order Status: Completed Specimen: Blood Updated: 12/15/23 0642     Special Requests NO SPECIAL REQUESTS        Culture NO GROWTH 4 DAYS       Culture, Respiratory [[id number]] Collected: 12/11/23 0200    Order Status: Canceled Specimen: Sputum Expectorated     Respiratory Panel, Molecular, with COVID-19 (Restricted: peds pts or suitable admitted adults) [[id number]]  (Abnormal) Collected: 12/10/23 2245    Order Status: Completed Specimen: Nasopharyngeal Updated: 12/11/23 0045     Adenovirus by PCR Not detected        Coronavirus 229E by PCR Not detected        Coronavirus HKU1 by PCR Not detected        Coronavirus NL63 by PCR Not detected        Coronavirus OC43 by PCR Not detected        SARS-CoV-2, PCR Not detected        Human Metapneumovirus by PCR Not detected        Rhinovirus Enterovirus PCR Not detected        Influenza A H1-2009 by PCR Detected        Influenza B PCR Not detected        Parainfluenza 1 PCR Not detected        Parainfluenza 2 PCR Not detected        Parainfluenza 3 PCR Not detected        Parainfluenza 4 PCR Not detected        Respiratory Syncytial Virus by PCR Not detected        Bordetella parapertussis by PCR Not detected        Bordetella pertussis by PCR Not detected        Chlamydophila Pneumonia PCR Not detected        Mycoplasma pneumo by PCR Not detected       Culture, Respiratory [[id number]] Collected: 12/10/23 1934    Order Status: Completed Specimen: Sputum  RVR new onsets/p Diltiazem currently SR  CAD- PCI left circumflex/OM1 (2014)  Severe mitral valve regurgitation  Dyslipidemia  Hypothyroidism     Patient Active Problem List   Diagnosis    CAD (coronary artery disease), native coronary artery    Fatty pancreas    Irritable bowel syndrome    Dyslipidemia    Chest pain    Nephrolithiasis    Abnormal nuclear stress test    Age-related osteoporosis without current pathological fracture    Non-rheumatic mitral regurgitation    Chronic rhinitis    Chronic cough    Thyroid nodule    Class 2 obesity in adult    Acute respiratory failure with hypoxia (HCC)    BPPV (benign paroxysmal positional vertigo)    Multifocal pneumonia    Hypothyroidism    Elevated troponin    Paroxysmal atrial fibrillation (HCC)    Atrial fibrillation, new onset ([address])    ALIREZA (acute kidney injury) ([address])    Acute pulmonary edema (HCC)    ARDS (adult respiratory distress syndrome) ([address])    Influenzal pneumonia    Atrial fibrillation with RVR ([address])        RECOMMENDATIONS:   Neuro: Neuro checks per routine. Sedated on fentanyl, versed and precedex. Pulm: Acute hypoxic resp failure in setting of influenza, severe MR- on MV 90% FiO2, 16 PEEP. On solumedrol 60mg q6hrs for inflammation/influenza pneumonia but as she is in shock will change to solucortef 50q6. On vanc and zosyn for possible superimposed bacterial pneumonia and azithromycin for atypical coverage, resp cx with heavy growth normal resp micky and negative RVP. CXR same today. Continue proning 16:8. ABG this am with resp acidosis and PO2 17, will wean O2 as able and repeat ABG today. Daily CXRs. PF ratio 176- moderate ARDS today. CVS : Echo with severe MR. Appreciate cards assistance. Continue heparin ggt for atrial fib, holding Eliquis for NPO status. Worsening shock on levo, will add vaso, suspect due to infection vs cardiac. Plan to perform cardiac POCUS to eval volume status. GI: SUP, Trend LFTs.  TPN continues and will do TF while

## 2023-12-15 NOTE — PROGRESS NOTES
12/15/23 1715   Ventilator Settings   FiO2  (S)  90 %  (Increased due to desaturation to 83% not responsive to suctioning after proning pt)

## 2023-12-15 NOTE — PROGRESS NOTES
ABG drawn from A- line and analyzed  pH        7.33  PCO2   42.3  PO2      69.6  HCO3-  22.1  BE       -3.8   sO2      92.3%     Vent settings at time of ABG PRVC RR 28 (total also 28), vT 400, PEEP 12, FiO2 70%, Ti 0.6          ABG did not cross- over from EPOC, POC coordinator aware of hospital- wide issue.

## 2023-12-15 NOTE — PROGRESS NOTES
ABG drawn from A- line and analyzed  pH        7.28  PCO2   48  PO2      97.1  HCO3-  22.6  BE       -4.5  sO2      96.5%    Vent settings at time of ABG PRVC RR 26 (total also 26), vT 400, PEEP 14, FiO2 80%, Ti 0.7    Settings adjusted post ABG; PRVC RR 28 (total also 28), vT 400, PEEP 12, FiO2 70%, Ti 0.6    ABG did not cross- over from EPOC, POC coordinator aware of hospital- wide issue.

## 2023-12-15 NOTE — INTERDISCIPLINARY ROUNDS
[facility]  Pulmonary, Critical Care, and Sleep Medicine  Interdisciplinary and Ventilator Weaning Rounds    Patient discussed in morning walking rounds and interdisciplinary rounds. ICU admission day: admitted 12/11    Overnight events:   No significant events overnight   Unproned this AM    Assessments and best practice:  Ventilator  Ventilator Day(s): 3  Vent Settings  FiO2 : 90 % (Weaned from 100)  Resp Rate (Set): (S) 26 bpm (Increased based on AM ABG)  Vt (Set, mL): (S) 400 mL  PEEP/CPAP (cmH2O): 18  Peak Inspiratory Flow (lpm): 32 L/sec  Insp Time (sec): 0.7 sec  Insp Rise Time (%): 60 %  Flow Sensitivity: 3 L/min  Disconnect Sensitivity (%): 75 %   Glycemic control  Diet  Diet NPO  PN-Adult Premix 8/10 - Custom Electrolytes - Central Line  ADULT TUBE FEEDING; Orogastric; Peptide Based High Protein; Continuous; 15; No; 30; Q 6 hours  Stress ulcer prophylaxis. Protonix  DVT prophylaxis. Heparin gtt  Need for Lines, pratt assessed.   Yes  Restraint Reevaluation   N/a  Disposition regarding transferring out of the ICU  RED      Family contact/MPOA: Cristofer Heredia  Family updated: updated yesterday evening, will continue to update today      Palliative consult within 3 days of admission to ICU-  Ethics Guidance: 21 days      Daily Plans:  Continue CRRT  Restarted vaso  Continue TPN  Re-prone at 1400 today  Switch steroids to solucortef 50 q6    VELASQUEZ time 10 minutes        Gildardo Mcginnis PA-C  12/15/23  Pulmonary, [address] [facility]

## 2023-12-15 NOTE — PROGRESS NOTES
No SBT at this time-     12/15/23 0816   Weaning Parameters   Spontaneous Breathing Trial Complete (S)  No (comment)  (High PEEP and FiO2, sedated and on CRRT)

## 2023-12-15 NOTE — PROGRESS NOTES
12/15/23 0453   Ventilator Settings   FiO2  100 %   Vt (Set, mL) (S)  400 mL   Resp Rate (Set) (S)  22 bpm   PEEP/CPAP (cmH2O) 18     Changes made post ABG  7.222, 54.4, 175.9, 22.4, -5.8, 99.3%  Per Liz Gan NP

## 2023-12-15 NOTE — PROGRESS NOTES
RENAL DAILY PROGRESS NOTE  74y F with PMH CAD, Mitral regugitaion, admitted to ICU for severe hypoxia and multiorgan failure following for renal failure  Subjective:       Complaint:   Overnight events noted  Started on CRRT for volume management yesterday   Tolerating UF about 50cc negative per hour   On heparin   Urine output remain poor  Remain on vasopressure    IMPRESSION:   ALIREZA in setting of hypoxic respiratory failure. Due to ATN ?( Start CRRT on 12/14)  Access temp HD catheter   Hypoxic res failure / ARDS/pneumonia   Shock, septic, with multiorgan failure  Severe MR  Hx CAD, new onset afib with rvr   PLAN:   Plan to continue CRRT support for better volume management. Plan to keep negative 50cc per hour  Replete lytes per CRRT protocol   Adjust meds per pharmacy colleague.          Discussed with ICU team.     Current Facility-Administered Medications   Medication Dose Route Frequency    vasopressin 20 Units in sodium chloride 0.9 % 100 mL infusion  0.03 Units/min IntraVENous Continuous    hydrocortisone sodium succinate PF (SOLU-CORTEF) injection 50 mg  50 mg IntraVENous Q6H    glucose chewable tablet 16 g  4 tablet Oral PRN    dextrose bolus 10% 125 mL  125 mL IntraVENous PRN    Or    dextrose bolus 10% 250 mL  250 mL IntraVENous PRN    glucagon injection 1 mg  1 mg SubCUTAneous PRN    dextrose 10 % infusion   IntraVENous Continuous PRN    insulin regular (MYXREDLIN) 100 units in sodium chloride 0.9 % 100 ml infusion  0.1-50 Units/hr IntraVENous Continuous    [Held by provider] bumetanide (BUMEX) injection 2 mg  2 mg IntraVENous BID    heparin (porcine) injection 2,100 Units  2,100 Units IntraCATHeter PRN    PN-Adult Premix 8/10 - Custom Electrolytes - Central Line   IntraVENous Continuous TPN    prismaSol BGK 2/3.5 dialysis solution   Dialysis Continuous    thiamine (B-1) injection 200 mg  200 mg IntraVENous Daily    Ketamine (KETALAR) injection syringe 100 mg  100 mg IntraVENous Once    fentaNYL

## 2023-12-16 ENCOUNTER — APPOINTMENT (OUTPATIENT)
Facility: HOSPITAL | Age: 73
DRG: 870 | End: 2023-12-16
Payer: MEDICARE

## 2023-12-16 LAB
ALBUMIN SERPL-MCNC: 2.1 G/DL (ref 3.4–5)
ALBUMIN SERPL-MCNC: 2.2 G/DL (ref 3.4–5)
ALBUMIN SERPL-MCNC: 2.6 G/DL (ref 3.4–5)
ALBUMIN SERPL-MCNC: 3 G/DL (ref 3.4–5)
ALBUMIN/GLOB SERPL: 0.6 (ref 0.8–1.7)
ALP SERPL-CCNC: 95 U/L (ref 45–117)
ALT SERPL-CCNC: 118 U/L (ref 13–56)
ANION GAP SERPL CALC-SCNC: 10 MMOL/L (ref 3–18)
ANION GAP SERPL CALC-SCNC: 5 MMOL/L (ref 3–18)
ANION GAP SERPL CALC-SCNC: 5 MMOL/L (ref 3–18)
ANION GAP SERPL CALC-SCNC: 7 MMOL/L (ref 3–18)
ANION GAP SERPL CALC-SCNC: 7 MMOL/L (ref 3–18)
ANION GAP SERPL CALC-SCNC: 8 MMOL/L (ref 3–18)
APTT PPP: 104.9 SEC (ref 23–36.4)
APTT PPP: 83.8 SEC (ref 23–36.4)
ARTERIAL PATENCY WRIST A: ABNORMAL
AST SERPL-CCNC: 108 U/L (ref 10–38)
BASE DEFICIT BLD-SCNC: 2.8 MMOL/L
BASE DEFICIT BLD-SCNC: 5.9 MMOL/L
BASE DEFICIT BLD-SCNC: 6.8 MMOL/L
BASOPHILS # BLD: 0 K/UL (ref 0–0.1)
BASOPHILS NFR BLD: 0 % (ref 0–2)
BDY SITE: ABNORMAL
BILIRUB DIRECT SERPL-MCNC: 0.7 MG/DL (ref 0–0.2)
BILIRUB SERPL-MCNC: 1.2 MG/DL (ref 0.2–1)
BUN SERPL-MCNC: 29 MG/DL (ref 7–18)
BUN SERPL-MCNC: 30 MG/DL (ref 7–18)
BUN SERPL-MCNC: 30 MG/DL (ref 7–18)
BUN SERPL-MCNC: 31 MG/DL (ref 7–18)
BUN SERPL-MCNC: 32 MG/DL (ref 7–18)
BUN SERPL-MCNC: 35 MG/DL (ref 7–18)
BUN/CREAT SERPL: 17 (ref 12–20)
BUN/CREAT SERPL: 18 (ref 12–20)
BUN/CREAT SERPL: 19 (ref 12–20)
BUN/CREAT SERPL: 21 (ref 12–20)
CA-I SERPL-SCNC: 1.25 MMOL/L (ref 1.15–1.33)
CA-I SERPL-SCNC: 1.27 MMOL/L (ref 1.15–1.33)
CA-I SERPL-SCNC: 1.28 MMOL/L (ref 1.15–1.33)
CA-I SERPL-SCNC: 1.3 MMOL/L (ref 1.15–1.33)
CA-I SERPL-SCNC: 1.3 MMOL/L (ref 1.15–1.33)
CALCIUM SERPL-MCNC: 8.8 MG/DL (ref 8.5–10.1)
CALCIUM SERPL-MCNC: 8.8 MG/DL (ref 8.5–10.1)
CALCIUM SERPL-MCNC: 8.9 MG/DL (ref 8.5–10.1)
CALCIUM SERPL-MCNC: 9 MG/DL (ref 8.5–10.1)
CALCIUM SERPL-MCNC: 9.2 MG/DL (ref 8.5–10.1)
CALCIUM SERPL-MCNC: 9.5 MG/DL (ref 8.5–10.1)
CHLORIDE SERPL-SCNC: 103 MMOL/L (ref 100–111)
CHLORIDE SERPL-SCNC: 104 MMOL/L (ref 100–111)
CHLORIDE SERPL-SCNC: 104 MMOL/L (ref 100–111)
CHLORIDE SERPL-SCNC: 105 MMOL/L (ref 100–111)
CHLORIDE SERPL-SCNC: 106 MMOL/L (ref 100–111)
CHLORIDE SERPL-SCNC: 106 MMOL/L (ref 100–111)
CO2 SERPL-SCNC: 24 MMOL/L (ref 21–32)
CO2 SERPL-SCNC: 24 MMOL/L (ref 21–32)
CO2 SERPL-SCNC: 25 MMOL/L (ref 21–32)
CO2 SERPL-SCNC: 26 MMOL/L (ref 21–32)
CREAT SERPL-MCNC: 1.45 MG/DL (ref 0.6–1.3)
CREAT SERPL-MCNC: 1.53 MG/DL (ref 0.6–1.3)
CREAT SERPL-MCNC: 1.61 MG/DL (ref 0.6–1.3)
CREAT SERPL-MCNC: 1.64 MG/DL (ref 0.6–1.3)
CREAT SERPL-MCNC: 1.84 MG/DL (ref 0.6–1.3)
CREAT SERPL-MCNC: 1.89 MG/DL (ref 0.6–1.3)
DIFFERENTIAL METHOD BLD: ABNORMAL
EOSINOPHIL # BLD: 0 K/UL (ref 0–0.4)
EOSINOPHIL NFR BLD: 0 % (ref 0–5)
ERYTHROCYTE [DISTWIDTH] IN BLOOD BY AUTOMATED COUNT: 13.7 % (ref 11.6–14.5)
GAS FLOW.O2 O2 DELIVERY SYS: ABNORMAL
GAS FLOW.O2 SETTING OXYMISER: 24 BPM
GAS FLOW.O2 SETTING OXYMISER: 24 BPM
GLOBULIN SER CALC-MCNC: 3.8 G/DL (ref 2–4)
GLUCOSE BLD STRIP.AUTO-MCNC: 130 MG/DL (ref 70–110)
GLUCOSE BLD STRIP.AUTO-MCNC: 134 MG/DL (ref 70–110)
GLUCOSE BLD STRIP.AUTO-MCNC: 135 MG/DL (ref 70–110)
GLUCOSE BLD STRIP.AUTO-MCNC: 141 MG/DL (ref 70–110)
GLUCOSE BLD STRIP.AUTO-MCNC: 143 MG/DL (ref 70–110)
GLUCOSE BLD STRIP.AUTO-MCNC: 149 MG/DL (ref 70–110)
GLUCOSE BLD STRIP.AUTO-MCNC: 149 MG/DL (ref 70–110)
GLUCOSE BLD STRIP.AUTO-MCNC: 152 MG/DL (ref 70–110)
GLUCOSE BLD STRIP.AUTO-MCNC: 154 MG/DL (ref 70–110)
GLUCOSE BLD STRIP.AUTO-MCNC: 155 MG/DL (ref 70–110)
GLUCOSE BLD STRIP.AUTO-MCNC: 156 MG/DL (ref 70–110)
GLUCOSE BLD STRIP.AUTO-MCNC: 158 MG/DL (ref 70–110)
GLUCOSE BLD STRIP.AUTO-MCNC: 161 MG/DL (ref 70–110)
GLUCOSE BLD STRIP.AUTO-MCNC: 161 MG/DL (ref 70–110)
GLUCOSE BLD STRIP.AUTO-MCNC: 164 MG/DL (ref 70–110)
GLUCOSE BLD STRIP.AUTO-MCNC: 166 MG/DL (ref 70–110)
GLUCOSE BLD STRIP.AUTO-MCNC: 166 MG/DL (ref 70–110)
GLUCOSE BLD STRIP.AUTO-MCNC: 169 MG/DL (ref 70–110)
GLUCOSE BLD STRIP.AUTO-MCNC: 172 MG/DL (ref 70–110)
GLUCOSE BLD STRIP.AUTO-MCNC: 177 MG/DL (ref 70–110)
GLUCOSE BLD STRIP.AUTO-MCNC: 184 MG/DL (ref 70–110)
GLUCOSE BLD STRIP.AUTO-MCNC: 186 MG/DL (ref 70–110)
GLUCOSE BLD STRIP.AUTO-MCNC: 192 MG/DL (ref 70–110)
GLUCOSE BLD STRIP.AUTO-MCNC: 200 MG/DL (ref 70–110)
GLUCOSE SERPL-MCNC: 167 MG/DL (ref 74–99)
GLUCOSE SERPL-MCNC: 177 MG/DL (ref 74–99)
GLUCOSE SERPL-MCNC: 180 MG/DL (ref 74–99)
GLUCOSE SERPL-MCNC: 182 MG/DL (ref 74–99)
GLUCOSE SERPL-MCNC: 194 MG/DL (ref 74–99)
GLUCOSE SERPL-MCNC: 220 MG/DL (ref 74–99)
HCO3 BLD-SCNC: 21.3 MMOL/L (ref 22–26)
HCO3 BLD-SCNC: 21.3 MMOL/L (ref 22–26)
HCO3 BLD-SCNC: 23.1 MMOL/L (ref 22–26)
HCT VFR BLD AUTO: 32.9 % (ref 35–45)
HGB BLD-MCNC: 10.9 G/DL (ref 12–16)
IMM GRANULOCYTES # BLD AUTO: 0.2 K/UL (ref 0–0.04)
IMM GRANULOCYTES NFR BLD AUTO: 1 % (ref 0–0.5)
INSPIRATION.DURATION SETTING TIME VENT: 19 SEC
IPAP/PIP/HIGH PEEP: 27
LYMPHOCYTES # BLD: 0.8 K/UL (ref 0.9–3.6)
LYMPHOCYTES NFR BLD: 3 % (ref 21–52)
MAGNESIUM SERPL-MCNC: 1.9 MG/DL (ref 1.6–2.6)
MAGNESIUM SERPL-MCNC: 1.9 MG/DL (ref 1.6–2.6)
MAGNESIUM SERPL-MCNC: 2.3 MG/DL (ref 1.6–2.6)
MAGNESIUM SERPL-MCNC: 2.3 MG/DL (ref 1.6–2.6)
MAGNESIUM SERPL-MCNC: 2.5 MG/DL (ref 1.6–2.6)
MAGNESIUM SERPL-MCNC: 2.8 MG/DL (ref 1.6–2.6)
MCH RBC QN AUTO: 28.8 PG (ref 24–34)
MCHC RBC AUTO-ENTMCNC: 33.1 G/DL (ref 31–37)
MCV RBC AUTO: 87 FL (ref 78–100)
MONOCYTES # BLD: 0.9 K/UL (ref 0.05–1.2)
MONOCYTES NFR BLD: 4 % (ref 3–10)
NEUTS SEG # BLD: 21.2 K/UL (ref 1.8–8)
NEUTS SEG NFR BLD: 92 % (ref 40–73)
NRBC # BLD: 0.04 K/UL (ref 0–0.01)
NRBC BLD-RTO: 0.2 PER 100 WBC
O2/TOTAL GAS SETTING VFR VENT: 100 %
O2/TOTAL GAS SETTING VFR VENT: 80 %
O2/TOTAL GAS SETTING VFR VENT: 80 %
PCO2 BLD: 43.5 MMHG (ref 35–45)
PCO2 BLD: 47.2 MMHG (ref 35–45)
PCO2 BLD: 52.8 MMHG (ref 35–45)
PEEP RESPIRATORY: 16 CMH2O
PEEP RESPIRATORY: 18 CMH2O
PH BLD: 7.21 (ref 7.35–7.45)
PH BLD: 7.26 (ref 7.35–7.45)
PH BLD: 7.33 (ref 7.35–7.45)
PHOSPHATE SERPL-MCNC: 1.9 MG/DL (ref 2.5–4.9)
PHOSPHATE SERPL-MCNC: 2.2 MG/DL (ref 2.5–4.9)
PHOSPHATE SERPL-MCNC: 2.5 MG/DL (ref 2.5–4.9)
PHOSPHATE SERPL-MCNC: 2.5 MG/DL (ref 2.5–4.9)
PHOSPHATE SERPL-MCNC: 2.7 MG/DL (ref 2.5–4.9)
PHOSPHATE SERPL-MCNC: 3.1 MG/DL (ref 2.5–4.9)
PLATELET # BLD AUTO: 193 K/UL (ref 135–420)
PMV BLD AUTO: 12.3 FL (ref 9.2–11.8)
PO2 BLD: 109 MMHG (ref 80–100)
PO2 BLD: 68 MMHG (ref 80–100)
PO2 BLD: 82 MMHG (ref 80–100)
POTASSIUM SERPL-SCNC: 3.2 MMOL/L (ref 3.5–5.5)
POTASSIUM SERPL-SCNC: 3.4 MMOL/L (ref 3.5–5.5)
POTASSIUM SERPL-SCNC: 3.7 MMOL/L (ref 3.5–5.5)
POTASSIUM SERPL-SCNC: 3.9 MMOL/L (ref 3.5–5.5)
POTASSIUM SERPL-SCNC: 4.2 MMOL/L (ref 3.5–5.5)
POTASSIUM SERPL-SCNC: 4.3 MMOL/L (ref 3.5–5.5)
PROT SERPL-MCNC: 5.9 G/DL (ref 6.4–8.2)
RBC # BLD AUTO: 3.78 M/UL (ref 4.2–5.3)
RESPIRATORY RATE, POC: 43 (ref 5–40)
SAO2 % BLD: 88.8 % (ref 92–97)
SAO2 % BLD: 94 % (ref 92–97)
SAO2 % BLD: 97.8 % (ref 92–97)
SERVICE CMNT-IMP: ABNORMAL
SODIUM SERPL-SCNC: 136 MMOL/L (ref 136–145)
SODIUM SERPL-SCNC: 137 MMOL/L (ref 136–145)
SPECIMEN TYPE: ABNORMAL
VENTILATION MODE VENT: ABNORMAL
VT SETTING VENT: 330 ML
VT SETTING VENT: 350 ML
VT SETTING VENT: 400 ML
WBC # BLD AUTO: 23 K/UL (ref 4.6–13.2)

## 2023-12-16 PROCEDURE — 99291 CRITICAL CARE FIRST HOUR: CPT | Performed by: INTERNAL MEDICINE

## 2023-12-16 PROCEDURE — 6360000002 HC RX W HCPCS: Performed by: NURSE PRACTITIONER

## 2023-12-16 PROCEDURE — 82330 ASSAY OF CALCIUM: CPT

## 2023-12-16 PROCEDURE — 82962 GLUCOSE BLOOD TEST: CPT

## 2023-12-16 PROCEDURE — 71045 X-RAY EXAM CHEST 1 VIEW: CPT

## 2023-12-16 PROCEDURE — 90945 DIALYSIS ONE EVALUATION: CPT

## 2023-12-16 PROCEDURE — A4216 STERILE WATER/SALINE, 10 ML: HCPCS | Performed by: PHYSICIAN ASSISTANT

## 2023-12-16 PROCEDURE — 83735 ASSAY OF MAGNESIUM: CPT

## 2023-12-16 PROCEDURE — 80048 BASIC METABOLIC PNL TOTAL CA: CPT

## 2023-12-16 PROCEDURE — 2500000003 HC RX 250 WO HCPCS: Performed by: PHYSICIAN ASSISTANT

## 2023-12-16 PROCEDURE — 2500000003 HC RX 250 WO HCPCS: Performed by: INTERNAL MEDICINE

## 2023-12-16 PROCEDURE — 6360000002 HC RX W HCPCS: Performed by: PHYSICIAN ASSISTANT

## 2023-12-16 PROCEDURE — A4216 STERILE WATER/SALINE, 10 ML: HCPCS | Performed by: INTERNAL MEDICINE

## 2023-12-16 PROCEDURE — 37799 UNLISTED PX VASCULAR SURGERY: CPT

## 2023-12-16 PROCEDURE — 85025 COMPLETE CBC W/AUTO DIFF WBC: CPT

## 2023-12-16 PROCEDURE — 2580000003 HC RX 258: Performed by: HOSPITALIST

## 2023-12-16 PROCEDURE — 6370000000 HC RX 637 (ALT 250 FOR IP): Performed by: PHYSICIAN ASSISTANT

## 2023-12-16 PROCEDURE — APPSS15 APP SPLIT SHARED TIME 0-15 MINUTES: Performed by: PHYSICIAN ASSISTANT

## 2023-12-16 PROCEDURE — 80069 RENAL FUNCTION PANEL: CPT

## 2023-12-16 PROCEDURE — 94761 N-INVAS EAR/PLS OXIMETRY MLT: CPT

## 2023-12-16 PROCEDURE — 6360000002 HC RX W HCPCS: Performed by: HOSPITALIST

## 2023-12-16 PROCEDURE — 2700000000 HC OXYGEN THERAPY PER DAY

## 2023-12-16 PROCEDURE — 2580000003 HC RX 258: Performed by: NURSE PRACTITIONER

## 2023-12-16 PROCEDURE — 85730 THROMBOPLASTIN TIME PARTIAL: CPT

## 2023-12-16 PROCEDURE — 94003 VENT MGMT INPAT SUBQ DAY: CPT

## 2023-12-16 PROCEDURE — 2580000003 HC RX 258: Performed by: INTERNAL MEDICINE

## 2023-12-16 PROCEDURE — 80076 HEPATIC FUNCTION PANEL: CPT

## 2023-12-16 PROCEDURE — P9047 ALBUMIN (HUMAN), 25%, 50ML: HCPCS | Performed by: PHYSICIAN ASSISTANT

## 2023-12-16 PROCEDURE — 2000000000 HC ICU R&B

## 2023-12-16 PROCEDURE — 84100 ASSAY OF PHOSPHORUS: CPT

## 2023-12-16 PROCEDURE — 6360000002 HC RX W HCPCS: Performed by: REGISTERED NURSE

## 2023-12-16 PROCEDURE — 2580000003 HC RX 258: Performed by: PHYSICIAN ASSISTANT

## 2023-12-16 PROCEDURE — 83605 ASSAY OF LACTIC ACID: CPT

## 2023-12-16 PROCEDURE — 6360000002 HC RX W HCPCS: Performed by: INTERNAL MEDICINE

## 2023-12-16 PROCEDURE — 2500000003 HC RX 250 WO HCPCS: Performed by: NURSE PRACTITIONER

## 2023-12-16 RX ORDER — MAGNESIUM SULFATE IN WATER 40 MG/ML
2000 INJECTION, SOLUTION INTRAVENOUS ONCE
Status: COMPLETED | OUTPATIENT
Start: 2023-12-16 | End: 2023-12-16

## 2023-12-16 RX ORDER — ALBUMIN (HUMAN) 12.5 G/50ML
25 SOLUTION INTRAVENOUS ONCE
Status: COMPLETED | OUTPATIENT
Start: 2023-12-16 | End: 2023-12-16

## 2023-12-16 RX ORDER — POTASSIUM CHLORIDE 29.8 MG/ML
20 INJECTION INTRAVENOUS
Status: COMPLETED | OUTPATIENT
Start: 2023-12-16 | End: 2023-12-16

## 2023-12-16 RX ORDER — NEPHROCAP 1 MG
1 CAP ORAL DAILY
Status: DISCONTINUED | OUTPATIENT
Start: 2023-12-16 | End: 2023-12-21

## 2023-12-16 RX ADMIN — SODIUM CHLORIDE, PRESERVATIVE FREE 10 ML: 5 INJECTION INTRAVENOUS at 08:23

## 2023-12-16 RX ADMIN — Medication 4 MCG/MIN: at 21:47

## 2023-12-16 RX ADMIN — Medication 10 MG/HR: at 18:45

## 2023-12-16 RX ADMIN — HYDROCORTISONE SODIUM SUCCINATE 50 MG: 100 INJECTION, POWDER, FOR SOLUTION INTRAMUSCULAR; INTRAVENOUS at 22:36

## 2023-12-16 RX ADMIN — MAGNESIUM SULFATE HEPTAHYDRATE 2000 MG: 40 INJECTION, SOLUTION INTRAVENOUS at 20:33

## 2023-12-16 RX ADMIN — FENTANYL CITRATE 200 MCG/HR: at 03:49

## 2023-12-16 RX ADMIN — SODIUM CHLORIDE, PRESERVATIVE FREE 10 ML: 5 INJECTION INTRAVENOUS at 21:34

## 2023-12-16 RX ADMIN — POLYVINYL ALCOHOL, POVIDONE 1 DROP: 14; 6 SOLUTION/ DROPS OPHTHALMIC at 08:23

## 2023-12-16 RX ADMIN — HEPARIN SODIUM 9 UNITS/KG/HR: 10000 INJECTION, SOLUTION INTRAVENOUS at 16:04

## 2023-12-16 RX ADMIN — LEUCINE, PHENYLALANINE, LYSINE, METHIONINE, ISOLEUCINE, VALINE, HISTIDINE, THREONINE, TRYPTOPHAN, ALANINE, GLYCINE, ARGININE, PROLINE, SERINE, TYROSINE, DEXTROSE: 584; 448; 464; 320; 480; 464; 384; 336; 144; 1656; 824; 920; 544; 400; 32; 10 INJECTION INTRAVENOUS at 20:16

## 2023-12-16 RX ADMIN — CALCIUM CHLORIDE, MAGNESIUM CHLORIDE, DEXTROSE MONOHYDRATE, LACTIC ACID, SODIUM CHLORIDE, SODIUM BICARBONATE AND POTASSIUM CHLORIDE: 5.15; 2.03; 22; 5.4; 6.46; 3.09; .157 INJECTION INTRAVENOUS at 20:55

## 2023-12-16 RX ADMIN — POLYVINYL ALCOHOL, POVIDONE 1 DROP: 14; 6 SOLUTION/ DROPS OPHTHALMIC at 20:00

## 2023-12-16 RX ADMIN — INSULIN HUMAN 2.56 UNITS/HR: 1 INJECTION, SOLUTION INTRAVENOUS at 15:02

## 2023-12-16 RX ADMIN — CALCIUM CHLORIDE, MAGNESIUM CHLORIDE, DEXTROSE MONOHYDRATE, LACTIC ACID, SODIUM CHLORIDE, SODIUM BICARBONATE AND POTASSIUM CHLORIDE: 5.15; 2.03; 22; 5.4; 6.46; 3.09; .157 INJECTION INTRAVENOUS at 21:00

## 2023-12-16 RX ADMIN — Medication 10 MG/HR: at 10:01

## 2023-12-16 RX ADMIN — CALCIUM CHLORIDE, MAGNESIUM CHLORIDE, DEXTROSE MONOHYDRATE, LACTIC ACID, SODIUM CHLORIDE, SODIUM BICARBONATE AND POTASSIUM CHLORIDE: 5.15; 2.03; 22; 5.4; 6.46; 3.09; .157 INJECTION INTRAVENOUS at 04:53

## 2023-12-16 RX ADMIN — FENTANYL CITRATE 200 MCG/HR: at 08:24

## 2023-12-16 RX ADMIN — HYDROCORTISONE SODIUM SUCCINATE 50 MG: 100 INJECTION, POWDER, FOR SOLUTION INTRAMUSCULAR; INTRAVENOUS at 03:53

## 2023-12-16 RX ADMIN — FENTANYL CITRATE 200 MCG/HR: at 18:45

## 2023-12-16 RX ADMIN — INSULIN HUMAN 2.65 UNITS/HR: 1 INJECTION, SOLUTION INTRAVENOUS at 17:04

## 2023-12-16 RX ADMIN — CALCIUM CHLORIDE, MAGNESIUM CHLORIDE, DEXTROSE MONOHYDRATE, LACTIC ACID, SODIUM CHLORIDE, SODIUM BICARBONATE AND POTASSIUM CHLORIDE: 5.15; 2.03; 22; 5.4; 6.46; 3.09; .157 INJECTION INTRAVENOUS at 05:00

## 2023-12-16 RX ADMIN — POLYVINYL ALCOHOL, POVIDONE 1 DROP: 14; 6 SOLUTION/ DROPS OPHTHALMIC at 17:06

## 2023-12-16 RX ADMIN — HYDROCORTISONE SODIUM SUCCINATE 50 MG: 100 INJECTION, POWDER, FOR SOLUTION INTRAMUSCULAR; INTRAVENOUS at 08:22

## 2023-12-16 RX ADMIN — FAMOTIDINE 20 MG: 10 INJECTION INTRAVENOUS at 08:22

## 2023-12-16 RX ADMIN — POTASSIUM CHLORIDE 20 MEQ: 29.8 INJECTION, SOLUTION INTRAVENOUS at 21:30

## 2023-12-16 RX ADMIN — THIAMINE HYDROCHLORIDE 200 MG: 100 INJECTION, SOLUTION INTRAMUSCULAR; INTRAVENOUS at 08:22

## 2023-12-16 RX ADMIN — SODIUM PHOSPHATE, MONOBASIC, MONOHYDRATE AND SODIUM PHOSPHATE, DIBASIC, ANHYDROUS 20 MMOL: 276; 142 INJECTION, SOLUTION INTRAVENOUS at 00:30

## 2023-12-16 RX ADMIN — 0.12% CHLORHEXIDINE GLUCONATE 15 ML: 1.2 RINSE ORAL at 21:14

## 2023-12-16 RX ADMIN — PIPERACILLIN AND TAZOBACTAM 3375 MG: 3; .375 INJECTION, POWDER, FOR SOLUTION INTRAVENOUS; PARENTERAL at 08:23

## 2023-12-16 RX ADMIN — HYDROCORTISONE SODIUM SUCCINATE 50 MG: 100 INJECTION, POWDER, FOR SOLUTION INTRAMUSCULAR; INTRAVENOUS at 16:14

## 2023-12-16 RX ADMIN — ALBUMIN (HUMAN) 25 G: 0.25 INJECTION, SOLUTION INTRAVENOUS at 16:14

## 2023-12-16 RX ADMIN — POTASSIUM CHLORIDE 20 MEQ: 29.8 INJECTION, SOLUTION INTRAVENOUS at 03:55

## 2023-12-16 RX ADMIN — POTASSIUM CHLORIDE 20 MEQ: 29.8 INJECTION, SOLUTION INTRAVENOUS at 20:34

## 2023-12-16 RX ADMIN — POTASSIUM CHLORIDE 20 MEQ: 29.8 INJECTION, SOLUTION INTRAVENOUS at 05:05

## 2023-12-16 RX ADMIN — PIPERACILLIN AND TAZOBACTAM 3375 MG: 3; .375 INJECTION, POWDER, FOR SOLUTION INTRAVENOUS; PARENTERAL at 22:07

## 2023-12-16 RX ADMIN — CALCIUM CHLORIDE, MAGNESIUM CHLORIDE, DEXTROSE MONOHYDRATE, LACTIC ACID, SODIUM CHLORIDE, SODIUM BICARBONATE AND POTASSIUM CHLORIDE: 5.15; 2.03; 22; 5.4; 6.46; 3.09; .157 INJECTION INTRAVENOUS at 04:59

## 2023-12-16 RX ADMIN — 0.12% CHLORHEXIDINE GLUCONATE 15 ML: 1.2 RINSE ORAL at 08:22

## 2023-12-16 ASSESSMENT — PULMONARY FUNCTION TESTS
PIF_VALUE: 29
PIF_VALUE: 27
PIF_VALUE: 26
PIF_VALUE: 27

## 2023-12-16 ASSESSMENT — PAIN SCALES - GENERAL: PAINLEVEL_OUTOF10: 0

## 2023-12-16 NOTE — PROGRESS NOTES
CRRT       Orders   Mode:  CVVHDF   Blood Flow Rate:  200   Prismasol Dose:  2K   Prismasol Concentrate:  3.5Ca   Blood Warmer Temp:     Net Fluid Removal:  50 mL/hr          Metrics   BP:  103/43   HR:  72   Access Pressure:  -70   Filter Pressure:  111   Return Pressure:  70   TMP:  47   Pressure Drop:  10          Access   Type & Location:  Right Femoral Trialysis   Comments:                                             Labs   HBsAg (Antigen) / date:   12/14/23 Neg                                            HBsAb (Antibody) / date:  [facility] 12/14/23   Source:  EMR          Safety:   Time Out Done:   (Time)  2030   Consent obtained/signed:  yes   Education:  yes   Primary Nurse Rpt Pre:  Lord Rounds, RN   Primary Nurse Rpt PostLord Rounds, RN      Comments / Plan:   CRRT initated at 9332 w/o any complications  Dialyzer lot# [id number]  CRRT machine #5

## 2023-12-16 NOTE — PROGRESS NOTES
ABG drawn from A- line and analyzed. Pt on vent PRVC RR 29 actual rate 29, vT 400, PEEP 10, FiO2 75%, Ti 0.6.  pH 7.33, PCO2 44.4, PO2 96.4, HCO3- 23.4, BE -2.6, sO2 96.9%, LAC 1.97. No changes at this time.

## 2023-12-16 NOTE — PROGRESS NOTES
0200 ABG results  pH 7.33  PCO2 44  PO2 109  HCO3 23  SaO2 98%    Weaned FiO2 to 75% and will continue to wean FiO2/PEEP as tolerated

## 2023-12-16 NOTE — PROGRESS NOTES
Comprehensive Nutrition Assessment    Type and Reason for Visit:  Reassess, Consult, NPO/Clear Liquid    Nutrition Recommendations/Plan:   Provide parenteral nutrition using standard premixed product of Clinimix AA8/D10 until patient is able to tolerate adequate enteral nutrition. Continue current PN order. See below for order contents/details. Lipids provided Monday, Wednesday, and Friday only  Plan to order renal MVI r/t pt on CRRT. Continue tube feeding regimen: - advanced per MD.  Formula: Vital HP  Goal Rate: 20ml/hr x 20 hours (for anticipation of holding tube feeds for standard nursing care)  Water Flushes: 30 mL q 6 hours (120 mL total)  Goal Regimen Provides (with modulars): 400kcal, 35g protein, 456 ml free water, 27% RDIs    Continue thiamine supplementation. Continue to monitor tolerance of PN, weight, labs, and plan of care during admission. Parenteral Nutrition Order:   Current PN:  Next PN:      Malnutrition Assessment:  Malnutrition Status: At risk for malnutrition (Comment) (NPO, vent, TPN, TF) (12/14/23 1239)    Context:  Acute Illness       Nutrition Assessment:    Admitted with c/o n/v, fevers, cough, SOB. +influenza 12/5. Pt placed on BIPAP 12/10 and transferred to ICU due to high risk for intubation. Pt initially on PO diet then made NPO while on BIPAP. NPO since ICU admit x 3 days. PICC placed 12/13, initiated on TPN. Noted s/p intubation 12/13. PT continues on insulin drip. In prone position currently. Tube feedings ordered at trickle rate of 15 mL/hr- OG tube currently showing \"clamped\" in chart. Discussed care with Dr. Griffin Newell- MD advanced feeds to 20 mL/hr. Plan to continue current TPN order @ 30 mL/hr. PN only provides 34% of estimated kcal, 77% of estimated protein needs. Nutrition Related Findings:    Last BM: unknown date. Edema: generalized +2. Labs: Na 136 WNL, K 3.9 WNL, Cl 106 WNL, BUN/Cr 31/1.61 H, GFR 33 L, Mg 2.3 WNL, Ca 8.8 WNL, Phos 2.2 L.  Pertinent meds: 75% of estimated needs, by next RD assessment       Nutrition Monitoring and Evaluation:   Behavioral-Environmental Outcomes: None Identified  Food/Nutrient Intake Outcomes: Enteral Nutrition Intake/Tolerance, Vitamin/Mineral Intake, Parenteral Nutrition Intake/Tolerance  Physical Signs/Symptoms Outcomes: Biochemical Data, GI Status, Fluid Status or Edema, Hemodynamic Status, Weight    Discharge Planning:    Too soon to determine     Mady Sommers RD  Contact: [phone]

## 2023-12-16 NOTE — PROGRESS NOTES
ABG drawn and analyzed at 0740 on PRVC RR 28, vT 400, PEEP 12, FiO2 75%, Ti 0.6.   pH 7.32, PCO2 46.7, PO2 103.5, HCO3- 23.8, BE - 2.7, sO2 97.4%, LAC 1.95. Vent settings adjusted;     12/16/23 0748   Ventilator Settings   Resp Rate (Set) (S)  29 bpm  (Increased following ABG)   PEEP/CPAP (cmH2O) (S)  10  (Decreased following ABG. PEEP lowered to increase potential delta P to improve ventilation while weaning for PO2 104)     Other settings remain unchanged.

## 2023-12-16 NOTE — PROGRESS NOTES
No SBT at this time-     12/16/23 0743   Weaning Parameters   Spontaneous Breathing Trial Complete (S)  No (comment)  (Sedated, proned, on CRRT with high PEEP and FiO2)

## 2023-12-16 NOTE — INTERDISCIPLINARY ROUNDS
[facility]  Pulmonary, Critical Care, and Sleep Medicine  Interdisciplinary and Ventilator Weaning Rounds    Patient discussed in morning walking rounds and interdisciplinary rounds. ICU admission day: admitted 12/11    Overnight events:   No significant events overnight   Continued proning and flipping of patient Q 6hrs with good response    Assessments and best practice:  Ventilator  Ventilator Day(s): 4  Vent Settings  FiO2 : 75 %  Resp Rate (Set): (S) (P) 29 bpm (Increased following ABG)  Vt (Set, mL): 400 mL  PEEP/CPAP (cmH2O): (S) (P) 10 (Decreased following ABG. PEEP lowered to increase potential delta P to improve ventilation while weaning for PO2 104)  Peak Inspiratory Flow (lpm): 32 L/sec  Insp Time (sec): 0.6 sec  Insp Rise Time (%): 60 %  Flow Sensitivity: 3 L/min  Disconnect Sensitivity (%): 75 %   Glycemic control  Diet  Diet NPO  ADULT TUBE FEEDING; Orogastric; Peptide Based High Protein; Continuous; 15; No; 30; Q 6 hours  PN-Adult Premix 8/10 - Custom Electrolytes - Central Line  Stress ulcer prophylaxis. Protonix  DVT prophylaxis. Heparin gtt  Need for Lines, pratt assessed.   Yes  Restraint Reevaluation   N/a  Disposition regarding transferring out of the ICU  RED    Family contact/MPOA: Iron Lentz  Family updated: updated yesterday evening, will continue to update today      Palliative consult within 3 days of admission to ICU-  Ethics Guidance: 21 days    Daily Plans:  Continue CRRT  Continue TPN  Re-prone at 1400 today    VELASQUEZ time 10 minutes        CAIO James  12/16/23  Pulmonary, [address] [facility]

## 2023-12-16 NOTE — PROGRESS NOTES
CRRT rounding completed. Patient tolerating treatment well. TMP @62. Cartridge last changed on 12/15/23. No additional supplies needed at this time as supply cart currently has 5 boxes of fluid and 4 effluent bags. Primary RN Yajaira Goldberg at bedside.

## 2023-12-16 NOTE — PROGRESS NOTES
12/15/23 2000 99.9 °F (37.7 °C) 71 28 (!) 130/59 93 %   12/15/23 1945 100 °F (37.8 °C) 74 28 (!) 136/54 94 %   12/15/23 1930 100 °F (37.8 °C) 78 27 (!) 124/55 93 %   12/15/23 1900 100 °F (37.8 °C) 72 28 (!) 139/53 94 %   12/15/23 1845 100 °F (37.8 °C) 76 28 (!) 165/56 97 %   12/15/23 1830 99.9 °F (37.7 °C) 71 28 (!) 147/60 93 %   12/15/23 1815 99.7 °F (37.6 °C) 76 28 (!) 164/61 94 %   12/15/23 1800 99.7 °F (37.6 °C) 76 28 (!) 164/55 95 %   12/15/23 1745 99.5 °F (37.5 °C) 77 28 (!) 146/60 90 %   12/15/23 1730 99.3 °F (37.4 °C) 78 28 (!) 126/54 (!) 89 %   12/15/23 1715 99.1 °F (37.3 °C) 72 25 (!) 111/50 (!) 84 %   12/15/23 1700 98.8 °F (37.1 °C) 76 19 (!) 145/130 (!) 88 %   12/15/23 1645 98.4 °F (36.9 °C) 67 28 (!) 162/53 95 %   12/15/23 1630 98.2 °F (36.8 °C) 74 28 106/63 95 %   12/15/23 1615 98.1 °F (36.7 °C) 74 28 (!) 109/49 95 %   12/15/23 1600 98.1 °F (36.7 °C) 74 28 (!) 133/46 95 %   12/15/23 1356 -- 75 28 -- 96 %   12/15/23 1247 (!) 96.3 °F (35.7 °C) 69 26 118/62 --   12/15/23 1232 (!) 96.1 °F (35.6 °C) 67 26 (!) 129/52 96 %   12/15/23 1217 (!) 95.9 °F (35.5 °C) 72 26 -- 96 %   12/15/23 1215 (!) 95.9 °F (35.5 °C) 70 26 (!) 121/56 97 %   12/15/23 1200 (!) 95.9 °F (35.5 °C) 74 26 (!) 113/55 97 %   12/15/23 1147 (!) 95.7 °F (35.4 °C) -- -- -- --   12/15/23 1145 (!) 95.7 °F (35.4 °C) 78 25 (!) 103/54 97 %   12/15/23 1141 -- 72 26 -- 97 %   12/15/23 1130 (!) 95.5 °F (35.3 °C) 80 26 (!) 88/30 97 %   12/15/23 1115 (!) 95.5 °F (35.3 °C) 72 26 (!) 97/52 98 %   12/15/23 1100 (!) 95.4 °F (35.2 °C) 72 26 (!) 93/45 98 %   12/15/23 1050 (!) 95.4 °F (35.2 °C) -- -- -- --        Weight change: -0.012 kg (-0.4 oz)     12/14 1901 - 12/16 0700  In: 4415.2 [I.V.:2651.1]  Out: 5533 [Urine:10]    Intake/Output Summary (Last 24 hours) at 12/16/2023 1047  Last data filed at 12/16/2023 1000  Gross per 24 hour   Intake 2640.07 ml   Output 3190 ml   Net -549.93 ml     Physical Exam:     CVS: S1S2 heard,  no rub  RS:  intubated   Abd:  Soft, Non tender  Extrm: no cyanosis, clubbing   Skin: no visible  Rash  Musculoskeletal: No gross joints or bone deformities         Data Review:     LABS:   Hematology:   Recent Labs     12/15/23  0310 12/16/23  0205   WBC 26.0* 23.0*   HGB 12.1 10.9*   HCT 37.6 32.9*     Chemistry:   Recent Labs     12/14/23  0508 12/14/23  0930 12/14/23  1425 12/14/23  1900 12/14/23  2248 12/15/23  0310 12/15/23  0715 12/15/23  1125 12/15/23  1602 12/15/23  1830 12/15/23  2210 12/16/23  0205 12/16/23  0600   BUN 91* 94* 101* 62* 51* 42* 37* 34* 32* 37* 38* 35* 32*   K 3.8 3.5 4.0 3.3* 3.8 4.3 3.9 4.0 3.9 4.1 3.6 3.4* 4.3    142 141 139 135* 135* 135* 136 136 136 134* 136 136    112* 110 108 106 107 104 104 104 104 104 104 106   CO2 22 22 24 23 26 24 23 22 23 25 24 24 25   PHOS 6.3* 4.7 5.7* 4.1 4.0 3.6 3.5 3.4 2.8 2.9 2.0* 3.1 2.7            Procedures/imaging: see electronic medical records for all procedures, Xrays and details which were not copied into this note but were reviewed prior to creation of Plan          Assessment & Plan:     See above        Dalbert Lefort, MD  12/16/2023  10:47 AM

## 2023-12-17 ENCOUNTER — APPOINTMENT (OUTPATIENT)
Facility: HOSPITAL | Age: 73
DRG: 870 | End: 2023-12-17
Payer: MEDICARE

## 2023-12-17 LAB
ALBUMIN SERPL-MCNC: 2.6 G/DL (ref 3.4–5)
ALBUMIN SERPL-MCNC: 2.7 G/DL (ref 3.4–5)
ALBUMIN/GLOB SERPL: 0.8 (ref 0.8–1.7)
ALP SERPL-CCNC: 84 U/L (ref 45–117)
ALT SERPL-CCNC: 81 U/L (ref 13–56)
ANION GAP SERPL CALC-SCNC: 5 MMOL/L (ref 3–18)
ANION GAP SERPL CALC-SCNC: 6 MMOL/L (ref 3–18)
ANION GAP SERPL CALC-SCNC: 6 MMOL/L (ref 3–18)
ANION GAP SERPL CALC-SCNC: 7 MMOL/L (ref 3–18)
ANION GAP SERPL CALC-SCNC: 7 MMOL/L (ref 3–18)
ANION GAP SERPL CALC-SCNC: 8 MMOL/L (ref 3–18)
APTT PPP: 62.1 SEC (ref 23–36.4)
APTT PPP: 72.7 SEC (ref 23–36.4)
APTT PPP: 87.3 SEC (ref 23–36.4)
ARTERIAL PATENCY WRIST A: ABNORMAL
ARTERIAL PATENCY WRIST A: NORMAL
ARTERIAL PATENCY WRIST A: NORMAL
AST SERPL-CCNC: 57 U/L (ref 10–38)
BACTERIA SPEC CULT: NORMAL
BACTERIA SPEC CULT: NORMAL
BASE DEFICIT BLD-SCNC: 0.9 MMOL/L
BASE DEFICIT BLD-SCNC: 1.7 MMOL/L
BASE DEFICIT BLD-SCNC: 2.6 MMOL/L
BASE DEFICIT BLD-SCNC: 2.7 MMOL/L
BASE DEFICIT BLD-SCNC: 3.8 MMOL/L
BASE DEFICIT BLD-SCNC: 4.5 MMOL/L
BASOPHILS # BLD: 0 K/UL (ref 0–0.1)
BASOPHILS NFR BLD: 0 % (ref 0–2)
BDY SITE: ABNORMAL
BDY SITE: NORMAL
BDY SITE: NORMAL
BILIRUB DIRECT SERPL-MCNC: 0.7 MG/DL (ref 0–0.2)
BILIRUB SERPL-MCNC: 1.3 MG/DL (ref 0.2–1)
BUN SERPL-MCNC: 27 MG/DL (ref 7–18)
BUN SERPL-MCNC: 28 MG/DL (ref 7–18)
BUN SERPL-MCNC: 29 MG/DL (ref 7–18)
BUN SERPL-MCNC: 30 MG/DL (ref 7–18)
BUN/CREAT SERPL: 18 (ref 12–20)
BUN/CREAT SERPL: 19 (ref 12–20)
BUN/CREAT SERPL: 21 (ref 12–20)
BUN/CREAT SERPL: 23 (ref 12–20)
CA-I SERPL-SCNC: 1.3 MMOL/L (ref 1.15–1.33)
CA-I SERPL-SCNC: 1.31 MMOL/L (ref 1.15–1.33)
CA-I SERPL-SCNC: 1.34 MMOL/L (ref 1.15–1.33)
CA-I SERPL-SCNC: 1.36 MMOL/L (ref 1.15–1.33)
CA-I SERPL-SCNC: 1.36 MMOL/L (ref 1.15–1.33)
CA-I SERPL-SCNC: 1.37 MMOL/L (ref 1.15–1.33)
CALCIUM SERPL-MCNC: 9.2 MG/DL (ref 8.5–10.1)
CALCIUM SERPL-MCNC: 9.5 MG/DL (ref 8.5–10.1)
CALCIUM SERPL-MCNC: 9.6 MG/DL (ref 8.5–10.1)
CALCIUM SERPL-MCNC: 9.7 MG/DL (ref 8.5–10.1)
CALCIUM SERPL-MCNC: 9.8 MG/DL (ref 8.5–10.1)
CALCIUM SERPL-MCNC: 9.8 MG/DL (ref 8.5–10.1)
CHLORIDE SERPL-SCNC: 104 MMOL/L (ref 100–111)
CHLORIDE SERPL-SCNC: 105 MMOL/L (ref 100–111)
CO2 SERPL-SCNC: 23 MMOL/L (ref 21–32)
CO2 SERPL-SCNC: 24 MMOL/L (ref 21–32)
CO2 SERPL-SCNC: 25 MMOL/L (ref 21–32)
CO2 SERPL-SCNC: 26 MMOL/L (ref 21–32)
CREAT SERPL-MCNC: 1.29 MG/DL (ref 0.6–1.3)
CREAT SERPL-MCNC: 1.37 MG/DL (ref 0.6–1.3)
CREAT SERPL-MCNC: 1.39 MG/DL (ref 0.6–1.3)
CREAT SERPL-MCNC: 1.42 MG/DL (ref 0.6–1.3)
CREAT SERPL-MCNC: 1.42 MG/DL (ref 0.6–1.3)
CREAT SERPL-MCNC: 1.58 MG/DL (ref 0.6–1.3)
DIFFERENTIAL METHOD BLD: ABNORMAL
EOSINOPHIL # BLD: 0 K/UL (ref 0–0.4)
EOSINOPHIL NFR BLD: 0 % (ref 0–5)
ERYTHROCYTE [DISTWIDTH] IN BLOOD BY AUTOMATED COUNT: 13.9 % (ref 11.6–14.5)
GAS FLOW.O2 O2 DELIVERY SYS: ABNORMAL
GAS FLOW.O2 O2 DELIVERY SYS: NORMAL
GAS FLOW.O2 O2 DELIVERY SYS: NORMAL
GAS FLOW.O2 SETTING OXYMISER: 26 BPM
GAS FLOW.O2 SETTING OXYMISER: 28 BPM
GAS FLOW.O2 SETTING OXYMISER: 282 BPM
GAS FLOW.O2 SETTING OXYMISER: 29 BPM
GLOBULIN SER CALC-MCNC: 3.6 G/DL (ref 2–4)
GLUCOSE BLD STRIP.AUTO-MCNC: 140 MG/DL (ref 70–110)
GLUCOSE BLD STRIP.AUTO-MCNC: 143 MG/DL (ref 70–110)
GLUCOSE BLD STRIP.AUTO-MCNC: 150 MG/DL (ref 70–110)
GLUCOSE BLD STRIP.AUTO-MCNC: 151 MG/DL (ref 70–110)
GLUCOSE BLD STRIP.AUTO-MCNC: 154 MG/DL (ref 70–110)
GLUCOSE BLD STRIP.AUTO-MCNC: 158 MG/DL (ref 70–110)
GLUCOSE BLD STRIP.AUTO-MCNC: 158 MG/DL (ref 70–110)
GLUCOSE BLD STRIP.AUTO-MCNC: 160 MG/DL (ref 70–110)
GLUCOSE BLD STRIP.AUTO-MCNC: 162 MG/DL (ref 70–110)
GLUCOSE BLD STRIP.AUTO-MCNC: 166 MG/DL (ref 70–110)
GLUCOSE BLD STRIP.AUTO-MCNC: 167 MG/DL (ref 70–110)
GLUCOSE BLD STRIP.AUTO-MCNC: 168 MG/DL (ref 70–110)
GLUCOSE BLD STRIP.AUTO-MCNC: 168 MG/DL (ref 70–110)
GLUCOSE BLD STRIP.AUTO-MCNC: 169 MG/DL (ref 70–110)
GLUCOSE BLD STRIP.AUTO-MCNC: 169 MG/DL (ref 70–110)
GLUCOSE BLD STRIP.AUTO-MCNC: 170 MG/DL (ref 70–110)
GLUCOSE BLD STRIP.AUTO-MCNC: 170 MG/DL (ref 70–110)
GLUCOSE BLD STRIP.AUTO-MCNC: 172 MG/DL (ref 70–110)
GLUCOSE BLD STRIP.AUTO-MCNC: 181 MG/DL (ref 70–110)
GLUCOSE BLD STRIP.AUTO-MCNC: 182 MG/DL (ref 70–110)
GLUCOSE BLD STRIP.AUTO-MCNC: 183 MG/DL (ref 70–110)
GLUCOSE BLD STRIP.AUTO-MCNC: 190 MG/DL (ref 70–110)
GLUCOSE BLD STRIP.AUTO-MCNC: 224 MG/DL (ref 70–110)
GLUCOSE SERPL-MCNC: 175 MG/DL (ref 74–99)
GLUCOSE SERPL-MCNC: 178 MG/DL (ref 74–99)
GLUCOSE SERPL-MCNC: 181 MG/DL (ref 74–99)
GLUCOSE SERPL-MCNC: 189 MG/DL (ref 74–99)
GLUCOSE SERPL-MCNC: 189 MG/DL (ref 74–99)
GLUCOSE SERPL-MCNC: 199 MG/DL (ref 74–99)
HCO3 BLD-SCNC: 22.1 MMOL/L (ref 22–26)
HCO3 BLD-SCNC: 22.6 MMOL/L (ref 22–26)
HCO3 BLD-SCNC: 23.4 MMOL/L (ref 22–26)
HCO3 BLD-SCNC: 23.8 MMOL/L (ref 22–26)
HCO3 BLD-SCNC: 24 MMOL/L (ref 22–26)
HCO3 BLD-SCNC: 24.7 MMOL/L (ref 22–26)
HCT VFR BLD AUTO: 30.4 % (ref 35–45)
HGB BLD-MCNC: 10 G/DL (ref 12–16)
IMM GRANULOCYTES # BLD AUTO: 0 K/UL (ref 0–0.04)
IMM GRANULOCYTES NFR BLD AUTO: 0 % (ref 0–0.5)
INSPIRATION.DURATION SETTING TIME VENT: 0.6 SEC
INSPIRATION.DURATION SETTING TIME VENT: 0.7 SEC
LACTATE BLD-SCNC: 1.95 MMOL/L (ref 0.4–2)
LACTATE BLD-SCNC: 1.97 MMOL/L (ref 0.4–2)
LACTATE BLD-SCNC: 2.57 MMOL/L (ref 0.4–2)
LACTATE BLD-SCNC: 2.64 MMOL/L (ref 0.4–2)
LACTATE SERPL-SCNC: 4.3 MMOL/L (ref 0.4–2)
LYMPHOCYTES # BLD: 2.7 K/UL (ref 0.9–3.6)
LYMPHOCYTES NFR BLD: 12 % (ref 21–52)
MAGNESIUM SERPL-MCNC: 2.2 MG/DL (ref 1.6–2.6)
MAGNESIUM SERPL-MCNC: 2.4 MG/DL (ref 1.6–2.6)
MAGNESIUM SERPL-MCNC: 2.6 MG/DL (ref 1.6–2.6)
MCH RBC QN AUTO: 28.5 PG (ref 24–34)
MCHC RBC AUTO-ENTMCNC: 32.9 G/DL (ref 31–37)
MCV RBC AUTO: 86.6 FL (ref 78–100)
MONOCYTES # BLD: 0.2 K/UL (ref 0.05–1.2)
MONOCYTES NFR BLD: 1 % (ref 3–10)
NEUTS BAND NFR BLD MANUAL: 4 %
NEUTS SEG # BLD: 19.7 K/UL (ref 1.8–8)
NEUTS SEG NFR BLD: 83 % (ref 40–73)
NRBC # BLD: 0.1 K/UL (ref 0–0.01)
NRBC BLD-RTO: 0.4 PER 100 WBC
O2/TOTAL GAS SETTING VFR VENT: 70 %
O2/TOTAL GAS SETTING VFR VENT: 75 %
O2/TOTAL GAS SETTING VFR VENT: 80 %
PCO2 BLD: 42.3 MMHG (ref 35–45)
PCO2 BLD: 43.8 MMHG (ref 35–45)
PCO2 BLD: 44 MMHG (ref 35–45)
PCO2 BLD: 44.4 MMHG (ref 35–45)
PCO2 BLD: 46.7 MMHG (ref 35–45)
PCO2 BLD: 48 MMHG (ref 35–45)
PEEP RESPIRATORY: 10 CMH2O
PEEP RESPIRATORY: 12 CMH2O
PEEP RESPIRATORY: 12 CMH2O
PEEP RESPIRATORY: 14 CMH2O
PH BLD: 7.28 (ref 7.35–7.45)
PH BLD: 7.32 (ref 7.35–7.45)
PH BLD: 7.33 (ref 7.35–7.45)
PH BLD: 7.33 (ref 7.35–7.45)
PH BLD: 7.35 (ref 7.35–7.45)
PH BLD: 7.36 (ref 7.35–7.45)
PHOSPHATE SERPL-MCNC: 1.4 MG/DL (ref 2.5–4.9)
PHOSPHATE SERPL-MCNC: 1.8 MG/DL (ref 2.5–4.9)
PHOSPHATE SERPL-MCNC: 2 MG/DL (ref 2.5–4.9)
PHOSPHATE SERPL-MCNC: 2.2 MG/DL (ref 2.5–4.9)
PHOSPHATE SERPL-MCNC: 2.7 MG/DL (ref 2.5–4.9)
PHOSPHATE SERPL-MCNC: 5 MG/DL (ref 2.5–4.9)
PLATELET # BLD AUTO: 190 K/UL (ref 135–420)
PLATELET COMMENT: ABNORMAL
PMV BLD AUTO: 12.7 FL (ref 9.2–11.8)
PO2 BLD: 104 MMHG (ref 80–100)
PO2 BLD: 70 MMHG (ref 80–100)
PO2 BLD: 82 MMHG (ref 80–100)
PO2 BLD: 91 MMHG (ref 80–100)
PO2 BLD: 96 MMHG (ref 80–100)
PO2 BLD: 97 MMHG (ref 80–100)
POTASSIUM SERPL-SCNC: 3.2 MMOL/L (ref 3.5–5.5)
POTASSIUM SERPL-SCNC: 3.4 MMOL/L (ref 3.5–5.5)
POTASSIUM SERPL-SCNC: 3.6 MMOL/L (ref 3.5–5.5)
POTASSIUM SERPL-SCNC: 3.6 MMOL/L (ref 3.5–5.5)
POTASSIUM SERPL-SCNC: 4.2 MMOL/L (ref 3.5–5.5)
POTASSIUM SERPL-SCNC: 4.5 MMOL/L (ref 3.5–5.5)
PROT SERPL-MCNC: 6.3 G/DL (ref 6.4–8.2)
RBC # BLD AUTO: 3.51 M/UL (ref 4.2–5.3)
RBC MORPH BLD: ABNORMAL
RESPIRATORY RATE, POC: 26 (ref 5–40)
RESPIRATORY RATE, POC: 28 (ref 5–40)
RESPIRATORY RATE, POC: 28 (ref 5–40)
RESPIRATORY RATE, POC: 29 (ref 5–40)
SAO2 % BLD: 92.3 % (ref 92–97)
SAO2 % BLD: 95.2 % (ref 92–97)
SAO2 % BLD: 96.5 % (ref 92–97)
SAO2 % BLD: 96.6 % (ref 92–97)
SAO2 % BLD: 96.9 % (ref 92–97)
SAO2 % BLD: 97.4 % (ref 92–97)
SERVICE CMNT-IMP: ABNORMAL
SERVICE CMNT-IMP: NORMAL
SODIUM SERPL-SCNC: 134 MMOL/L (ref 136–145)
SODIUM SERPL-SCNC: 134 MMOL/L (ref 136–145)
SODIUM SERPL-SCNC: 136 MMOL/L (ref 136–145)
SODIUM SERPL-SCNC: 136 MMOL/L (ref 136–145)
SODIUM SERPL-SCNC: 137 MMOL/L (ref 136–145)
SODIUM SERPL-SCNC: 137 MMOL/L (ref 136–145)
SPECIMEN TYPE: ABNORMAL
SPECIMEN TYPE: NORMAL
SPECIMEN TYPE: NORMAL
VENTILATION MODE VENT: ABNORMAL
VENTILATION MODE VENT: NORMAL
VENTILATION MODE VENT: NORMAL
VT SETTING VENT: 400 ML
WBC # BLD AUTO: 22.6 K/UL (ref 4.6–13.2)

## 2023-12-17 PROCEDURE — 2580000003 HC RX 258: Performed by: PHYSICIAN ASSISTANT

## 2023-12-17 PROCEDURE — 6360000002 HC RX W HCPCS: Performed by: PHYSICIAN ASSISTANT

## 2023-12-17 PROCEDURE — 83735 ASSAY OF MAGNESIUM: CPT

## 2023-12-17 PROCEDURE — 2580000003 HC RX 258: Performed by: INTERNAL MEDICINE

## 2023-12-17 PROCEDURE — 6370000000 HC RX 637 (ALT 250 FOR IP): Performed by: INTERNAL MEDICINE

## 2023-12-17 PROCEDURE — 82330 ASSAY OF CALCIUM: CPT

## 2023-12-17 PROCEDURE — 94761 N-INVAS EAR/PLS OXIMETRY MLT: CPT

## 2023-12-17 PROCEDURE — 6360000002 HC RX W HCPCS

## 2023-12-17 PROCEDURE — 2500000003 HC RX 250 WO HCPCS: Performed by: NURSE PRACTITIONER

## 2023-12-17 PROCEDURE — 6360000002 HC RX W HCPCS: Performed by: NURSE PRACTITIONER

## 2023-12-17 PROCEDURE — 6360000002 HC RX W HCPCS: Performed by: INTERNAL MEDICINE

## 2023-12-17 PROCEDURE — 2500000003 HC RX 250 WO HCPCS: Performed by: PHYSICIAN ASSISTANT

## 2023-12-17 PROCEDURE — 85730 THROMBOPLASTIN TIME PARTIAL: CPT

## 2023-12-17 PROCEDURE — 2500000003 HC RX 250 WO HCPCS: Performed by: INTERNAL MEDICINE

## 2023-12-17 PROCEDURE — 80069 RENAL FUNCTION PANEL: CPT

## 2023-12-17 PROCEDURE — 90945 DIALYSIS ONE EVALUATION: CPT

## 2023-12-17 PROCEDURE — 87449 NOS EACH ORGANISM AG IA: CPT

## 2023-12-17 PROCEDURE — 2580000003 HC RX 258: Performed by: HOSPITALIST

## 2023-12-17 PROCEDURE — 2000000000 HC ICU R&B

## 2023-12-17 PROCEDURE — 2580000003 HC RX 258: Performed by: NURSE PRACTITIONER

## 2023-12-17 PROCEDURE — A4216 STERILE WATER/SALINE, 10 ML: HCPCS | Performed by: PHYSICIAN ASSISTANT

## 2023-12-17 PROCEDURE — 85025 COMPLETE CBC W/AUTO DIFF WBC: CPT

## 2023-12-17 PROCEDURE — 94003 VENT MGMT INPAT SUBQ DAY: CPT

## 2023-12-17 PROCEDURE — 2500000003 HC RX 250 WO HCPCS

## 2023-12-17 PROCEDURE — 6370000000 HC RX 637 (ALT 250 FOR IP): Performed by: PHYSICIAN ASSISTANT

## 2023-12-17 PROCEDURE — 82962 GLUCOSE BLOOD TEST: CPT

## 2023-12-17 PROCEDURE — 37799 UNLISTED PX VASCULAR SURGERY: CPT

## 2023-12-17 PROCEDURE — 84100 ASSAY OF PHOSPHORUS: CPT

## 2023-12-17 PROCEDURE — P9047 ALBUMIN (HUMAN), 25%, 50ML: HCPCS

## 2023-12-17 PROCEDURE — 6360000002 HC RX W HCPCS: Performed by: REGISTERED NURSE

## 2023-12-17 PROCEDURE — 99291 CRITICAL CARE FIRST HOUR: CPT | Performed by: INTERNAL MEDICINE

## 2023-12-17 PROCEDURE — 80076 HEPATIC FUNCTION PANEL: CPT

## 2023-12-17 PROCEDURE — 80048 BASIC METABOLIC PNL TOTAL CA: CPT

## 2023-12-17 PROCEDURE — 2700000000 HC OXYGEN THERAPY PER DAY

## 2023-12-17 PROCEDURE — 6360000002 HC RX W HCPCS: Performed by: HOSPITALIST

## 2023-12-17 PROCEDURE — 71045 X-RAY EXAM CHEST 1 VIEW: CPT

## 2023-12-17 PROCEDURE — 83605 ASSAY OF LACTIC ACID: CPT

## 2023-12-17 RX ORDER — ROCURONIUM BROMIDE 10 MG/ML
0.6 INJECTION, SOLUTION INTRAVENOUS ONCE
Status: COMPLETED | OUTPATIENT
Start: 2023-12-17 | End: 2023-12-18

## 2023-12-17 RX ORDER — ALBUMIN (HUMAN) 12.5 G/50ML
25 SOLUTION INTRAVENOUS ONCE
Status: COMPLETED | OUTPATIENT
Start: 2023-12-17 | End: 2023-12-17

## 2023-12-17 RX ORDER — POTASSIUM CHLORIDE 29.8 MG/ML
20 INJECTION INTRAVENOUS ONCE
Status: COMPLETED | OUTPATIENT
Start: 2023-12-17 | End: 2023-12-17

## 2023-12-17 RX ORDER — POTASSIUM CHLORIDE 29.8 MG/ML
20 INJECTION INTRAVENOUS
Status: COMPLETED | OUTPATIENT
Start: 2023-12-17 | End: 2023-12-17

## 2023-12-17 RX ORDER — ALBUMIN (HUMAN) 12.5 G/50ML
SOLUTION INTRAVENOUS
Status: COMPLETED
Start: 2023-12-17 | End: 2023-12-17

## 2023-12-17 RX ADMIN — HYDROCORTISONE SODIUM SUCCINATE 50 MG: 100 INJECTION, POWDER, FOR SOLUTION INTRAMUSCULAR; INTRAVENOUS at 09:55

## 2023-12-17 RX ADMIN — Medication 20 MG/HR: at 23:50

## 2023-12-17 RX ADMIN — POLYVINYL ALCOHOL, POVIDONE 1 DROP: 14; 6 SOLUTION/ DROPS OPHTHALMIC at 08:00

## 2023-12-17 RX ADMIN — HEPARIN SODIUM 2000 UNITS: 1000 INJECTION INTRAVENOUS; SUBCUTANEOUS at 03:38

## 2023-12-17 RX ADMIN — SODIUM BICARBONATE 50 MEQ: 84 INJECTION INTRAVENOUS at 23:51

## 2023-12-17 RX ADMIN — POLYVINYL ALCOHOL, POVIDONE 1 DROP: 14; 6 SOLUTION/ DROPS OPHTHALMIC at 00:45

## 2023-12-17 RX ADMIN — POTASSIUM CHLORIDE 20 MEQ: 29.8 INJECTION INTRAVENOUS at 10:15

## 2023-12-17 RX ADMIN — FENTANYL CITRATE 200 MCG/HR: at 09:55

## 2023-12-17 RX ADMIN — LEUCINE, PHENYLALANINE, LYSINE, METHIONINE, ISOLEUCINE, VALINE, HISTIDINE, THREONINE, TRYPTOPHAN, ALANINE, GLYCINE, ARGININE, PROLINE, SERINE, TYROSINE, DEXTROSE: 584; 448; 464; 320; 480; 464; 384; 336; 144; 1656; 824; 920; 544; 400; 32; 10 INJECTION INTRAVENOUS at 20:17

## 2023-12-17 RX ADMIN — THIAMINE HYDROCHLORIDE 200 MG: 100 INJECTION, SOLUTION INTRAMUSCULAR; INTRAVENOUS at 09:55

## 2023-12-17 RX ADMIN — VASOPRESSIN 0.03 UNITS/MIN: 20 INJECTION INTRAVENOUS at 23:14

## 2023-12-17 RX ADMIN — PIPERACILLIN AND TAZOBACTAM 3375 MG: 3; .375 INJECTION, POWDER, FOR SOLUTION INTRAVENOUS; PARENTERAL at 21:17

## 2023-12-17 RX ADMIN — ALBUMIN (HUMAN) 25 G: 12.5 SOLUTION INTRAVENOUS at 22:00

## 2023-12-17 RX ADMIN — POLYVINYL ALCOHOL, POVIDONE 1 DROP: 14; 6 SOLUTION/ DROPS OPHTHALMIC at 13:00

## 2023-12-17 RX ADMIN — SODIUM PHOSPHATE, MONOBASIC, MONOHYDRATE AND SODIUM PHOSPHATE, DIBASIC, ANHYDROUS 15 MMOL: 142; 276 INJECTION, SOLUTION INTRAVENOUS at 03:15

## 2023-12-17 RX ADMIN — Medication 8 MG/HR: at 16:07

## 2023-12-17 RX ADMIN — POLYVINYL ALCOHOL, POVIDONE 1 DROP: 14; 6 SOLUTION/ DROPS OPHTHALMIC at 20:23

## 2023-12-17 RX ADMIN — HEPARIN SODIUM 10 UNITS/KG/HR: 10000 INJECTION, SOLUTION INTRAVENOUS at 19:16

## 2023-12-17 RX ADMIN — HYDROCORTISONE SODIUM SUCCINATE 50 MG: 100 INJECTION, POWDER, FOR SOLUTION INTRAMUSCULAR; INTRAVENOUS at 04:06

## 2023-12-17 RX ADMIN — CALCIUM CHLORIDE, MAGNESIUM CHLORIDE, DEXTROSE MONOHYDRATE, LACTIC ACID, SODIUM CHLORIDE, SODIUM BICARBONATE AND POTASSIUM CHLORIDE: 5.15; 2.03; 22; 5.4; 6.46; 3.09; .157 INJECTION INTRAVENOUS at 07:06

## 2023-12-17 RX ADMIN — CALCIUM CHLORIDE, MAGNESIUM CHLORIDE, DEXTROSE MONOHYDRATE, LACTIC ACID, SODIUM CHLORIDE, SODIUM BICARBONATE AND POTASSIUM CHLORIDE: 5.15; 2.03; 22; 5.4; 6.46; 3.09; .157 INJECTION INTRAVENOUS at 22:28

## 2023-12-17 RX ADMIN — CALCIUM CHLORIDE, MAGNESIUM CHLORIDE, DEXTROSE MONOHYDRATE, LACTIC ACID, SODIUM CHLORIDE, SODIUM BICARBONATE AND POTASSIUM CHLORIDE: 5.15; 2.03; 22; 5.4; 6.46; 3.09; .157 INJECTION INTRAVENOUS at 17:14

## 2023-12-17 RX ADMIN — AMIODARONE HYDROCHLORIDE 150 MG: 1.5 INJECTION, SOLUTION INTRAVENOUS at 12:41

## 2023-12-17 RX ADMIN — AMIODARONE HYDROCHLORIDE 0.5 MG/MIN: 1.8 INJECTION, SOLUTION INTRAVENOUS at 19:01

## 2023-12-17 RX ADMIN — SODIUM CHLORIDE 0.25 MG/KG/HR: 9 INJECTION, SOLUTION INTRAVENOUS at 23:25

## 2023-12-17 RX ADMIN — FENTANYL CITRATE 200 MCG/HR: at 04:39

## 2023-12-17 RX ADMIN — FENTANYL CITRATE 200 MCG/HR: at 00:21

## 2023-12-17 RX ADMIN — CALCIUM CHLORIDE, MAGNESIUM CHLORIDE, DEXTROSE MONOHYDRATE, LACTIC ACID, SODIUM CHLORIDE, SODIUM BICARBONATE AND POTASSIUM CHLORIDE: 5.15; 2.03; 22; 5.4; 6.46; 3.09; .157 INJECTION INTRAVENOUS at 07:07

## 2023-12-17 RX ADMIN — CALCIUM CHLORIDE, MAGNESIUM CHLORIDE, DEXTROSE MONOHYDRATE, LACTIC ACID, SODIUM CHLORIDE, SODIUM BICARBONATE AND POTASSIUM CHLORIDE: 5.15; 2.03; 22; 5.4; 6.46; 3.09; .157 INJECTION INTRAVENOUS at 01:56

## 2023-12-17 RX ADMIN — CALCIUM CHLORIDE, MAGNESIUM CHLORIDE, DEXTROSE MONOHYDRATE, LACTIC ACID, SODIUM CHLORIDE, SODIUM BICARBONATE AND POTASSIUM CHLORIDE: 5.15; 2.03; 22; 5.4; 6.46; 3.09; .157 INJECTION INTRAVENOUS at 02:00

## 2023-12-17 RX ADMIN — SODIUM CHLORIDE, PRESERVATIVE FREE 10 ML: 5 INJECTION INTRAVENOUS at 09:00

## 2023-12-17 RX ADMIN — POLYVINYL ALCOHOL, POVIDONE 1 DROP: 14; 6 SOLUTION/ DROPS OPHTHALMIC at 16:09

## 2023-12-17 RX ADMIN — CALCIUM CHLORIDE, MAGNESIUM CHLORIDE, DEXTROSE MONOHYDRATE, LACTIC ACID, SODIUM CHLORIDE, SODIUM BICARBONATE AND POTASSIUM CHLORIDE: 5.15; 2.03; 22; 5.4; 6.46; 3.09; .157 INJECTION INTRAVENOUS at 17:13

## 2023-12-17 RX ADMIN — MIDAZOLAM 2 MG: 1 INJECTION INTRAMUSCULAR; INTRAVENOUS at 22:37

## 2023-12-17 RX ADMIN — HEPARIN SODIUM 4000 UNITS: 1000 INJECTION INTRAVENOUS; SUBCUTANEOUS at 20:39

## 2023-12-17 RX ADMIN — AMIODARONE HYDROCHLORIDE 1 MG/MIN: 1.8 INJECTION, SOLUTION INTRAVENOUS at 12:58

## 2023-12-17 RX ADMIN — CALCIUM CHLORIDE, MAGNESIUM CHLORIDE, DEXTROSE MONOHYDRATE, LACTIC ACID, SODIUM CHLORIDE, SODIUM BICARBONATE AND POTASSIUM CHLORIDE: 5.15; 2.03; 22; 5.4; 6.46; 3.09; .157 INJECTION INTRAVENOUS at 01:57

## 2023-12-17 RX ADMIN — Medication 40 MCG/MIN: at 23:19

## 2023-12-17 RX ADMIN — HYDROCORTISONE SODIUM SUCCINATE 50 MG: 100 INJECTION, POWDER, FOR SOLUTION INTRAMUSCULAR; INTRAVENOUS at 21:52

## 2023-12-17 RX ADMIN — 0.12% CHLORHEXIDINE GLUCONATE 15 ML: 1.2 RINSE ORAL at 21:17

## 2023-12-17 RX ADMIN — POTASSIUM CHLORIDE 20 MEQ: 29.8 INJECTION INTRAVENOUS at 09:15

## 2023-12-17 RX ADMIN — FENTANYL CITRATE 300 MCG/HR: at 23:50

## 2023-12-17 RX ADMIN — POTASSIUM CHLORIDE 20 MEQ: 29.8 INJECTION, SOLUTION INTRAVENOUS at 16:25

## 2023-12-17 RX ADMIN — POLYVINYL ALCOHOL, POVIDONE 1 DROP: 14; 6 SOLUTION/ DROPS OPHTHALMIC at 04:00

## 2023-12-17 RX ADMIN — Medication 10 MG/HR: at 04:11

## 2023-12-17 RX ADMIN — Medication 1 MG: at 09:55

## 2023-12-17 RX ADMIN — FENTANYL CITRATE 200 MCG/HR: at 19:37

## 2023-12-17 RX ADMIN — PIPERACILLIN AND TAZOBACTAM 3375 MG: 3; .375 INJECTION, POWDER, FOR SOLUTION INTRAVENOUS; PARENTERAL at 09:55

## 2023-12-17 RX ADMIN — SODIUM CHLORIDE, PRESERVATIVE FREE 10 ML: 5 INJECTION INTRAVENOUS at 21:17

## 2023-12-17 RX ADMIN — INSULIN HUMAN 6.41 UNITS/HR: 1 INJECTION, SOLUTION INTRAVENOUS at 14:41

## 2023-12-17 RX ADMIN — FENTANYL CITRATE 200 MCG/HR: at 15:02

## 2023-12-17 RX ADMIN — SODIUM CHLORIDE 0.2 MG/KG/HR: 9 INJECTION, SOLUTION INTRAVENOUS at 23:07

## 2023-12-17 RX ADMIN — CALCIUM CHLORIDE, MAGNESIUM CHLORIDE, DEXTROSE MONOHYDRATE, LACTIC ACID, SODIUM CHLORIDE, SODIUM BICARBONATE AND POTASSIUM CHLORIDE: 5.15; 2.03; 22; 5.4; 6.46; 3.09; .157 INJECTION INTRAVENOUS at 12:13

## 2023-12-17 RX ADMIN — ALBUMIN (HUMAN) 25 G: 0.25 INJECTION, SOLUTION INTRAVENOUS at 22:00

## 2023-12-17 RX ADMIN — 0.12% CHLORHEXIDINE GLUCONATE 15 ML: 1.2 RINSE ORAL at 09:54

## 2023-12-17 RX ADMIN — HYDROCORTISONE SODIUM SUCCINATE 50 MG: 100 INJECTION, POWDER, FOR SOLUTION INTRAMUSCULAR; INTRAVENOUS at 16:08

## 2023-12-17 RX ADMIN — FAMOTIDINE 20 MG: 10 INJECTION INTRAVENOUS at 09:54

## 2023-12-17 RX ADMIN — POTASSIUM PHOSPHATE, MONOBASIC POTASSIUM PHOSPHATE, DIBASIC 20 MMOL: 224; 236 INJECTION, SOLUTION, CONCENTRATE INTRAVENOUS at 19:06

## 2023-12-17 RX ADMIN — CALCIUM CHLORIDE, MAGNESIUM CHLORIDE, DEXTROSE MONOHYDRATE, LACTIC ACID, SODIUM CHLORIDE, SODIUM BICARBONATE AND POTASSIUM CHLORIDE: 5.15; 2.03; 22; 5.4; 6.46; 3.09; .157 INJECTION INTRAVENOUS at 22:27

## 2023-12-17 RX ADMIN — CALCIUM CHLORIDE, MAGNESIUM CHLORIDE, DEXTROSE MONOHYDRATE, LACTIC ACID, SODIUM CHLORIDE, SODIUM BICARBONATE AND POTASSIUM CHLORIDE: 5.15; 2.03; 22; 5.4; 6.46; 3.09; .157 INJECTION INTRAVENOUS at 22:47

## 2023-12-17 RX ADMIN — SODIUM CHLORIDE 0.3 MG/KG/HR: 9 INJECTION, SOLUTION INTRAVENOUS at 23:50

## 2023-12-17 RX ADMIN — CALCIUM CHLORIDE, MAGNESIUM CHLORIDE, DEXTROSE MONOHYDRATE, LACTIC ACID, SODIUM CHLORIDE, SODIUM BICARBONATE AND POTASSIUM CHLORIDE: 5.15; 2.03; 22; 5.4; 6.46; 3.09; .157 INJECTION INTRAVENOUS at 12:14

## 2023-12-17 ASSESSMENT — PAIN SCALES - GENERAL
PAINLEVEL_OUTOF10: 0
PAINLEVEL_OUTOF10: 0

## 2023-12-17 ASSESSMENT — PULMONARY FUNCTION TESTS
PIF_VALUE: 26
PIF_VALUE: 34
PIF_VALUE: 25
PIF_VALUE: 26

## 2023-12-17 NOTE — PROGRESS NOTES
RENAL DAILY PROGRESS NOTE  74y F with PMH CAD, Mitral regugitaion, admitted to ICU for severe hypoxia and multiorgan failure following for renal failure  Subjective:       Complaint:   Overnight events noted  Her oxygen requirement trending down  Remain on vasopressure  Urine output remain low    IMPRESSION:   ALIREZA in setting of hypoxic respiratory failure. Due to ATN ?( Start CRRT on 12/14)  Access temp HD catheter   Hypoxic res failure / ARDS/pneumonia   Shock, septic, with multiorgan failure  Severe MR  Hx CAD, new onset afib with rvr   PLAN:   Continue replete K, phos , calcium, mag per ICU protocol, adjust dialysis bath. Plan to continue CRRT with goal 50cc net negative per hour. Adjust meds per pharmacy colleague.          Discussed with ICU team.     Current Facility-Administered Medications   Medication Dose Route Frequency    PN-Adult Premix 8/10 - Custom Electrolytes - Central Line   IntraVENous Continuous TPN    Virt-Caps 1 mg  1 capsule Oral Daily    hydrocortisone sodium succinate PF (SOLU-CORTEF) injection 50 mg  50 mg IntraVENous Q6H    fentanyl (SUBLIMAZE) infusion 1000 mcg/100mL   mcg/hr IntraVENous Continuous    glucose chewable tablet 16 g  4 tablet Oral PRN    dextrose bolus 10% 125 mL  125 mL IntraVENous PRN    Or    dextrose bolus 10% 250 mL  250 mL IntraVENous PRN    glucagon injection 1 mg  1 mg SubCUTAneous PRN    dextrose 10 % infusion   IntraVENous Continuous PRN    insulin regular (MYXREDLIN) 100 units in sodium chloride 0.9 % 100 ml infusion  0.1-50 Units/hr IntraVENous Continuous    [Held by provider] bumetanide (BUMEX) injection 2 mg  2 mg IntraVENous BID    heparin (porcine) injection 2,100 Units  2,100 Units IntraCATHeter PRN    prismaSol BGK 2/3.5 dialysis solution   Dialysis Continuous    thiamine (B-1) injection 200 mg  200 mg IntraVENous Daily    Ketamine (KETALAR) injection syringe 100 mg  100 mg IntraVENous Once    fentaNYL (SUBLIMAZE) injection 50 mcg  50 mcg 0330 97.2 °F (36.2 °C) 67 29 -- 100 %   12/17/23 0315 97.2 °F (36.2 °C) 71 29 -- 100 %   12/17/23 0300 97.2 °F (36.2 °C) 69 29 (!) 115/51 98 %   12/17/23 0245 97.2 °F (36.2 °C) 62 29 -- 100 %   12/17/23 0230 97.2 °F (36.2 °C) 70 29 -- 99 %   12/17/23 0222 -- 73 29 -- 97 %   12/17/23 0215 97.2 °F (36.2 °C) 79 29 -- 98 %   12/17/23 0200 97 °F (36.1 °C) 80 29 134/83 100 %   12/17/23 0145 97 °F (36.1 °C) 82 29 -- 100 %   12/17/23 0130 97 °F (36.1 °C) 75 29 -- 100 %   12/17/23 0115 97 °F (36.1 °C) 78 29 -- 97 %   12/17/23 0100 97 °F (36.1 °C) 83 29 126/83 97 %   12/17/23 0045 96.8 °F (36 °C) 79 29 -- 97 %   12/17/23 0030 96.8 °F (36 °C) 80 29 -- 98 %   12/17/23 0015 (!) 96.6 °F (35.9 °C) 79 29 -- 96 %   12/17/23 0000 (!) 96.6 °F (35.9 °C) 79 29 118/65 98 %   12/16/23 2345 (!) 96.4 °F (35.8 °C) 70 29 -- 97 %   12/16/23 2330 (!) 96.4 °F (35.8 °C) 75 29 -- 98 %   12/16/23 2315 (!) 96.3 °F (35.7 °C) 75 29 -- 95 %   12/16/23 2300 (!) 96.1 °F (35.6 °C) 67 29 (!) 91/53 94 %   12/16/23 2245 (!) 96.1 °F (35.6 °C) 68 29 -- 100 %   12/16/23 2230 (!) 95.9 °F (35.5 °C) 65 29 -- 98 %   12/16/23 2215 (!) 95.7 °F (35.4 °C) 72 29 (!) 83/46 98 %   12/16/23 2200 (!) 95.5 °F (35.3 °C) 67 29 136/64 98 %   12/16/23 2157 (!) 95.5 °F (35.3 °C) 70 26 -- 99 %   12/16/23 2155 (!) 95.5 °F (35.3 °C) 71 29 -- 98 %   12/16/23 2145 (!) 95.5 °F (35.3 °C) 72 29 -- 100 %   12/16/23 2130 (!) 95.5 °F (35.3 °C) 73 29 -- 99 %   12/16/23 2115 (!) 95.4 °F (35.2 °C) 73 29 -- 98 %   12/16/23 2100 (!) 95.4 °F (35.2 °C) 67 29 (!) 109/59 98 %   12/16/23 2045 (!) 95.4 °F (35.2 °C) 69 29 -- 98 %   12/16/23 2030 (!) 95.5 °F (35.3 °C) 64 29 (!) 103/56 96 %   12/16/23 2015 (!) 95.5 °F (35.3 °C) 65 29 -- 97 %   12/16/23 2000 (!) 95.5 °F (35.3 °C) 64 29 111/65 98 %   12/16/23 1945 (!) 95.5 °F (35.3 °C) 64 29 119/66 96 %   12/16/23 1932 -- 64 29 -- 96 %   12/16/23 1930 (!) 95.5 °F (35.3 °C) 64 29 126/65 96 %   12/16/23 1745 (!) 96.3 °F (35.7 °C) 65 29 122/67 96 %   12/16/23

## 2023-12-17 NOTE — DIALYSIS
CRRT rounding completed. Patient tolerating the therapy. Cartridge TMP 81, pressure drop 39. 7 boxes of Primasol 2k/3.5 ca delivered.

## 2023-12-17 NOTE — PROGRESS NOTES
ABG results on AC/VC+ 29 400 10 75%:     pH 7.36  PCO2 44  PO2 91  HCO3 25  BE -0.9  SaO2 97%    Weaned FiO2 to 70%

## 2023-12-17 NOTE — PROGRESS NOTES
Comprehensive Nutrition Assessment    Type and Reason for Visit:  Reassess, Consult, NPO/Clear Liquid    Nutrition Recommendations/Plan:   Provide parenteral nutrition using standard premixed product of Clinimix AA8/D10 until patient is able to tolerate adequate enteral nutrition. Continue current PN order. See below for order contents/details. Lipids provided Monday, Wednesday, and Friday only  Plan to order renal MVI r/t pt on CRRT. Continue tube feeding regimen: - advanced per MD.  Formula: Vital HP  Goal Rate: 20ml/hr x 20 hours (for anticipation of holding tube feeds for standard nursing care)  Water Flushes: 30 mL q 6 hours (120 mL total)  Goal Regimen Provides (with modulars): 400kcal, 35g protein, 456 ml free water, 27% RDIs     Continue thiamine supplementation. Continue to monitor tolerance of PN, weight, labs, and plan of care during admission. Parenteral Nutrition Order:   Current PN:  Next PN:      Malnutrition Assessment:  Malnutrition Status: At risk for malnutrition (Comment) (NPO, vent, TPN, TF) (12/14/23 1239)    Context:  Acute Illness       Nutrition Assessment:    Admitted with c/o n/v, fevers, cough, SOB. +influenza 12/5. Pt placed on BIPAP 12/10 and transferred to ICU due to high risk for intubation. Pt initially on PO diet then made NPO while on BIPAP. NPO since ICU admit x 3 days. PICC placed 12/13, initiated on TPN. Noted s/p intubation 12/13. Pt continues on insulin drip. Tube feedings at 20 mL/hr, will be continued today. CRRT continues. Plan to continue current TPN order @ 30 mL/hr. PN only provides 34% of estimated kcal, 77% of estimated protein needs. Nutrition Related Findings:    Last BM: unknown date. Edema: generalized +2. Labs: Na 136 WNL, K 3.4 L (trend down), Cl 104 WNL, BUN/Cr 28/1.58 H, GFR 34 L, Mg 2.4 WNL, Phos 2.7 WNL (up from 1.4 on labs at 02:20).  Pertinent meds: Pepcid, Zosyn,  KCl 20 mEq (x2 doses ordered), thiamine, virt-caps (held x1 day),  insulin Goal(s) (slowly)  Goals: Meet at least 75% of estimated needs, by next RD assessment       Nutrition Monitoring and Evaluation:   Behavioral-Environmental Outcomes: None Identified  Food/Nutrient Intake Outcomes: Enteral Nutrition Intake/Tolerance, Parenteral Nutrition Intake/Tolerance, Vitamin/Mineral Intake, IVF Intake  Physical Signs/Symptoms Outcomes: Biochemical Data, GI Status, Fluid Status or Edema, Hemodynamic Status, Weight    Discharge Planning:    Too soon to determine     Mady Sommers RD  Contact: [phone]

## 2023-12-17 NOTE — PROGRESS NOTES
ABG drawn from A- line and analyzed. Pt on vent PRVC RR 29, vT 400, PEEP 10, FiO2 65%, Ti 0.6.     pH 7.64, PCO2 43.4, PO2 62.1, HCO3- 24.9, BE -0.5, sO2 90.5, LAC 1.89. No changes made at this time.

## 2023-12-17 NOTE — PROGRESS NOTES
ABG drawn from A- line and analyzed. Pt on vent, PRVC RR 29 (actual rate 29), vT 400, PEEP 10, FiO2 65%, Ti 0.6.     pH 7.31, PCO2 46.2, PO2 43.2, HCO3- 23.2, BE -3.3, sO2 74%, LAC 2.46 . Critical values reported to Dr Quinton Adler by phone. FiO2 increased to 100%.

## 2023-12-18 ENCOUNTER — APPOINTMENT (OUTPATIENT)
Facility: HOSPITAL | Age: 73
DRG: 870 | End: 2023-12-18
Payer: MEDICARE

## 2023-12-18 LAB
ANION GAP SERPL CALC-SCNC: 4 MMOL/L (ref 3–18)
ANION GAP SERPL CALC-SCNC: 5 MMOL/L (ref 3–18)
ANION GAP SERPL CALC-SCNC: 5 MMOL/L (ref 3–18)
ANION GAP SERPL CALC-SCNC: 6 MMOL/L (ref 3–18)
ANION GAP SERPL CALC-SCNC: 7 MMOL/L (ref 3–18)
APTT PPP: 108 SEC (ref 23–36.4)
APTT PPP: 112.3 SEC (ref 23–36.4)
APTT PPP: 120.1 SEC (ref 23–36.4)
ARTERIAL PATENCY WRIST A: ABNORMAL
BASE DEFICIT BLD-SCNC: 3.3 MMOL/L
BASE DEFICIT BLD-SCNC: 3.9 MMOL/L
BASE DEFICIT BLD-SCNC: 7.5 MMOL/L
BASOPHILS # BLD: 0 K/UL (ref 0–0.1)
BASOPHILS NFR BLD: 0 % (ref 0–2)
BDY SITE: ABNORMAL
BODY TEMPERATURE: 95.72
BUN SERPL-MCNC: 27 MG/DL (ref 7–18)
BUN SERPL-MCNC: 28 MG/DL (ref 7–18)
BUN SERPL-MCNC: 28 MG/DL (ref 7–18)
BUN SERPL-MCNC: 29 MG/DL (ref 7–18)
BUN SERPL-MCNC: 32 MG/DL (ref 7–18)
BUN/CREAT SERPL: 21 (ref 12–20)
BUN/CREAT SERPL: 22 (ref 12–20)
BUN/CREAT SERPL: 24 (ref 12–20)
CA-I SERPL-SCNC: 1.24 MMOL/L (ref 1.15–1.33)
CA-I SERPL-SCNC: 1.3 MMOL/L (ref 1.15–1.33)
CA-I SERPL-SCNC: 1.3 MMOL/L (ref 1.15–1.33)
CA-I SERPL-SCNC: 1.33 MMOL/L (ref 1.15–1.33)
CA-I SERPL-SCNC: 1.34 MMOL/L (ref 1.15–1.33)
CA-I SERPL-SCNC: 1.34 MMOL/L (ref 1.15–1.33)
CALCIUM SERPL-MCNC: 8.8 MG/DL (ref 8.5–10.1)
CALCIUM SERPL-MCNC: 9.1 MG/DL (ref 8.5–10.1)
CALCIUM SERPL-MCNC: 9.3 MG/DL (ref 8.5–10.1)
CALCIUM SERPL-MCNC: 9.5 MG/DL (ref 8.5–10.1)
CALCIUM SERPL-MCNC: 9.6 MG/DL (ref 8.5–10.1)
CHLORIDE SERPL-SCNC: 104 MMOL/L (ref 100–111)
CHLORIDE SERPL-SCNC: 105 MMOL/L (ref 100–111)
CHLORIDE SERPL-SCNC: 106 MMOL/L (ref 100–111)
CO2 SERPL-SCNC: 25 MMOL/L (ref 21–32)
CO2 SERPL-SCNC: 26 MMOL/L (ref 21–32)
CO2 SERPL-SCNC: 26 MMOL/L (ref 21–32)
CREAT SERPL-MCNC: 1.25 MG/DL (ref 0.6–1.3)
CREAT SERPL-MCNC: 1.25 MG/DL (ref 0.6–1.3)
CREAT SERPL-MCNC: 1.26 MG/DL (ref 0.6–1.3)
CREAT SERPL-MCNC: 1.3 MG/DL (ref 0.6–1.3)
CREAT SERPL-MCNC: 1.32 MG/DL (ref 0.6–1.3)
DIFFERENTIAL METHOD BLD: ABNORMAL
EOSINOPHIL # BLD: 0 K/UL (ref 0–0.4)
EOSINOPHIL NFR BLD: 0 % (ref 0–5)
ERYTHROCYTE [DISTWIDTH] IN BLOOD BY AUTOMATED COUNT: 14.3 % (ref 11.6–14.5)
GAS FLOW.O2 O2 DELIVERY SYS: ABNORMAL
GAS FLOW.O2 SETTING OXYMISER: 29 BPM
GAS FLOW.O2 SETTING OXYMISER: 32 BPM
GAS FLOW.O2 SETTING OXYMISER: 3232 BPM
GLUCOSE BLD STRIP.AUTO-MCNC: 131 MG/DL (ref 70–110)
GLUCOSE BLD STRIP.AUTO-MCNC: 138 MG/DL (ref 70–110)
GLUCOSE BLD STRIP.AUTO-MCNC: 140 MG/DL (ref 70–110)
GLUCOSE BLD STRIP.AUTO-MCNC: 147 MG/DL (ref 70–110)
GLUCOSE BLD STRIP.AUTO-MCNC: 153 MG/DL (ref 70–110)
GLUCOSE BLD STRIP.AUTO-MCNC: 156 MG/DL (ref 70–110)
GLUCOSE BLD STRIP.AUTO-MCNC: 166 MG/DL (ref 70–110)
GLUCOSE BLD STRIP.AUTO-MCNC: 166 MG/DL (ref 70–110)
GLUCOSE BLD STRIP.AUTO-MCNC: 167 MG/DL (ref 70–110)
GLUCOSE BLD STRIP.AUTO-MCNC: 168 MG/DL (ref 70–110)
GLUCOSE BLD STRIP.AUTO-MCNC: 168 MG/DL (ref 70–110)
GLUCOSE BLD STRIP.AUTO-MCNC: 169 MG/DL (ref 70–110)
GLUCOSE BLD STRIP.AUTO-MCNC: 170 MG/DL (ref 70–110)
GLUCOSE BLD STRIP.AUTO-MCNC: 171 MG/DL (ref 70–110)
GLUCOSE BLD STRIP.AUTO-MCNC: 174 MG/DL (ref 70–110)
GLUCOSE BLD STRIP.AUTO-MCNC: 176 MG/DL (ref 70–110)
GLUCOSE BLD STRIP.AUTO-MCNC: 178 MG/DL (ref 70–110)
GLUCOSE BLD STRIP.AUTO-MCNC: 178 MG/DL (ref 70–110)
GLUCOSE BLD STRIP.AUTO-MCNC: 185 MG/DL (ref 70–110)
GLUCOSE SERPL-MCNC: 155 MG/DL (ref 74–99)
GLUCOSE SERPL-MCNC: 156 MG/DL (ref 74–99)
GLUCOSE SERPL-MCNC: 156 MG/DL (ref 74–99)
GLUCOSE SERPL-MCNC: 167 MG/DL (ref 74–99)
GLUCOSE SERPL-MCNC: 180 MG/DL (ref 74–99)
HCO3 BLD-SCNC: 20.4 MMOL/L (ref 22–26)
HCO3 BLD-SCNC: 23.2 MMOL/L (ref 22–26)
HCO3 BLD-SCNC: 23.3 MMOL/L (ref 22–26)
HCT VFR BLD AUTO: 34.4 % (ref 35–45)
HGB BLD-MCNC: 11.1 G/DL (ref 12–16)
IMM GRANULOCYTES # BLD AUTO: 0 K/UL (ref 0–0.04)
IMM GRANULOCYTES NFR BLD AUTO: 0 % (ref 0–0.5)
IPAP/PIP/HIGH PEEP: 30
LACTATE BLD-SCNC: 2.02 MMOL/L (ref 0.4–2)
LACTATE SERPL-SCNC: 1.6 MMOL/L (ref 0.4–2)
LACTATE SERPL-SCNC: 2 MMOL/L (ref 0.4–2)
LACTATE SERPL-SCNC: 2 MMOL/L (ref 0.4–2)
LYMPHOCYTES # BLD: 1.5 K/UL (ref 0.9–3.6)
LYMPHOCYTES NFR BLD: 4 % (ref 21–52)
MAGNESIUM SERPL-MCNC: 1.8 MG/DL (ref 1.6–2.6)
MAGNESIUM SERPL-MCNC: 1.9 MG/DL (ref 1.6–2.6)
MAGNESIUM SERPL-MCNC: 2 MG/DL (ref 1.6–2.6)
MAGNESIUM SERPL-MCNC: 2.1 MG/DL (ref 1.6–2.6)
MAGNESIUM SERPL-MCNC: 2.3 MG/DL (ref 1.6–2.6)
MAGNESIUM SERPL-MCNC: 2.9 MG/DL (ref 1.6–2.6)
MCH RBC QN AUTO: 28.6 PG (ref 24–34)
MCHC RBC AUTO-ENTMCNC: 32.3 G/DL (ref 31–37)
MCV RBC AUTO: 88.7 FL (ref 78–100)
MONOCYTES # BLD: 1.8 K/UL (ref 0.05–1.2)
MONOCYTES NFR BLD: 5 % (ref 3–10)
NEUTS BAND NFR BLD MANUAL: 1 %
NEUTS SEG # BLD: 33.2 K/UL (ref 1.8–8)
NEUTS SEG NFR BLD: 90 % (ref 40–73)
NRBC # BLD: 1.31 K/UL (ref 0–0.01)
NRBC BLD-RTO: 3.6 PER 100 WBC
O2/TOTAL GAS SETTING VFR VENT: 100 %
PAW @ MEAN EXP FLOW ON VENT: 17 CMH2O
PCO2 BLD: 44.5 MMHG (ref 35–45)
PCO2 BLD: 49.8 MMHG (ref 35–45)
PCO2 BLD: 50.1 MMHG (ref 35–45)
PEEP RESPIRATORY: 10 CMH2O
PH BLD: 7.22 (ref 7.35–7.45)
PH BLD: 7.28 (ref 7.35–7.45)
PH BLD: 7.32 (ref 7.35–7.45)
PHOSPHATE SERPL-MCNC: 1.4 MG/DL (ref 2.5–4.9)
PHOSPHATE SERPL-MCNC: 1.6 MG/DL (ref 2.5–4.9)
PHOSPHATE SERPL-MCNC: 1.8 MG/DL (ref 2.5–4.9)
PHOSPHATE SERPL-MCNC: 2.4 MG/DL (ref 2.5–4.9)
PHOSPHATE SERPL-MCNC: 3.1 MG/DL (ref 2.5–4.9)
PLATELET # BLD AUTO: 255 K/UL (ref 135–420)
PLATELET COMMENT: ABNORMAL
PMV BLD AUTO: 12.4 FL (ref 9.2–11.8)
PO2 BLD: 61 MMHG (ref 80–100)
PO2 BLD: 82 MMHG (ref 80–100)
PO2 BLD: 90 MMHG (ref 80–100)
POTASSIUM SERPL-SCNC: 3.3 MMOL/L (ref 3.5–5.5)
POTASSIUM SERPL-SCNC: 3.5 MMOL/L (ref 3.5–5.5)
POTASSIUM SERPL-SCNC: 3.5 MMOL/L (ref 3.5–5.5)
POTASSIUM SERPL-SCNC: 3.6 MMOL/L (ref 3.5–5.5)
POTASSIUM SERPL-SCNC: 3.6 MMOL/L (ref 3.5–5.5)
RBC # BLD AUTO: 3.88 M/UL (ref 4.2–5.3)
RBC MORPH BLD: ABNORMAL
RBC MORPH BLD: ABNORMAL
RESPIRATORY RATE, POC: 31 (ref 5–40)
RESPIRATORY RATE, POC: 32 (ref 5–40)
RESPIRATORY RATE, POC: 32 (ref 5–40)
SAO2 % BLD: 85.5 % (ref 92–97)
SAO2 % BLD: 94.3 % (ref 92–97)
SAO2 % BLD: 96.9 % (ref 92–97)
SERVICE CMNT-IMP: ABNORMAL
SODIUM SERPL-SCNC: 135 MMOL/L (ref 136–145)
SODIUM SERPL-SCNC: 136 MMOL/L (ref 136–145)
SPECIMEN TYPE: ABNORMAL
VENTILATION MODE VENT: ABNORMAL
VT SETTING VENT: 400 ML
WBC # BLD AUTO: 36.5 K/UL (ref 4.6–13.2)

## 2023-12-18 PROCEDURE — 6360000002 HC RX W HCPCS: Performed by: INTERNAL MEDICINE

## 2023-12-18 PROCEDURE — 6360000002 HC RX W HCPCS: Performed by: PHYSICIAN ASSISTANT

## 2023-12-18 PROCEDURE — 87205 SMEAR GRAM STAIN: CPT

## 2023-12-18 PROCEDURE — 94003 VENT MGMT INPAT SUBQ DAY: CPT

## 2023-12-18 PROCEDURE — 2580000003 HC RX 258: Performed by: INTERNAL MEDICINE

## 2023-12-18 PROCEDURE — 82803 BLOOD GASES ANY COMBINATION: CPT

## 2023-12-18 PROCEDURE — 89220 SPUTUM SPECIMEN COLLECTION: CPT

## 2023-12-18 PROCEDURE — 99232 SBSQ HOSP IP/OBS MODERATE 35: CPT | Performed by: INTERNAL MEDICINE

## 2023-12-18 PROCEDURE — 87070 CULTURE OTHR SPECIMN AEROBIC: CPT

## 2023-12-18 PROCEDURE — 83605 ASSAY OF LACTIC ACID: CPT

## 2023-12-18 PROCEDURE — 6370000000 HC RX 637 (ALT 250 FOR IP): Performed by: PHYSICIAN ASSISTANT

## 2023-12-18 PROCEDURE — P9047 ALBUMIN (HUMAN), 25%, 50ML: HCPCS | Performed by: INTERNAL MEDICINE

## 2023-12-18 PROCEDURE — 85730 THROMBOPLASTIN TIME PARTIAL: CPT

## 2023-12-18 PROCEDURE — 2580000003 HC RX 258: Performed by: REGISTERED NURSE

## 2023-12-18 PROCEDURE — A4216 STERILE WATER/SALINE, 10 ML: HCPCS | Performed by: PHYSICIAN ASSISTANT

## 2023-12-18 PROCEDURE — 84100 ASSAY OF PHOSPHORUS: CPT

## 2023-12-18 PROCEDURE — 2500000003 HC RX 250 WO HCPCS: Performed by: INTERNAL MEDICINE

## 2023-12-18 PROCEDURE — 74018 RADEX ABDOMEN 1 VIEW: CPT

## 2023-12-18 PROCEDURE — 90945 DIALYSIS ONE EVALUATION: CPT

## 2023-12-18 PROCEDURE — 85025 COMPLETE CBC W/AUTO DIFF WBC: CPT

## 2023-12-18 PROCEDURE — 6370000000 HC RX 637 (ALT 250 FOR IP): Performed by: INTERNAL MEDICINE

## 2023-12-18 PROCEDURE — 99291 CRITICAL CARE FIRST HOUR: CPT | Performed by: INTERNAL MEDICINE

## 2023-12-18 PROCEDURE — 83735 ASSAY OF MAGNESIUM: CPT

## 2023-12-18 PROCEDURE — 37799 UNLISTED PX VASCULAR SURGERY: CPT

## 2023-12-18 PROCEDURE — 36415 COLL VENOUS BLD VENIPUNCTURE: CPT

## 2023-12-18 PROCEDURE — 2580000003 HC RX 258: Performed by: PHYSICIAN ASSISTANT

## 2023-12-18 PROCEDURE — 6360000002 HC RX W HCPCS: Performed by: REGISTERED NURSE

## 2023-12-18 PROCEDURE — 82330 ASSAY OF CALCIUM: CPT

## 2023-12-18 PROCEDURE — 82962 GLUCOSE BLOOD TEST: CPT

## 2023-12-18 PROCEDURE — 80048 BASIC METABOLIC PNL TOTAL CA: CPT

## 2023-12-18 PROCEDURE — 99232 SBSQ HOSP IP/OBS MODERATE 35: CPT

## 2023-12-18 PROCEDURE — 2500000003 HC RX 250 WO HCPCS: Performed by: NURSE PRACTITIONER

## 2023-12-18 PROCEDURE — 87040 BLOOD CULTURE FOR BACTERIA: CPT

## 2023-12-18 PROCEDURE — 2500000003 HC RX 250 WO HCPCS: Performed by: PHYSICIAN ASSISTANT

## 2023-12-18 PROCEDURE — 71045 X-RAY EXAM CHEST 1 VIEW: CPT

## 2023-12-18 PROCEDURE — 2500000003 HC RX 250 WO HCPCS: Performed by: REGISTERED NURSE

## 2023-12-18 PROCEDURE — 2000000000 HC ICU R&B

## 2023-12-18 RX ORDER — MAGNESIUM SULFATE IN WATER 40 MG/ML
2000 INJECTION, SOLUTION INTRAVENOUS ONCE
Status: COMPLETED | OUTPATIENT
Start: 2023-12-18 | End: 2023-12-18

## 2023-12-18 RX ORDER — ALBUMIN (HUMAN) 12.5 G/50ML
25 SOLUTION INTRAVENOUS
Status: COMPLETED | OUTPATIENT
Start: 2023-12-18 | End: 2023-12-18

## 2023-12-18 RX ORDER — MAGNESIUM SULFATE IN WATER 40 MG/ML
2000 INJECTION, SOLUTION INTRAVENOUS ONCE
Status: COMPLETED | OUTPATIENT
Start: 2023-12-19 | End: 2023-12-19

## 2023-12-18 RX ORDER — METRONIDAZOLE 500 MG/100ML
500 INJECTION, SOLUTION INTRAVENOUS EVERY 8 HOURS
Status: DISCONTINUED | OUTPATIENT
Start: 2023-12-18 | End: 2023-12-18

## 2023-12-18 RX ADMIN — POLYVINYL ALCOHOL, POVIDONE 1 DROP: 14; 6 SOLUTION/ DROPS OPHTHALMIC at 12:06

## 2023-12-18 RX ADMIN — INSULIN HUMAN 5.78 UNITS/HR: 1 INJECTION, SOLUTION INTRAVENOUS at 21:06

## 2023-12-18 RX ADMIN — POLYVINYL ALCOHOL, POVIDONE 1 DROP: 14; 6 SOLUTION/ DROPS OPHTHALMIC at 04:23

## 2023-12-18 RX ADMIN — FENTANYL CITRATE 300 MCG/HR: at 06:49

## 2023-12-18 RX ADMIN — CALCIUM CHLORIDE, MAGNESIUM CHLORIDE, DEXTROSE MONOHYDRATE, LACTIC ACID, SODIUM CHLORIDE, SODIUM BICARBONATE AND POTASSIUM CHLORIDE: 5.15; 2.03; 22; 5.4; 6.46; 3.09; .157 INJECTION INTRAVENOUS at 03:31

## 2023-12-18 RX ADMIN — ROCURONIUM BROMIDE 56 MG: 10 INJECTION, SOLUTION INTRAVENOUS at 00:01

## 2023-12-18 RX ADMIN — MAGNESIUM SULFATE HEPTAHYDRATE 2000 MG: 40 INJECTION, SOLUTION INTRAVENOUS at 04:03

## 2023-12-18 RX ADMIN — CALCIUM CHLORIDE, MAGNESIUM CHLORIDE, DEXTROSE MONOHYDRATE, LACTIC ACID, SODIUM CHLORIDE, SODIUM BICARBONATE AND POTASSIUM CHLORIDE: 5.15; 2.03; 22; 5.4; 6.46; 3.09; .157 INJECTION INTRAVENOUS at 19:45

## 2023-12-18 RX ADMIN — HYDROCORTISONE SODIUM SUCCINATE 50 MG: 100 INJECTION, POWDER, FOR SOLUTION INTRAMUSCULAR; INTRAVENOUS at 04:23

## 2023-12-18 RX ADMIN — LEUCINE, PHENYLALANINE, LYSINE, METHIONINE, ISOLEUCINE, VALINE, HISTIDINE, THREONINE, TRYPTOPHAN, ALANINE, GLYCINE, ARGININE, PROLINE, SERINE, TYROSINE, DEXTROSE: 584; 448; 464; 320; 480; 464; 384; 336; 144; 1656; 824; 920; 544; 400; 32; 10 INJECTION INTRAVENOUS at 21:40

## 2023-12-18 RX ADMIN — Medication 20 MG/HR: at 13:34

## 2023-12-18 RX ADMIN — CALCIUM CHLORIDE, MAGNESIUM CHLORIDE, DEXTROSE MONOHYDRATE, LACTIC ACID, SODIUM CHLORIDE, SODIUM BICARBONATE AND POTASSIUM CHLORIDE: 5.15; 2.03; 22; 5.4; 6.46; 3.09; .157 INJECTION INTRAVENOUS at 19:46

## 2023-12-18 RX ADMIN — POLYVINYL ALCOHOL, POVIDONE 1 DROP: 14; 6 SOLUTION/ DROPS OPHTHALMIC at 21:40

## 2023-12-18 RX ADMIN — METRONIDAZOLE 500 MG: 500 INJECTION, SOLUTION INTRAVENOUS at 06:26

## 2023-12-18 RX ADMIN — ALBUMIN (HUMAN) 25 G: 0.25 INJECTION, SOLUTION INTRAVENOUS at 11:33

## 2023-12-18 RX ADMIN — POTASSIUM PHOSPHATES 15 MMOL: 236; 224 INJECTION, SOLUTION INTRAVENOUS at 17:31

## 2023-12-18 RX ADMIN — FENTANYL CITRATE 300 MCG/HR: at 10:17

## 2023-12-18 RX ADMIN — AMIODARONE HYDROCHLORIDE 0.5 MG/MIN: 1.8 INJECTION, SOLUTION INTRAVENOUS at 20:24

## 2023-12-18 RX ADMIN — POLYVINYL ALCOHOL, POVIDONE 1 DROP: 14; 6 SOLUTION/ DROPS OPHTHALMIC at 16:11

## 2023-12-18 RX ADMIN — CISATRACURIUM BESYLATE 0.5 MCG/KG/MIN: 2 INJECTION, SOLUTION INTRAVENOUS at 00:06

## 2023-12-18 RX ADMIN — INSULIN HUMAN 3.43 UNITS/HR: 1 INJECTION, SOLUTION INTRAVENOUS at 02:35

## 2023-12-18 RX ADMIN — FENTANYL CITRATE 300 MCG/HR: at 18:18

## 2023-12-18 RX ADMIN — ALBUMIN (HUMAN) 25 G: 0.25 INJECTION, SOLUTION INTRAVENOUS at 12:00

## 2023-12-18 RX ADMIN — SODIUM CHLORIDE 0.35 MG/KG/HR: 9 INJECTION, SOLUTION INTRAVENOUS at 12:50

## 2023-12-18 RX ADMIN — FENTANYL CITRATE 300 MCG/HR: at 14:32

## 2023-12-18 RX ADMIN — Medication 1 MG: at 08:35

## 2023-12-18 RX ADMIN — MICAFUNGIN SODIUM 100 MG: 100 INJECTION, POWDER, LYOPHILIZED, FOR SOLUTION INTRAVENOUS at 12:53

## 2023-12-18 RX ADMIN — MEROPENEM 1000 MG: 1 INJECTION, POWDER, FOR SOLUTION INTRAVENOUS at 15:09

## 2023-12-18 RX ADMIN — POLYVINYL ALCOHOL, POVIDONE 1 DROP: 14; 6 SOLUTION/ DROPS OPHTHALMIC at 08:24

## 2023-12-18 RX ADMIN — SODIUM CHLORIDE, PRESERVATIVE FREE 10 ML: 5 INJECTION INTRAVENOUS at 21:30

## 2023-12-18 RX ADMIN — HYDROCORTISONE SODIUM SUCCINATE 50 MG: 100 INJECTION, POWDER, FOR SOLUTION INTRAMUSCULAR; INTRAVENOUS at 22:17

## 2023-12-18 RX ADMIN — SODIUM CHLORIDE, PRESERVATIVE FREE 10 ML: 5 INJECTION INTRAVENOUS at 08:19

## 2023-12-18 RX ADMIN — INSULIN HUMAN 5.78 UNITS/HR: 1 INJECTION, SOLUTION INTRAVENOUS at 18:59

## 2023-12-18 RX ADMIN — Medication 20 MG/HR: at 23:38

## 2023-12-18 RX ADMIN — HYDROCORTISONE SODIUM SUCCINATE 50 MG: 100 INJECTION, POWDER, FOR SOLUTION INTRAMUSCULAR; INTRAVENOUS at 16:10

## 2023-12-18 RX ADMIN — CALCIUM CHLORIDE, MAGNESIUM CHLORIDE, DEXTROSE MONOHYDRATE, LACTIC ACID, SODIUM CHLORIDE, SODIUM BICARBONATE AND POTASSIUM CHLORIDE: 5.15; 2.03; 22; 5.4; 6.46; 3.09; .157 INJECTION INTRAVENOUS at 03:30

## 2023-12-18 RX ADMIN — CALCIUM CHLORIDE, MAGNESIUM CHLORIDE, DEXTROSE MONOHYDRATE, LACTIC ACID, SODIUM CHLORIDE, SODIUM BICARBONATE AND POTASSIUM CHLORIDE: 5.15; 2.03; 22; 5.4; 6.46; 3.09; .157 INJECTION INTRAVENOUS at 19:47

## 2023-12-18 RX ADMIN — HYDROCORTISONE SODIUM SUCCINATE 50 MG: 100 INJECTION, POWDER, FOR SOLUTION INTRAMUSCULAR; INTRAVENOUS at 10:09

## 2023-12-18 RX ADMIN — FAMOTIDINE 20 MG: 10 INJECTION INTRAVENOUS at 08:27

## 2023-12-18 RX ADMIN — AMIODARONE HYDROCHLORIDE 0.5 MG/MIN: 1.8 INJECTION, SOLUTION INTRAVENOUS at 07:33

## 2023-12-18 RX ADMIN — 0.12% CHLORHEXIDINE GLUCONATE 15 ML: 1.2 RINSE ORAL at 21:30

## 2023-12-18 RX ADMIN — Medication 20 MG/HR: at 04:47

## 2023-12-18 RX ADMIN — MEROPENEM 1000 MG: 1 INJECTION INTRAVENOUS at 03:27

## 2023-12-18 RX ADMIN — POLYVINYL ALCOHOL, POVIDONE 1 DROP: 14; 6 SOLUTION/ DROPS OPHTHALMIC at 00:00

## 2023-12-18 RX ADMIN — Medication 20 MG/HR: at 09:15

## 2023-12-18 RX ADMIN — INSULIN HUMAN 6.27 UNITS/HR: 1 INJECTION, SOLUTION INTRAVENOUS at 11:13

## 2023-12-18 RX ADMIN — 0.12% CHLORHEXIDINE GLUCONATE 15 ML: 1.2 RINSE ORAL at 08:24

## 2023-12-18 RX ADMIN — Medication 20 MG/HR: at 18:20

## 2023-12-18 RX ADMIN — FENTANYL CITRATE 300 MCG/HR: at 21:41

## 2023-12-18 RX ADMIN — CISATRACURIUM BESYLATE 1.5 MCG/KG/MIN: 2 INJECTION, SOLUTION INTRAVENOUS at 20:25

## 2023-12-18 RX ADMIN — FENTANYL CITRATE 300 MCG/HR: at 03:13

## 2023-12-18 RX ADMIN — VASOPRESSIN 0.03 UNITS/MIN: 20 INJECTION INTRAVENOUS at 08:41

## 2023-12-18 RX ADMIN — HEPARIN SODIUM 10 UNITS/KG/HR: 10000 INJECTION, SOLUTION INTRAVENOUS at 23:06

## 2023-12-18 RX ADMIN — THIAMINE HYDROCHLORIDE 200 MG: 100 INJECTION, SOLUTION INTRAMUSCULAR; INTRAVENOUS at 08:25

## 2023-12-18 RX ADMIN — VASOPRESSIN 0.03 UNITS/MIN: 20 INJECTION INTRAVENOUS at 18:16

## 2023-12-18 ASSESSMENT — PULMONARY FUNCTION TESTS
PIF_VALUE: 29
PIF_VALUE: 27
PIF_VALUE: 30
PIF_VALUE: 29
PIF_VALUE: 29
PIF_VALUE: 28

## 2023-12-18 NOTE — PROGRESS NOTES
Latest Reference Range & Units 12/18/23 11:00   POC pH 7.35 - 7.45   7.32 (L)   POC pCO2 35.0 - 45.0 MMHG 44.5   POC PO2 80 - 100 MMHG 90   POC HCO3 22 - 26 MMOL/L 23.3   POC O2 SAT 92 - 97 % 96.9   POC Steve's Test -   NOT APPLICABLE   POC TIDAL VOLUME ml 400   FIO2 % 100   BASE DEFICIT (POC) mmol/L 3.3   POC PEEP cmH2O 10   (L): Data is abnormally low    ABG obtained from line. Dr. Josette Villanueva notified. No changes at this time.

## 2023-12-18 NOTE — PROGRESS NOTES
ABG Results    pH 7.219    PCO2 50.1    PO2  61.2    HCO3-  20.4    BE -7.5      CAIO Perkins informed of critical results no new orders a this time.

## 2023-12-18 NOTE — CONSULTS
Infectious Disease Consultation Note        Reason: septic shock, influenza A pneumonia, ARDS    Current abx Prior abx   Meropenem since 12/18  Vancomycin since 12/8 Pip/tazo 12/11-12/18  Metronidazole on 12/18  Levofloxacin 12/8-12/11  Cefepime on 12/11  Azithromycin 12/4-12/9, 1211-12/13         Lines:       Assessment :     74 y.o. female with PMHX of CAD s/p PCI 2014, severe mitral valve regurgitation,  hypothyroidism, dyslipidemia,  presented to the ED on 12/8 with cc of nausea, vomiting, fevers, chills, non-productive cough, shortness of breath. Positive influenza A testing on 12/5/23    Clinical presentation consistent with acute hypoxic respiratory failure-likely ARDS in setting of influenza A pneumonia    Acute kidney injury-initiated on CRRT on 12/14.?  ATN. Nephrology follow-up appreciated    Shock-likely septic shock    Worsening leukocytosis-likely due to steroids, sepsis-rule out fungemia. Rule out pneumonia with ESBL gram-negative's    Worsening oxygenation/increased pressor requirement noted overnight concerning for sepsis-no bowel movement noted for several days per RN report. Rule out undiagnosed abdominal pathology    Recommendations:    Continue meropenem, vancomycin for now add micafungin  Send Fungitell  Obtain sputum culture, blood culture, XR abdomen  Weaning from vent per ICU team  Wean pressors as tolerated  Monitor CBC, temperature, clinically    Thank you for consultation request. Above plan was discussed in details with dr. Michael Hwang. Cc time spent > 40 min. Please call me if any further questions or concerns. Will continue to participate in the care of this patient. HPI:     74 y.o. female with PMHX of CAD s/p PCI 2014, severe mitral valve regurgitation,  hypothyroidism, dyslipidemia,  presented to the ED on 12/8 with cc of nausea, vomiting, fevers, chills, non-productive cough, shortness of breath. Pt tested positive for Influenza on 12/5.  Work-up significant for hypokalemia,

## 2023-12-18 NOTE — INTERDISCIPLINARY ROUNDS
[facility]  Pulmonary, Critical Care, and Sleep Medicine  Interdisciplinary and Ventilator Weaning Rounds    Patient discussed in morning walking rounds and interdisciplinary rounds. ICU admission day: admitted 12/11    Overnight events:   No significant events overnight     Assessments and best practice:  Ventilator  Ventilator Day(s): 4  Vent Settings  FiO2 : 75 %  Resp Rate (Set): (S) (P) 29 bpm (Increased following ABG)  Vt (Set, mL): 400 mL  PEEP/CPAP (cmH2O): (S) (P) 10 (Decreased following ABG. PEEP lowered to increase potential delta P to improve ventilation while weaning for PO2 104)  Peak Inspiratory Flow (lpm): 32 L/sec  Insp Time (sec): 0.6 sec  Insp Rise Time (%): 60 %  Flow Sensitivity: 3 L/min  Disconnect Sensitivity (%): 75 %   Glycemic control  Diet  Diet NPO  ADULT TUBE FEEDING; Orogastric; Peptide Based High Protein; Continuous; 15; No; 30; Q 6 hours  PN-Adult Premix 8/10 - Custom Electrolytes - Central Line  Stress ulcer prophylaxis. Protonix  DVT prophylaxis. Heparin gtt  Need for Lines, pratt assessed. Yes  Restraint Reevaluation   N/a  Disposition regarding transferring out of the ICU  RED    Family contact/MPOA: Jon Rodney  Family updated: updated yesterday evening, will continue to update today      Palliative consult within 3 days of admission to ICU-  Ethics Guidance: 21 days    Daily Plans:    -Repeat ABG  -increase TPN.  Stop enteral feeds  -ARDS net   -D/C Flagyl        VELASQUEZ time 10 minutes        SUSANA Tomlinson - NP   Pulmonary, Critical Care Medicine  [facility]

## 2023-12-18 NOTE — PROGRESS NOTES
CRRT rounding completed. Primary RN LINDSEY Hickman at bedside. Patient tolerating treatment well. TMP @116. Cartridge last changed on 8/15/23 @2000, CRRT cartridge due to be changed today at 8pm.  No additional supplies needed at this time as supply cart currently has 5 boxes of fluid and 3 effluent bags.

## 2023-12-18 NOTE — PROGRESS NOTES
RENAL DAILY PROGRESS NOTE  73y F with PMH CAD, Mitral regugitaion, admitted to ICU for severe hypoxia and multiorgan failure following for renal failure  Subjective:       Complaint:   Overnight events noted  She was hypoxic overnight and blood pressures was dropping down.  Her sedation has been adjusted, started on paralytics.  Her leucocytosis is getting worst.   Overnight her ultrafiltrate was discontinued because of hypotension.  This morning her Levophed requirement is higher compared to yesterday.  She had elevated lactate which is improving this morning.      IMPRESSION:   ALIREZA in setting of hypoxic respiratory failure. Due to ATN ?( Start CRRT on 12/14)  Access temp HD catheter   Hypoxic res failure / ARDS/pneumonia   Shock, septic, with multiorgan failure  Severe MR  Hx CAD, new onset afib with rvr   PLAN:   She remains critically ill, has been receiving significant IV infusion and medications, her urine output remained poor.  Plan to continue CRRT for better volume management.   Her blood pressure is dropping, would like to try assess for volume responsiveness.  Plan to hold ultrafiltrate in next 4 hours and give her albumin to see any change in vasopressor requirement.  Discussed with nursing staff.  Once clinically more stabilized plan to resume UF as tolerated.  Adjust meds per pharmacy colleague.         Discussed with ICU team.     Current Facility-Administered Medications   Medication Dose Route Frequency    meropenem (MERREM) 1,000 mg in sodium chloride 0.9 % 100 mL IVPB (mini-bag)  1,000 mg IntraVENous Q12H    vancomycin (VANCOCIN) intermittent dosing (placeholder)   Other RX Placeholder    PN-Adult Premix 8/10 - Custom Electrolytes - Central Line   IntraVENous Continuous TPN    albumin human 25% IV solution 25 g  25 g IntraVENous Q1H    micafungin (MYCAMINE) 100 mg in sodium chloride 0.9 % 100 mL IVPB  100 mg IntraVENous Daily    amiodarone (NEXTERONE) 360 mg in dextrose 5% 200 ml  0.5 mg/min  Procedures/imaging: see electronic medical records for all procedures, Xrays and details which were not copied into this note but were reviewed prior to creation of Plan          Assessment & Plan:     See above        Carolyn Hernandez MD  12/18/2023  11:12 AM

## 2023-12-18 NOTE — PROGRESS NOTES
PCCM Update:    Patient noted to have vent dyssynchrony and associated hypoxia with worsening hypotension. Pt now maxed on Fentanyl 300mcg/hr and versed 20mg/hr. Levophed quickly uptitrated to 35 mcg. Will add ketamine gtt and vasopressin. RN informed to stop pulling on CRRT. Albumin also ordered. Repeat lactic acid noted to be elevated - 4.3. Repeat ABG reveals mixed respiratory and metabolic acidosis. Plan to administer 1 amp of bicarb and paralyze patient with nimbex. CXR pending - will reveal abx pending results. Dr. Vahid Suarez notified.     Lillian Duarte PA-C  12/17/23  Pulmonary, Critical Care Medicine  [facility]

## 2023-12-18 NOTE — CARE COORDINATION
12/18/23  SW reviewed chart. Vent dyssynchrony, hypoxia, hypotension noted over the weekend. Patient continues to require intubation/vent support. CRRT was completed on 12/17. IV Abx continued and patient remains NPO with parenteral nutrition. Not medically ready for discharge/stepdown. SW continues monitoring clinical status today for disposition planning. 12/14/23  SW reviewed patient's chart. PT/OT recommended patient ambulate with DME walker at discharge and ARU. Patient was functioning independently without DME prior to hospitalization. Patient was observed with tachypnea and respiratory distress which led to intubation on 12/13/23. Temporary HD catheter placed 12/14. Patient continues to require ICU level of care at this time. Not medically ready or stepdown. SW is following clinical status for disposition planning.       Crys Gaston LMSW   Case Management

## 2023-12-18 NOTE — PROGRESS NOTES
Comprehensive Nutrition Assessment    Type and Reason for Visit:  Reassess, Consult, NPO/Clear Liquid    Nutrition Recommendations/Plan:   D/C TF order. Provide parenteral nutrition using standard premixed product of Clinimix AA8/D10 until patient is able to tolerate adequate enteral nutrition. Advance PN to start at 20:00 tonight. See below for order contents/details. Lipids provided Monday, Wednesday, and Friday only  MVI/TE provided Tuesday, Thursday, and Saturday due to shortage  Continue to monitor tolerance of PN, weight, labs, and plan of care during admission. Parenteral Nutrition Order:   Current PN:  Next PN:       Malnutrition Assessment:  Malnutrition Status: At risk for malnutrition (Comment) (NPO, vent, ARDS/proning, CRRT, TPN) (12/18/23 1013)    Context:  Acute Illness       Nutrition Assessment:    Admitted with c/o n/v, fevers, cough, SOB. +influenza 12/5. Pt placed on BIPAP 12/10 and transferred to ICU due to high risk for intubation. Pt initially on PO diet then made NPO while on BIPAP. NPO since ICU admit x 3 days. PICC placed 12/13, initiated on TPN. Noted s/p intubation 12/13. Pt continues on insulin drip. Discussed care during interdisciplinary rounds. Per RN, pt increased in pressor requirements, feeds were held. Pt previously been proned. Continues on CRRT, plan to increase fluid removal. Plan to d/c TF order. Discussed to meet protein, TPN rate increased to 50 ml/hr. Lytes replacement per MD. Will hold on lipids. Plan to order TPN Clinimix AA8/D10 @ 50 ml/hr without lytes, PN only provides 49% of estimated kcal, 100% of estimated protein needs.     Nutrition Related Findings:    Last BM (including prior to admit):  (unknown date)  Edema: Generalized  Edema Generalized: +2 Pertinent Meds: pepcid, solucortef, thiamine, virt-caps, amiodarone gtt, nimbex gtt, fentanyl gtt, insulin drip, ketamine gtt, levo @ 21 mcg/min, vaso @ 0.03 units/min  Pertinent Labs: POC Glucose 147-170

## 2023-12-19 ENCOUNTER — APPOINTMENT (OUTPATIENT)
Facility: HOSPITAL | Age: 73
DRG: 870 | End: 2023-12-19
Payer: MEDICARE

## 2023-12-19 LAB
ANION GAP SERPL CALC-SCNC: 5 MMOL/L (ref 3–18)
ANION GAP SERPL CALC-SCNC: 6 MMOL/L (ref 3–18)
ANION GAP SERPL CALC-SCNC: 6 MMOL/L (ref 3–18)
APTT PPP: 104.3 SEC (ref 23–36.4)
APTT PPP: 97.5 SEC (ref 23–36.4)
ARTERIAL PATENCY WRIST A: ABNORMAL
BASE DEFICIT BLD-SCNC: 0.5 MMOL/L
BASE DEFICIT BLD-SCNC: 1.9 MMOL/L
BASE DEFICIT BLD-SCNC: 3.3 MMOL/L
BASE DEFICIT BLD-SCNC: 3.4 MMOL/L
BASOPHILS # BLD: 0 K/UL (ref 0–0.1)
BASOPHILS NFR BLD: 0 % (ref 0–2)
BDY SITE: ABNORMAL
BUN SERPL-MCNC: 31 MG/DL (ref 7–18)
BUN SERPL-MCNC: 33 MG/DL (ref 7–18)
BUN SERPL-MCNC: 34 MG/DL (ref 7–18)
BUN SERPL-MCNC: 36 MG/DL (ref 7–18)
BUN SERPL-MCNC: 36 MG/DL (ref 7–18)
BUN/CREAT SERPL: 27 (ref 12–20)
BUN/CREAT SERPL: 27 (ref 12–20)
BUN/CREAT SERPL: 29 (ref 12–20)
BUN/CREAT SERPL: 30 (ref 12–20)
BUN/CREAT SERPL: 35 (ref 12–20)
CA-I SERPL-SCNC: 1.31 MMOL/L (ref 1.15–1.33)
CA-I SERPL-SCNC: 1.33 MMOL/L (ref 1.15–1.33)
CA-I SERPL-SCNC: 1.36 MMOL/L (ref 1.15–1.33)
CA-I SERPL-SCNC: 1.37 MMOL/L (ref 1.15–1.33)
CA-I SERPL-SCNC: 1.38 MMOL/L (ref 1.15–1.33)
CA-I SERPL-SCNC: 1.39 MMOL/L (ref 1.15–1.33)
CALCIUM SERPL-MCNC: 8.8 MG/DL (ref 8.5–10.1)
CALCIUM SERPL-MCNC: 8.8 MG/DL (ref 8.5–10.1)
CALCIUM SERPL-MCNC: 8.9 MG/DL (ref 8.5–10.1)
CALCIUM SERPL-MCNC: 9 MG/DL (ref 8.5–10.1)
CALCIUM SERPL-MCNC: 9.1 MG/DL (ref 8.5–10.1)
CHLORIDE SERPL-SCNC: 104 MMOL/L (ref 100–111)
CHLORIDE SERPL-SCNC: 105 MMOL/L (ref 100–111)
CO2 SERPL-SCNC: 25 MMOL/L (ref 21–32)
CO2 SERPL-SCNC: 25 MMOL/L (ref 21–32)
CO2 SERPL-SCNC: 26 MMOL/L (ref 21–32)
CREAT SERPL-MCNC: 1.02 MG/DL (ref 0.6–1.3)
CREAT SERPL-MCNC: 1.15 MG/DL (ref 0.6–1.3)
CREAT SERPL-MCNC: 1.16 MG/DL (ref 0.6–1.3)
CREAT SERPL-MCNC: 1.21 MG/DL (ref 0.6–1.3)
CREAT SERPL-MCNC: 1.24 MG/DL (ref 0.6–1.3)
DIFFERENTIAL METHOD BLD: ABNORMAL
EOSINOPHIL # BLD: 0 K/UL (ref 0–0.4)
EOSINOPHIL NFR BLD: 0 % (ref 0–5)
ERYTHROCYTE [DISTWIDTH] IN BLOOD BY AUTOMATED COUNT: 14.2 % (ref 11.6–14.5)
GAS FLOW.O2 O2 DELIVERY SYS: ABNORMAL
GAS FLOW.O2 SETTING OXYMISER: 29 BPM
GAS FLOW.O2 SETTING OXYMISER: 32 BPM
GLUCOSE BLD STRIP.AUTO-MCNC: 136 MG/DL (ref 70–110)
GLUCOSE BLD STRIP.AUTO-MCNC: 142 MG/DL (ref 70–110)
GLUCOSE BLD STRIP.AUTO-MCNC: 143 MG/DL (ref 70–110)
GLUCOSE BLD STRIP.AUTO-MCNC: 144 MG/DL (ref 70–110)
GLUCOSE BLD STRIP.AUTO-MCNC: 146 MG/DL (ref 70–110)
GLUCOSE BLD STRIP.AUTO-MCNC: 146 MG/DL (ref 70–110)
GLUCOSE BLD STRIP.AUTO-MCNC: 147 MG/DL (ref 70–110)
GLUCOSE BLD STRIP.AUTO-MCNC: 149 MG/DL (ref 70–110)
GLUCOSE BLD STRIP.AUTO-MCNC: 150 MG/DL (ref 70–110)
GLUCOSE BLD STRIP.AUTO-MCNC: 151 MG/DL (ref 70–110)
GLUCOSE BLD STRIP.AUTO-MCNC: 151 MG/DL (ref 70–110)
GLUCOSE BLD STRIP.AUTO-MCNC: 153 MG/DL (ref 70–110)
GLUCOSE BLD STRIP.AUTO-MCNC: 162 MG/DL (ref 70–110)
GLUCOSE BLD STRIP.AUTO-MCNC: 168 MG/DL (ref 70–110)
GLUCOSE BLD STRIP.AUTO-MCNC: 170 MG/DL (ref 70–110)
GLUCOSE BLD STRIP.AUTO-MCNC: 171 MG/DL (ref 70–110)
GLUCOSE BLD STRIP.AUTO-MCNC: 171 MG/DL (ref 70–110)
GLUCOSE BLD STRIP.AUTO-MCNC: 173 MG/DL (ref 70–110)
GLUCOSE BLD STRIP.AUTO-MCNC: 174 MG/DL (ref 70–110)
GLUCOSE BLD STRIP.AUTO-MCNC: 174 MG/DL (ref 70–110)
GLUCOSE BLD STRIP.AUTO-MCNC: 175 MG/DL (ref 70–110)
GLUCOSE BLD STRIP.AUTO-MCNC: 175 MG/DL (ref 70–110)
GLUCOSE BLD STRIP.AUTO-MCNC: 176 MG/DL (ref 70–110)
GLUCOSE BLD STRIP.AUTO-MCNC: 176 MG/DL (ref 70–110)
GLUCOSE BLD STRIP.AUTO-MCNC: 182 MG/DL (ref 70–110)
GLUCOSE SERPL-MCNC: 138 MG/DL (ref 74–99)
GLUCOSE SERPL-MCNC: 144 MG/DL (ref 74–99)
GLUCOSE SERPL-MCNC: 153 MG/DL (ref 74–99)
GLUCOSE SERPL-MCNC: 166 MG/DL (ref 74–99)
GLUCOSE SERPL-MCNC: 167 MG/DL (ref 74–99)
HCO3 BLD-SCNC: 23.2 MMOL/L (ref 22–26)
HCO3 BLD-SCNC: 24.2 MMOL/L (ref 22–26)
HCO3 BLD-SCNC: 24.8 MMOL/L (ref 22–26)
HCO3 BLD-SCNC: 24.9 MMOL/L (ref 22–26)
HCT VFR BLD AUTO: 22.9 % (ref 35–45)
HGB BLD-MCNC: 7.4 G/DL (ref 12–16)
IMM GRANULOCYTES # BLD AUTO: 0 K/UL (ref 0–0.04)
IMM GRANULOCYTES NFR BLD AUTO: 0 % (ref 0–0.5)
INSPIRATION.DURATION SETTING TIME VENT: 0.6 SEC
IPAP/PIP/HIGH PEEP: 28
LACTATE BLD-SCNC: 1.89 MMOL/L (ref 0.4–2)
LACTATE BLD-SCNC: 2.46 MMOL/L (ref 0.4–2)
LYMPHOCYTES # BLD: 1.3 K/UL (ref 0.9–3.6)
LYMPHOCYTES NFR BLD: 5 % (ref 21–52)
MAGNESIUM SERPL-MCNC: 1.9 MG/DL (ref 1.6–2.6)
MAGNESIUM SERPL-MCNC: 2 MG/DL (ref 1.6–2.6)
MAGNESIUM SERPL-MCNC: 2 MG/DL (ref 1.6–2.6)
MAGNESIUM SERPL-MCNC: 2.1 MG/DL (ref 1.6–2.6)
MAGNESIUM SERPL-MCNC: 2.4 MG/DL (ref 1.6–2.6)
MAGNESIUM SERPL-MCNC: 2.5 MG/DL (ref 1.6–2.6)
MCH RBC QN AUTO: 28.7 PG (ref 24–34)
MCHC RBC AUTO-ENTMCNC: 32.3 G/DL (ref 31–37)
MCV RBC AUTO: 88.8 FL (ref 78–100)
MONOCYTES # BLD: 0.5 K/UL (ref 0.05–1.2)
MONOCYTES NFR BLD: 2 % (ref 3–10)
NEUTS BAND NFR BLD MANUAL: 3 %
NEUTS SEG # BLD: 24.4 K/UL (ref 1.8–8)
NEUTS SEG NFR BLD: 90 % (ref 40–73)
NRBC # BLD: 0.66 K/UL (ref 0–0.01)
NRBC BLD-RTO: 2.5 PER 100 WBC
O2/TOTAL GAS SETTING VFR VENT: 100 %
O2/TOTAL GAS SETTING VFR VENT: 100 %
O2/TOTAL GAS SETTING VFR VENT: 65 %
O2/TOTAL GAS SETTING VFR VENT: 65 %
PAW @ MEAN EXP FLOW ON VENT: 17 CMH2O
PCO2 BLD: 43.4 MMHG (ref 35–45)
PCO2 BLD: 46.2 MMHG (ref 35–45)
PCO2 BLD: 46.4 MMHG (ref 35–45)
PCO2 BLD: 57.7 MMHG (ref 35–45)
PEEP RESPIRATORY: 10 CMH2O
PH BLD: 7.24 (ref 7.35–7.45)
PH BLD: 7.31 (ref 7.35–7.45)
PH BLD: 7.33 (ref 7.35–7.45)
PH BLD: 7.37 (ref 7.35–7.45)
PHOSPHATE SERPL-MCNC: 2 MG/DL (ref 2.5–4.9)
PHOSPHATE SERPL-MCNC: 2.3 MG/DL (ref 2.5–4.9)
PHOSPHATE SERPL-MCNC: 2.7 MG/DL (ref 2.5–4.9)
PHOSPHATE SERPL-MCNC: 2.8 MG/DL (ref 2.5–4.9)
PHOSPHATE SERPL-MCNC: 3.1 MG/DL (ref 2.5–4.9)
PLATELET # BLD AUTO: 168 K/UL (ref 135–420)
PLATELET COMMENT: ABNORMAL
PMV BLD AUTO: 11.9 FL (ref 9.2–11.8)
PO2 BLD: 43 MMHG (ref 80–100)
PO2 BLD: 62 MMHG (ref 80–100)
PO2 BLD: 67 MMHG (ref 80–100)
PO2 BLD: 80 MMHG (ref 80–100)
POTASSIUM SERPL-SCNC: 3.4 MMOL/L (ref 3.5–5.5)
POTASSIUM SERPL-SCNC: 3.4 MMOL/L (ref 3.5–5.5)
POTASSIUM SERPL-SCNC: 3.5 MMOL/L (ref 3.5–5.5)
POTASSIUM SERPL-SCNC: 3.5 MMOL/L (ref 3.5–5.5)
POTASSIUM SERPL-SCNC: 3.6 MMOL/L (ref 3.5–5.5)
RBC # BLD AUTO: 2.58 M/UL (ref 4.2–5.3)
RBC MORPH BLD: ABNORMAL
RBC MORPH BLD: ABNORMAL
RESPIRATORY RATE, POC: 29 (ref 5–40)
RESPIRATORY RATE, POC: 32 (ref 5–40)
SAO2 % BLD: 73.9 % (ref 92–97)
SAO2 % BLD: 89 % (ref 92–97)
SAO2 % BLD: 90.5 % (ref 92–97)
SAO2 % BLD: 94.6 % (ref 92–97)
SERVICE CMNT-IMP: ABNORMAL
SODIUM SERPL-SCNC: 135 MMOL/L (ref 136–145)
SODIUM SERPL-SCNC: 135 MMOL/L (ref 136–145)
SODIUM SERPL-SCNC: 136 MMOL/L (ref 136–145)
SODIUM SERPL-SCNC: 136 MMOL/L (ref 136–145)
SODIUM SERPL-SCNC: 137 MMOL/L (ref 136–145)
SPECIMEN TYPE: ABNORMAL
VANCOMYCIN SERPL-MCNC: 14.2 UG/ML (ref 5–40)
VENTILATION MODE VENT: ABNORMAL
VT SETTING VENT: 400 ML
WBC # BLD AUTO: 26.2 K/UL (ref 4.6–13.2)

## 2023-12-19 PROCEDURE — 82962 GLUCOSE BLOOD TEST: CPT

## 2023-12-19 PROCEDURE — 94003 VENT MGMT INPAT SUBQ DAY: CPT

## 2023-12-19 PROCEDURE — 2580000003 HC RX 258: Performed by: PHYSICIAN ASSISTANT

## 2023-12-19 PROCEDURE — 84100 ASSAY OF PHOSPHORUS: CPT

## 2023-12-19 PROCEDURE — 6370000000 HC RX 637 (ALT 250 FOR IP): Performed by: REGISTERED NURSE

## 2023-12-19 PROCEDURE — 83735 ASSAY OF MAGNESIUM: CPT

## 2023-12-19 PROCEDURE — 99232 SBSQ HOSP IP/OBS MODERATE 35: CPT | Performed by: INTERNAL MEDICINE

## 2023-12-19 PROCEDURE — 80202 ASSAY OF VANCOMYCIN: CPT

## 2023-12-19 PROCEDURE — 82330 ASSAY OF CALCIUM: CPT

## 2023-12-19 PROCEDURE — 2500000003 HC RX 250 WO HCPCS: Performed by: PHYSICIAN ASSISTANT

## 2023-12-19 PROCEDURE — 37799 UNLISTED PX VASCULAR SURGERY: CPT

## 2023-12-19 PROCEDURE — 6360000002 HC RX W HCPCS: Performed by: PHYSICIAN ASSISTANT

## 2023-12-19 PROCEDURE — 6370000000 HC RX 637 (ALT 250 FOR IP): Performed by: PHYSICIAN ASSISTANT

## 2023-12-19 PROCEDURE — 6370000000 HC RX 637 (ALT 250 FOR IP): Performed by: INTERNAL MEDICINE

## 2023-12-19 PROCEDURE — 2580000003 HC RX 258: Performed by: INTERNAL MEDICINE

## 2023-12-19 PROCEDURE — 90945 DIALYSIS ONE EVALUATION: CPT

## 2023-12-19 PROCEDURE — 2000000000 HC ICU R&B

## 2023-12-19 PROCEDURE — 71045 X-RAY EXAM CHEST 1 VIEW: CPT

## 2023-12-19 PROCEDURE — 80048 BASIC METABOLIC PNL TOTAL CA: CPT

## 2023-12-19 PROCEDURE — 85730 THROMBOPLASTIN TIME PARTIAL: CPT

## 2023-12-19 PROCEDURE — A4216 STERILE WATER/SALINE, 10 ML: HCPCS | Performed by: PHYSICIAN ASSISTANT

## 2023-12-19 PROCEDURE — 99291 CRITICAL CARE FIRST HOUR: CPT | Performed by: INTERNAL MEDICINE

## 2023-12-19 PROCEDURE — 6360000002 HC RX W HCPCS: Performed by: INTERNAL MEDICINE

## 2023-12-19 PROCEDURE — 2580000003 HC RX 258: Performed by: REGISTERED NURSE

## 2023-12-19 PROCEDURE — 2500000003 HC RX 250 WO HCPCS: Performed by: REGISTERED NURSE

## 2023-12-19 PROCEDURE — 2500000003 HC RX 250 WO HCPCS: Performed by: INTERNAL MEDICINE

## 2023-12-19 PROCEDURE — 85025 COMPLETE CBC W/AUTO DIFF WBC: CPT

## 2023-12-19 RX ORDER — NOREPINEPHRINE BITARTRATE 0.06 MG/ML
1-100 INJECTION, SOLUTION INTRAVENOUS CONTINUOUS
Status: DISCONTINUED | OUTPATIENT
Start: 2023-12-19 | End: 2023-12-21

## 2023-12-19 RX ORDER — MIDAZOLAM IN NACL,ISO-OSMOT/PF 50 MG/50ML
1-20 INFUSION BOTTLE (ML) INTRAVENOUS
Status: DISCONTINUED | OUTPATIENT
Start: 2023-12-19 | End: 2023-12-21

## 2023-12-19 RX ORDER — VANCOMYCIN 1.75 G/350ML
1250 INJECTION, SOLUTION INTRAVENOUS ONCE
Status: COMPLETED | OUTPATIENT
Start: 2023-12-19 | End: 2023-12-19

## 2023-12-19 RX ORDER — FENTANYL CITRATE-0.9 % NACL/PF 10 MCG/ML
25-300 PLASTIC BAG, INJECTION (ML) INTRAVENOUS CONTINUOUS
Status: DISCONTINUED | OUTPATIENT
Start: 2023-12-19 | End: 2023-12-20

## 2023-12-19 RX ORDER — MAGNESIUM SULFATE IN WATER 40 MG/ML
2000 INJECTION, SOLUTION INTRAVENOUS ONCE
Status: COMPLETED | OUTPATIENT
Start: 2023-12-19 | End: 2023-12-20

## 2023-12-19 RX ORDER — POTASSIUM CHLORIDE 29.8 MG/ML
20 INJECTION INTRAVENOUS
Status: ACTIVE | OUTPATIENT
Start: 2023-12-19 | End: 2023-12-19

## 2023-12-19 RX ORDER — POLYETHYLENE GLYCOL 3350 17 G/17G
17 POWDER, FOR SOLUTION ORAL 2 TIMES DAILY
Status: DISCONTINUED | OUTPATIENT
Start: 2023-12-19 | End: 2023-12-21

## 2023-12-19 RX ORDER — NOREPINEPHRINE BITARTRATE 0.06 MG/ML
1-100 INJECTION, SOLUTION INTRAVENOUS CONTINUOUS
Status: DISCONTINUED | OUTPATIENT
Start: 2023-12-19 | End: 2023-12-19

## 2023-12-19 RX ORDER — POTASSIUM CHLORIDE 29.8 MG/ML
20 INJECTION INTRAVENOUS
Status: DISCONTINUED | OUTPATIENT
Start: 2023-12-19 | End: 2023-12-19

## 2023-12-19 RX ADMIN — POLYVINYL ALCOHOL, POVIDONE 1 DROP: 14; 6 SOLUTION/ DROPS OPHTHALMIC at 00:13

## 2023-12-19 RX ADMIN — Medication 10 MG/HR: at 13:18

## 2023-12-19 RX ADMIN — SODIUM CHLORIDE, PRESERVATIVE FREE 10 ML: 5 INJECTION INTRAVENOUS at 20:30

## 2023-12-19 RX ADMIN — Medication 300 MCG/HR: at 05:08

## 2023-12-19 RX ADMIN — POLYETHYLENE GLYCOL 3350 17 G: 17 POWDER, FOR SOLUTION ORAL at 10:38

## 2023-12-19 RX ADMIN — Medication 250 MCG/HR: at 20:58

## 2023-12-19 RX ADMIN — POLYVINYL ALCOHOL, POVIDONE 1 DROP: 14; 6 SOLUTION/ DROPS OPHTHALMIC at 14:48

## 2023-12-19 RX ADMIN — MAGNESIUM SULFATE HEPTAHYDRATE 2000 MG: 40 INJECTION, SOLUTION INTRAVENOUS at 22:42

## 2023-12-19 RX ADMIN — INSULIN HUMAN 5.7 UNITS/HR: 1 INJECTION, SOLUTION INTRAVENOUS at 13:10

## 2023-12-19 RX ADMIN — 0.12% CHLORHEXIDINE GLUCONATE 15 ML: 1.2 RINSE ORAL at 19:52

## 2023-12-19 RX ADMIN — POLYETHYLENE GLYCOL 3350 17 G: 17 POWDER, FOR SOLUTION ORAL at 19:46

## 2023-12-19 RX ADMIN — POTASSIUM PHOSPHATE, MONOBASIC POTASSIUM PHOSPHATE, DIBASIC 20 MMOL: 224; 236 INJECTION, SOLUTION, CONCENTRATE INTRAVENOUS at 12:17

## 2023-12-19 RX ADMIN — HYDROCORTISONE SODIUM SUCCINATE 50 MG: 100 INJECTION, POWDER, FOR SOLUTION INTRAMUSCULAR; INTRAVENOUS at 04:27

## 2023-12-19 RX ADMIN — MEROPENEM 1000 MG: 1 INJECTION, POWDER, FOR SOLUTION INTRAVENOUS at 14:37

## 2023-12-19 RX ADMIN — Medication 300 MCG/HR: at 12:32

## 2023-12-19 RX ADMIN — Medication 14 MG/HR: at 05:10

## 2023-12-19 RX ADMIN — Medication 300 MCG/HR: at 08:58

## 2023-12-19 RX ADMIN — INSULIN HUMAN 5.17 UNITS/HR: 1 INJECTION, SOLUTION INTRAVENOUS at 20:00

## 2023-12-19 RX ADMIN — POLYVINYL ALCOHOL, POVIDONE 1 DROP: 14; 6 SOLUTION/ DROPS OPHTHALMIC at 20:00

## 2023-12-19 RX ADMIN — KETAMINE HYDROCHLORIDE 0.35 MG/KG/HR: 50 INJECTION INTRAMUSCULAR; INTRAVENOUS at 22:02

## 2023-12-19 RX ADMIN — HYDROCORTISONE SODIUM SUCCINATE 50 MG: 100 INJECTION, POWDER, FOR SOLUTION INTRAMUSCULAR; INTRAVENOUS at 21:52

## 2023-12-19 RX ADMIN — INSULIN HUMAN 4.25 UNITS/HR: 1 INJECTION, SOLUTION INTRAVENOUS at 21:57

## 2023-12-19 RX ADMIN — VANCOMYCIN 1250 MG: 1.75 INJECTION, SOLUTION INTRAVENOUS at 11:41

## 2023-12-19 RX ADMIN — POTASSIUM CHLORIDE 20 MEQ: 29.8 INJECTION, SOLUTION INTRAVENOUS at 06:48

## 2023-12-19 RX ADMIN — POLYVINYL ALCOHOL, POVIDONE 1 DROP: 14; 6 SOLUTION/ DROPS OPHTHALMIC at 04:26

## 2023-12-19 RX ADMIN — Medication 300 MCG/HR: at 01:18

## 2023-12-19 RX ADMIN — LEVOTHYROXINE SODIUM 50 MCG: 0.05 TABLET ORAL at 06:19

## 2023-12-19 RX ADMIN — MAGNESIUM SULFATE HEPTAHYDRATE 2000 MG: 40 INJECTION, SOLUTION INTRAVENOUS at 00:19

## 2023-12-19 RX ADMIN — MICAFUNGIN SODIUM 100 MG: 100 INJECTION, POWDER, LYOPHILIZED, FOR SOLUTION INTRAVENOUS at 09:25

## 2023-12-19 RX ADMIN — INSULIN HUMAN 4.66 UNITS/HR: 1 INJECTION, SOLUTION INTRAVENOUS at 21:07

## 2023-12-19 RX ADMIN — Medication 275 MCG/HR: at 16:27

## 2023-12-19 RX ADMIN — CALCIUM CHLORIDE, MAGNESIUM CHLORIDE, DEXTROSE MONOHYDRATE, LACTIC ACID, SODIUM CHLORIDE, SODIUM BICARBONATE AND POTASSIUM CHLORIDE: 5.15; 2.03; 22; 5.4; 6.46; 3.09; .157 INJECTION INTRAVENOUS at 06:03

## 2023-12-19 RX ADMIN — HYDROCORTISONE SODIUM SUCCINATE 50 MG: 100 INJECTION, POWDER, FOR SOLUTION INTRAMUSCULAR; INTRAVENOUS at 14:48

## 2023-12-19 RX ADMIN — POLYVINYL ALCOHOL, POVIDONE 1 DROP: 14; 6 SOLUTION/ DROPS OPHTHALMIC at 07:39

## 2023-12-19 RX ADMIN — CALCIUM CHLORIDE, MAGNESIUM CHLORIDE, DEXTROSE MONOHYDRATE, LACTIC ACID, SODIUM CHLORIDE, SODIUM BICARBONATE AND POTASSIUM CHLORIDE: 5.15; 2.03; 22; 5.4; 6.46; 3.09; .157 INJECTION INTRAVENOUS at 00:55

## 2023-12-19 RX ADMIN — 0.12% CHLORHEXIDINE GLUCONATE 15 ML: 1.2 RINSE ORAL at 07:39

## 2023-12-19 RX ADMIN — LEUCINE, PHENYLALANINE, LYSINE, METHIONINE, ISOLEUCINE, VALINE, HISTIDINE, THREONINE, TRYPTOPHAN, ALANINE, GLYCINE, ARGININE, PROLINE, SERINE, TYROSINE, DEXTROSE: 584; 448; 464; 320; 480; 464; 384; 336; 144; 1656; 824; 920; 544; 400; 32; 10 INJECTION INTRAVENOUS at 19:45

## 2023-12-19 RX ADMIN — KETAMINE HYDROCHLORIDE 0.35 MG/KG/HR: 50 INJECTION INTRAMUSCULAR; INTRAVENOUS at 05:19

## 2023-12-19 RX ADMIN — Medication 1 MG: at 07:39

## 2023-12-19 RX ADMIN — FAMOTIDINE 20 MG: 10 INJECTION INTRAVENOUS at 07:39

## 2023-12-19 RX ADMIN — SODIUM CHLORIDE, PRESERVATIVE FREE 10 ML: 5 INJECTION INTRAVENOUS at 07:40

## 2023-12-19 RX ADMIN — POLYVINYL ALCOHOL, POVIDONE 1 DROP: 14; 6 SOLUTION/ DROPS OPHTHALMIC at 10:13

## 2023-12-19 RX ADMIN — CISATRACURIUM BESYLATE 2 MCG/KG/MIN: 2 INJECTION, SOLUTION INTRAVENOUS at 19:58

## 2023-12-19 RX ADMIN — CALCIUM CHLORIDE, MAGNESIUM CHLORIDE, DEXTROSE MONOHYDRATE, LACTIC ACID, SODIUM CHLORIDE, SODIUM BICARBONATE AND POTASSIUM CHLORIDE: 5.15; 2.03; 22; 5.4; 6.46; 3.09; .157 INJECTION INTRAVENOUS at 06:06

## 2023-12-19 RX ADMIN — HYDROCORTISONE SODIUM SUCCINATE 50 MG: 100 INJECTION, POWDER, FOR SOLUTION INTRAMUSCULAR; INTRAVENOUS at 10:12

## 2023-12-19 RX ADMIN — VASOPRESSIN 0.03 UNITS/MIN: 20 INJECTION INTRAVENOUS at 06:18

## 2023-12-19 RX ADMIN — CALCIUM CHLORIDE, MAGNESIUM CHLORIDE, DEXTROSE MONOHYDRATE, LACTIC ACID, SODIUM CHLORIDE, SODIUM BICARBONATE AND POTASSIUM CHLORIDE: 5.15; 2.03; 22; 5.4; 6.46; 3.09; .157 INJECTION INTRAVENOUS at 00:54

## 2023-12-19 RX ADMIN — MEROPENEM 1000 MG: 1 INJECTION, POWDER, FOR SOLUTION INTRAVENOUS at 02:55

## 2023-12-19 RX ADMIN — CALCIUM CHLORIDE, MAGNESIUM CHLORIDE, DEXTROSE MONOHYDRATE, LACTIC ACID, SODIUM CHLORIDE, SODIUM BICARBONATE AND POTASSIUM CHLORIDE: 5.15; 2.03; 22; 5.4; 6.46; 3.09; .157 INJECTION INTRAVENOUS at 06:04

## 2023-12-19 RX ADMIN — POTASSIUM PHOSPHATES 20 MMOL: 236; 224 INJECTION, SOLUTION INTRAVENOUS at 01:29

## 2023-12-19 ASSESSMENT — PULMONARY FUNCTION TESTS
PIF_VALUE: 27
PIF_VALUE: 28
PIF_VALUE: 28

## 2023-12-19 NOTE — PROGRESS NOTES
attempted to visit  with Marlo Vance, who is a 74 y.o., female. The reason patientcame to hospital is:   Patient Active Problem List    Diagnosis Date Noted    Thyroid nodule 02/20/2023    Atrial fibrillation with RVR ([address]) 12/13/2023    ARDS (adult respiratory distress syndrome) ([address]) 12/11/2023    Influenzal pneumonia 12/11/2023    ALIREZA (acute kidney injury) ([address]) 12/10/2023    Acute pulmonary edema (HCC) 12/10/2023    Elevated troponin 12/09/2023    Paroxysmal atrial fibrillation ([address]) 12/09/2023    Atrial fibrillation, new onset ([address]) 12/09/2023    Acute respiratory failure with hypoxia (HCC) 12/08/2023    BPPV (benign paroxysmal positional vertigo) 12/08/2023    Multifocal pneumonia 12/08/2023    Hypothyroidism 12/08/2023    Irritable bowel syndrome 04/05/2021    Chronic rhinitis 04/02/2021    Chronic cough 04/02/2021    Class 2 obesity in adult 08/21/2018    Non-rheumatic mitral regurgitation 05/02/2016    Fatty pancreas 04/05/2016    Age-related osteoporosis without current pathological fracture 04/05/2016    Nephrolithiasis 08/06/2015    CAD (coronary artery disease), native coronary artery 07/01/2014    Chest pain 02/07/2014    Abnormal nuclear stress test 02/07/2014    Dyslipidemia    . Patient is unable to communicate at this time. Patient intubated.  offered silent prayer near bedside. Chaplains will continue to follow and will provide pastoral care on an as needed/requested basis.     [facility]   Staff [address]   [phone]

## 2023-12-19 NOTE — PROGRESS NOTES
Infectious Disease  progress Note        Reason: septic shock, influenza A pneumonia, ARDS    Current abx Prior abx   Meropenem since 12/18  Vancomycin since 12/8  Micafungin since 12/18 Pip/tazo 12/11-12/18  Metronidazole on 12/18  Levofloxacin 12/8-12/11  Cefepime on 12/11  Azithromycin 12/4-12/9, 1211-12/13         Lines:       Assessment :     73 y.o. female with PMHX of CAD s/p PCI 2014, severe mitral valve regurgitation,  hypothyroidism, dyslipidemia,  presented to the ED on 12/8 with cc of nausea, vomiting, fevers, chills, non-productive cough, shortness of breath.    Positive influenza A testing on 12/5/23    Clinical presentation consistent with acute hypoxic respiratory failure-likely ARDS in setting of influenza A pneumonia    Acute kidney injury-initiated on CRRT on 12/14.?  ATN.  Nephrology follow-up appreciated    Shock-likely septic shock    Worsening leukocytosis-likely due to steroids, sepsis-rule out fungemia.  Rule out pneumonia with ESBL gram-negative's    Worsening oxygenation/increased pressor requirement noted 12/18/23 concerning for sepsis- ? Due to undiagnosed fungemia ? Ventilator associated pneumonia with resistant gram negative/positive pathogens    No evidence of fecal impaction noted on x-ray abdomen 12/18/2023    Improving leukocytosis.  Decreased pressor requirement.  On Levophed at 8 mics    Recommendations:    Continue meropenem, vancomycin, micafungin  Follow-up Fungitell  Follow-up sputum culture, blood culture 12/18  Weaning from vent per ICU team  Wean pressors as tolerated  Discontinue droplet isolation (d/w IP)     Above plan was discussed in details with dr. Rust.  Please call me if any further questions or concerns. Will continue to participate in the care of this patient.    HPI:    Patient was intubated, nonverbal at the time of my evaluation    Past Medical History:   Diagnosis Date    Abnormal myocardial perfusion study 02/05/2014    Mod-sized, mild-mod mid lateral  infarction w/mild-mod ninfa-infarct ischemia. Mod lateral hypk. EF 56%. Normal EKG on pharm stress test.  Intermediate risk. Age-related osteoporosis without current pathological fracture 4/5/2016    Arthritis     Benign positional vertigo 4/2/2021    CAD (coronary artery disease), native coronary artery 7/2014    MINERVA to LCx    Cancer [facility])     skin ca    Chronic cough 4/2/2021    Chronic rhinitis 4/2/2021    Dyslipidemia     Fatty pancreas 4/5/2016    On imaging    Hernia     History of echocardiogram 04/05/2016    EF 55-60%. No WMA. Gr 1 DDfx. Mod MR.       Nephrolithiasis     Thyroid disease        Past Surgical History:   Procedure Laterality Date    CARDIAC CATHETERIZATION      CHG US VASC ACCESS SITS VSL PATENCY NDL ENTRY  12/13/2023    CHOLECYSTECTOMY      CORONARY ANGIOPLASTY WITH STENT PLACEMENT  07/07/2014    HERNIA REPAIR      LYSIS OF ADHESIONS      OTHER SURGICAL HISTORY  11/2018    repair of anal fissure    OVARY REMOVAL Right 12/05/2018    ND ARTL CATHJ/CANNULJ MNTR/TRANSFUSION SPX PRQ  12/13/2023    REMOVAL OF ANAL FISSURE  11/02/2018    REMOVAL OF KIDNEY STONE  12/28/2018    THYROID LOBECTOMY Right 2/20/2023    RIGHT THYROID LOBECTOMY,; 23HR performed by Lebron Cooks, MD at [facility] MAIN OR       [unfilled]    Current Facility-Administered Medications   Medication Dose Route Frequency    fentanyl (SUBLIMAZE) infusion 1000 mcg/100mL   mcg/hr IntraVENous Continuous    ketamine (KETALAR) 500 mg in sodium chloride 0.9 % 100 mL infusion  0.05-2 mg/kg/hr IntraVENous Continuous    midazolam (VERSED) infusion 100mg/100mL  1-20 mg/hr IntraVENous TITRATE    norepinephrine (LEVOPHED) 16 mg in sodium chloride 0.9 % 250 mL infusion  1-100 mcg/min IntraVENous Continuous    potassium chloride 20 mEq/50 mL IVPB (Central Line)  20 mEq IntraVENous Q1H    meropenem (MERREM) 1,000 mg in sodium chloride 0.9 % 100 mL IVPB (mini-bag)  1,000 mg IntraVENous Q12H    vancomycin (VANCOCIN) intermittent dosing

## 2023-12-19 NOTE — CARE COORDINATION
12/19/23  JAGDISH reviewed chart and is following today for disposition planning. Patient to continue CRRT, parenteral nutrition and IV abx. She continues to require intubation and vent support. Breathing trials pending patient's ability to tolerate/participate. Patient still requiring ICU level of care. 12/18/23  JAGDISH reviewed chart. Vent dyssynchrony, hypoxia, hypotension noted over the weekend. Patient continues to require intubation/vent support. CRRT was completed on 12/17. IV Abx continued and patient remains NPO with parenteral nutrition. Not medically ready for discharge/stepdown. SW continues monitoring clinical status today for disposition planning. 12/14/23  SW reviewed patient's chart. PT/OT recommended patient ambulate with DME walker at discharge and ARU. Patient was functioning independently without DME prior to hospitalization. Patient was observed with tachypnea and respiratory distress which led to intubation on 12/13/23. Temporary HD catheter placed 12/14. Patient continues to require ICU level of care at this time. Not medically ready or stepdown. JAGDISH is following clinical status for disposition planning.       Jamila Garcia LMSW   Case Management

## 2023-12-19 NOTE — DIALYSIS
CRRT rounding completed. Patient tolerating treatment well. TMP 76. Cartridge last changed on 12/18/23. No additional supplies needed at this time. Will Liaise with primary nurse to provide any additional supplies as required.

## 2023-12-19 NOTE — PROGRESS NOTES
Comprehensive Nutrition Assessment    Type and Reason for Visit:  Reassess, Consult, NPO/Clear Liquid    Nutrition Recommendations/Plan:   Provide parenteral nutrition using standard premixed product of Clinimix AA8/D10 until patient is able to tolerate adequate enteral nutrition.   Continue current PN order. See below for order contents/details.  Lipids provided Monday, Wednesday, and Friday only  MVI/TE provided Tuesday, Thursday, and Saturday due to shortage    Initiate tube feeding regimen:   Formula: Vital HP  Goal Rate: 10 ml/hr x 20 hours (for anticipation of holding tube feeds for standard nursing care)  Water Flushes: 30 mL q 4 hours (180 mL total)  Goal Regimen Provides (with modulars):  200 kcal, 18g protein, 348 ml free water, 13% RDIs    Continue to monitor tolerance of PN, weight, labs, and plan of care during admission.          Parenteral Nutrition Order:   Current PN:  Next PN:       Malnutrition Assessment:  Malnutrition Status:  At risk for malnutrition (Comment) (NPO, vent, CRRT, ARDS, TPN/TF) (12/19/23 1017)    Context:  Acute Illness       Nutrition Assessment:    Admitted with c/o n/v, fevers, cough, SOB. +influenza 12/5. Pt placed on BIPAP 12/10 and transferred to ICU due to high risk for intubation. Pt initially on PO diet then made NPO while on BIPAP. NPO since ICU admit x 3 days. PICC placed 12/13, initiated on TPN. Noted s/p intubation 12/13. Pt continues on insulin drip. Discussed care during interdisciplinary rounds. Pt continues on CRRT, pulling net even. Continues on insulin drip, nimbex. Receiving K replacement this morning. Per RN, pt off vaso, down on levo. Unknown of last UBW, charted PTA. Plan to add bowel regimen. To continue TPN for now and order Vital HP at 10 ml/hr for today. Plan to order TPN Clinimix AA8/D10 @ 50 ml/hr without lytes, PN only provides 49% of estimated kcal, 100% of estimated protein needs.    Nutrition Related Findings:    Last BM (including prior to admit):  (prior to admission)  Edema: Generalized  Edema Generalized: +2 Pertinent Meds: pepcid, solucortef, synthroid, virt-caps, fentanyl gtt, nimbex, insulin drip, ketamine, levo @ 8 mcg/min, vaso @ 0.03 units/min  Pertinent Labs: POC Glucose 170-176 mg/dl x 24 hrs, Na 135 L (trending down), K 3.4 L (trending down), Mg 2.4 wnl, Phos 2.8 wnl (trending up) Wound Type: None        Current Nutrition Intake & Therapies:    Average Meal Intake: NPO     Diet NPO  PN-Adult Premix 8/10 - Custom Electrolytes - Central Line    Anthropometric Measures:  Height: 157.5 cm (5' 2\")  Ideal Body Weight (IBW): 110 lbs (50 kg)    Admission Body Weight: 88.5 kg (195 lb 1.7 oz) (88.5 kg)  Current Body Weight: 93 kg (205 lb 0.4 oz) (Pt -3.5 L overall per I&Os), 177.4 % IBW.  Weight Source: Stated  Current BMI (kg/m2): 37.5  BMI Categories: Obese Class 2 (BMI 35.0 -39.9)    Estimated Daily Nutrient Needs:  Energy Requirements Based On: Formula  Weight Used for Energy Requirements: Admission  Energy (kcal/day): 3489-8115 ([facility] 2010 x 0.9-1.1)  Weight Used for Protein Requirements: Ideal  Protein (g/day):  (1.5-2)  Method Used for Fluid Requirements: Standard Renal  Fluid (ml/day): 750-1500    Nutrition Diagnosis:   Inadequate protein-energy intake related to impaired respiratory function, acute injury/trauma as evidenced by NPO or clear liquid status due to medical condition, nutrition support - parenteral nutrition (now improving on nutrition support, ~49% of kcal, 100% of protein needs)  Swallowing difficulty related to impaired respiratory function as evidenced by NPO or clear liquid status due to medical condition, nutrition support - enteral nutrition, nutrition support - parenteral nutrition, intubation    Nutrition Interventions:   Food and/or Nutrient Delivery: Continue NPO, Start Tube Feeding, Continue Current Parenteral Nutrition  Nutrition Education/Counseling: No recommendation at this time  Coordination of Nutrition Care:

## 2023-12-19 NOTE — INTERDISCIPLINARY ROUNDS
[facility]  Pulmonary, Critical Care, and Sleep Medicine  Interdisciplinary and Ventilator Weaning Rounds    Patient discussed in morning walking rounds and interdisciplinary rounds. ICU admission day: admitted 12/11    Overnight events:   No significant events overnight     Assessments and best practice:  Ventilator  Ventilator Day(s): 4  Vent Settings  FiO2 : 75 %  Resp Rate (Set): (S) (P) 29 bpm (Increased following ABG)  Vt (Set, mL): 400 mL  PEEP/CPAP (cmH2O): (S) (P) 10 (Decreased following ABG. PEEP lowered to increase potential delta P to improve ventilation while weaning for PO2 104)  Peak Inspiratory Flow (lpm): 32 L/sec  Insp Time (sec): 0.6 sec  Insp Rise Time (%): 60 %  Flow Sensitivity: 3 L/min  Disconnect Sensitivity (%): 75 %   Glycemic control  Diet  Diet NPO  ADULT TUBE FEEDING; Orogastric; Peptide Based High Protein; Continuous; 15; No; 30; Q 6 hours  PN-Adult Premix 8/10 - Custom Electrolytes - Central Line  Stress ulcer prophylaxis. Protonix  DVT prophylaxis. Heparin gtt  Need for Lines, pratt assessed.   Yes  Restraint Reevaluation   N/a  Disposition regarding transferring out of the ICU  RED    Family contact/MPOA: [facility]  Family updated: updated yesterday evening, will continue to update today      Palliative consult within 3 days of admission to ICU-  Ethics Guidance: 21 days    Daily Plans:    -Off [place], [address]        VELASQUEZ time 10 minutes        SUSANA Luciano - NP   Pulmonary, [facility] Referred To Mid-Level For Closure Text (Leave Blank If You Do Not Want): After obtaining clear surgical margins the patient was sent to Javad White PA-C for surgical repair.  The patient understands they will receive post-surgical care and follow-up from the mid-level provider.

## 2023-12-20 ENCOUNTER — APPOINTMENT (OUTPATIENT)
Facility: HOSPITAL | Age: 73
DRG: 870 | End: 2023-12-20
Payer: MEDICARE

## 2023-12-20 LAB
ANION GAP BLD CALC-SCNC: ABNORMAL MMOL/L (ref 10–20)
ANION GAP SERPL CALC-SCNC: 3 MMOL/L (ref 3–18)
ANION GAP SERPL CALC-SCNC: 4 MMOL/L (ref 3–18)
ANION GAP SERPL CALC-SCNC: 4 MMOL/L (ref 3–18)
ANION GAP SERPL CALC-SCNC: 7 MMOL/L (ref 3–18)
ANION GAP SERPL CALC-SCNC: 7 MMOL/L (ref 3–18)
BACTERIA SPEC CULT: ABNORMAL
BACTERIA SPEC CULT: ABNORMAL
BASE DEFICIT BLD-SCNC: 2.1 MMOL/L
BASOPHILS # BLD: 0 K/UL (ref 0–0.1)
BASOPHILS NFR BLD: 0 % (ref 0–2)
BUN SERPL-MCNC: 39 MG/DL (ref 7–18)
BUN SERPL-MCNC: 39 MG/DL (ref 7–18)
BUN SERPL-MCNC: 40 MG/DL (ref 7–18)
BUN SERPL-MCNC: 41 MG/DL (ref 7–18)
BUN SERPL-MCNC: 41 MG/DL (ref 7–18)
BUN/CREAT SERPL: 30 (ref 12–20)
BUN/CREAT SERPL: 31 (ref 12–20)
BUN/CREAT SERPL: 32 (ref 12–20)
BUN/CREAT SERPL: 36 (ref 12–20)
BUN/CREAT SERPL: 37 (ref 12–20)
CA-I BLD-MCNC: 1.47 MMOL/L (ref 1.12–1.32)
CA-I SERPL-SCNC: 1.29 MMOL/L (ref 1.15–1.33)
CA-I SERPL-SCNC: 1.38 MMOL/L (ref 1.15–1.33)
CA-I SERPL-SCNC: 1.43 MMOL/L (ref 1.15–1.33)
CA-I SERPL-SCNC: 1.46 MMOL/L (ref 1.15–1.33)
CALCIUM SERPL-MCNC: 8.1 MG/DL (ref 8.5–10.1)
CALCIUM SERPL-MCNC: 9.1 MG/DL (ref 8.5–10.1)
CALCIUM SERPL-MCNC: 9.2 MG/DL (ref 8.5–10.1)
CALCIUM SERPL-MCNC: 9.3 MG/DL (ref 8.5–10.1)
CALCIUM SERPL-MCNC: 9.4 MG/DL (ref 8.5–10.1)
CHLORIDE BLD-SCNC: 102 MMOL/L (ref 98–107)
CHLORIDE SERPL-SCNC: 103 MMOL/L (ref 100–111)
CHLORIDE SERPL-SCNC: 103 MMOL/L (ref 100–111)
CHLORIDE SERPL-SCNC: 105 MMOL/L (ref 100–111)
CHLORIDE SERPL-SCNC: 105 MMOL/L (ref 100–111)
CHLORIDE SERPL-SCNC: 99 MMOL/L (ref 100–111)
CO2 BLD-SCNC: 24 MMOL/L (ref 19–24)
CO2 SERPL-SCNC: 23 MMOL/L (ref 21–32)
CO2 SERPL-SCNC: 25 MMOL/L (ref 21–32)
CO2 SERPL-SCNC: 27 MMOL/L (ref 21–32)
CO2 SERPL-SCNC: 28 MMOL/L (ref 21–32)
CO2 SERPL-SCNC: 28 MMOL/L (ref 21–32)
CREAT BLD-MCNC: 1.11 MG/DL (ref 0.6–1.3)
CREAT SERPL-MCNC: 1.11 MG/DL (ref 0.6–1.3)
CREAT SERPL-MCNC: 1.13 MG/DL (ref 0.6–1.3)
CREAT SERPL-MCNC: 1.24 MG/DL (ref 0.6–1.3)
CREAT SERPL-MCNC: 1.26 MG/DL (ref 0.6–1.3)
CREAT SERPL-MCNC: 1.31 MG/DL (ref 0.6–1.3)
DIFFERENTIAL METHOD BLD: ABNORMAL
EOSINOPHIL # BLD: 0 K/UL (ref 0–0.4)
EOSINOPHIL NFR BLD: 0 % (ref 0–5)
ERYTHROCYTE [DISTWIDTH] IN BLOOD BY AUTOMATED COUNT: 14.7 % (ref 11.6–14.5)
GLUCOSE BLD STRIP.AUTO-MCNC: 134 MG/DL (ref 70–110)
GLUCOSE BLD STRIP.AUTO-MCNC: 134 MG/DL (ref 70–110)
GLUCOSE BLD STRIP.AUTO-MCNC: 141 MG/DL (ref 70–110)
GLUCOSE BLD STRIP.AUTO-MCNC: 143 MG/DL (ref 70–110)
GLUCOSE BLD STRIP.AUTO-MCNC: 143 MG/DL (ref 70–110)
GLUCOSE BLD STRIP.AUTO-MCNC: 145 MG/DL (ref 70–110)
GLUCOSE BLD STRIP.AUTO-MCNC: 146 MG/DL (ref 70–110)
GLUCOSE BLD STRIP.AUTO-MCNC: 146 MG/DL (ref 70–110)
GLUCOSE BLD STRIP.AUTO-MCNC: 147 MG/DL (ref 70–110)
GLUCOSE BLD STRIP.AUTO-MCNC: 148 MG/DL (ref 70–110)
GLUCOSE BLD STRIP.AUTO-MCNC: 149 MG/DL (ref 70–110)
GLUCOSE BLD STRIP.AUTO-MCNC: 150 MG/DL (ref 70–110)
GLUCOSE BLD STRIP.AUTO-MCNC: 151 MG/DL (ref 70–110)
GLUCOSE BLD STRIP.AUTO-MCNC: 151 MG/DL (ref 70–110)
GLUCOSE BLD STRIP.AUTO-MCNC: 152 MG/DL (ref 70–110)
GLUCOSE BLD STRIP.AUTO-MCNC: 152 MG/DL (ref 70–110)
GLUCOSE BLD STRIP.AUTO-MCNC: 155 MG/DL (ref 70–110)
GLUCOSE BLD STRIP.AUTO-MCNC: 156 MG/DL (ref 70–110)
GLUCOSE BLD STRIP.AUTO-MCNC: 158 MG/DL (ref 70–110)
GLUCOSE BLD STRIP.AUTO-MCNC: 159 MG/DL (ref 70–110)
GLUCOSE BLD-MCNC: 149 MG/DL (ref 65–100)
GLUCOSE SERPL-MCNC: 133 MG/DL (ref 74–99)
GLUCOSE SERPL-MCNC: 135 MG/DL (ref 74–99)
GLUCOSE SERPL-MCNC: 140 MG/DL (ref 74–99)
GLUCOSE SERPL-MCNC: 160 MG/DL (ref 74–99)
GLUCOSE SERPL-MCNC: 485 MG/DL (ref 74–99)
GRAM STN SPEC: ABNORMAL
HCO3 BLD-SCNC: 24.3 MMOL/L (ref 22–26)
HCT VFR BLD AUTO: 24.4 % (ref 35–45)
HGB BLD-MCNC: 7.9 G/DL (ref 12–16)
IMM GRANULOCYTES # BLD AUTO: 0 K/UL
IMM GRANULOCYTES NFR BLD AUTO: 0 %
LACTATE BLD-SCNC: 1.14 MMOL/L (ref 0.4–2)
LYMPHOCYTES # BLD: 3.9 K/UL (ref 0.9–3.6)
LYMPHOCYTES NFR BLD: 10 % (ref 21–52)
MAGNESIUM SERPL-MCNC: 1.9 MG/DL (ref 1.6–2.6)
MAGNESIUM SERPL-MCNC: 2 MG/DL (ref 1.6–2.6)
MAGNESIUM SERPL-MCNC: 2.1 MG/DL (ref 1.6–2.6)
MAGNESIUM SERPL-MCNC: 2.5 MG/DL (ref 1.6–2.6)
MCH RBC QN AUTO: 28.9 PG (ref 24–34)
MCHC RBC AUTO-ENTMCNC: 32.4 G/DL (ref 31–37)
MCV RBC AUTO: 89.4 FL (ref 78–100)
MONOCYTES # BLD: 0.8 K/UL (ref 0.05–1.2)
MONOCYTES NFR BLD: 2 % (ref 3–10)
MYELOCYTES NFR BLD MANUAL: 3 %
NEUTS BAND NFR BLD MANUAL: 2 % (ref 0–5)
NEUTS SEG # BLD: 32.8 K/UL (ref 1.8–8)
NEUTS SEG NFR BLD: 83 % (ref 40–73)
NRBC # BLD: 1.32 K/UL (ref 0–0.01)
NRBC BLD-RTO: 3.4 PER 100 WBC
PCO2 BLD: 48.9 MMHG (ref 35–45)
PH BLD: 7.3 (ref 7.35–7.45)
PHOSPHATE SERPL-MCNC: 2 MG/DL (ref 2.5–4.9)
PHOSPHATE SERPL-MCNC: 2.3 MG/DL (ref 2.5–4.9)
PHOSPHATE SERPL-MCNC: 2.5 MG/DL (ref 2.5–4.9)
PHOSPHATE SERPL-MCNC: 3.3 MG/DL (ref 2.5–4.9)
PLATELET # BLD AUTO: 205 K/UL (ref 135–420)
PLATELET COMMENT: ABNORMAL
PMV BLD AUTO: 11.8 FL (ref 9.2–11.8)
PO2 BLD: 63 MMHG (ref 80–100)
POTASSIUM BLD-SCNC: 3.8 MMOL/L (ref 3.5–5.1)
POTASSIUM SERPL-SCNC: 3.3 MMOL/L (ref 3.5–5.5)
POTASSIUM SERPL-SCNC: 3.4 MMOL/L (ref 3.5–5.5)
POTASSIUM SERPL-SCNC: 3.7 MMOL/L (ref 3.5–5.5)
RBC # BLD AUTO: 2.73 M/UL (ref 4.2–5.3)
RBC MORPH BLD: ABNORMAL
SAO2 % BLD: 89 %
SERVICE CMNT-IMP: ABNORMAL
SERVICE CMNT-IMP: ABNORMAL
SODIUM BLD-SCNC: 135 MMOL/L (ref 136–145)
SODIUM SERPL-SCNC: 129 MMOL/L (ref 136–145)
SODIUM SERPL-SCNC: 134 MMOL/L (ref 136–145)
SODIUM SERPL-SCNC: 135 MMOL/L (ref 136–145)
SODIUM SERPL-SCNC: 136 MMOL/L (ref 136–145)
SODIUM SERPL-SCNC: 137 MMOL/L (ref 136–145)
SPECIMEN SITE: ABNORMAL
VANCOMYCIN SERPL-MCNC: 23.2 UG/ML (ref 5–40)
WBC # BLD AUTO: 38.6 K/UL (ref 4.6–13.2)

## 2023-12-20 PROCEDURE — 84132 ASSAY OF SERUM POTASSIUM: CPT

## 2023-12-20 PROCEDURE — 6360000002 HC RX W HCPCS: Performed by: INTERNAL MEDICINE

## 2023-12-20 PROCEDURE — 94003 VENT MGMT INPAT SUBQ DAY: CPT

## 2023-12-20 PROCEDURE — 83735 ASSAY OF MAGNESIUM: CPT

## 2023-12-20 PROCEDURE — 2580000003 HC RX 258: Performed by: PHYSICIAN ASSISTANT

## 2023-12-20 PROCEDURE — 4A133B1 MONITORING OF ARTERIAL PRESSURE, PERIPHERAL, PERCUTANEOUS APPROACH: ICD-10-PCS | Performed by: INTERNAL MEDICINE

## 2023-12-20 PROCEDURE — 6360000002 HC RX W HCPCS: Performed by: NURSE PRACTITIONER

## 2023-12-20 PROCEDURE — 2580000003 HC RX 258: Performed by: INTERNAL MEDICINE

## 2023-12-20 PROCEDURE — 6360000002 HC RX W HCPCS: Performed by: REGISTERED NURSE

## 2023-12-20 PROCEDURE — 6360000002 HC RX W HCPCS: Performed by: PHYSICIAN ASSISTANT

## 2023-12-20 PROCEDURE — 94645 CONT INHLJ TX EACH ADDL HOUR: CPT

## 2023-12-20 PROCEDURE — 99291 CRITICAL CARE FIRST HOUR: CPT | Performed by: INTERNAL MEDICINE

## 2023-12-20 PROCEDURE — 36620 INSERTION CATHETER ARTERY: CPT | Performed by: INTERNAL MEDICINE

## 2023-12-20 PROCEDURE — 82803 BLOOD GASES ANY COMBINATION: CPT

## 2023-12-20 PROCEDURE — 82947 ASSAY GLUCOSE BLOOD QUANT: CPT

## 2023-12-20 PROCEDURE — 36600 WITHDRAWAL OF ARTERIAL BLOOD: CPT

## 2023-12-20 PROCEDURE — 6370000000 HC RX 637 (ALT 250 FOR IP): Performed by: REGISTERED NURSE

## 2023-12-20 PROCEDURE — 2500000003 HC RX 250 WO HCPCS: Performed by: PHYSICIAN ASSISTANT

## 2023-12-20 PROCEDURE — 2700000000 HC OXYGEN THERAPY PER DAY

## 2023-12-20 PROCEDURE — 6370000000 HC RX 637 (ALT 250 FOR IP): Performed by: INTERNAL MEDICINE

## 2023-12-20 PROCEDURE — 36620 INSERTION CATHETER ARTERY: CPT

## 2023-12-20 PROCEDURE — 84295 ASSAY OF SERUM SODIUM: CPT

## 2023-12-20 PROCEDURE — 85025 COMPLETE CBC W/AUTO DIFF WBC: CPT

## 2023-12-20 PROCEDURE — 82330 ASSAY OF CALCIUM: CPT

## 2023-12-20 PROCEDURE — 2000000000 HC ICU R&B

## 2023-12-20 PROCEDURE — 84100 ASSAY OF PHOSPHORUS: CPT

## 2023-12-20 PROCEDURE — 85014 HEMATOCRIT: CPT

## 2023-12-20 PROCEDURE — 80048 BASIC METABOLIC PNL TOTAL CA: CPT

## 2023-12-20 PROCEDURE — A4216 STERILE WATER/SALINE, 10 ML: HCPCS | Performed by: INTERNAL MEDICINE

## 2023-12-20 PROCEDURE — 4A133J1 MONITORING OF ARTERIAL PULSE, PERIPHERAL, PERCUTANEOUS APPROACH: ICD-10-PCS | Performed by: INTERNAL MEDICINE

## 2023-12-20 PROCEDURE — 90945 DIALYSIS ONE EVALUATION: CPT

## 2023-12-20 PROCEDURE — 6370000000 HC RX 637 (ALT 250 FOR IP): Performed by: PHYSICIAN ASSISTANT

## 2023-12-20 PROCEDURE — 71045 X-RAY EXAM CHEST 1 VIEW: CPT

## 2023-12-20 PROCEDURE — 82962 GLUCOSE BLOOD TEST: CPT

## 2023-12-20 PROCEDURE — A4216 STERILE WATER/SALINE, 10 ML: HCPCS | Performed by: PHYSICIAN ASSISTANT

## 2023-12-20 PROCEDURE — 94761 N-INVAS EAR/PLS OXIMETRY MLT: CPT

## 2023-12-20 PROCEDURE — 36592 COLLECT BLOOD FROM PICC: CPT

## 2023-12-20 PROCEDURE — 80202 ASSAY OF VANCOMYCIN: CPT

## 2023-12-20 PROCEDURE — 37799 UNLISTED PX VASCULAR SURGERY: CPT

## 2023-12-20 PROCEDURE — 83605 ASSAY OF LACTIC ACID: CPT

## 2023-12-20 RX ORDER — MAGNESIUM SULFATE IN WATER 40 MG/ML
2000 INJECTION, SOLUTION INTRAVENOUS ONCE
Status: COMPLETED | OUTPATIENT
Start: 2023-12-20 | End: 2023-12-20

## 2023-12-20 RX ORDER — POTASSIUM CHLORIDE 29.8 MG/ML
20 INJECTION INTRAVENOUS
Status: COMPLETED | OUTPATIENT
Start: 2023-12-20 | End: 2023-12-20

## 2023-12-20 RX ORDER — FENTANYL CITRATE-0.9 % NACL/PF 10 MCG/ML
25-200 PLASTIC BAG, INJECTION (ML) INTRAVENOUS CONTINUOUS
Status: DISCONTINUED | OUTPATIENT
Start: 2023-12-20 | End: 2023-12-20

## 2023-12-20 RX ADMIN — POLYVINYL ALCOHOL, POVIDONE 1 DROP: 14; 6 SOLUTION/ DROPS OPHTHALMIC at 07:46

## 2023-12-20 RX ADMIN — POTASSIUM CHLORIDE 20 MEQ: 29.8 INJECTION INTRAVENOUS at 14:52

## 2023-12-20 RX ADMIN — POLYETHYLENE GLYCOL 3350 17 G: 17 POWDER, FOR SOLUTION ORAL at 20:23

## 2023-12-20 RX ADMIN — Medication 1 MG: at 07:46

## 2023-12-20 RX ADMIN — INSULIN HUMAN 3.73 UNITS/HR: 1 INJECTION, SOLUTION INTRAVENOUS at 11:01

## 2023-12-20 RX ADMIN — HYDROCORTISONE SODIUM SUCCINATE 50 MG: 100 INJECTION, POWDER, FOR SOLUTION INTRAMUSCULAR; INTRAVENOUS at 20:37

## 2023-12-20 RX ADMIN — POLYVINYL ALCOHOL, POVIDONE 1 DROP: 14; 6 SOLUTION/ DROPS OPHTHALMIC at 00:07

## 2023-12-20 RX ADMIN — SODIUM CHLORIDE, PRESERVATIVE FREE 10 ML: 5 INJECTION INTRAVENOUS at 07:47

## 2023-12-20 RX ADMIN — FENTANYL CITRATE 200 MCG/HR: 50 INJECTION, SOLUTION INTRAMUSCULAR; INTRAVENOUS at 17:19

## 2023-12-20 RX ADMIN — FENTANYL CITRATE 200 MCG/HR: 50 INJECTION, SOLUTION INTRAMUSCULAR; INTRAVENOUS at 11:38

## 2023-12-20 RX ADMIN — HEPARIN SODIUM 10 UNITS/KG/HR: 10000 INJECTION, SOLUTION INTRAVENOUS at 04:11

## 2023-12-20 RX ADMIN — POTASSIUM CHLORIDE 20 MEQ: 29.8 INJECTION INTRAVENOUS at 14:06

## 2023-12-20 RX ADMIN — EPOPROSTENOL 50 NG/KG/MIN: 1.5 INJECTION, POWDER, LYOPHILIZED, FOR SOLUTION INTRAVENOUS at 12:50

## 2023-12-20 RX ADMIN — HYDROCORTISONE SODIUM SUCCINATE 50 MG: 100 INJECTION, POWDER, FOR SOLUTION INTRAMUSCULAR; INTRAVENOUS at 09:25

## 2023-12-20 RX ADMIN — MAGNESIUM SULFATE HEPTAHYDRATE 2000 MG: 40 INJECTION, SOLUTION INTRAVENOUS at 20:51

## 2023-12-20 RX ADMIN — POTASSIUM PHOSPHATE, MONOBASIC POTASSIUM PHOSPHATE, DIBASIC 15 MMOL: 224; 236 INJECTION, SOLUTION, CONCENTRATE INTRAVENOUS at 04:15

## 2023-12-20 RX ADMIN — 0.12% CHLORHEXIDINE GLUCONATE 15 ML: 1.2 RINSE ORAL at 07:46

## 2023-12-20 RX ADMIN — INSULIN HUMAN 4.4 UNITS/HR: 1 INJECTION, SOLUTION INTRAVENOUS at 09:07

## 2023-12-20 RX ADMIN — MICAFUNGIN SODIUM 100 MG: 100 INJECTION, POWDER, LYOPHILIZED, FOR SOLUTION INTRAVENOUS at 08:41

## 2023-12-20 RX ADMIN — MEROPENEM 1000 MG: 1 INJECTION, POWDER, FOR SOLUTION INTRAVENOUS at 14:53

## 2023-12-20 RX ADMIN — HYDROCORTISONE SODIUM SUCCINATE 50 MG: 100 INJECTION, POWDER, FOR SOLUTION INTRAMUSCULAR; INTRAVENOUS at 16:08

## 2023-12-20 RX ADMIN — Medication 225 MCG/HR: at 06:08

## 2023-12-20 RX ADMIN — POLYVINYL ALCOHOL, POVIDONE 1 DROP: 14; 6 SOLUTION/ DROPS OPHTHALMIC at 10:59

## 2023-12-20 RX ADMIN — POLYETHYLENE GLYCOL 3350 17 G: 17 POWDER, FOR SOLUTION ORAL at 07:46

## 2023-12-20 RX ADMIN — 0.12% CHLORHEXIDINE GLUCONATE 15 ML: 1.2 RINSE ORAL at 20:22

## 2023-12-20 RX ADMIN — CISATRACURIUM BESYLATE 1.5 MCG/KG/MIN: 2 INJECTION, SOLUTION INTRAVENOUS at 23:47

## 2023-12-20 RX ADMIN — POLYVINYL ALCOHOL, POVIDONE 1 DROP: 14; 6 SOLUTION/ DROPS OPHTHALMIC at 16:08

## 2023-12-20 RX ADMIN — LEVOTHYROXINE SODIUM 50 MCG: 0.05 TABLET ORAL at 05:54

## 2023-12-20 RX ADMIN — MEROPENEM 1000 MG: 1 INJECTION, POWDER, FOR SOLUTION INTRAVENOUS at 02:52

## 2023-12-20 RX ADMIN — POLYVINYL ALCOHOL, POVIDONE 1 DROP: 14; 6 SOLUTION/ DROPS OPHTHALMIC at 20:22

## 2023-12-20 RX ADMIN — Medication 250 MCG/HR: at 01:06

## 2023-12-20 RX ADMIN — POLYVINYL ALCOHOL, POVIDONE 1 DROP: 14; 6 SOLUTION/ DROPS OPHTHALMIC at 02:37

## 2023-12-20 RX ADMIN — FAMOTIDINE 20 MG: 10 INJECTION INTRAVENOUS at 07:47

## 2023-12-20 RX ADMIN — KETAMINE HYDROCHLORIDE 0.35 MG/KG/HR: 50 INJECTION INTRAMUSCULAR; INTRAVENOUS at 14:31

## 2023-12-20 RX ADMIN — EPOPROSTENOL 50 NG/KG/MIN: 1.5 INJECTION, POWDER, LYOPHILIZED, FOR SOLUTION INTRAVENOUS at 20:16

## 2023-12-20 RX ADMIN — SODIUM CHLORIDE, PRESERVATIVE FREE 35 ML: 5 INJECTION INTRAVENOUS at 20:22

## 2023-12-20 RX ADMIN — Medication 4 MCG/MIN: at 12:38

## 2023-12-20 RX ADMIN — Medication 5 MG/HR: at 09:28

## 2023-12-20 RX ADMIN — POLYVINYL ALCOHOL, POVIDONE 1 DROP: 14; 6 SOLUTION/ DROPS OPHTHALMIC at 23:52

## 2023-12-20 RX ADMIN — HYDROCORTISONE SODIUM SUCCINATE 50 MG: 100 INJECTION, POWDER, FOR SOLUTION INTRAMUSCULAR; INTRAVENOUS at 02:34

## 2023-12-20 ASSESSMENT — PULMONARY FUNCTION TESTS
PIF_VALUE: 29
PIF_VALUE: 33
PIF_VALUE: 28

## 2023-12-20 NOTE — PROGRESS NOTES
Vent changes made based off ABG    ABG results:    pH: 7.34    pCO2: 44.9    pO2: 60.7    HCO3: 24.2    BE: -1.8    SaO2: 89.2%       12/20/23 0344   Patient Observation   Pulse 72   Respirations 28   SpO2 100 %   Vent Information   Vent Mode AC/PRVC   Ventilator Settings   FiO2  (S)  90 %   Resp Rate (Set) (S)  28 bpm   PEEP/CPAP (cmH2O) 10   Vent Patient Data (Readings)   Vt Mandatory Exp (mL) 420 mL   Vt (Measured) 420 mL   Peak Inspiratory Pressure (cmH2O) 28 cmH2O   Rate Measured 28 br/min   Minute Volume (L/min) 11.8 Liters   Mean Airway Pressure (cmH2O) 16 cmH20   I:E Ratio 1:2.6   Flow Sensitivity 3 L/min   Insp Rise Time (%) 60 %

## 2023-12-20 NOTE — PROGRESS NOTES
Infectious Disease  progress Note        Reason: septic shock, influenza A pneumonia, ARDS    Current abx Prior abx   Meropenem since 12/18  Vancomycin since 12/8-12/12, restarted 12/19  Micafungin since 12/18 Pip/tazo 12/11-12/18  Metronidazole on 12/18  Levofloxacin 12/8-12/11  Cefepime on 12/11  Azithromycin 12/4-12/9, 1211-12/13         Lines:       Assessment :     73 y.o. female with PMHX of CAD s/p PCI 2014, severe mitral valve regurgitation,  hypothyroidism, dyslipidemia,  presented to the ED on 12/8 with cc of nausea, vomiting, fevers, chills, non-productive cough, shortness of breath.    Positive influenza A testing on 12/5/23    Clinical presentation consistent with acute hypoxic respiratory failure-likely ARDS in setting of influenza A pneumonia    Acute kidney injury-initiated on CRRT on 12/14.?  ATN.  Nephrology follow-up appreciated    Shock-likely septic shock    Worsening leukocytosis-likely due to steroids, sepsis-rule out fungemia.  Rule out pneumonia with ESBL gram-negative's    Worsening oxygenation/increased pressor requirement noted 12/18/23 concerning for sepsis- ? Due to undiagnosed fungemia ? Ventilator associated pneumonia with resistant gram negative pathogens     Yeast in sputum culture 12/18-likely colonizer  Recent broad-spectrum antibiotics, CVC, colonization with Candida puts patient at high risk for fungemia      No evidence of fecal impaction noted on x-ray abdomen 12/18/2023    Now with worsening leukocytosis after improvement on 12/19-decreased pressor requirement, improving oxygenation  ?  Leukemoid reaction to undiagnosed bleed (drop in H&H concerning for this)  Management complicated due to inability to perform imaging studies since patient not stable for transport    Recommendations:    Continue meropenem,  micafungin.  Discontinue vancomycin  Follow-up Fungitell  Management of anemia per primary team  Weaning from vent per ICU team  Wean pressors as tolerated  Monitor CBC,  obtain due to patient factors    Physical Exam:  Laying on bed intubated, sedated      HEENT: ET tube in place   CVS: S1S2 heard,  no rub  RS:  intubated   Abd: Soft, Non tender  Extrm: no cyanosis, clubbing   Skin: no visible  Rash  Musculoskeletal: No gross joints or bone deformities   Neurology: Does not follow commands.   Detailed allergic evaluation not feasible    Labs: Results:   Chemistry Recent Labs     12/19/23  1431 12/19/23  1818 12/20/23  0125   GLUCOSE 138* 144* 133*   * 136 135*   K 3.6 3.5 3.3*    105 103   CO2 25 26 28   BUN 36* 36* 39*   CREATININE 1.02 1.21 1.31*        CBC w/Diff Recent Labs     12/18/23  0205 12/19/23  0235 12/20/23  0125   WBC 36.5* 26.2* 38.6*   RBC 3.88* 2.58* 2.73*   HGB 11.1* 7.4* 7.9*   HCT 34.4* 22.9* 24.4*    168 205        Microbiology Results       Procedure Component Value Units Date/Time    Culture, Respiratory [[id number]]  (Abnormal) Collected: 12/18/23 1713    Order Status: Completed Specimen: Sputum Updated: 12/19/23 1424     Special Requests NO SPECIAL REQUESTS        Gram stain NO EPITHELIAL CELLS SEEN         RARE WBCS SEEN               FEW BUDDING YEAST WITH PSEUDOHYPHAE           Culture       LIGHT Yeast, (apparent Candida albicans/Candida dubliniensis)                  NO NORMAL RESPIRATORY ELIE ISOLATED          Culture, Blood 1 [[id number]] Collected: 12/18/23 1550    Order Status: Completed Specimen: Blood Updated: 12/20/23 0634     Special Requests NO SPECIAL REQUESTS        Culture NO GROWTH 2 DAYS       Culture, Urine [[id number]]  (Abnormal) Collected: 12/11/23 0330    Order Status: Completed Specimen: Urine, clean catch Updated: 12/13/23 1010     Special Requests NO SPECIAL REQUESTS        Hollister count --        >100,000  COLONIES/mL       Culture Candida albicans       Culture, Blood 1 [[id number]] Collected: 12/11/23 0300    Order Status: Completed Specimen: Blood Updated: 12/17/23 0631     Special Requests NO SPECIAL REQUESTS

## 2023-12-20 NOTE — PROGRESS NOTES
EPOC results:    pH 7.304  pCO2 48.9  PO2 62.5  cHCO3 24.3      K 3.8  Ca++ 1.47  Cl- 102  TCO2 24  Agap 10    Lac 1.14  Crea 1.11

## 2023-12-20 NOTE — PROCEDURES
[employer] [facility]  Arterial  Line Procedure Note with ultrasound    Indication: Acute hypoxic respiratory failure/ARDS need for real time BP monitoring and frequent ABGs    Line Bundle:   Full sterile barrier precautions used. 7-Step Sterility Protocol followed. (cap, mask sterile gown, sterile gloves, large sterile sheet, hand hygiene, 2% chlorhexidine for cutaneous antisepsis)  5 mL 1% Lidocaine placed at insertion site. Under ultrasound guidance  RIGHT/LEFT: right femoral artery cannulated x 2 attempt(s) utilizing the modified Seldinger technique. Good blood return. Catheter secured    Sterile Tegaderm placed.         Due to technical limitations, US imaging unable to be uploaded into Tabitha Hanley MD      Pulmonary, [facility]

## 2023-12-20 NOTE — INTERDISCIPLINARY ROUNDS
[facility]  Pulmonary, Critical Care, and Sleep Medicine  Interdisciplinary and Ventilator Weaning Rounds    Patient discussed in morning walking rounds and interdisciplinary rounds. ICU admission day: admitted 12/11    Overnight events:   No significant events overnight     Assessments and best practice:  Ventilator  Ventilator Day(s): 8  Vent Settings  FiO2 : (S) 90 %  Resp Rate (Set): (S) 28 bpm  Vt (Set, mL): 400 mL  PEEP/CPAP (cmH2O): 10  Peak Inspiratory Flow (lpm): 32 L/sec  Insp Time (sec): 0.6 sec  Insp Rise Time (%): 60 %  Flow Sensitivity: 3 L/min  Disconnect Sensitivity (%): 75 %   Glycemic control- insulin gtt   Diet  Diet NPO  ADULT TUBE FEEDING; Orogastric; Peptide Based High Protein; Continuous; 10; No; 30; Q 4 hours  PN-Adult Premix 8/10 - Custom Electrolytes - Central Line  Stress ulcer prophylaxis. Protonix  DVT prophylaxis. Heparin gtt  Need for Lines, pratt assessed.   Yes  Restraint Reevaluation   N/a  Disposition regarding transferring out of the ICU  RED    Family contact/MPOA: Rosalva Oshea  Family updated: updated yesterday evening, will continue to update today      Palliative consult within 3 days of admission to ICU-  Ethics Guidance: 21 days    Daily Plans:  Wean FIO2  Increase TF and wean TPN  Replace art line   Obtain ABG   Start Veletri         VELASQUEZ time 10 minutes        SUSANA Lugo - NP   Pulmonary, [facility]

## 2023-12-20 NOTE — CARE COORDINATION
12/20/23  JAGDISH reviewed chart. Intubated on vent. FiO2 to be weaned, respiratory continues following patient. Cardiology, Nephrology still following. Patient still requiring ICU level care - not yet stepped down from ICU. JAGDISH is monitoring clinical status today for disposition planning. 12/19/23  JAGDISH reviewed chart and is following today for disposition planning. Patient to continue CRRT, parenteral nutrition and IV abx. She continues to require intubation and vent support. Breathing trials pending patient's ability to tolerate/participate. Patient still requiring ICU level of care. 12/18/23  JAGDISH reviewed chart. Vent dyssynchrony, hypoxia, hypotension noted over the weekend. Patient continues to require intubation/vent support. CRRT was completed on 12/17. IV Abx continued and patient remains NPO with parenteral nutrition. Not medically ready for discharge/stepdown. SW continues monitoring clinical status today for disposition planning. 12/14/23  SW reviewed patient's chart. PT/OT recommended patient ambulate with DME walker at discharge and ARU. Patient was functioning independently without DME prior to hospitalization. Patient was observed with tachypnea and respiratory distress which led to intubation on 12/13/23. Temporary HD catheter placed 12/14. Patient continues to require ICU level of care at this time. Not medically ready or stepdown. JAGDISH is following clinical status for disposition planning.       Roxane Munson LMSW   Case Management

## 2023-12-20 NOTE — DIALYSIS
CRRT rounding completed. Patient tolerating therapy. TMP at 82, pressure drop 35. Filter change not required at this time,  4 boxes of K2, 3.5 ca provided.

## 2023-12-20 NOTE — PROGRESS NOTES
RENAL DAILY PROGRESS NOTE  74y F with PMH CAD, Mitral regugitaion, admitted to ICU for severe hypoxia and multiorgan failure following for renal failure  Subjective:       Complaint:   Overnight events noted  Her vasopressure requirement trending down   Family at bedside, update from renal stand point      IMPRESSION:   ALIREZA in setting of hypoxic respiratory failure. Due to ATN ?( Start CRRT on 12/14)  Access temp HD catheter   Hypoxic res failure / ARDS/pneumonia   Shock, septic, with multiorgan failure  Severe MR  Hx CAD, new onset afib with rvr   PLAN:   Plan to continue CRRT for volume management. Her hemodynamics improving, plan to switch IHD once off vasopressure. Electrolytes repletion per CRRT protocol. Adjust meds per pharmacy colleague.          Discussed with ICU team.     Current Facility-Administered Medications   Medication Dose Route Frequency    fentaNYL 50 mcg/mL 50 mL infusion   mcg/hr IntraVENous Continuous    potassium chloride 20 mEq/50 mL IVPB (Central Line)  20 mEq IntraVENous Q1H    epoprostenol 1,500 mcg in sodium chloride 0.9 % 50 mL nebulization solution  50 ng/kg/min (Order-Specific) Nebulization Continuous    ketamine (KETALAR) 500 mg in sodium chloride 0.9 % 100 mL infusion  0.05-2 mg/kg/hr IntraVENous Continuous    midazolam (VERSED) infusion 100mg/100mL  1-20 mg/hr IntraVENous TITRATE    norepinephrine (LEVOPHED) 16 mg in sodium chloride 0.9 % 250 mL infusion  1-100 mcg/min IntraVENous Continuous    polyethylene glycol (GLYCOLAX) packet 17 g  17 g Oral BID    PN-Adult Premix 8/10 - Custom Electrolytes - Central Line   IntraVENous Continuous TPN    meropenem (MERREM) 1,000 mg in sodium chloride 0.9 % 100 mL IVPB (mini-bag)  1,000 mg IntraVENous Q12H    vancomycin (VANCOCIN) intermittent dosing (placeholder)   Other RX Placeholder    micafungin (MYCAMINE) 100 mg in sodium chloride 0.9 % 100 mL IVPB  100 mg IntraVENous Daily    cisatracurium besylate (NIMBEX) 200 mg / 100 [Held by provider] apixaban (ELIQUIS) tablet 5 mg  5 mg Oral BID    sodium chloride flush 0.9 % injection 5-40 mL  5-40 mL IntraVENous 2 times per day    sodium chloride flush 0.9 % injection 5-40 mL  5-40 mL IntraVENous PRN    0.9 % sodium chloride infusion   IntraVENous PRN    ondansetron (ZOFRAN-ODT) disintegrating tablet 4 mg  4 mg Oral Q8H PRN    Or    ondansetron (ZOFRAN) injection 4 mg  4 mg IntraVENous Q6H PRN    polyethylene glycol (GLYCOLAX) packet 17 g  17 g Oral Daily PRN    acetaminophen (TYLENOL) tablet 650 mg  650 mg Oral Q6H PRN    Or    acetaminophen (TYLENOL) suppository 650 mg  650 mg Rectal Q6H PRN    ipratropium 0.5 mg-albuterol 2.5 mg (DUONEB) nebulizer solution 1 Dose  1 Dose Inhalation Q4H PRN    [Held by provider] guaiFENesin (MUCINEX) extended release tablet 600 mg  600 mg Oral BID    [Held by provider] Vitamin D (CHOLECALCIFEROL) tablet 2,000 Units  2,000 Units Oral Daily    [Held by provider] ezetimibe (ZETIA) tablet 10 mg  10 mg Oral Daily    levothyroxine (SYNTHROID) tablet 50 mcg  50 mcg Oral Daily    [Held by provider] metoprolol succinate (TOPROL XL) extended release tablet 50 mg  50 mg Oral Daily    [Held by provider] montelukast (SINGULAIR) tablet 10 mg  10 mg Oral Daily       Review of Symptoms: comprehensive ROS negative except above.   Objective:   Patient Vitals for the past 24 hrs:   Temp Pulse Resp BP SpO2   12/20/23 1200 (!) 96.4 °F (35.8 °C) 71 28 (!) 136/46 95 %   12/20/23 1145 (!) 96.4 °F (35.8 °C) 68 28 (!) 131/53 95 %   12/20/23 1130 (!) 96.4 °F (35.8 °C) 69 28 (!) 134/51 95 %   12/20/23 1115 (!) 96.4 °F (35.8 °C) 72 28 (!) 132/53 95 %   12/20/23 1100 (!) 96.6 °F (35.9 °C) 67 28 (!) 126/50 95 %   12/20/23 1045 (!) 96.6 °F (35.9 °C) 71 28 (!) 124/51 95 %   12/20/23 1030 (!) 96.6 °F (35.9 °C) 70 28 (!) 128/47 94 %   12/20/23 1015 96.8 °F (36 °C) 70 28 (!) 114/46 94 %   12/20/23 1000 96.8 °F (36 °C) 70 28 (!) 121/45 94 %   12/20/23 0945 96.8 °F (36 °C) 74 28 (!) 122/50 94

## 2023-12-20 NOTE — PROGRESS NOTES
Comprehensive Nutrition Assessment    Type and Reason for Visit:  Reassess, Consult, NPO/Clear Liquid    Nutrition Recommendations/Plan:   Cut TPN rate in half, let bag run out. Modify tube feeding regimen:   Formula: Vital AF 1.2  Advance as tolerated by 10 ml q 6 hours  Goal Rate: 75 ml/hr x 20 hours (for anticipation of holding tube feeds for standard nursing care)  Water Flushes: 30 mL q 4 hours (180 mL total)  Goal Regimen Provides (with modulars):  1800 kcal, 113g protein, 1395 ml free water, 100% RDIs  Continue to monitor tolerance of EN, weight, labs, and plan of care during admission. Malnutrition Assessment:  Malnutrition Status: At risk for malnutrition (Comment) (NPO, vent, ARDS, TFC) (12/20/23 1227)    Context:  Acute Illness       Nutrition Assessment:    Admitted with c/o n/v, fevers, cough, SOB. +influenza 12/5. Pt placed on BIPAP 12/10 and transferred to ICU due to high risk for intubation. Pt initially on PO diet then made NPO while on BIPAP. NPO since ICU admit x 3 days. CRRT was initiated. PICC placed 12/13, initiated on TPN. Noted s/p intubation 12/13. Pt continues on insulin drip. Trickle TF initiated 12/19. Discussed care during interdisciplinary rounds. Pt off pressors, tolerating trickle feeds per RN. Plan to wean off TPN and increase TF. Discussed with RN, cut rate in half at 6p and let bag run out. Will modify TF order.     Nutrition Related Findings:    Last BM (including prior to admit):  (pta)  Edema: Generalized  Edema Generalized: +2, Non-pitting Pertinent Meds: pepcid, solucortef, synthroid, glycolax, 15 mmol Kphos, virt-caps, nimbex gtt, fentanyl gtt, insulin drip, ketamine gtt, versed gtt Pertinent Labs: POC Glucose 145-152 mg/dl x 24 hrs, Na 135 L Wound Type: None        Current Nutrition Intake & Therapies:    Average Meal Intake: NPO     Diet NPO  ADULT TUBE FEEDING; Orogastric; Peptide Based High Protein; Continuous; 10; No; 30; Q 4 hours  PN-Adult Premix 8/10 - Custom

## 2023-12-20 NOTE — PLAN OF CARE
Problem: Discharge Planning  Goal: Discharge to home or other facility with appropriate resources  Outcome: Progressing  Discharge to home or other facility with appropriate resources:   Identify barriers to discharge with patient and caregiver   Identify discharge learning needs (meds, wound care, etc)   Refer to discharge planning if patient needs post-hospital services based on physician order or complex needs related to functional status, cognitive ability or social support system     Problem: Skin/Tissue Integrity  Goal: Absence of new skin breakdown  Description: 1. Monitor for areas of redness and/or skin breakdown  2. Assess vascular access sites hourly  3. Every 4-6 hours minimum:  Change oxygen saturation probe site  4. Every 4-6 hours:  If on nasal continuous positive airway pressure, respiratory therapy assess nares and determine need for appliance change or resting period.   Outcome: Progressing     Problem: Safety - Adult  Goal: Free from fall injury  Outcome: Progressing  Free From Fall Injury: Instruct family/caregiver on patient safety     Problem: Pain  Goal: Verbalizes/displays adequate comfort level or baseline comfort level  Outcome: Progressing  Verbalizes/displays adequate comfort level or baseline comfort level:   Encourage patient to monitor pain and request assistance   Assess pain using appropriate pain scale   Administer analgesics based on type and severity of pain and evaluate response   Implement non-pharmacological measures as appropriate and evaluate response   Consider cultural and social influences on pain and pain management   Notify Licensed Independent Practitioner if interventions unsuccessful or patient reports new pain     Problem: Nutrition Deficit:  Goal: Optimize nutritional status  Outcome: Progressing  Nutrient intake appropriate for improving, restoring, or maintaining nutritional needs:   Assess nutritional status and recommend course of action   Monitor oral intake, labs, and treatment plans   Recommend appropriate diets, oral nutritional supplements, and vitamin/mineral supplements   Recommend, monitor, and adjust tube feedings and TPN/PPN based on assessed needs

## 2023-12-21 ENCOUNTER — APPOINTMENT (OUTPATIENT)
Facility: HOSPITAL | Age: 73
DRG: 870 | End: 2023-12-21
Payer: MEDICARE

## 2023-12-21 VITALS
SYSTOLIC BLOOD PRESSURE: 119 MMHG | HEART RATE: 84 BPM | TEMPERATURE: 100.2 F | HEIGHT: 62 IN | RESPIRATION RATE: 32 BRPM | OXYGEN SATURATION: 100 % | BODY MASS INDEX: 37.73 KG/M2 | DIASTOLIC BLOOD PRESSURE: 60 MMHG | WEIGHT: 205 LBS

## 2023-12-21 PROBLEM — J96.02 ACUTE RESPIRATORY FAILURE WITH HYPOXIA AND HYPERCAPNIA (HCC): Status: ACTIVE | Noted: 2023-12-08

## 2023-12-21 PROBLEM — G93.40 ACUTE ENCEPHALOPATHY: Status: ACTIVE | Noted: 2023-12-21

## 2023-12-21 LAB
ANION GAP SERPL CALC-SCNC: 7 MMOL/L (ref 3–18)
ANION GAP SERPL CALC-SCNC: 8 MMOL/L (ref 3–18)
APTT PPP: 88 SEC (ref 23–36.4)
ARTERIAL PATENCY WRIST A: ABNORMAL
ARTERIAL PATENCY WRIST A: ABNORMAL
BASE DEFICIT BLD-SCNC: 2.5 MMOL/L
BASE DEFICIT BLD-SCNC: 2.9 MMOL/L
BASOPHILS # BLD: 0 K/UL (ref 0–0.1)
BASOPHILS NFR BLD: 0 % (ref 0–2)
BDY SITE: ABNORMAL
BDY SITE: ABNORMAL
BUN SERPL-MCNC: 34 MG/DL (ref 7–18)
BUN SERPL-MCNC: 38 MG/DL (ref 7–18)
BUN/CREAT SERPL: 28 (ref 12–20)
BUN/CREAT SERPL: 31 (ref 12–20)
CA-I SERPL-SCNC: 1.42 MMOL/L (ref 1.15–1.33)
CA-I SERPL-SCNC: 1.49 MMOL/L (ref 1.15–1.33)
CALCIUM SERPL-MCNC: 10 MG/DL (ref 8.5–10.1)
CALCIUM SERPL-MCNC: 9.9 MG/DL (ref 8.5–10.1)
CHLORIDE SERPL-SCNC: 102 MMOL/L (ref 100–111)
CHLORIDE SERPL-SCNC: 102 MMOL/L (ref 100–111)
CO2 SERPL-SCNC: 25 MMOL/L (ref 21–32)
CO2 SERPL-SCNC: 26 MMOL/L (ref 21–32)
CREAT SERPL-MCNC: 1.21 MG/DL (ref 0.6–1.3)
CREAT SERPL-MCNC: 1.21 MG/DL (ref 0.6–1.3)
DIFFERENTIAL METHOD BLD: ABNORMAL
EOSINOPHIL # BLD: 0 K/UL (ref 0–0.4)
EOSINOPHIL NFR BLD: 0 % (ref 0–5)
ERYTHROCYTE [DISTWIDTH] IN BLOOD BY AUTOMATED COUNT: 15 % (ref 11.6–14.5)
GAS FLOW.O2 O2 DELIVERY SYS: ABNORMAL
GAS FLOW.O2 O2 DELIVERY SYS: ABNORMAL
GAS FLOW.O2 SETTING OXYMISER: 28 BPM
GAS FLOW.O2 SETTING OXYMISER: 32 BPM
GLUCOSE BLD STRIP.AUTO-MCNC: 133 MG/DL (ref 70–110)
GLUCOSE BLD STRIP.AUTO-MCNC: 139 MG/DL (ref 70–110)
GLUCOSE BLD STRIP.AUTO-MCNC: 145 MG/DL (ref 70–110)
GLUCOSE BLD STRIP.AUTO-MCNC: 146 MG/DL (ref 70–110)
GLUCOSE BLD STRIP.AUTO-MCNC: 148 MG/DL (ref 70–110)
GLUCOSE BLD STRIP.AUTO-MCNC: 154 MG/DL (ref 70–110)
GLUCOSE BLD STRIP.AUTO-MCNC: 154 MG/DL (ref 70–110)
GLUCOSE BLD STRIP.AUTO-MCNC: 158 MG/DL (ref 70–110)
GLUCOSE BLD STRIP.AUTO-MCNC: 158 MG/DL (ref 70–110)
GLUCOSE BLD STRIP.AUTO-MCNC: 169 MG/DL (ref 70–110)
GLUCOSE BLD STRIP.AUTO-MCNC: 169 MG/DL (ref 70–110)
GLUCOSE BLD STRIP.AUTO-MCNC: 170 MG/DL (ref 70–110)
GLUCOSE SERPL-MCNC: 126 MG/DL (ref 74–99)
GLUCOSE SERPL-MCNC: 145 MG/DL (ref 74–99)
HCO3 BLD-SCNC: 26.8 MMOL/L (ref 22–26)
HCO3 BLD-SCNC: 27 MMOL/L (ref 22–26)
HCT VFR BLD AUTO: 29.6 % (ref 35–45)
HGB BLD-MCNC: 9.5 G/DL (ref 12–16)
IMM GRANULOCYTES # BLD AUTO: 0 K/UL (ref 0–0.04)
IMM GRANULOCYTES NFR BLD AUTO: 0 % (ref 0–0.5)
IPAP/PIP/HIGH PEEP: 25
LYMPHOCYTES # BLD: 4.7 K/UL (ref 0.9–3.6)
LYMPHOCYTES NFR BLD: 8 % (ref 21–52)
MAGNESIUM SERPL-MCNC: 2.4 MG/DL (ref 1.6–2.6)
MAGNESIUM SERPL-MCNC: 2.5 MG/DL (ref 1.6–2.6)
MCH RBC QN AUTO: 29.1 PG (ref 24–34)
MCHC RBC AUTO-ENTMCNC: 32.1 G/DL (ref 31–37)
MCV RBC AUTO: 90.8 FL (ref 78–100)
MONOCYTES # BLD: 1.2 K/UL (ref 0.05–1.2)
MONOCYTES NFR BLD: 2 % (ref 3–10)
NEUTS BAND NFR BLD MANUAL: 3 %
NEUTS SEG # BLD: 52.4 K/UL (ref 1.8–8)
NEUTS SEG NFR BLD: 87 % (ref 40–73)
NRBC # BLD: 4.91 K/UL (ref 0–0.01)
NRBC BLD-RTO: 8.4 PER 100 WBC
O2/TOTAL GAS SETTING VFR VENT: 100 %
O2/TOTAL GAS SETTING VFR VENT: 100 %
PAW @ MEAN EXP FLOW ON VENT: 15 CMH2O
PCO2 BLD: 72.7 MMHG (ref 35–45)
PCO2 BLD: 73.1 MMHG (ref 35–45)
PEEP RESPIRATORY: 10 CMH2O
PEEP RESPIRATORY: 10 CMH2O
PH BLD: 7.17 (ref 7.35–7.45)
PH BLD: 7.18 (ref 7.35–7.45)
PHOSPHATE SERPL-MCNC: 2.9 MG/DL (ref 2.5–4.9)
PHOSPHATE SERPL-MCNC: 3.6 MG/DL (ref 2.5–4.9)
PLATELET # BLD AUTO: 256 K/UL (ref 135–420)
PLATELET COMMENT: ABNORMAL
PMV BLD AUTO: 11.7 FL (ref 9.2–11.8)
PO2 BLD: 65 MMHG (ref 80–100)
PO2 BLD: 75 MMHG (ref 80–100)
POTASSIUM SERPL-SCNC: 3.7 MMOL/L (ref 3.5–5.5)
POTASSIUM SERPL-SCNC: 3.7 MMOL/L (ref 3.5–5.5)
RBC # BLD AUTO: 3.26 M/UL (ref 4.2–5.3)
RBC MORPH BLD: ABNORMAL
RBC MORPH BLD: ABNORMAL
SAO2 % BLD: 85 % (ref 92–97)
SAO2 % BLD: 89.7 % (ref 92–97)
SERVICE CMNT-IMP: ABNORMAL
SERVICE CMNT-IMP: ABNORMAL
SODIUM SERPL-SCNC: 135 MMOL/L (ref 136–145)
SODIUM SERPL-SCNC: 135 MMOL/L (ref 136–145)
SPECIMEN TYPE: ABNORMAL
SPECIMEN TYPE: ABNORMAL
VENTILATION MODE VENT: ABNORMAL
VENTILATION MODE VENT: ABNORMAL
VT SETTING VENT: 300 ML
VT SETTING VENT: 300 ML
WBC # BLD AUTO: 58.3 K/UL (ref 4.6–13.2)

## 2023-12-21 PROCEDURE — 90945 DIALYSIS ONE EVALUATION: CPT

## 2023-12-21 PROCEDURE — 6370000000 HC RX 637 (ALT 250 FOR IP): Performed by: PHYSICIAN ASSISTANT

## 2023-12-21 PROCEDURE — 94003 VENT MGMT INPAT SUBQ DAY: CPT

## 2023-12-21 PROCEDURE — 2500000003 HC RX 250 WO HCPCS

## 2023-12-21 PROCEDURE — 82803 BLOOD GASES ANY COMBINATION: CPT

## 2023-12-21 PROCEDURE — 99239 HOSP IP/OBS DSCHRG MGMT >30: CPT | Performed by: NURSE PRACTITIONER

## 2023-12-21 PROCEDURE — 85025 COMPLETE CBC W/AUTO DIFF WBC: CPT

## 2023-12-21 PROCEDURE — 2500000003 HC RX 250 WO HCPCS: Performed by: PHYSICIAN ASSISTANT

## 2023-12-21 PROCEDURE — APPSS30 APP SPLIT SHARED TIME 16-30 MINUTES: Performed by: NURSE PRACTITIONER

## 2023-12-21 PROCEDURE — 80048 BASIC METABOLIC PNL TOTAL CA: CPT

## 2023-12-21 PROCEDURE — 37799 UNLISTED PX VASCULAR SURGERY: CPT

## 2023-12-21 PROCEDURE — 6360000002 HC RX W HCPCS: Performed by: PHYSICIAN ASSISTANT

## 2023-12-21 PROCEDURE — 6360000002 HC RX W HCPCS: Performed by: INTERNAL MEDICINE

## 2023-12-21 PROCEDURE — 85730 THROMBOPLASTIN TIME PARTIAL: CPT

## 2023-12-21 PROCEDURE — 6360000002 HC RX W HCPCS: Performed by: NURSE PRACTITIONER

## 2023-12-21 PROCEDURE — 2580000003 HC RX 258: Performed by: INTERNAL MEDICINE

## 2023-12-21 PROCEDURE — 94645 CONT INHLJ TX EACH ADDL HOUR: CPT

## 2023-12-21 PROCEDURE — 2580000003 HC RX 258: Performed by: PHYSICIAN ASSISTANT

## 2023-12-21 PROCEDURE — A4216 STERILE WATER/SALINE, 10 ML: HCPCS | Performed by: PHYSICIAN ASSISTANT

## 2023-12-21 PROCEDURE — 83735 ASSAY OF MAGNESIUM: CPT

## 2023-12-21 PROCEDURE — 71045 X-RAY EXAM CHEST 1 VIEW: CPT

## 2023-12-21 PROCEDURE — 82330 ASSAY OF CALCIUM: CPT

## 2023-12-21 PROCEDURE — 99291 CRITICAL CARE FIRST HOUR: CPT | Performed by: INTERNAL MEDICINE

## 2023-12-21 PROCEDURE — 6370000000 HC RX 637 (ALT 250 FOR IP): Performed by: INTERNAL MEDICINE

## 2023-12-21 PROCEDURE — 84100 ASSAY OF PHOSPHORUS: CPT

## 2023-12-21 PROCEDURE — 6370000000 HC RX 637 (ALT 250 FOR IP): Performed by: REGISTERED NURSE

## 2023-12-21 PROCEDURE — 82962 GLUCOSE BLOOD TEST: CPT

## 2023-12-21 RX ORDER — BISACODYL 10 MG
10 SUPPOSITORY, RECTAL RECTAL DAILY PRN
Status: DISCONTINUED | OUTPATIENT
Start: 2023-12-21 | End: 2023-12-21 | Stop reason: HOSPADM

## 2023-12-21 RX ORDER — ACETAMINOPHEN 325 MG/1
650 TABLET ORAL EVERY 4 HOURS PRN
Status: DISCONTINUED | OUTPATIENT
Start: 2023-12-21 | End: 2023-12-21 | Stop reason: HOSPADM

## 2023-12-21 RX ORDER — LORAZEPAM 2 MG/ML
2 INJECTION INTRAMUSCULAR EVERY 4 HOURS PRN
Status: DISCONTINUED | OUTPATIENT
Start: 2023-12-21 | End: 2023-12-21 | Stop reason: HOSPADM

## 2023-12-21 RX ORDER — GLYCOPYRROLATE 0.2 MG/ML
0.2 INJECTION INTRAMUSCULAR; INTRAVENOUS EVERY 4 HOURS PRN
Status: DISCONTINUED | OUTPATIENT
Start: 2023-12-21 | End: 2023-12-21 | Stop reason: HOSPADM

## 2023-12-21 RX ORDER — ONDANSETRON 2 MG/ML
4 INJECTION INTRAMUSCULAR; INTRAVENOUS EVERY 6 HOURS PRN
Status: DISCONTINUED | OUTPATIENT
Start: 2023-12-21 | End: 2023-12-21 | Stop reason: HOSPADM

## 2023-12-21 RX ORDER — HALOPERIDOL 5 MG/ML
2 INJECTION INTRAMUSCULAR
Status: DISCONTINUED | OUTPATIENT
Start: 2023-12-21 | End: 2023-12-21 | Stop reason: HOSPADM

## 2023-12-21 RX ADMIN — HYDROCORTISONE SODIUM SUCCINATE 50 MG: 100 INJECTION, POWDER, FOR SOLUTION INTRAMUSCULAR; INTRAVENOUS at 11:00

## 2023-12-21 RX ADMIN — POLYVINYL ALCOHOL, POVIDONE 1 DROP: 14; 6 SOLUTION/ DROPS OPHTHALMIC at 02:48

## 2023-12-21 RX ADMIN — AMIODARONE HYDROCHLORIDE 1 MG/MIN: 1.8 INJECTION, SOLUTION INTRAVENOUS at 07:30

## 2023-12-21 RX ADMIN — FENTANYL CITRATE 200 MCG/HR: 50 INJECTION, SOLUTION INTRAMUSCULAR; INTRAVENOUS at 04:06

## 2023-12-21 RX ADMIN — LORAZEPAM 2 MG: 2 INJECTION, SOLUTION INTRAMUSCULAR; INTRAVENOUS at 12:19

## 2023-12-21 RX ADMIN — ACETAMINOPHEN 325MG 650 MG: 325 TABLET ORAL at 11:17

## 2023-12-21 RX ADMIN — POLYVINYL ALCOHOL, POVIDONE 1 DROP: 14; 6 SOLUTION/ DROPS OPHTHALMIC at 11:18

## 2023-12-21 RX ADMIN — FAMOTIDINE 20 MG: 10 INJECTION INTRAVENOUS at 08:26

## 2023-12-21 RX ADMIN — POLYETHYLENE GLYCOL 3350 17 G: 17 POWDER, FOR SOLUTION ORAL at 08:26

## 2023-12-21 RX ADMIN — MEROPENEM 1000 MG: 1 INJECTION, POWDER, FOR SOLUTION INTRAVENOUS at 02:48

## 2023-12-21 RX ADMIN — Medication 1 MG: at 08:26

## 2023-12-21 RX ADMIN — POLYVINYL ALCOHOL, POVIDONE 1 DROP: 14; 6 SOLUTION/ DROPS OPHTHALMIC at 08:30

## 2023-12-21 RX ADMIN — LEVOTHYROXINE SODIUM 50 MCG: 0.05 TABLET ORAL at 05:30

## 2023-12-21 RX ADMIN — KETAMINE HYDROCHLORIDE 0.35 MG/KG/HR: 50 INJECTION INTRAMUSCULAR; INTRAVENOUS at 05:56

## 2023-12-21 RX ADMIN — 0.12% CHLORHEXIDINE GLUCONATE 15 ML: 1.2 RINSE ORAL at 08:26

## 2023-12-21 RX ADMIN — EPOPROSTENOL 50 NG/KG/MIN: 1.5 INJECTION, POWDER, LYOPHILIZED, FOR SOLUTION INTRAVENOUS at 06:33

## 2023-12-21 RX ADMIN — AMIODARONE HYDROCHLORIDE 150 MG: 1.5 INJECTION, SOLUTION INTRAVENOUS at 07:15

## 2023-12-21 RX ADMIN — METOPROLOL TARTRATE 5 MG: 5 INJECTION INTRAVENOUS at 07:07

## 2023-12-21 RX ADMIN — SODIUM CHLORIDE, PRESERVATIVE FREE 10 ML: 5 INJECTION INTRAVENOUS at 08:30

## 2023-12-21 RX ADMIN — MICAFUNGIN SODIUM 100 MG: 100 INJECTION, POWDER, LYOPHILIZED, FOR SOLUTION INTRAVENOUS at 10:29

## 2023-12-21 RX ADMIN — Medication 3 MG/HR: at 06:08

## 2023-12-21 RX ADMIN — INSULIN HUMAN 1.66 UNITS/HR: 1 INJECTION, SOLUTION INTRAVENOUS at 06:09

## 2023-12-21 RX ADMIN — HYDROCORTISONE SODIUM SUCCINATE 50 MG: 100 INJECTION, POWDER, FOR SOLUTION INTRAMUSCULAR; INTRAVENOUS at 02:48

## 2023-12-21 ASSESSMENT — PULMONARY FUNCTION TESTS
PIF_VALUE: 31
PIF_VALUE: 28
PIF_VALUE: 30

## 2023-12-21 NOTE — INTERDISCIPLINARY ROUNDS
[facility]  Pulmonary, Critical Care, and Sleep Medicine  Interdisciplinary and Ventilator Weaning Rounds    Patient discussed in morning walking rounds and interdisciplinary rounds. ICU admission day: admitted 12/11    Overnight events:   . Patient experienced elevated plateau pressures and peak pressures overnight. Repeat ABG's were performed with significant hypercapnia. Attempts were made to adjust the ventilator, however, on the repeat gas patient remained hypercapnic and there was not much room for adjustments due to elevated plateau pressures and peak pressures. Remains hypoxic on veletri   Afib rvr, started on amio, but became bradycardic, so stopped   CRRT stopped due to ectopy and bradycardia. Discussed with Nephro. Assessments and best practice:  Ventilator  Ventilator Day(s): 9  Vent Settings  FiO2 : 100 %  Resp Rate (Set): (S) 32 bpm  Vt (Set, mL): 400 mL  PEEP/CPAP (cmH2O): 10  Peak Inspiratory Flow (lpm): 28 L/sec  Insp Time (sec): 0.7 sec  Insp Rise Time (%): 60 %  Flow Sensitivity: 3 L/min  Disconnect Sensitivity (%): 75 %   Glycemic control- insulin gtt   Diet  Diet NPO  ADULT TUBE FEEDING; Orogastric; Peptide Based; Continuous; 10; Yes; 10; Q 6 hours; 75; 30; Q 4 hours  Stress ulcer prophylaxis. Protonix  DVT prophylaxis. Heparin gtt  Need for Lines, pratt assessed.   Yes  Restraint Reevaluation   N/a  Disposition regarding transferring out of the ICU  RED    Family contact/MPOA: Marshall Hanna  Family updated: updated yesterday evening, will continue to update today      Palliative consult within 3 days of admission to ICU-  Ethics Guidance: 21 days    Daily Plans:  Continue to update daughter regarding clinical status, may transition to comfort care   Do not restart CRRT   Hold amiodarone due to bradycardia   Hold TF for now   Cont supportive care           VELASQUEZ time 3 minutes        SUSANA Sanchez - NP   [facility]

## 2023-12-21 NOTE — PROGRESS NOTES
ABG results on AC/VC+ 32 300 10 100%:    pH 7.17  pCO2 73  pO2 65  HCO3 27  BE -2.9  SaO2 85%    Results reported to PA, Vt increased to 350

## 2023-12-21 NOTE — PROGRESS NOTES
Chemistry:   Recent Labs     12/18/23  1423 12/18/23  1839 12/18/23  2234 12/19/23  0235 12/19/23  0550 12/19/23  1028 12/19/23  1431 12/19/23  1818 12/20/23  0125 12/20/23  0825 12/20/23  1016 12/20/23  1416 12/20/23  1934 12/21/23  0125 12/21/23  0645   BUN 28* 32* 29* 33* 31* 34* 36* 36* 39* 39* 40* 41* 41* 38* 34*   K 3.6 3.6 3.3* 3.5 3.4* 3.4* 3.6 3.5 3.3* 3.4* 3.4* 3.4* 3.7 3.7 3.7    136 136 137 135* 136 135* 136 135* 129* 134* 137 136 135* 135*    105 105 105 105 105 104 105 103 99* 103 105 105 102 102   CO2 25 26 26 26 25 26 25 26 28 23 28 25 27 25 26   PHOS 1.4* 1.6* 1.8* 2.3* 2.8 2.0* 3.1 2.7 2.0* 3.3  --  2.5 2.3* 3.6 2.9            Procedures/imaging: see electronic medical records for all procedures, Xrays and details which were not copied into this note but were reviewed prior to creation of Plan          Assessment & Plan:     See above        Haider Tavarez MD  12/21/2023  12:58 PM

## 2023-12-21 NOTE — DISCHARGE SUMMARY
BIPAP with FIO2 at 100%. 18/10 and transferred to the stepdown unit. Lasix was given with 700 ml urine output. Pt's repeat ABG 2 hours post BIPAP still showed hypoxia with PO2 in the 60s. Pt transferred to our service due to high risk for intubation. 12/11: Overnight pt said she had some episodes of dyspnea but feels comfortable at time of exam.   12/12: remains on bipap on AVAPS, down to 90% FiO2 from 100% with the increase in EPAP to 10.   12/13: intubated for severe respiratory distress in the evening  12/14: initiated proning and temp HD catheter placed with initiation of HD, then pt became hypotensive so pressors initiated and transitioned to CRRT  12/17: now supinated, vent desynch, hypoxia, hypotension, increasing lactate now 4.3 levo increased, decreased ultrafiltration on CRRT, started cis drip, abx  12/18: consulted ID for abx refinement, no obvious PNA on CXR. Increased TPN, per nephrology is likely intravascularly depleted, blood gas improved, will trial downtitrating FiO2 slowly. Will d/c flagyl. Per ID, started micafungin. KUB ordered to assess for constipation/stercocolitis. 12/19: Will reach out to infection control re d/c flu precautions, repleted lytes, started trickle tube feeds will trial slow wean of FiO2 today  12/20: FiO2 increased to 90% overnight due to low pO2, will recheck gas and continue to wean FiO2 and PEEP as tolerated; can consider d/c paralytics if enough improvement, but more likely will wait until tomorrow even if significant progress. Still no stool. Arterial line placed. 12/21: rapid deterioration. Patient experienced elevated plateau pressures and peak pressures overnight. Repeat ABG's were performed with significant hypercapnia. Attempts were made to adjust the ventilator, however, on the repeat gas patient remained hypercapnic and there was not much room for adjustments due to elevated plateau pressures and peak pressures.  Hypoxia remains minimally improved and patient Performed by: Charo Yu     POC Sodium 135 (L) 136 - 145 mmol/L    POC Potassium 3.8 3.5 - 5.1 mmol/L    POC Glucose 149 (H) 65 - 100 mg/dL    POC Creatinine 1.11 0.6 - 1.3 mg/dL    POC Lactic Acid 1.14 0.40 - 2.00 mmol/L    POC Chloride 102 98 - 107 mmol/L    Anion Gap, POC Cannot be calculated  Cannot be calculated   10 - 20 mmol/L    eGFR, POC 52 (L) >60 ml/min/1.73m2   POCT Glucose    Collection Time: 12/20/23  3:39 PM   Result Value Ref Range    POC Glucose 155 (H) 70 - 110 mg/dL   POCT Glucose    Collection Time: 12/20/23  4:42 PM   Result Value Ref Range    POC Glucose 143 (H) 70 - 110 mg/dL   POCT Glucose    Collection Time: 12/20/23  5:44 PM   Result Value Ref Range    POC Glucose 158 (H) 70 - 110 mg/dL   POCT Glucose    Collection Time: 12/20/23  6:42 PM   Result Value Ref Range    POC Glucose 141 (H) 70 - 110 mg/dL   Basic Metabolic Panel    Collection Time: 12/20/23  7:34 PM   Result Value Ref Range    Sodium 136 136 - 145 mmol/L    Potassium 3.7 3.5 - 5.5 mmol/L    Chloride 105 100 - 111 mmol/L    CO2 27 21 - 32 mmol/L    Anion Gap 4 3.0 - 18 mmol/L    Glucose 135 (H) 74 - 99 mg/dL    BUN 41 (H) 7.0 - 18 MG/DL    Creatinine 1.11 0.6 - 1.3 MG/DL    Bun/Cre Ratio 37 (H) 12 - 20      Est, Glom Filt Rate 52 (L) >60 ml/min/1.73m2    Calcium 9.4 8.5 - 10.1 MG/DL   Calcium, Ionized    Collection Time: 12/20/23  7:34 PM   Result Value Ref Range    IONIZED CALCIUM 1.46 (H) 1.15 - 1.33 MMOL/L   Magnesium    Collection Time: 12/20/23  7:34 PM   Result Value Ref Range    Magnesium 1.9 1.6 - 2.6 mg/dL   Phosphorus    Collection Time: 12/20/23  7:34 PM   Result Value Ref Range    Phosphorus 2.3 (L) 2.5 - 4.9 MG/DL   POCT Glucose    Collection Time: 12/20/23  8:00 PM   Result Value Ref Range    POC Glucose 134 (H) 70 - 110 mg/dL   POCT Glucose    Collection Time: 12/20/23  8:54 PM   Result Value Ref Range    POC Glucose 141 (H) 70 - 110 mg/dL   POCT Glucose    Collection Time: 12/20/23  9:57 PM   Result

## 2023-12-21 NOTE — DEATH NOTES
Death Pronouncement Note  Patient's Name: Delvis Tesfaye   Patient's YOB: 1950  MRN Number: [id number]    Admitting Provider: David Jay DO  Attending Provider: Kar Vallejo MD    Patient was examined and the following were absent: Pulses, Blood Pressure, and Respiratory effort    I declared the patient dead on 12/21/23 at 1253    Preliminary Cause of Death: influenza pneumonia, ARDS    Electronically signed by Kar Vallejo MD on 12/21/23 at 1:04 PM EST

## 2023-12-21 NOTE — PROGRESS NOTES
visited with the family of Radha Green, who is a 74 y.o.,female. The  provided the following Interventions:  Initiated a relationship of care and support. Offered prayer and assurance of continued prayers on patient's behalf. Plan:  Chaplains will continue to follow and will provide pastoral care on an as needed/requested basis.  recommends bedside caregivers page  on duty if patient shows signs of acute spiritual or emotional distress.  provided family care. Patient being extubated. Provided prayer of comfort and provided emotional support to the family.      [address] [facility]  Staff [id number] [facility] [address]   [phone]

## 2023-12-21 NOTE — PROGRESS NOTES
12/21/23 0013   Ventilator Settings   Vt (Set, mL) (S)  300 mL  (Vt 6mL/kg)     Called to patient's room for O2 desaturation to mid 80s. FiO2 increased to 100%. Peak pressure (38 cmH2O) and plateau pressure (33 cmH2O) both elevated. Pt IBW 50 kg; decreased Vt to 300 mL to achieve Vt 6mL/kg. Peak pressures (25 cmH2O) and plateau pressures (22 cmH2O) improved. Will draw ABG in 30 mins to see if additional vent adjustments necessary.     0100 - increased rate to 32; will draw repeat ABG in 30 mins

## 2023-12-21 NOTE — PROGRESS NOTES
Blood 1 [[id number]] Collected: 12/11/23 0300    Order Status: Completed Specimen: Blood Updated: 12/17/23 0631     Special Requests NO SPECIAL REQUESTS        Culture NO GROWTH 6 DAYS       Culture, Blood 2 [[id number]] Collected: 12/11/23 0300    Order Status: Completed Specimen: Blood Updated: 12/17/23 0631     Special Requests NO SPECIAL REQUESTS        Culture NO GROWTH 6 DAYS       Culture, Respiratory [[id number]] Collected: 12/11/23 0200    Order Status: Canceled Specimen: Sputum Expectorated     Respiratory Panel, Molecular, with COVID-19 (Restricted: peds pts or suitable admitted adults) [[id number]]  (Abnormal) Collected: 12/10/23 2245    Order Status: Completed Specimen: Nasopharyngeal Updated: 12/11/23 0045     Adenovirus by PCR Not detected        Coronavirus 229E by PCR Not detected        Coronavirus HKU1 by PCR Not detected        Coronavirus NL63 by PCR Not detected        Coronavirus OC43 by PCR Not detected        SARS-CoV-2, PCR Not detected        Human Metapneumovirus by PCR Not detected        Rhinovirus Enterovirus PCR Not detected        Influenza A H1-2009 by PCR Detected        Influenza B PCR Not detected        Parainfluenza 1 PCR Not detected        Parainfluenza 2 PCR Not detected        Parainfluenza 3 PCR Not detected        Parainfluenza 4 PCR Not detected        Respiratory Syncytial Virus by PCR Not detected        Bordetella parapertussis by PCR Not detected        Bordetella pertussis by PCR Not detected        Chlamydophila Pneumonia PCR Not detected        Mycoplasma pneumo by PCR Not detected       Culture, Respiratory [[id number]] Collected: 12/10/23 1934    Order Status: Completed Specimen: Sputum Expectorated Updated: 12/13/23 0845     Special Requests NO SPECIAL REQUESTS        Gram stain 1+ Gram positive cocci         1+ WBCS SEEN               RARE EPITHELIAL CELLS SEEN           Culture       HEAVY NORMAL RESPIRATORY ELIE          Culture, MRSA, Screening  [[id number]] Collected: 12/09/23 1843    Order Status: Completed Specimen: Nares Updated: 12/11/23 1203     Special Requests NO SPECIAL REQUESTS        Culture MRSA NOT PRESENT               Screening of patient nares for MRSA is for surveillance purposes and, if positive, to facilitate isolation considerations in high risk settings. It is not intended for automatic decolonization interventions per se as regimens are not sufficiently effective to warrant routine use. Clostridium difficile toxin/antigen [[id number]] Collected: 12/09/23 1030    Order Status: Canceled Specimen: Stool                 RADIOLOGY:    All available imaging studies/reports in [facility] for this admission were reviewed    High complexity decision making was performed during the evaluation of this patient at high risk for decompensation with multiple organ involvement     Above mentioned total time spent on reviewing the case/medical record/data/notes/EMR/patient examination/documentation/coordinating care with nurse/consultants, exclusive of procedures with complex decision making performed and > 50% time spent in face to face evaluation. Disclaimer: Sections of this note are dictated utilizing voice recognition software, which may have resulted in some phonetic based errors in grammar and contents. Even though attempts were made to correct all the mistakes, some may have been missed, and remained in the body of the document. If questions arise, please contact our department.     Dr. Lin Speaker, Infectious Disease Specialist  [phone]  December 21, 2023  10:05 AM

## 2023-12-21 NOTE — PROGRESS NOTES
@[id number] arrived bedside and patient CRRT was no longer going. Per primary RN LINDSEY Sheldon, patient CRRT was stopped approx.  30 mins prior to my arrival.

## 2023-12-21 NOTE — PLAN OF CARE
Problem: Discharge Planning  Goal: Discharge to home or other facility with appropriate resources  Outcome: Progressing  Discharge to home or other facility with appropriate resources:   Identify barriers to discharge with patient and caregiver   Identify discharge learning needs (meds, wound care, etc)   Refer to discharge planning if patient needs post-hospital services based on physician order or complex needs related to functional status, cognitive ability or social support system     Problem: Skin/Tissue Integrity  Goal: Absence of new skin breakdown  Description: 1. Monitor for areas of redness and/or skin breakdown  2. Assess vascular access sites hourly  3. Every 4-6 hours minimum:  Change oxygen saturation probe site  4. Every 4-6 hours:  If on nasal continuous positive airway pressure, respiratory therapy assess nares and determine need for appliance change or resting period.   Outcome: Progressing     Problem: Safety - Adult  Goal: Free from fall injury  Outcome: Progressing  Free From Fall Injury: Instruct family/caregiver on patient safety     Problem: Pain  Goal: Verbalizes/displays adequate comfort level or baseline comfort level  Outcome: Progressing  Flowsheets (Taken 12/20/2023 2000)  Verbalizes/displays adequate comfort level or baseline comfort level:   Encourage patient to monitor pain and request assistance   Assess pain using appropriate pain scale   Administer analgesics based on type and severity of pain and evaluate response   Implement non-pharmacological measures as appropriate and evaluate response   Consider cultural and social influences on pain and pain management   Notify Licensed Independent Practitioner if interventions unsuccessful or patient reports new pain     Problem: Nutrition Deficit:  Goal: Optimize nutritional status  Outcome: Progressing  Nutrient intake appropriate for improving, restoring, or maintaining nutritional needs:   Assess nutritional status and recommend

## 2023-12-21 NOTE — PROGRESS NOTES
Special Requests NO SPECIAL REQUESTS        Culture NO GROWTH 6 DAYS       Culture, Blood 2 [[id number]] Collected: 12/11/23 0300    Order Status: Completed Specimen: Blood Updated: 12/17/23 0631     Special Requests NO SPECIAL REQUESTS        Culture NO GROWTH 6 DAYS       Culture, Respiratory [[id number]] Collected: 12/11/23 0200    Order Status: Canceled Specimen: Sputum Expectorated     Respiratory Panel, Molecular, with COVID-19 (Restricted: peds pts or suitable admitted adults) [[id number]]  (Abnormal) Collected: 12/10/23 2245    Order Status: Completed Specimen: Nasopharyngeal Updated: 12/11/23 0045     Adenovirus by PCR Not detected        Coronavirus 229E by PCR Not detected        Coronavirus HKU1 by PCR Not detected        Coronavirus NL63 by PCR Not detected        Coronavirus OC43 by PCR Not detected        SARS-CoV-2, PCR Not detected        Human Metapneumovirus by PCR Not detected        Rhinovirus Enterovirus PCR Not detected        Influenza A H1-2009 by PCR Detected        Influenza B PCR Not detected        Parainfluenza 1 PCR Not detected        Parainfluenza 2 PCR Not detected        Parainfluenza 3 PCR Not detected        Parainfluenza 4 PCR Not detected        Respiratory Syncytial Virus by PCR Not detected        Bordetella parapertussis by PCR Not detected        Bordetella pertussis by PCR Not detected        Chlamydophila Pneumonia PCR Not detected        Mycoplasma pneumo by PCR Not detected       Culture, Respiratory [[id number]] Collected: 12/10/23 1934    Order Status: Completed Specimen: Sputum Expectorated Updated: 12/13/23 0845     Special Requests NO SPECIAL REQUESTS        Gram stain 1+ Gram positive cocci         1+ WBCS SEEN               RARE EPITHELIAL CELLS SEEN           Culture       HEAVY NORMAL RESPIRATORY ELIE          Culture, MRSA, Screening [[id number]] Collected: 12/09/23 1843    Order Status: Completed Specimen: Nares Updated: 12/11/23 1203     Special Requests NO further life threatening deterioration of the patient’s condition  The services I provided to this patient were to treat and/or prevent clinically significant deterioration that could result in the failure of one or more body systems and/or organ systems due to respiratory distress, hypoxia, cardiac dysrhythmia.       Charity Caputo, APRN - NP  12/21/23  Pulmonary, Critical Care Medicine  [facility]

## 2023-12-21 NOTE — PROGRESS NOTES
PCC Update:  Discussion with daughter, Pineda Mitchell, this morning regarding rapid deterioration of her mother overnight. Patient experienced elevated plateau pressures and peak pressures overnight. Repeat ABG's were performed with significant hypercapnia. Attempts were made to adjust the ventilator, however, on the repeat gas patient remained hypercapnic and there was not much room for adjustments due to elevated plateau pressures and peak pressures. Hypoxia remains minimally improved and patient went into Afib RVR this morning. Amiodarone gtt was ordered. She received the bolus, however, once the gtt was started she abruptly became bradycardic into the 50's, so amiodarone gtt was stopped. Apparently she experienced several pauses and periods of ectopy this morning as well, so CRRT was stopped. Will reach out to Nephrology in regards to whether we should continue CRRT. All the above was explained to the patient's daughter. She stated understanding. DNR vs. Comfort care was recommended, however, she was not ready to move forward with either at this time. She requested if the ICU team could wait until after franck. I have explained the patient is critically ill and will likely not survive until franck. She states understanding. Will update if any further changes occur. Addendum:  849  I have spoken with Dr. Gabriela Crawford, Nephrology, and in regards to CRRT. He states we will currently hold off on CRRT as it is not providing any benefit at this time and patient has not improved clinically. Bedside RN updated.      Awilda Bernheim, AGACNP-BC  12/21/23  Pulmonary, Critical Care Medicine  [facility]

## 2023-12-21 NOTE — PROGRESS NOTES
Spoke with pt's daughter Leo Parker and updated her on pt's status. Daughter later decided on DNR/DNI and called family in. Family meeting with Abilio Dickens , pt's mother, and various cousins. Based on pt's current condition and grim prognosis, pt was made Comfort care measures only. Orders written.

## 2023-12-22 LAB
ARTERIAL PATENCY WRIST A: ABNORMAL
BASE DEFICIT BLD-SCNC: 4 MMOL/L
BDY SITE: ABNORMAL
FUNGITELL INTERPRETATION: NORMAL
FUNGITELL: <31.25 PG/ML
GAS FLOW.O2 O2 DELIVERY SYS: ABNORMAL
GAS FLOW.O2 SETTING OXYMISER: 28 BPM
HCO3 BLD-SCNC: 23.5 MMOL/L (ref 22–26)
Lab: NORMAL
O2/TOTAL GAS SETTING VFR VENT: 100 %
PCO2 BLD: 54.1 MMHG (ref 35–45)
PEEP RESPIRATORY: 10 CMH2O
PH BLD: 7.25 (ref 7.35–7.45)
PO2 BLD: 68 MMHG (ref 80–100)
REFERENCE VALUE: NORMAL
RESULT: NEGATIVE
SAO2 % BLD: 89.3 % (ref 92–97)
SERVICE CMNT-IMP: ABNORMAL
SPECIMEN TYPE: ABNORMAL
VENTILATION MODE VENT: ABNORMAL
VT SETTING VENT: 400 ML

## 2023-12-23 LAB
ARTERIAL PATENCY WRIST A: ABNORMAL
BASE DEFICIT BLD-SCNC: 2.4 MMOL/L
BDY SITE: ABNORMAL
GAS FLOW.O2 O2 DELIVERY SYS: ABNORMAL
GAS FLOW.O2 SETTING OXYMISER: 26 BPM
HCO3 BLD-SCNC: 26 MMOL/L (ref 22–26)
O2/TOTAL GAS SETTING VFR VENT: 100 %
PCO2 BLD: 62.9 MMHG (ref 35–45)
PEEP RESPIRATORY: 10 CMH2O
PH BLD: 7.23 (ref 7.35–7.45)
PO2 BLD: 74 MMHG (ref 80–100)
SAO2 % BLD: 90.8 % (ref 92–97)
SERVICE CMNT-IMP: ABNORMAL
SPECIMEN TYPE: ABNORMAL
VENTILATION MODE VENT: ABNORMAL
VT SETTING VENT: 400 ML

## 2023-12-24 LAB
ARTERIAL PATENCY WRIST A: POSITIVE
BACTERIA SPEC CULT: NORMAL
BASE DEFICIT BLD-SCNC: 1.8 MMOL/L
BDY SITE: ABNORMAL
GAS FLOW.O2 O2 DELIVERY SYS: ABNORMAL
GAS FLOW.O2 SETTING OXYMISER: 32 BPM
HCO3 BLD-SCNC: 24.2 MMOL/L (ref 22–26)
O2/TOTAL GAS SETTING VFR VENT: 85 %
PCO2 BLD: 44.9 MMHG (ref 35–45)
PEEP RESPIRATORY: 10 CMH2O
PH BLD: 7.34 (ref 7.35–7.45)
PO2 BLD: 61 MMHG (ref 80–100)
RESPIRATORY RATE, POC: 32 (ref 5–40)
SAO2 % BLD: 89.2 % (ref 92–97)
SERVICE CMNT-IMP: ABNORMAL
SERVICE CMNT-IMP: NORMAL
SPECIMEN TYPE: ABNORMAL
VENTILATION MODE VENT: ABNORMAL
VT SETTING VENT: 400 ML

## 2024-01-19 NOTE — PROGRESS NOTES
ABG results AC/VC+ 28 400 10 100%:    pH 7.25  pCO2 54  PO2 68  HCO3 24  BE -4  SO2 89% normal gait and station

## (undated) DEVICE — STERILE POLYISOPRENE POWDER-FREE SURGICAL GLOVES: Brand: PROTEXIS

## (undated) DEVICE — MEDI-VAC NON-CONDUCTIVE SUCTION TUBING: Brand: CARDINAL HEALTH

## (undated) DEVICE — KIT CLN UP BON SECOURS MARYV

## (undated) DEVICE — SUTURE PROL SZ 5-0 L18IN NONABSORBABLE BLU L19MM PS-2 3/8 8686G

## (undated) DEVICE — PACK,LITHOTOMY: Brand: MEDLINE

## (undated) DEVICE — TRAY PREP DRY W/ PREM GLV 2 APPL 6 SPNG 2 UNDPD 1 OVERWRAP

## (undated) DEVICE — Device

## (undated) DEVICE — FLEX ADVANTAGE 3000CC: Brand: FLEX ADVANTAGE

## (undated) DEVICE — TRAP SUC MUCOUS 70ML -- MEDICHOICE MEDLINE

## (undated) DEVICE — Device: Brand: MEDEX

## (undated) DEVICE — 3M™ BAIR PAWS FLEX™ WARMING GOWN, STANDARD, 20 PER CASE 81003: Brand: BAIR PAWS™

## (undated) DEVICE — GDWIRE 3CM FLX-TIP 0.038X150CM -- BX/5 SENSOR

## (undated) DEVICE — SPONGE LAPAROTOMY W18XL18IN WHITE STRUNG RADIOPAQUE STERILE

## (undated) DEVICE — MATERNITY PAD,HEAVY: Brand: CURITY

## (undated) DEVICE — Y-TYPE TUR/BLADDER IRRIGATION SET, REGULATING CLAMP

## (undated) DEVICE — GAUZE SPONGES,16 PLY: Brand: CURITY

## (undated) DEVICE — TIP MANIP UTER RUMI 6.7MMX6CM --

## (undated) DEVICE — MAYO STAND COVER: Brand: CONVERTORS

## (undated) DEVICE — APPLICATOR FBR TIP L6IN COT TIP WOOD SHFT SWAB 2000 PER CA

## (undated) DEVICE — APPLIER CLP M L11IN TI MULT RNG HNDL 30 CLP STR LIGACLP

## (undated) DEVICE — OPEN-END FLEXI-TIP URETERAL CATHETER: Brand: FLEXI-TIP

## (undated) DEVICE — SOFT SILICONE HYDROCELLULAR SACRUM DRESSING WITH LOCK AWAY LAYER: Brand: ALLEVYN LIFE SACRUM (LARGE) PACK OF 10

## (undated) DEVICE — 4-PORT MANIFOLD: Brand: NEPTUNE 2

## (undated) DEVICE — KENDALL SCD EXPRESS SLEEVES, KNEE LENGTH, MEDIUM: Brand: KENDALL SCD

## (undated) DEVICE — SUTURE ETHLN SZ 5-0 L18IN NONABSORBABLE BLK L19MM PS-2 3/8 1666G

## (undated) DEVICE — COVER LT HNDL BLU STRL -- MEDICHOICE

## (undated) DEVICE — THREE-QUARTER SHEET: Brand: CONVERTORS

## (undated) DEVICE — GOWN,PREVENTION PLUS,XLN/XL,ST,24/CS: Brand: MEDLINE

## (undated) DEVICE — STER SINGLE BASIN SET W/BOWLS: Brand: CARDINAL HEALTH

## (undated) DEVICE — ENDOSCOPIC VALVE WITH ADAPTER.: Brand: SURSEAL® II

## (undated) DEVICE — IRRIGATION KT PMP SGL ACT SAPS -- BX/5

## (undated) DEVICE — TRAY PHAR SYR 10ML CLR PLAS STD N CTRL LUERLOCK TIP

## (undated) DEVICE — PACK PROCEDURE SURG MAJ W/ BASIN LF

## (undated) DEVICE — SHEARS ENDOSCP L9CM CRV HARM FOCS +

## (undated) DEVICE — SEAL INSTR CYSTO ADJ BX PRT SEAL FOR ACC UP TO 6FR

## (undated) DEVICE — BLADELESS OPTICAL TROCAR WITH FIXATION CANNULA: Brand: VERSAPORT

## (undated) DEVICE — DRAPE: MAGNETIC 12X16 30/CS: Brand: MEDICAL ACTION INDUSTRIES

## (undated) DEVICE — SOL INJ L R 1000ML BG --

## (undated) DEVICE — ELECTRODE PT RET AD L9FT HI MOIST COND ADH HYDRGEL CORDED

## (undated) DEVICE — SUTURE PERMA-HAND SZ 3-0 L24IN NONABSORBABLE BLK W/O NDL SA74H

## (undated) DEVICE — LEGGINGS, PAIR, 31X48, STERILE: Brand: MEDLINE

## (undated) DEVICE — X-RAY SPONGES,12 PLY: Brand: DERMACEA

## (undated) DEVICE — DISPOSABLE SUCTION/IRRIGATOR TUBE SET WITH TIP: Brand: AHTO

## (undated) DEVICE — APPLICATOR BNDG 1MM ADH PREMIERPRO EXOFIN

## (undated) DEVICE — NITINOL STONE RETRIEVAL BASKET: Brand: ZERO TIP

## (undated) DEVICE — SINGLE-USE HOLMIUM LASER FIBER: Brand: ACCUTRAC

## (undated) DEVICE — VISUALIZATION SYSTEM: Brand: CLEARIFY

## (undated) DEVICE — INTENDED FOR TISSUE SEPARATION, AND OTHER PROCEDURES THAT REQUIRE A SHARP SURGICAL BLADE TO PUNCTURE OR CUT.: Brand: BARD-PARKER ® DISPOSABLE SCALPELS

## (undated) DEVICE — BAG DRAINAGE CUST DISP

## (undated) DEVICE — Z DUP USE 2565107 PACK SURG PROC LEG CYSTO T-DRAPE REINF TBL CVR HND TWL

## (undated) DEVICE — CATHETERIZATION TRAY 16 FR FOL DRAINAGE BG LUBRI-SIL IC

## (undated) DEVICE — SUTURE VCRL SZ 3-0 L27IN ABSRB UD L26MM SH 1/2 CIR J416H

## (undated) DEVICE — SUTURE PERMA-HAND SZ 2-0 L24IN NONABSORBABLE BLK W/O NDL SA75H

## (undated) DEVICE — SOLUTION IRRIG 3000ML 0.9% SOD CHL FLX CONT 0797208] ICU MEDICAL INC]

## (undated) DEVICE — GOWN,REINFORCED,POLY,AURORA,XLARGE,STRL: Brand: MEDLINE

## (undated) DEVICE — DRAIN SURG 10FR L1/8IN DIA3.2MM SIL CHN RND FULL FLUT TRCR

## (undated) DEVICE — BLADE ES L2.75IN ELASTOMERIC COAT DURABLE BEND UPTO 90DEG

## (undated) DEVICE — SOLUTION IV 1000ML 0.9% SOD CHL

## (undated) DEVICE — SUTURE ETHLN SZ 2-0 L18IN NONABSORBABLE BLK L19MM PS-2 PRIM 593H

## (undated) DEVICE — SHEET, DRAPE, SPLIT, STERILE: Brand: MEDLINE

## (undated) DEVICE — SHEARS ENDOSCP L36CM DIA5MM ULTRASONIC CRV TIP W/ ADV

## (undated) DEVICE — GOWN,AURORA,NONRNF,XL,30/CS: Brand: MEDLINE

## (undated) DEVICE — BLADE ES ELASTOMERIC COAT INSUL DURABLE BEND UPTO 90DEG

## (undated) DEVICE — APPLIER CLP L9.375IN APER 2.1MM CLS L3.8MM 20 SM TI CLP

## (undated) DEVICE — TRAP MUCUS SPECIMEN 40ML -- MEDICHOICE

## (undated) DEVICE — SUTURE PERMA-HAND SZ 2-0 L30IN NONABSORBABLE BLK L26MM SH K833H

## (undated) DEVICE — CORD ES L12FT BPLR FRCP

## (undated) DEVICE — LUB SURG MEDC STRL 2OZ TUBE MC -- MEDICHOICE

## (undated) DEVICE — SUTURE PERMAHAND SZ 3-0 L30IN NONABSORBABLE BLK SH L26MM K832H

## (undated) DEVICE — DEVICE IRRIG TBNG L10IN 30ML POLYVI BLB LUERLOCK FLO CTRL

## (undated) DEVICE — (D)PREP SKN CHLRAPRP APPL 26ML -- CONVERT TO ITEM 371833

## (undated) DEVICE — GLOVE SURG SZ 75 CRM LTX FREE POLYISOPRENE POLYMER BEAD ANTI

## (undated) DEVICE — MINI ENDO POCKET

## (undated) DEVICE — DRAPE TWL SURG 16X26IN BLU ORB04] ALLCARE INC]

## (undated) DEVICE — REM POLYHESIVE ADULT PATIENT RETURN ELECTRODE: Brand: VALLEYLAB

## (undated) DEVICE — TELFA NON-ADHERENT ABSORBENT DRESSING: Brand: TELFA

## (undated) DEVICE — SYR 10ML CTRL LR LCK NSAF LF --

## (undated) DEVICE — Z INACTIVE USE 2527070 DRAPE SURG W40XL44IN UNDERBUTTOCK SMS POLYPR W/ PCH BK DISP

## (undated) DEVICE — BLUNT TROCAR WITH THREADED ANCHOR: Brand: VERSAONE

## (undated) DEVICE — GAUZE,SPONGE,4"X4",12PLY,STERILE,LF,2'S: Brand: MEDLINE

## (undated) DEVICE — ROUND DISSECTORS: Brand: DEROYAL